# Patient Record
Sex: FEMALE | Race: BLACK OR AFRICAN AMERICAN | NOT HISPANIC OR LATINO | Employment: UNEMPLOYED | RURAL
[De-identification: names, ages, dates, MRNs, and addresses within clinical notes are randomized per-mention and may not be internally consistent; named-entity substitution may affect disease eponyms.]

---

## 2017-01-30 ENCOUNTER — OFFICE VISIT (OUTPATIENT)
Dept: FAMILY MEDICINE CLINIC | Facility: CLINIC | Age: 57
End: 2017-01-30

## 2017-01-30 VITALS
DIASTOLIC BLOOD PRESSURE: 66 MMHG | TEMPERATURE: 97.9 F | SYSTOLIC BLOOD PRESSURE: 104 MMHG | HEIGHT: 63 IN | RESPIRATION RATE: 16 BRPM | WEIGHT: 150 LBS | BODY MASS INDEX: 26.58 KG/M2 | HEART RATE: 108 BPM

## 2017-01-30 DIAGNOSIS — G47.30 SLEEP APNEA, UNSPECIFIED TYPE: Primary | ICD-10-CM

## 2017-01-30 DIAGNOSIS — M25.50 ARTHRALGIA, UNSPECIFIED JOINT: ICD-10-CM

## 2017-01-30 DIAGNOSIS — E56.9 VITAMIN DEFICIENCY: ICD-10-CM

## 2017-01-30 PROCEDURE — 99214 OFFICE O/P EST MOD 30 MIN: CPT | Performed by: NURSE PRACTITIONER

## 2017-01-30 RX ORDER — DICLOFENAC SODIUM 75 MG/1
75 TABLET, DELAYED RELEASE ORAL 2 TIMES DAILY
Qty: 60 TABLET | Refills: 5 | Status: SHIPPED | OUTPATIENT
Start: 2017-01-30 | End: 2017-05-02

## 2017-01-30 RX ORDER — FOLIC ACID 1 MG/1
1 TABLET ORAL DAILY
Qty: 30 TABLET | Refills: 5 | Status: SHIPPED | OUTPATIENT
Start: 2017-01-30 | End: 2017-09-22 | Stop reason: SDUPTHER

## 2017-01-30 NOTE — MR AVS SNAPSHOT
Jyoti SARAVIA Person   1/30/2017 3:00 PM   Office Visit    Dept Phone:  [phone]   Encounter #:  [id number]    Provider:  WESTON Burroughs   Department:  [facility]                Your Full Care Plan              Today's Medication Changes          These changes are accurate as of: 1/30/17  4:08 PM.  If you have any questions, ask your nurse or doctor.               Medication(s)that have changed:     diclofenac 75 MG EC tablet   Commonly known as:  VOLTAREN   Take 1 tablet by mouth 2 (Two) Times a Day.   What changed:    - medication strength  - how much to take   Changed by:  WESTON Burroughs         Stop taking medication(s)listed here:     hydrochlorothiazide 25 MG tablet   Commonly known as:  HYDRODIURIL   Stopped by:  WESTON Burroughs                Where to Get Your Medications      These medications were sent to [facility] [place], [place] - [address] - [phone]  - [phone]   [address] [facility] [address]     Phone:  [phone]     diclofenac 75 MG EC tablet    folic acid 1 MG tablet                  Your Updated Medication List          This list is accurate as of: 1/30/17  4:08 PM.  Always use your most recent med list.                aspirin 81 MG chewable tablet       atorvastatin 40 MG tablet   Commonly known as:  LIPITOR   Take 1 tablet by mouth Daily.       diclofenac 75 MG EC tablet   Commonly known as:  VOLTAREN   Take 1 tablet by mouth 2 (Two) Times a Day.       folic acid 1 MG tablet   Commonly known as:  FOLVITE   Take 1 tablet by mouth Daily.       gabapentin 800 MG tablet   Commonly known as:  NEURONTIN   Take 1 tablet by mouth 2 (two) times a day.       ibuprofen 800 MG tablet   Commonly known as:  ADVIL,MOTRIN   take 1 tablet by mouth three times a day if needed for pain       levETIRAcetam 250 MG tablet   Commonly known as:  KEPPRA      Take 1 tablet by mouth 2 (Two) Times a Day.       lisinopril-hydrochlorothiazide 20-25 MG per tablet   Commonly known as:  PRINZIDE,ZESTORETIC   Take 1 tablet by mouth Daily.       loratadine 10 MG tablet   Commonly known as:  CLARITIN   take 1 tablet by mouth once daily       meclizine 25 MG tablet   Commonly known as:  ANTIVERT   take 1 tablet by mouth three times a day if needed for dizziness       methocarbamol 500 MG tablet   Commonly known as:  ROBAXIN   Take 1 tablet by mouth 3 (Three) Times a Day As Needed for muscle spasms.       rOPINIRole 2 MG tablet   Commonly known as:  REQUIP   take 1 tablet by mouth every evening       VENTOLIN  (90 BASE) MCG/ACT inhaler   Generic drug:  albuterol       vitamin D 77207 UNITS capsule capsule   Commonly known as:  ERGOCALCIFEROL   1 CAP EVERY 2 WEEKS               You Were Diagnosed With        Codes Comments    Arthralgia, unspecified joint    -  Primary ICD-10-CM: M25.50  ICD-9-CM: 719.40     Vitamin deficiency     ICD-10-CM: E56.9  ICD-9-CM: 269.2       Instructions     None    Patient Instructions History      Upcoming Appointments     Visit Type Date Time Department    OFFICE VISIT 1/30/2017  3:00 PM [facility]    FOLLOW UP 4/10/2017  1:40 PM [facility]      MyChart Signup     Our records indicate that you have declined [facility] signup. If you would like to sign up for [employer], please email [facility]orm[facility] [email or web address] or call [phone] to obtain an activation code.             Other Info from Your Visit           Your Appointments     Apr 10, 2017  1:40 PM CDT   Follow Up with WESTON Gaffney   [facility] (--)    [address] [place] [address] [address]  [facility] [address] [phone]           Arrive 15 minutes prior to appointment.              Allergies     No Known Allergies      Reason for Visit     Neck Pain re.ck    Sleep Apnea           Vital Signs     Blood Pressure Pulse Temperature  "Respirations Height Weight    104/66 108 97.9 °F (36.6 °C) 16 63\" (160 cm) 150 lb (68 kg)    Body Mass Index Smoking Status                26.57 kg/m2 Current Every Day Smoker          Problems and Diagnoses Noted     Joint pain    Vitamin deficiency        "

## 2017-01-31 NOTE — PROGRESS NOTES
Hattie Riddle is a 56 y.o. female.     Sleep Apnea   This is a chronic problem. The current episode started more than 1 year ago. The problem occurs constantly. The problem has been waxing and waning. Associated symptoms include fatigue. Pertinent negatives include no abdominal pain, anorexia, arthralgias, change in bowel habit, chest pain, chills, congestion, coughing, diaphoresis, fever, headaches, joint swelling, myalgias, nausea, neck pain, numbness, rash, sore throat, swollen glands, urinary symptoms, vertigo, visual change, vomiting or weakness. Nothing aggravates the symptoms. She has tried nothing (has had sleep study done, but was unable to get results or treatment due to misunderstanding with physician who ordered.) for the symptoms. The treatment provided no relief.   Pain   This is a chronic (has neuralgia on right side of her body.  already takes gabapentin and other meds prescribed by neurologist.  has multiple joint pain) problem. The current episode started more than 1 year ago. Associated symptoms include fatigue. Pertinent negatives include no abdominal pain, anorexia, arthralgias, change in bowel habit, chest pain, chills, congestion, coughing, diaphoresis, fever, headaches, joint swelling, myalgias, nausea, neck pain, numbness, rash, sore throat, swollen glands, urinary symptoms, vertigo, visual change, vomiting or weakness. The symptoms are aggravated by walking, bending and standing. She has tried NSAIDs for the symptoms. The treatment provided moderate relief.        The following portions of the patient's history were reviewed and updated as appropriate: allergies, current medications, past family history, past medical history, past social history, past surgical history and problem list.    Review of Systems   Constitutional: Positive for fatigue. Negative for chills, diaphoresis and fever.   HENT: Negative.  Negative for congestion and sore throat.    Respiratory: Negative.   Negative for cough.    Cardiovascular: Negative.  Negative for chest pain.   Gastrointestinal: Negative for abdominal pain, anorexia, change in bowel habit, nausea and vomiting.   Musculoskeletal: Negative.  Negative for arthralgias, joint swelling, myalgias and neck pain.   Skin: Negative.  Negative for rash.   Neurological: Positive for syncope. Negative for vertigo, weakness, numbness and headaches.   Psychiatric/Behavioral: Negative.        Objective   Physical Exam   Constitutional: She is oriented to person, place, and time. She appears well-developed and well-nourished. No distress.   HENT:   Head: Normocephalic and atraumatic.   Eyes: EOM are normal. Pupils are equal, round, and reactive to light.   Neck: Normal range of motion. Neck supple. No thyromegaly present.   Cardiovascular: Normal rate, regular rhythm and normal heart sounds.  Exam reveals no friction rub.    No murmur heard.  Pulmonary/Chest: Effort normal and breath sounds normal. No respiratory distress. She has no wheezes. She has no rales.   Abdominal: Soft.   Musculoskeletal: Normal range of motion.        Right shoulder: She exhibits tenderness.        Right elbow: Tenderness found.        Right wrist: She exhibits tenderness.        Cervical back: She exhibits tenderness.   Neurological: She is alert and oriented to person, place, and time.   Skin: Skin is warm and dry.   Psychiatric: She has a normal mood and affect. Thought content normal.   Nursing note and vitals reviewed.      Assessment/Plan   Jyoti was seen today for neck pain and sleep apnea.    Diagnoses and all orders for this visit:    Sleep apnea, unspecified type    Arthralgia, unspecified joint  -     diclofenac (VOLTAREN) 75 MG EC tablet; Take 1 tablet by mouth 2 (Two) Times a Day.    Vitamin deficiency  -     folic acid (FOLVITE) 1 MG tablet; Take 1 tablet by mouth Daily.    will refer her for a sleep study.  She is given a refill on some of her maintenance meds.

## 2017-02-19 DIAGNOSIS — R42 VERTIGO: ICD-10-CM

## 2017-02-20 RX ORDER — LEVETIRACETAM 250 MG/1
250 TABLET ORAL 2 TIMES DAILY
Qty: 60 TABLET | Refills: 5 | Status: SHIPPED | OUTPATIENT
Start: 2017-02-20 | End: 2017-05-23 | Stop reason: SDUPTHER

## 2017-02-20 RX ORDER — MECLIZINE HYDROCHLORIDE 25 MG/1
TABLET ORAL
Qty: 30 TABLET | Refills: 5 | Status: SHIPPED | OUTPATIENT
Start: 2017-02-20 | End: 2017-05-30 | Stop reason: SDUPTHER

## 2017-02-24 DIAGNOSIS — M25.50 ARTHRALGIA: ICD-10-CM

## 2017-03-16 RX ORDER — LISINOPRIL AND HYDROCHLOROTHIAZIDE 12.5; 1 MG/1; MG/1
TABLET ORAL
Qty: 30 TABLET | Refills: 3 | Status: SHIPPED | OUTPATIENT
Start: 2017-03-16 | End: 2017-04-10

## 2017-03-18 ENCOUNTER — HOSPITAL ENCOUNTER (EMERGENCY)
Facility: HOSPITAL | Age: 57
Discharge: HOME | End: 2017-03-18
Attending: EMERGENCY MEDICINE | Admitting: EMERGENCY MEDICINE

## 2017-03-18 VITALS
RESPIRATION RATE: 18 BRPM | OXYGEN SATURATION: 100 % | HEART RATE: 99 BPM | BODY MASS INDEX: 26.58 KG/M2 | HEIGHT: 63 IN | SYSTOLIC BLOOD PRESSURE: 98 MMHG | WEIGHT: 150 LBS | TEMPERATURE: 97.6 F | DIASTOLIC BLOOD PRESSURE: 62 MMHG

## 2017-03-18 DIAGNOSIS — H60.91 OTITIS EXTERNA OF RIGHT EAR, UNSPECIFIED CHRONICITY, UNSPECIFIED TYPE: Primary | ICD-10-CM

## 2017-03-18 PROCEDURE — 99283 EMERGENCY DEPT VISIT LOW MDM: CPT

## 2017-03-18 RX ORDER — CLINDAMYCIN HYDROCHLORIDE 300 MG/1
300 CAPSULE ORAL EVERY 6 HOURS
Qty: 40 CAPSULE | Refills: 0 | Status: SHIPPED | OUTPATIENT
Start: 2017-03-18 | End: 2017-07-03

## 2017-03-18 NOTE — ED PROVIDER NOTES
Subjective   Patient is a 56 y.o. female presenting with ear pain.   Earache   Location:  Right  Behind ear:  Swelling  Quality:  Aching, sore and throbbing  Severity:  Severe  Onset quality:  Gradual  Duration:  2 days  Timing:  Constant  Progression:  Worsening  Chronicity:  New  Context: not direct blow, not elevation change, not foreign body in ear, not loud noise, not recent URI and not water in ear    Relieved by:  Nothing  Worsened by:  Nothing  Ineffective treatments:  None tried  Associated symptoms: ear discharge, hearing loss and neck pain    Associated symptoms: no abdominal pain, no congestion, no cough, no diarrhea, no fever, no headaches, no rash, no rhinorrhea, no sore throat, no tinnitus and no vomiting    Risk factors: no recent travel, no chronic ear infection and no prior ear surgery        Review of Systems   Constitutional: Negative for activity change, appetite change, chills and fever.   HENT: Positive for ear discharge, ear pain and hearing loss. Negative for congestion, rhinorrhea, sore throat and tinnitus.    Eyes: Negative.  Negative for discharge and redness.   Respiratory: Negative.  Negative for cough, chest tightness and shortness of breath.    Cardiovascular: Negative.  Negative for chest pain, palpitations and leg swelling.   Gastrointestinal: Negative.  Negative for abdominal pain, diarrhea, nausea and vomiting.   Genitourinary: Negative for difficulty urinating, dysuria, flank pain and urgency.   Musculoskeletal: Positive for neck pain. Negative for arthralgias, back pain, joint swelling and myalgias.   Skin: Negative.  Negative for color change and rash.   Neurological: Negative.  Negative for dizziness, seizures, speech difficulty, weakness, numbness and headaches.   Psychiatric/Behavioral: Negative for behavioral problems.   All other systems reviewed and are negative.      Past Medical History:   Diagnosis Date   • Acute bronchitis 07/19/2016   • Acute otitis externa  09/04/2014   • Acute otitis media 02/10/2015   • Acute sinusitis 07/19/2016   • Backache 12/08/2014   • Benign hypertension 05/13/2016   • Cerebrovascular accident    • Dizziness 02/10/2015   • Encounter for gynecological examination without abnormal finding 03/10/2016   • Fatigue 08/28/2015   • Foot pain 06/09/2014   • Hyperkeratosis 04/28/2014   • Hypotensive episode 06/12/2015   • Keratoma 08/04/2014    intractable plantar   • Low blood pressure 08/28/2015   • Menopausal and perimenopausal disorder 03/10/2016    other specified   • Numbness of face 05/13/2016    improved   • Otitis media of right ear 07/30/2016    unspecified   • Perforated tympanic membrane 09/04/2014   • Puncture wound 06/17/2014    injury   • Right-sided face pain 05/13/2016   • Seizure    • Thumb pain 08/28/2015   • Upper respiratory infection 08/28/2015   • Wheezing 10/21/2014       No Known Allergies    Past Surgical History:   Procedure Laterality Date   • BREAST BIOPSY     • OTHER SURGICAL HISTORY  07/07/2014    Destruction of Benign Lesion (1-14)   • TUBAL ABDOMINAL LIGATION         Family History   Problem Relation Age of Onset   • Alzheimer's disease Other    • Bipolar disorder Other    • Depression Other    • Diabetes Other    • Heart disease Other    • Hypertension Other    • Lung cancer Other    • Migraines Other    • Schizophrenia Other    • Sickle cell anemia Other    • Stroke Other    • No Known Problems Mother    • Alzheimer's disease Father    • Heart attack Father    • Throat cancer Father    • No Known Problems Daughter        Social History     Social History   • Marital status:      Spouse name: N/A   • Number of children: N/A   • Years of education: N/A     Social History Main Topics   • Smoking status: Current Every Day Smoker     Packs/day: 0.50     Types: Cigarettes   • Smokeless tobacco: Never Used   • Alcohol use Yes      Comment: rarely   • Drug use: No   • Sexual activity: Defer     Other Topics Concern   •  None     Social History Narrative           Objective   Physical Exam   HENT:   Head: Normocephalic and atraumatic.   Right Ear: Hearing and tympanic membrane normal. There is drainage, swelling and tenderness.   Left Ear: External ear normal.   Nose: Nose normal.   Mouth/Throat: Oropharynx is clear and moist. No oropharyngeal exudate.       Procedures         ED Course  ED Course                  MDM    Final diagnoses:   Otitis externa of right ear, unspecified chronicity, unspecified type            Eber Hooper MD  03/25/17 0253

## 2017-03-18 NOTE — ED NOTES
Patient presented to Ed with C/O right ear pain,swelling and drainage for about 2 days.     Janett Lam LPN  03/18/17 1237

## 2017-03-21 ENCOUNTER — OFFICE VISIT (OUTPATIENT)
Dept: FAMILY MEDICINE CLINIC | Facility: CLINIC | Age: 57
End: 2017-03-21

## 2017-03-21 VITALS
HEIGHT: 63 IN | DIASTOLIC BLOOD PRESSURE: 70 MMHG | SYSTOLIC BLOOD PRESSURE: 112 MMHG | TEMPERATURE: 97.8 F | BODY MASS INDEX: 25.39 KG/M2 | WEIGHT: 143.3 LBS | RESPIRATION RATE: 16 BRPM | HEART RATE: 76 BPM | OXYGEN SATURATION: 98 %

## 2017-03-21 DIAGNOSIS — H60.501 ACUTE OTITIS EXTERNA OF RIGHT EAR, UNSPECIFIED TYPE: Primary | ICD-10-CM

## 2017-03-21 PROCEDURE — 99213 OFFICE O/P EST LOW 20 MIN: CPT | Performed by: NURSE PRACTITIONER

## 2017-03-21 RX ORDER — AMOXICILLIN AND CLAVULANATE POTASSIUM 875; 125 MG/1; MG/1
1 TABLET, FILM COATED ORAL 2 TIMES DAILY
Qty: 20 TABLET | Refills: 0 | Status: SHIPPED | OUTPATIENT
Start: 2017-03-21 | End: 2017-04-11 | Stop reason: SDUPTHER

## 2017-03-21 NOTE — PROGRESS NOTES
Hattie Riddle is a 56 y.o. female.     HPI Comments: Was seen at the er a few nights ago with pain in her right ear.  She was placed on gtts and antibiotics and is not improved.    Earache    There is pain in the right ear. This is a new problem. The current episode started in the past 7 days. The problem occurs constantly. The problem has been unchanged. There has been no fever. The pain is at a severity of 7/10. The pain is moderate. Pertinent negatives include no abdominal pain, coughing, diarrhea, ear discharge, headaches, hearing loss, neck pain, rash, rhinorrhea, sore throat or vomiting. She has tried antibiotics and ear drops for the symptoms. The treatment provided no relief.        The following portions of the patient's history were reviewed and updated as appropriate: allergies, current medications, past family history, past medical history, past social history, past surgical history and problem list.    Review of Systems   Constitutional: Negative.    HENT: Positive for ear pain. Negative for ear discharge, hearing loss, rhinorrhea and sore throat.    Respiratory: Negative.  Negative for cough.    Cardiovascular: Negative.    Gastrointestinal: Negative.  Negative for abdominal pain, diarrhea and vomiting.   Musculoskeletal: Negative.  Negative for neck pain.   Skin: Negative.  Negative for rash.   Neurological: Negative.  Negative for headaches.   Psychiatric/Behavioral: Negative.        Objective   Physical Exam   Constitutional: She is oriented to person, place, and time. She appears well-developed and well-nourished. No distress.   HENT:   Head: Normocephalic and atraumatic.   Right Ear: Hearing normal.   Left Ear: Hearing, tympanic membrane, external ear and ear canal normal.   Right canal swollen and is full of cheesy debris.     Eyes: Pupils are equal, round, and reactive to light.   Neck: Normal range of motion. Neck supple. No thyromegaly present.   Cardiovascular: Normal rate,  regular rhythm and normal heart sounds.  Exam reveals no friction rub.    No murmur heard.  Pulmonary/Chest: Effort normal and breath sounds normal. No respiratory distress. She has no wheezes. She has no rales.   Abdominal: Soft. Bowel sounds are normal.   Musculoskeletal: Normal range of motion.   Neurological: She is alert and oriented to person, place, and time.   Skin: Skin is warm and dry.   Psychiatric: She has a normal mood and affect. Thought content normal.   Nursing note and vitals reviewed.      Assessment/Plan   Jyoti was seen today for follow-up.    Diagnoses and all orders for this visit:    Acute otitis externa of right ear, unspecified type  -     amoxicillin-clavulanate (AUGMENTIN) 875-125 MG per tablet; Take 1 tablet by mouth 2 (Two) Times a Day.      The clindomycin is changed to augmentin.  She should cont with the ear gtts.  Will refer to ent.

## 2017-03-22 RX ORDER — IBUPROFEN 800 MG/1
TABLET ORAL
Qty: 30 TABLET | Refills: 3 | OUTPATIENT
Start: 2017-03-22

## 2017-03-27 DIAGNOSIS — I10 ESSENTIAL HYPERTENSION: ICD-10-CM

## 2017-03-27 RX ORDER — LISINOPRIL AND HYDROCHLOROTHIAZIDE 25; 20 MG/1; MG/1
TABLET ORAL
Qty: 30 TABLET | Refills: 3 | Status: SHIPPED | OUTPATIENT
Start: 2017-03-27 | End: 2017-07-28 | Stop reason: SDUPTHER

## 2017-04-04 ENCOUNTER — CLINICAL SUPPORT (OUTPATIENT)
Dept: FAMILY MEDICINE CLINIC | Facility: CLINIC | Age: 57
End: 2017-04-04

## 2017-04-04 DIAGNOSIS — H66.91 RIGHT OTITIS MEDIA, UNSPECIFIED CHRONICITY, UNSPECIFIED OTITIS MEDIA TYPE: Primary | ICD-10-CM

## 2017-04-04 DIAGNOSIS — R06.2 WHEEZING: Primary | ICD-10-CM

## 2017-04-04 PROCEDURE — 96372 THER/PROPH/DIAG INJ SC/IM: CPT | Performed by: NURSE PRACTITIONER

## 2017-04-04 RX ORDER — ALBUTEROL SULFATE 90 UG/1
2 AEROSOL, METERED RESPIRATORY (INHALATION)
Qty: 18 G | Refills: 3 | Status: SHIPPED | OUTPATIENT
Start: 2017-04-04 | End: 2018-04-26 | Stop reason: SDUPTHER

## 2017-04-04 RX ORDER — CEFTRIAXONE 1 G/1
1 INJECTION, POWDER, FOR SOLUTION INTRAMUSCULAR; INTRAVENOUS EVERY 24 HOURS
Status: DISCONTINUED | OUTPATIENT
Start: 2017-04-04 | End: 2017-07-03

## 2017-04-04 RX ADMIN — CEFTRIAXONE 1 G: 1 INJECTION, POWDER, FOR SOLUTION INTRAMUSCULAR; INTRAVENOUS at 17:00

## 2017-04-10 ENCOUNTER — OFFICE VISIT (OUTPATIENT)
Dept: NEUROLOGY | Facility: CLINIC | Age: 57
End: 2017-04-10

## 2017-04-10 VITALS
DIASTOLIC BLOOD PRESSURE: 74 MMHG | SYSTOLIC BLOOD PRESSURE: 102 MMHG | HEART RATE: 84 BPM | WEIGHT: 138 LBS | BODY MASS INDEX: 24.45 KG/M2 | HEIGHT: 63 IN

## 2017-04-10 DIAGNOSIS — G40.309 GENERALIZED SEIZURE DISORDER (HCC): ICD-10-CM

## 2017-04-10 DIAGNOSIS — I63.032 CEREBROVASCULAR ACCIDENT (CVA) DUE TO THROMBOSIS OF LEFT CAROTID ARTERY (HCC): Primary | ICD-10-CM

## 2017-04-10 DIAGNOSIS — G47.30 SLEEP APNEA, UNSPECIFIED TYPE: ICD-10-CM

## 2017-04-10 DIAGNOSIS — Z72.0 TOBACCO ABUSE: ICD-10-CM

## 2017-04-10 DIAGNOSIS — R94.01 ABNORMAL EEG: ICD-10-CM

## 2017-04-10 DIAGNOSIS — I10 ESSENTIAL HYPERTENSION: ICD-10-CM

## 2017-04-10 DIAGNOSIS — F19.90 ILLICIT DRUG USE: ICD-10-CM

## 2017-04-10 PROCEDURE — 99213 OFFICE O/P EST LOW 20 MIN: CPT | Performed by: CLINICAL NURSE SPECIALIST

## 2017-04-10 RX ORDER — ATORVASTATIN CALCIUM 40 MG/1
40 TABLET, FILM COATED ORAL DAILY
Qty: 30 TABLET | Refills: 8 | Status: SHIPPED | OUTPATIENT
Start: 2017-04-10 | End: 2017-05-23 | Stop reason: SDUPTHER

## 2017-04-10 NOTE — PATIENT INSTRUCTIONS
"You Can Quit Smoking  If you are ready to quit smoking or are thinking about it, congratulations! You have chosen to help yourself be healthier and live longer! There are lots of different ways to quit smoking. Nicotine gum, nicotine patches, a nicotine inhaler, or nicotine nasal spray can help with physical craving. Hypnosis, support groups, and medicines help break the habit of smoking.  TIPS TO GET OFF AND STAY OFF CIGARETTES  · Learn to predict your moods. Do not let a bad situation be your excuse to have a cigarette. Some situations in your life might tempt you to have a cigarette.  · Ask friends and co-workers not to smoke around you.  · Make your home smoke-free.  · Never have \"just one\" cigarette. It leads to wanting another and another. Remind yourself of your decision to quit.  · On a card, make a list of your reasons for not smoking. Read it at least the same number of times a day as you have a cigarette. Tell yourself everyday, \"I do not want to smoke. I choose not to smoke.\"  · Ask someone at home or work to help you with your plan to quit smoking.  · Have something planned after you eat or have a cup of coffee. Take a walk or get other exercise to perk you up. This will help to keep you from overeating.  · Try a relaxation exercise to calm you down and decrease your stress. Remember, you may be tense and nervous the first two weeks after you quit. This will pass.  · Find new activities to keep your hands busy. Play with a pen, coin, or rubber band. Doodle or draw things on paper.  · Brush your teeth right after eating. This will help cut down the craving for the taste of tobacco after meals. You can try mouthwash too.  · Try gum, breath mints, or diet candy to keep something in your mouth.  IF YOU SMOKE AND WANT TO QUIT:  · Do not stock up on cigarettes. Never buy a carton. Wait until one pack is finished before you buy another.  · Never carry cigarettes with you at work or at home.  · Keep cigarettes " "as far away from you as possible. Leave them with someone else.  · Never carry matches or a lighter with you.  · Ask yourself, \"Do I need this cigarette or is this just a reflex?\"  · Bet with someone that you can quit. Put cigarette money in a [employer] bank every morning. If you smoke, you give up the money. If you do not smoke, by the end of the week, you keep the money.  · Keep trying. It takes 21 days to change a habit!  · Talk to your doctor about using medicines to help you quit. These include nicotine replacement gum, lozenges, or skin patches.     This information is not intended to replace advice given to you by your health care provider. Make sure you discuss any questions you have with your health care provider.     Document Released: 10/14/2010 Document Revised: 03/11/2013 Document Reviewed: 05/03/2016  [employer] Interactive Patient Education ©2016 [employer]  Stroke Prevention  Some medical conditions and behaviors are associated with an increased chance of having a stroke. You may prevent a stroke by making healthy choices and managing medical conditions.  HOW CAN I REDUCE MY RISK OF HAVING A STROKE?   · Stay physically active. Get at least 30 minutes of activity on most or all days.  · Do not smoke. It may also be helpful to avoid exposure to secondhand smoke.  · Limit alcohol use. Moderate alcohol use is considered to be:  ¨ No more than 2 drinks per day for men.  ¨ No more than 1 drink per day for nonpregnant women.  · Eat healthy foods. This involves:  ¨ Eating 5 or more servings of fruits and vegetables a day.  ¨ Making dietary changes that address high blood pressure (hypertension), high cholesterol, diabetes, or obesity.  · Manage your cholesterol levels.  ¨ Making food choices that are high in fiber and low in saturated fat, trans fat, and cholesterol may control cholesterol levels.  ¨ Take any prescribed medicines to control cholesterol as directed by your health care provider.  · Manage your " diabetes.  ¨ Controlling your carbohydrate and sugar intake is recommended to manage diabetes.  ¨ Take any prescribed medicines to control diabetes as directed by your health care provider.  · Control your hypertension.  ¨ Making food choices that are low in salt (sodium), saturated fat, trans fat, and cholesterol is recommended to manage hypertension.  ¨ Ask your health care provider if you need treatment to lower your blood pressure. Take any prescribed medicines to control hypertension as directed by your health care provider.  ¨ If you are 18-39 years of age, have your blood pressure checked every 3-5 years. If you are 40 years of age or older, have your blood pressure checked every year.  · Maintain a healthy weight.  ¨ Reducing calorie intake and making food choices that are low in sodium, saturated fat, trans fat, and cholesterol are recommended to manage weight.  · Stop drug abuse.  · Avoid taking birth control pills.  ¨ Talk to your health care provider about the risks of taking birth control pills if you are over 35 years old, smoke, get migraines, or have ever had a blood clot.  · Get evaluated for sleep disorders (sleep apnea).  ¨ Talk to your health care provider about getting a sleep evaluation if you snore a lot or have excessive sleepiness.  · Take medicines only as directed by your health care provider.  ¨ For some people, aspirin or blood thinners (anticoagulants) are helpful in reducing the risk of forming abnormal blood clots that can lead to stroke. If you have the irregular heart rhythm of atrial fibrillation, you should be on a blood thinner unless there is a good reason you cannot take them.  ¨ Understand all your medicine instructions.  · Make sure that other conditions (such as anemia or atherosclerosis) are addressed.  SEEK IMMEDIATE MEDICAL CARE IF:   · You have sudden weakness or numbness of the face, arm, or leg, especially on one side of the body.  · Your face or eyelid droops to one  side.  · You have sudden confusion.  · You have trouble speaking (aphasia) or understanding.  · You have sudden trouble seeing in one or both eyes.  · You have sudden trouble walking.  · You have dizziness.  · You have a loss of balance or coordination.  · You have a sudden, severe headache with no known cause.  · You have new chest pain or an irregular heartbeat.  Any of these symptoms may represent a serious problem that is an emergency. Do not wait to see if the symptoms will go away. Get medical help at once. Call your local emergency services (911 in [place] Do not drive yourself to the hospital.     This information is not intended to replace advice given to you by your health care provider. Make sure you discuss any questions you have with your health care provider.     Document Released: 01/25/2006 Document Revised: 01/08/2016 Document Reviewed: 06/20/2014  [employer] Interactive Patient Education ©2016 [employer]  Epilepsy  Epilepsy is a disorder in which a person has repeated seizures over time. A seizure is a release of abnormal electrical activity in the brain. Seizures can cause a change in attention, behavior, or the ability to remain awake and alert (altered mental status). Seizures often involve uncontrollable shaking (convulsions).   Most people with epilepsy lead normal lives. However, people with epilepsy are at an increased risk of falls, accidents, and injuries. Therefore, it is important to begin treatment right away.  CAUSES   Epilepsy has many possible causes. Anything that disturbs the normal pattern of brain cell activity can lead to seizures. This may include:   · Head injury.  · Birth trauma.  · High fever as a child.  · Stroke.  · Bleeding into or around the brain.  · Certain drugs.  · Prolonged low oxygen, such as what occurs after CPR efforts.  · Abnormal brain development.  · Certain illnesses, such as meningitis, encephalitis (brain infection), malaria, and other infections.  · An  imbalance of nerve signaling chemicals (neurotransmitters).    SIGNS AND SYMPTOMS   The symptoms of a seizure can vary greatly from one person to another. Right before a seizure, you may have a warning (aura) that a seizure is about to occur. An aura may include the following symptoms:  · Fear or anxiety.  · Nausea.  · Feeling like the room is spinning (vertigo).  · Vision changes, such as seeing flashing lights or spots.  Common symptoms during a seizure include:  · Abnormal sensations, such as an abnormal smell or a bitter taste in the mouth.    · Sudden, general body stiffness.    · Convulsions that involve rhythmic jerking of the face, arm, or leg on one or both sides.    · Sudden change in consciousness.      Appearing to be awake but not responding.      Appearing to be asleep but cannot be awakened.    · Grimacing, chewing, lip smacking, drooling, tongue biting, or loss of bowel or bladder control.  After a seizure, you may feel sleepy for a while.   DIAGNOSIS   Your health care provider will ask about your symptoms and take a medical history. Descriptions from any witnesses to your seizures will be very helpful in the diagnosis. A physical exam, including a detailed neurological exam, is necessary. Various tests may be done, such as:   · An electroencephalogram (EEG). This is a painless test of your brain waves. In this test, a diagram is created of your brain waves. These diagrams can be interpreted by a specialist.  · An MRI of the brain.    · A CT scan of the brain.    · A spinal tap (lumbar puncture, LP).  · Blood tests to check for signs of infection or abnormal blood chemistry.  TREATMENT   There is no cure for epilepsy, but it is generally treatable. Once epilepsy is diagnosed, it is important to begin treatment as soon as possible. For most people with epilepsy, seizures can be controlled with medicines. The following may also be used:  · A pacemaker for the brain (vagus nerve stimulator) can be used  for people with seizures that are not well controlled by medicine.  · Surgery on the brain.  For some people, epilepsy eventually goes away.  HOME CARE INSTRUCTIONS   ·  Follow your health care provider's recommendations on driving and safety in normal activities.  · Get enough rest. Lack of sleep can cause seizures.  · Only take over-the-counter or prescription medicines as directed by your health care provider. Take any prescribed medicine exactly as directed.  · Avoid any known triggers of your seizures.  · Keep a seizure diary. Record what you recall about any seizure, especially any possible trigger.    · Make sure the people you live and work with know that you are prone to seizures. They should receive instructions on how to help you. In general, a witness to a seizure should:      Cushion your head and body.      Turn you on your side.      Avoid unnecessarily restraining you.      Not place anything inside your mouth.      Call for emergency medical help if there is any question about what has occurred.    · Follow up with your health care provider as directed. You may need regular blood tests to monitor the levels of your medicine.    SEEK MEDICAL CARE IF:   · You develop signs of infection or other illness. This might increase the risk of a seizure.    · You seem to be having more frequent seizures.    · Your seizure pattern is changing.    SEEK IMMEDIATE MEDICAL CARE IF:   · You have a seizure that does not stop after a few moments.    · You have a seizure that causes any difficulty in breathing.    · You have a seizure that results in a very severe headache.    · You have a seizure that leaves you with the inability to speak or use a part of your body.       This information is not intended to replace advice given to you by your health care provider. Make sure you discuss any questions you have with your health care provider.     Document Released: 12/18/2006 Document Revised: 01/13/2017 Document  Reviewed: 07/30/2014  [email or web address] Interactive Patient Education ©2016 [employer]

## 2017-04-10 NOTE — PROGRESS NOTES
Subjective     Chief Complaint   Patient presents with   • Neurologic Problem     4 month f/u of stroke/Pt states that she feels like she is unchanged since her last visit.  Pt is R handed.       Jyoti Riddle is a 56 y.o. female right handed .  She is here today for follow up for stroke and abnormal EEG concerning to be seizuregenic. She was last seen 12/2016.  She denies new stroke symptoms of unilateral weakness, slurred speech, or numbness.  She states she did have a seizure a few days ago in the setting of missed doses of keppra and illicit drug use of cocaine and marijuana.  She has no new complaints today.     Stroke   This is a chronic (image negative stroke in 2013 left hemisphere) problem. The problem has been gradually improving. Pertinent negatives include no arthralgias, chest pain, fatigue, fever, myalgias, nausea, sore throat, vomiting or weakness. Associated symptoms comments: Right facial weakness, RUE/RLE weakness and decrease sensation. And mild dysarthria this has improved since last visit but imbalanced and walks with single point cane. Exacerbated by: hypertensive emergency, illicit drug use.   Altered Mental Status   This is a chronic (since 2013) problem. The current episode started more than 1 year ago. The problem has been gradually improving (has had 2 episodes since last visit but may be related to illliciit drug use). Pertinent negatives include no arthralgias, chest pain, fatigue, fever, myalgias, nausea, sore throat, vomiting or weakness. Exacerbated by: illicit drug use, JAQUELIN. Treatments tried: trileptal 150 mg BID.   Seizures    This is a chronic problem. Associated symptoms include speech difficulty (mild dysarthria). Pertinent negatives include no confusion, no visual disturbance, no sore throat, no chest pain, no nausea, no vomiting and no diarrhea. Characteristics include eye blinking. body stiffens Possible causes include missed seizure meds. illicit drug use        Current  Outpatient Prescriptions   Medication Sig Dispense Refill   • albuterol (VENTOLIN HFA) 108 (90 BASE) MCG/ACT inhaler Inhale 2 puffs 4 (Four) Times a Day. 18 g 3   • amoxicillin-clavulanate (AUGMENTIN) 875-125 MG per tablet Take 1 tablet by mouth 2 (Two) Times a Day. 20 tablet 0   • aspirin 81 MG chewable tablet Chew 81 mg.     • folic acid (FOLVITE) 1 MG tablet Take 1 tablet by mouth Daily. 30 tablet 5   • gabapentin (NEURONTIN) 800 MG tablet Take 1 tablet by mouth 2 (two) times a day. 60 tablet 5   • ibuprofen (ADVIL,MOTRIN) 800 MG tablet take 1 tablet by mouth three times a day if needed for pain 30 tablet 3   • levETIRAcetam (KEPPRA) 250 MG tablet Take 1 tablet by mouth 2 (Two) Times a Day. 60 tablet 5   • lisinopril-hydrochlorothiazide (PRINZIDE,ZESTORETIC) 20-25 MG per tablet take 1 tablet by mouth once daily 30 tablet 3   • meclizine (ANTIVERT) 25 MG tablet take 1 tablet by mouth three times a day if needed for dizziness 30 tablet 5   • methocarbamol (ROBAXIN) 500 MG tablet Take 1 tablet by mouth 3 (Three) Times a Day As Needed for muscle spasms. 30 tablet 3   • rOPINIRole (REQUIP) 2 MG tablet take 1 tablet by mouth every evening 30 tablet 3   • vitamin D (ERGOCALCIFEROL) 45161 UNITS capsule capsule 1 CAP EVERY 2 WEEKS 4 capsule 5   • atorvastatin (LIPITOR) 40 MG tablet Take 1 tablet by mouth Daily. 30 tablet 8   • clindamycin (CLEOCIN) 300 MG capsule Take 1 capsule by mouth Every 6 (Six) Hours. 40 capsule 0   • diclofenac (VOLTAREN) 50 MG EC tablet take 1 tablet by mouth twice a day 60 tablet 5   • diclofenac (VOLTAREN) 75 MG EC tablet Take 1 tablet by mouth 2 (Two) Times a Day. 60 tablet 5   • neomycin-polymyxin-hydrocortisone (CORTISPORIN) 3.5-74194-4 otic solution Administer 3 drops into both ears 4 (Four) Times a Day. 10 mL 0     Current Facility-Administered Medications   Medication Dose Route Frequency Provider Last Rate Last Dose   • cefTRIAXone (ROCEPHIN) injection 1 g  1 g Intramuscular Q24H Sharon ROUSSEAU  [facility]   1 g at 04/04/17 1700       Past Medical History:   Diagnosis Date   • Acute bronchitis 07/19/2016   • Acute otitis externa 09/04/2014   • Acute otitis media 02/10/2015   • Acute sinusitis 07/19/2016   • Backache 12/08/2014   • Benign hypertension 05/13/2016   • Cerebrovascular accident    • Dizziness 02/10/2015   • Encounter for gynecological examination without abnormal finding 03/10/2016   • Fatigue 08/28/2015   • Foot pain 06/09/2014   • Hyperkeratosis 04/28/2014   • Hypotensive episode 06/12/2015   • Keratoma 08/04/2014    intractable plantar   • Low blood pressure 08/28/2015   • Menopausal and perimenopausal disorder 03/10/2016    other specified   • Numbness of face 05/13/2016    improved   • Otitis media of right ear 07/30/2016    unspecified   • Perforated tympanic membrane 09/04/2014   • Puncture wound 06/17/2014    injury   • Right-sided face pain 05/13/2016   • Seizure    • Thumb pain 08/28/2015   • Upper respiratory infection 08/28/2015   • Wheezing 10/21/2014       Past Surgical History:   Procedure Laterality Date   • BREAST BIOPSY     • OTHER SURGICAL HISTORY  07/07/2014    Destruction of Benign Lesion (1-14)   • TUBAL ABDOMINAL LIGATION         family history includes Alzheimer's disease in her father and other; Bipolar disorder in her other; Depression in her other; Diabetes in her other; Heart attack in her father; Heart disease in her other; Hypertension in her other; Lung cancer in her other; Migraines in her other; No Known Problems in her daughter and mother; Sickle cell anemia in her other; Stroke in her other; Throat cancer in her father.    Social History   Substance Use Topics   • Smoking status: Current Every Day Smoker     Packs/day: 0.50     Types: Cigarettes   • Smokeless tobacco: Never Used   • Alcohol use Yes      Comment: rarely       Review of Systems   Constitutional: Negative for fatigue and fever.   HENT: Negative.  Negative for rhinorrhea, sinus pressure and sore  "throat.    Eyes: Negative.  Negative for visual disturbance.   Respiratory: Negative.  Negative for choking, chest tightness and shortness of breath.    Cardiovascular: Negative.  Negative for chest pain and leg swelling.   Gastrointestinal: Negative.  Negative for constipation, diarrhea, nausea and vomiting.   Endocrine: Negative.    Genitourinary: Negative.  Negative for dysuria.   Musculoskeletal: Positive for gait problem (unsteady gait). Negative for arthralgias and myalgias.   Skin: Negative.    Allergic/Immunologic: Negative.    Neurological: Positive for seizures and speech difficulty (mild dysarthria). Negative for dizziness, weakness and light-headedness.   Hematological: Negative.  Negative for adenopathy.   Psychiatric/Behavioral: Negative for agitation and confusion.   All other systems reviewed and are negative.      Objective     /74  Pulse 84  Ht 63\" (160 cm)  Wt 138 lb (62.6 kg)  BMI 24.45 kg/m2, Body mass index is 24.45 kg/(m^2).    Physical Exam   Constitutional: She is oriented to person, place, and time. Vital signs are normal. She appears well-developed and well-nourished.   HENT:   Head: Normocephalic and atraumatic.   Right Ear: Hearing and external ear normal.   Left Ear: Hearing and external ear normal.   Nose: Nose normal.   Mouth/Throat: Oropharynx is clear and moist.   Eyes: EOM and lids are normal. Pupils are equal, round, and reactive to light.   Neck: Neck supple. Carotid bruit is not present.   Cardiovascular: Normal rate, regular rhythm, S1 normal, S2 normal and normal heart sounds.    Pulmonary/Chest: Effort normal and breath sounds normal.   Abdominal: Soft. Bowel sounds are normal.   Musculoskeletal: Normal range of motion.   Neurological: She is alert and oriented to person, place, and time. She has normal strength and normal reflexes. She displays no tremor. A cranial nerve deficit (right lower facial weakness) and sensory deficit (decrease sensation of right side) is " present. She displays a negative Romberg sign. She displays no seizure activity. Coordination (ataxia bilateral finger to nose) abnormal. Abnormal gait: unsteady gait, negative romberg.   Reflex Scores:       Tricep reflexes are 2+ on the right side and 2+ on the left side.       Bicep reflexes are 2+ on the right side and 2+ on the left side.       Brachioradialis reflexes are 2+ on the right side and 2+ on the left side.       Patellar reflexes are 2+ on the right side and 2+ on the left side.       Achilles reflexes are 2+ on the right side and 2+ on the left side.  Skin: Skin is warm and dry.   Psychiatric: She has a normal mood and affect. Her speech is normal and behavior is normal. Cognition and memory are normal.   Nursing note and vitals reviewed.      Results for orders placed or performed during the hospital encounter of 12/23/16   Comprehensive Metabolic Panel   Result Value Ref Range    Sodium 142 137 - 145 mmol/L    Potassium 4.2 3.5 - 5.1 mmol/L    Chloride 103 95 - 110 mmol/L    CO2 27 22 - 31 mmol/L    Anion Gap 12.0 5.0 - 15.0 mmol/L    Glucose 85 60 - 100 mg/dl    BUN 22 (H) 7 - 21 mg/dl    Creatinine 1.2 (H) 0.5 - 1.0 mg/dl    GFR MDRD Non  46 (L) 51 - 120 mL/min/1.73 sq.M    GFR MDRD  56 51 - 120 mL/min/1.73 sq.M    Calcium 8.4 8.4 - 10.2 mg/dl    Total Protein 6.6 6.3 - 8.6 gm/dl    Albumin 3.3 (L) 3.4 - 4.8 gm/dl    Total Bilirubin 0.4 0.2 - 1.3 mg/dl    Alkaline Phosphatase 60 38 - 126 U/L    AST (SGOT) 57 (H) 14 - 36 U/L    ALT (SGPT) 51 9 - 52 U/L   CBC & Differential   Result Value Ref Range    WBC 3.5 3.2 - 9.8 x1000/uL    RBC 3.72 (L) 3.77 - 5.16 jessica/mm3    Hemoglobin 11.4 (L) 12.0 - 15.5 gm/dl    Hematocrit 33.8 (L) 35.0 - 45.0 %    MCV 90.9 80.0 - 98.0 fl    MCH 30.6 26.0 - 34.0 pg    MCHC 33.7 31.4 - 36.0 gm/dl    RDW 12.9 11.5 - 14.5 %    Platelets 125 (L) 150 - 450 x1000/mm3    MPV 11.6 8.0 - 12.0 fl    Neutrophil Rel % 42.0 37.0 - 80.0 %     Lymphocyte Rel % 40.2 10.0 - 50.0 %    Monocyte Rel % 6.9 0.0 - 12.0 %    Eosinophil Rel % 10.6 (H) 0.0 - 7.0 %    Basophil Rel % 0.3 0.0 - 2.0 %    Immature Granulocyte Rel % 0.00 0.00 - 0.50 %    Neutrophils Absolute 1.46 (L) 2.00 - 8.60 x1000/uL    Lymphocytes Absolute 1.40 0.60 - 4.20 x1000/uL    Monocytes Absolute 0.24 0.00 - 0.90 x1000/uL    Eosinophils Absolute 0.37 0.00 - 0.70 x1000/uL    Basophils Absolute 0.01 0.00 - 0.20 x1000/uL    Immature Granulocytes Absolute 0.000 (L) 0.005 - 0.022 x1000/uL        ASSESSMENT/PLAN    Diagnoses and all orders for this visit:    Cerebrovascular accident (CVA) due to thrombosis of left carotid artery    Tobacco abuse    Sleep apnea, unspecified type    Illicit drug use    Abnormal EEG    Essential hypertension    Other orders  -     atorvastatin (LIPITOR) 40 MG tablet; Take 1 tablet by mouth Daily.    MEDICAL DECISION MAKIN. Continue with ASA 81 mg for secondary stroke prevention  2. Continue with statin per PCP for LDL goal less than 70.  3. Continue with BP management per PCP for systolic less than 140.  4. Continue with Keppra 250 mg BID for abnormal EEG and counseled on compliance.  5. Counseled on cessation of cocaine, marijuana, and tobacco  6. Patient is counseled on stroke signs and symptoms using FAST and Time Saved is Brain Saved.  7. Seizure precautions were discussed to include no tub baths, no swimming, avoiding lack of sleep, and avoiding known triggers. Education given of things that may contribute to a seizure to include, but not limited to: stressful situations, fever, fatigue, lack of sleep, low blood sugar, hyperventilation, flashing lights, and caffeine. Instructions given to take seizure medications as prescribed. Education given to family member on what to do during a seizure and care following the seizure. Education given to contact this office prior to stopping or changing any medications.  8.Discussed tobacco cessation for greater than 3  minutes to include options for cessation and information given for support groups. All questions answered.       allergies and all known medications/prescriptions have been reviewed using resources available on this encounter.    Return in about 6 months (around 10/10/2017).        WESTON Lara

## 2017-04-11 ENCOUNTER — OFFICE VISIT (OUTPATIENT)
Dept: FAMILY MEDICINE CLINIC | Facility: CLINIC | Age: 57
End: 2017-04-11

## 2017-04-11 VITALS
WEIGHT: 137.2 LBS | OXYGEN SATURATION: 95 % | SYSTOLIC BLOOD PRESSURE: 134 MMHG | HEIGHT: 63 IN | RESPIRATION RATE: 18 BRPM | DIASTOLIC BLOOD PRESSURE: 88 MMHG | BODY MASS INDEX: 24.31 KG/M2 | TEMPERATURE: 95.9 F | HEART RATE: 76 BPM

## 2017-04-11 DIAGNOSIS — H60.501 ACUTE OTITIS EXTERNA OF RIGHT EAR, UNSPECIFIED TYPE: ICD-10-CM

## 2017-04-11 DIAGNOSIS — M25.562 ACUTE PAIN OF LEFT KNEE: Primary | ICD-10-CM

## 2017-04-11 PROCEDURE — 99213 OFFICE O/P EST LOW 20 MIN: CPT | Performed by: NURSE PRACTITIONER

## 2017-04-11 RX ORDER — AMOXICILLIN AND CLAVULANATE POTASSIUM 875; 125 MG/1; MG/1
1 TABLET, FILM COATED ORAL 2 TIMES DAILY
Qty: 20 TABLET | Refills: 0 | Status: SHIPPED | OUTPATIENT
Start: 2017-04-11 | End: 2017-07-03

## 2017-04-11 NOTE — PROGRESS NOTES
Hattie Riddle is a 56 y.o. female.     HPI Comments: She is here today with painful right ear.  Has had a hx of otitis media and otitis ext.    She feel in her bathroom 3 days ago and hurt her left knee.  Is still hurting.    Earache    There is pain in the right ear. This is a new problem. The current episode started in the past 7 days. The problem occurs constantly. The problem has been unchanged. There has been no fever. The pain is at a severity of 4/10. The pain is moderate. Pertinent negatives include no abdominal pain, coughing, diarrhea, ear discharge, headaches, hearing loss, neck pain, rash, rhinorrhea, sore throat or vomiting. She has tried antibiotics for the symptoms. The treatment provided mild relief. Her past medical history is significant for a chronic ear infection.   Knee Pain    The incident occurred 3 to 5 days ago. The incident occurred at home. The injury mechanism was a fall. The pain is present in the left knee. The pain is at a severity of 4/10. The pain is moderate. The pain has been constant since onset. Pertinent negatives include no inability to bear weight, loss of motion, loss of sensation, muscle weakness, numbness or tingling. The symptoms are aggravated by weight bearing. She has tried nothing for the symptoms. The treatment provided no relief.        The following portions of the patient's history were reviewed and updated as appropriate: allergies, current medications, past family history, past medical history, past social history, past surgical history and problem list.    Review of Systems   Constitutional: Negative.    HENT: Positive for ear pain. Negative for ear discharge, hearing loss, rhinorrhea and sore throat.    Respiratory: Negative.  Negative for cough.    Cardiovascular: Negative.    Gastrointestinal: Negative for abdominal pain, diarrhea and vomiting.   Musculoskeletal: Positive for arthralgias (left knee pain). Negative for neck pain.   Skin:  Negative.  Negative for rash.   Neurological: Negative.  Negative for tingling, numbness and headaches.   Psychiatric/Behavioral: Negative.        Objective   Physical Exam   Constitutional: She is oriented to person, place, and time. She appears well-developed and well-nourished. No distress.   HENT:   Head: Normocephalic.   Right Ear: Hearing normal. Tympanic membrane is injected.   Right canal remains injected and some areas are excoriated appearing.   Eyes: Pupils are equal, round, and reactive to light.   Neck: Normal range of motion. Neck supple. No thyromegaly present.   Cardiovascular: Normal rate, regular rhythm and normal heart sounds.  Exam reveals no friction rub.    No murmur heard.  Pulmonary/Chest: Effort normal and breath sounds normal. No respiratory distress. She has no wheezes. She has no rales.   Abdominal: Soft. Bowel sounds are normal.   Musculoskeletal: Normal range of motion.   Has good rom of her left knee.  No discoloration and can bear weight without diff.  xrays are negative.   Neurological: She is alert and oriented to person, place, and time.   Skin: Skin is warm and dry.   Psychiatric: She has a normal mood and affect. Thought content normal.   Nursing note and vitals reviewed.      Assessment/Plan   Jyoti was seen today for follow-up.    Diagnoses and all orders for this visit:    Acute pain of left knee  -     Cancel: XR Knee 1 or 2 View Left (In Office)  -     XR knee 4+ vw left    Acute otitis externa of right ear, unspecified type  -     neomycin-polymyxin-hydrocortisone (CORTISPORIN) 3.5-07661-4 otic solution; Administer 3 drops into both ears 4 (Four) Times a Day.  -     amoxicillin-clavulanate (AUGMENTIN) 875-125 MG per tablet; Take 1 tablet by mouth 2 (Two) Times a Day.      She is advised to not put anything in her ear.  Also will just watch the left knee for now.

## 2017-04-21 ENCOUNTER — OFFICE VISIT (OUTPATIENT)
Dept: FAMILY MEDICINE CLINIC | Facility: CLINIC | Age: 57
End: 2017-04-21

## 2017-04-21 VITALS
HEIGHT: 63 IN | RESPIRATION RATE: 16 BRPM | SYSTOLIC BLOOD PRESSURE: 122 MMHG | TEMPERATURE: 97.1 F | HEART RATE: 76 BPM | DIASTOLIC BLOOD PRESSURE: 84 MMHG | WEIGHT: 135.3 LBS | OXYGEN SATURATION: 98 % | BODY MASS INDEX: 23.97 KG/M2

## 2017-04-21 DIAGNOSIS — R63.4 WEIGHT LOSS: ICD-10-CM

## 2017-04-21 DIAGNOSIS — M54.32 LEFT SIDED SCIATICA: Primary | ICD-10-CM

## 2017-04-21 PROCEDURE — 96372 THER/PROPH/DIAG INJ SC/IM: CPT | Performed by: NURSE PRACTITIONER

## 2017-04-21 PROCEDURE — 99213 OFFICE O/P EST LOW 20 MIN: CPT | Performed by: NURSE PRACTITIONER

## 2017-04-21 RX ORDER — CYCLOBENZAPRINE HCL 10 MG
10 TABLET ORAL 3 TIMES DAILY PRN
Qty: 30 TABLET | Refills: 3 | Status: SHIPPED | OUTPATIENT
Start: 2017-04-21 | End: 2017-06-01 | Stop reason: SDUPTHER

## 2017-04-21 RX ORDER — BETAMETHASONE SODIUM PHOSPHATE AND BETAMETHASONE ACETATE 3; 3 MG/ML; MG/ML
6 INJECTION, SUSPENSION INTRA-ARTICULAR; INTRALESIONAL; INTRAMUSCULAR; SOFT TISSUE EVERY 24 HOURS
Status: SHIPPED | OUTPATIENT
Start: 2017-04-21 | End: 2017-04-23

## 2017-04-21 RX ADMIN — BETAMETHASONE SODIUM PHOSPHATE AND BETAMETHASONE ACETATE 6 MG: 3; 3 INJECTION, SUSPENSION INTRA-ARTICULAR; INTRALESIONAL; INTRAMUSCULAR; SOFT TISSUE at 15:13

## 2017-04-21 NOTE — PROGRESS NOTES
Hattie Riddle is a 56 y.o. female.     HPI Comments: Was here last week with left knee pain due to a fall.  Now the knee is better, but is having more left back and sciatic nerve pain.  She took a muscle relaxer that belonged to someone else and it did help.  She is also concerned about weight loss.  She says her appetite is good.    Leg Pain    The incident occurred 5 to 7 days ago. The incident occurred at home. There was no injury mechanism. The pain is present in the left leg and left thigh. The pain is at a severity of 6/10. The pain is moderate. The pain has been worsening since onset. Pertinent negatives include no inability to bear weight, loss of motion, loss of sensation, muscle weakness, numbness or tingling. The symptoms are aggravated by weight bearing and movement. Treatments tried: muscle relaxer. The treatment provided significant relief.   Weight Loss   This is a new problem. The current episode started in the past 7 days (has lost3 lbs in the past week to 10 days). The problem occurs constantly. The problem has been gradually worsening. Associated symptoms include arthralgias. Pertinent negatives include no abdominal pain, anorexia, change in bowel habit, chest pain, chills, congestion, coughing, diaphoresis, fatigue, fever, headaches, joint swelling, myalgias, nausea, neck pain, numbness, rash, sore throat, swollen glands, urinary symptoms, vertigo, visual change, vomiting or weakness. Nothing aggravates the symptoms. She has tried nothing for the symptoms. The treatment provided no relief.        The following portions of the patient's history were reviewed and updated as appropriate: allergies, current medications, past family history, past medical history, past social history, past surgical history and problem list.    Review of Systems   Constitutional: Positive for weight loss. Negative for chills, diaphoresis, fatigue and fever.   HENT: Negative.  Negative for congestion and  sore throat.    Respiratory: Negative.  Negative for cough.    Cardiovascular: Negative.  Negative for chest pain.   Gastrointestinal: Negative for abdominal pain, anorexia, change in bowel habit, nausea and vomiting.   Musculoskeletal: Positive for arthralgias. Negative for joint swelling, myalgias and neck pain.   Skin: Negative.  Negative for rash.   Neurological: Negative.  Negative for vertigo, tingling, weakness, numbness and headaches.   Psychiatric/Behavioral: Negative.        Objective   Physical Exam   Constitutional: She is oriented to person, place, and time. She appears well-developed and well-nourished. No distress.   HENT:   Head: Normocephalic.   Eyes: Pupils are equal, round, and reactive to light.   Neck: Normal range of motion. Neck supple. No thyromegaly present.   Cardiovascular: Normal rate, regular rhythm and normal heart sounds.  Exam reveals no friction rub.    No murmur heard.  Pulmonary/Chest: Effort normal and breath sounds normal. No respiratory distress. She has no wheezes. She has no rales.   Abdominal: Soft.   Musculoskeletal: Normal range of motion.        Left upper leg: She exhibits tenderness and edema.   Neurological: She is alert and oriented to person, place, and time.   Skin: Skin is warm and dry.   Psychiatric: She has a normal mood and affect. Thought content normal.   Nursing note and vitals reviewed.      Assessment/Plan   Jyoti was seen today for leg pain.    Diagnoses and all orders for this visit:    Left sided sciatica  -     betamethasone acetate-betamethasone sodium phosphate (CELESTONE SOLUSPAN) injection 6 mg; Inject 1 mL into the shoulder, thigh, or buttocks Daily.    Weight loss  -     TSH    Other orders  -     cyclobenzaprine (FLEXERIL) 10 MG tablet; Take 1 tablet by mouth 3 (Three) Times a Day As Needed for Muscle Spasms.    she has had other labs done and were normal.  tsh has not been checked in a while.

## 2017-04-26 DIAGNOSIS — G62.9 NEUROPATHY: ICD-10-CM

## 2017-04-26 RX ORDER — GABAPENTIN 800 MG/1
800 TABLET ORAL 2 TIMES DAILY
Qty: 60 TABLET | Refills: 5 | Status: SHIPPED | OUTPATIENT
Start: 2017-04-26 | End: 2017-10-12

## 2017-04-28 DIAGNOSIS — G25.81 RESTLESS LEG SYNDROME: ICD-10-CM

## 2017-04-28 RX ORDER — ROPINIROLE 2 MG/1
TABLET, FILM COATED ORAL
Qty: 30 TABLET | Refills: 3 | OUTPATIENT
Start: 2017-04-28

## 2017-05-02 ENCOUNTER — OFFICE VISIT (OUTPATIENT)
Dept: FAMILY MEDICINE CLINIC | Facility: CLINIC | Age: 57
End: 2017-05-02

## 2017-05-02 VITALS
RESPIRATION RATE: 16 BRPM | DIASTOLIC BLOOD PRESSURE: 70 MMHG | WEIGHT: 135 LBS | BODY MASS INDEX: 23.92 KG/M2 | SYSTOLIC BLOOD PRESSURE: 118 MMHG | HEART RATE: 67 BPM | TEMPERATURE: 95.5 F | OXYGEN SATURATION: 98 % | HEIGHT: 63 IN

## 2017-05-02 DIAGNOSIS — M25.50 ARTHRALGIA, UNSPECIFIED JOINT: Primary | ICD-10-CM

## 2017-05-02 PROCEDURE — 99213 OFFICE O/P EST LOW 20 MIN: CPT | Performed by: NURSE PRACTITIONER

## 2017-05-02 RX ORDER — SULINDAC 200 MG/1
200 TABLET ORAL 2 TIMES DAILY
Qty: 60 TABLET | Refills: 2 | Status: SHIPPED | OUTPATIENT
Start: 2017-05-02 | End: 2017-07-03 | Stop reason: SDUPTHER

## 2017-05-12 ENCOUNTER — TELEPHONE (OUTPATIENT)
Dept: FAMILY MEDICINE CLINIC | Facility: CLINIC | Age: 57
End: 2017-05-12

## 2017-05-23 DIAGNOSIS — R56.9 GENERALIZED CONVULSIVE SEIZURES (HCC): Primary | ICD-10-CM

## 2017-05-23 DIAGNOSIS — E78.00 HIGH CHOLESTEROL: ICD-10-CM

## 2017-05-23 RX ORDER — LEVETIRACETAM 250 MG/1
250 TABLET ORAL 2 TIMES DAILY
Qty: 60 TABLET | Refills: 5 | Status: SHIPPED | OUTPATIENT
Start: 2017-05-23 | End: 2017-10-03 | Stop reason: DRUGHIGH

## 2017-05-23 RX ORDER — ATORVASTATIN CALCIUM 40 MG/1
40 TABLET, FILM COATED ORAL DAILY
Qty: 30 TABLET | Refills: 8 | Status: SHIPPED | OUTPATIENT
Start: 2017-05-23 | End: 2018-05-03 | Stop reason: SDUPTHER

## 2017-05-30 DIAGNOSIS — G25.81 RESTLESS LEG SYNDROME: ICD-10-CM

## 2017-05-30 DIAGNOSIS — R42 VERTIGO: ICD-10-CM

## 2017-05-30 RX ORDER — MECLIZINE HYDROCHLORIDE 25 MG/1
TABLET ORAL
Qty: 30 TABLET | Refills: 5 | Status: SHIPPED | OUTPATIENT
Start: 2017-05-30 | End: 2017-08-09 | Stop reason: SDUPTHER

## 2017-05-31 RX ORDER — ROPINIROLE 2 MG/1
TABLET, FILM COATED ORAL
Qty: 30 TABLET | Refills: 3 | Status: SHIPPED | OUTPATIENT
Start: 2017-05-31 | End: 2017-06-01 | Stop reason: SDUPTHER

## 2017-06-01 ENCOUNTER — OFFICE VISIT (OUTPATIENT)
Dept: FAMILY MEDICINE CLINIC | Facility: CLINIC | Age: 57
End: 2017-06-01

## 2017-06-01 ENCOUNTER — TRANSCRIBE ORDERS (OUTPATIENT)
Dept: PHYSICAL THERAPY | Facility: HOSPITAL | Age: 57
End: 2017-06-01

## 2017-06-01 VITALS
BODY MASS INDEX: 23.92 KG/M2 | DIASTOLIC BLOOD PRESSURE: 72 MMHG | HEIGHT: 63 IN | HEART RATE: 114 BPM | RESPIRATION RATE: 18 BRPM | SYSTOLIC BLOOD PRESSURE: 103 MMHG | OXYGEN SATURATION: 98 % | WEIGHT: 135 LBS | TEMPERATURE: 98.2 F

## 2017-06-01 DIAGNOSIS — M75.81 RIGHT ROTATOR CUFF TENDINITIS: Primary | ICD-10-CM

## 2017-06-01 DIAGNOSIS — S46.011A ROTATOR CUFF STRAIN, RIGHT, INITIAL ENCOUNTER: ICD-10-CM

## 2017-06-01 DIAGNOSIS — M25.511 ACUTE PAIN OF RIGHT SHOULDER: Primary | ICD-10-CM

## 2017-06-01 DIAGNOSIS — G25.81 RESTLESS LEG SYNDROME: ICD-10-CM

## 2017-06-01 PROBLEM — S46.019A ROTATOR CUFF STRAIN: Status: ACTIVE | Noted: 2017-06-01

## 2017-06-01 PROCEDURE — 99213 OFFICE O/P EST LOW 20 MIN: CPT | Performed by: NURSE PRACTITIONER

## 2017-06-01 RX ORDER — CYCLOBENZAPRINE HCL 10 MG
10 TABLET ORAL 3 TIMES DAILY PRN
Qty: 30 TABLET | Refills: 3 | Status: SHIPPED | OUTPATIENT
Start: 2017-06-01 | End: 2017-07-03 | Stop reason: SDUPTHER

## 2017-06-01 RX ORDER — LISINOPRIL AND HYDROCHLOROTHIAZIDE 12.5; 1 MG/1; MG/1
TABLET ORAL
Refills: 0 | COMMUNITY
Start: 2017-05-18 | End: 2017-08-03

## 2017-06-01 RX ORDER — ROPINIROLE 2 MG/1
2 TABLET, FILM COATED ORAL NIGHTLY
Qty: 30 TABLET | Refills: 3 | Status: SHIPPED | OUTPATIENT
Start: 2017-06-01 | End: 2018-01-23 | Stop reason: SDUPTHER

## 2017-06-01 NOTE — PROGRESS NOTES
Hattie Riddle is a 56 y.o. female.     HPI Comments: Here today with right shoulder pain that she has had for the past 2 weeks.  There has been no injury and she says her rom has decreased.    Upper Extremity Issue   This is a new problem. The current episode started 1 to 4 weeks ago. The problem occurs constantly. The problem has been unchanged. Associated symptoms include arthralgias and myalgias. Pertinent negatives include no abdominal pain, anorexia, change in bowel habit, chest pain, chills, congestion, coughing, diaphoresis, fatigue, fever, headaches, joint swelling, nausea, neck pain, numbness, rash, sore throat, swollen glands, urinary symptoms, vertigo, visual change, vomiting or weakness. Exacerbated by: movement. She has tried position changes and NSAIDs (muscle relaxer and chiropractor.) for the symptoms. The treatment provided mild relief.        The following portions of the patient's history were reviewed and updated as appropriate: allergies, current medications, past family history, past medical history, past social history, past surgical history and problem list.    Review of Systems   Constitutional: Negative.  Negative for chills, diaphoresis, fatigue and fever.   HENT: Negative.  Negative for congestion and sore throat.    Respiratory: Negative.  Negative for cough.    Cardiovascular: Negative.  Negative for chest pain.   Gastrointestinal: Negative for abdominal pain, anorexia, change in bowel habit, nausea and vomiting.   Musculoskeletal: Positive for arthralgias and myalgias. Negative for joint swelling and neck pain.   Skin: Negative.  Negative for rash.   Neurological: Negative.  Negative for vertigo, weakness, numbness and headaches.   Psychiatric/Behavioral: Negative.        Objective   Physical Exam   Constitutional: She is oriented to person, place, and time. She appears well-developed and well-nourished. No distress.   HENT:   Head: Normocephalic.   Eyes: Pupils are  equal, round, and reactive to light.   Neck: Normal range of motion.   Cardiovascular: Normal rate, regular rhythm and normal heart sounds.  Exam reveals no friction rub.    No murmur heard.  Pulmonary/Chest: Effort normal and breath sounds normal. No respiratory distress. She has no wheezes. She has no rales.   Abdominal: Soft.   Musculoskeletal:        Right shoulder: She exhibits decreased range of motion, tenderness and pain.   Is unable to raise right arm above her head.  She also has poor ability to rotate.  xrays are reviewed and are normal.   Neurological: She is alert and oriented to person, place, and time.   Skin: Skin is warm and dry.   Psychiatric: She has a normal mood and affect. Thought content normal.   Nursing note and vitals reviewed.      Assessment/Plan   Jyoti was seen today for spasms.    Diagnoses and all orders for this visit:    Acute pain of right shoulder  -     XR Shoulder 2+ View Right (In Office)    Rotator cuff strain, right, initial encounter  -     cyclobenzaprine (FLEXERIL) 10 MG tablet; Take 1 tablet by mouth 3 (Three) Times a Day As Needed for Muscle Spasms.    Restless leg syndrome  -     rOPINIRole (REQUIP) 2 MG tablet; Take 1 tablet by mouth Every Night.    she is sent to physical therapy for right shoulder pain

## 2017-06-08 ENCOUNTER — HOSPITAL ENCOUNTER (OUTPATIENT)
Dept: PHYSICAL THERAPY | Facility: HOSPITAL | Age: 57
Setting detail: THERAPIES SERIES
Discharge: HOME | End: 2017-06-08

## 2017-06-08 DIAGNOSIS — S46.011D ROTATOR CUFF STRAIN, RIGHT, SUBSEQUENT ENCOUNTER: Primary | ICD-10-CM

## 2017-06-08 PROCEDURE — 97162 PT EVAL MOD COMPLEX 30 MIN: CPT | Performed by: PHYSICAL THERAPIST

## 2017-06-08 NOTE — THERAPY EVALUATION
Outpatient Physical Therapy Ortho Initial Evaluation  [facility]     Patient Name: Jyoti Riddle  : 1960  MRN: [id number]  Today's Date: 2017      Visit Date: 2017     Pt attended  scheduled visits.   Re-cert date 17  Pt will RTD 3 weeks  Pt awaiting approval for further visits.     Patient Active Problem List   Diagnosis   • Vitamin D deficiency   • Vertigo   • Neuropathy   • Restless leg syndrome   • Pain in joint   • Allergic rhinitis   • Essential hypertension   • Abnormal EEG   • Cerebrovascular accident (CVA) due to thrombosis of left carotid artery   • Illicit drug use   • Tobacco abuse   • Sleep apnea   • Neck pain   • Vitamin deficiency   • Acute otitis externa of right ear   • Generalized seizure disorder   • Rotator cuff strain   • Acute pain of right shoulder        Past Medical History:   Diagnosis Date   • Acute bronchitis 2016   • Acute otitis externa 2014   • Acute otitis media 02/10/2015   • Acute sinusitis 2016   • Backache 2014   • Benign hypertension 2016   • Cerebrovascular accident    • Dizziness 02/10/2015   • Encounter for gynecological examination without abnormal finding 03/10/2016   • Fatigue 2015   • Foot pain 2014   • Hyperkeratosis 2014   • Hypotensive episode 2015   • Keratoma 2014    intractable plantar   • Low blood pressure 2015   • Menopausal and perimenopausal disorder 03/10/2016    other specified   • Numbness of face 2016    improved   • Otitis media of right ear 2016    unspecified   • Perforated tympanic membrane 2014   • Puncture wound 2014    injury   • Right-sided face pain 2016   • Seizure    • Thumb pain 2015   • Upper respiratory infection 2015   • Wheezing 10/21/2014        Past Surgical History:   Procedure Laterality Date   • BREAST BIOPSY     • OTHER SURGICAL HISTORY  2014    Destruction of Benign Lesion (1-14)   •  TUBAL ABDOMINAL LIGATION       Medications (Admitted on 6/8/2017)     Outpatient Medications     albuterol (VENTOLIN HFA) 108 (90 BASE) MCG/ACT inhaler      amoxicillin-clavulanate (AUGMENTIN) 875-125 MG per tablet      aspirin 81 MG chewable tablet      atorvastatin (LIPITOR) 40 MG tablet      clindamycin (CLEOCIN) 300 MG capsule      cyclobenzaprine (FLEXERIL) 10 MG tablet      diclofenac (VOLTAREN) 50 MG EC tablet      folic acid (FOLVITE) 1 MG tablet      gabapentin (NEURONTIN) 800 MG tablet      levETIRAcetam (KEPPRA) 250 MG tablet      lisinopril-hydrochlorothiazide (PRINZIDE,ZESTORETIC) 10-12.5 MG per tablet      lisinopril-hydrochlorothiazide (PRINZIDE,ZESTORETIC) 20-25 MG per tablet      meclizine (ANTIVERT) 25 MG tablet      neomycin-polymyxin-hydrocortisone (CORTISPORIN) 3.5-61260-0 otic solution      rOPINIRole (REQUIP) 2 MG tablet      sulindac (CLINORIL) 200 MG tablet      vitamin D (ERGOCALCIFEROL) 35800 UNITS capsule capsule    Clinic-Administered Medications     cefTRIAXone (ROCEPHIN) injection           Visit Dx:     ICD-10-CM ICD-9-CM   1. Rotator cuff strain, right, subsequent encounter S46.011D V58.89     840.4                 PT Ortho       06/08/17 1400    Subjective Comments    Subjective Comments 57 y/o R handed female presents with c/o R shoulder pain, inability to lift R arm. States is unsure of specific ADRIANA, but recalls remodeling house and moving furniture. States has pain in bicep area, upper trap, ant shoulder, scapular area. States using heat at home. Pt lives at home with her boyfriend. Need assistance wiht dressing , ADLs, due to R arm pain . Pt has hx of stroke, 3 years ago. Pt not working. states has trouble sleeping.   -LF    Subjective Pain    Able to rate subjective pain? yes  -LF    Pre-Treatment Pain Level 7  -LF    Posture/Observations    Posture/Observations Comments R UE at side. Pt has a eligio tremor/ assessory motions due to stroke  -LF    Special Tests/Palpation     Special Tests/Palpation --   TTP over R shoulder girdle, bicipital groove  -LF    ROM (Range of Motion)    General ROM Detail L shoulder AROM WFL, R flexion 50, abd 32, IR to mid buttock, ER  minimal. R elbow flexion 50%, wrist / hand motion 80% (injury or residual from stroke)  -LF    MMT (Manual Muscle Testing)    General MMT Assessment Detail min strength with MMT in all planes, unable to give resistance with R shoulder  -LF      User Key  (r) = Recorded By, (t) = Taken By, (c) = Cosigned By    Initials Name Provider Type     Taya Orozco PT Physical Therapist                            Therapy Education       06/08/17 1400          Therapy Education    Given Symptoms/condition management  -LF      Program New  -LF      How Provided Verbal;Demonstration  -LF      Provided to Patient  -LF      Level of Understanding Verbalized  -LF        User Key  (r) = Recorded By, (t) = Taken By, (c) = Cosigned By    Initials Name Provider Type     Taya Orozco PT Physical Therapist                PT OP Goals       06/08/17 1400       PT Short Term Goals    STG Date to Achieve 06/22/17  -LF     STG 1 Pt sarai demonstrate AROM R shoulder flexion 60, abd 45  -LF     STG 2 Pt will perform MMT R shoulder girdle 3+/4  -LF     STG 3 Pt will perform full R slbow flexion   -LF     STG 4 I HEP each session  -LF     Long Term Goals    LTG Date to Achieve 07/06/17  -LF     LTG 1 Pt will report min R shoulder pain, 2-3/10.   -LF     LTG 2 Pt will resume brushing hair with R hand.   -LF     LTG 3 Pt will resume putting on make up with R hand  -LF     LTG 4 Pt will demonstrate functional AROM R shoulder/ UE  -LF     LTG 5 I in final HEP   -LF     Time Calculation    PT Goal Re-Cert Due Date 06/29/17  -LF       User Key  (r) = Recorded By, (t) = Taken By, (c) = Cosigned By    Initials Name Provider Type    SHAY Orozco PT Physical Therapist                PT Assessment/Plan       06/08/17 1400       PT Assessment    Functional  Limitations Decreased safety during functional activities;Impaired gait;Limitation in home management;Limitations in functional capacity and performance;Performance in leisure activities;Performance in self-care ADL  -LF     Impairments Impaired muscle power;Pain;Muscle strength;Range of motion  -LF     Assessment Comments 57 y/o female presents with c/o R shoulder pain and immobility, limited AROM R shoulder, poor muscle testing, inability to perform ADLs, brush hair, put on makeup, sleep uninterupted by pain. Pt would benefi from skilled interventoin to address the above deficits.   -LF     Please refer to paper survey for additional self-reported information Yes  -LF     Rehab Potential Good  -LF     Patient/caregiver participated in establishment of treatment plan and goals Yes  -LF     Patient would benefit from skilled therapy intervention Yes  -LF     PT Plan    PT Frequency 2x/week  -LF     Predicted Duration of Therapy Intervention (days/wks) 4 weeks  -LF     Planned CPT's? PT RE-EVAL: 20008;PT THER PROC EA 15 MIN: 34866;PT THER ACT EA 15 MIN: 38643;PT MANUAL THERAPY EA 15 MIN: 78867;PT NEUROMUSC RE-EDUCATION EA 15 MIN: 94538;PT HOT OR COLD PACK TREAT MCARE  -LF     Physical Therapy Interventions (Optional Details) home exercise program;manual therapy techniques;modalities;neuromuscular re-education;ROM (Range of Motion);strengthening  -LF     PT Plan Comments Continue therapy involving education, neuromuscular re-education, modalities, strengthening, manual techniques, progressive HEP instruction. If pt has minimal progress will refer back to physician for further study.   -LF       User Key  (r) = Recorded By, (t) = Taken By, (c) = Cosigned By    Initials Name Provider Type     Taya Orozco, PT Physical Therapist                Modalities       06/08/17 1400          Moist Heat    MH Applied Yes  -LF      Location R shoulder  -LF      Rx Minutes 10 mins  -LF        User Key  (r) = Recorded By, (t) =  Taken By, (c) = Cosigned By    Initials Name Provider Type    SHAY Orozco, PT Physical Therapist              Exercises       06/08/17 1400          Subjective Comments    Subjective Comments 55 y/o R handed female presents with c/o R shoulder pain, inability to lift R arm. States is unsure of specific ADRIANA, but recalls remodeling house and moving furniture. States has pain in bicep area, upper trap, ant shoulder, scapular area. States using heat at home. Pt lives at home with her boyfriend. Need assistance wiht dressing , ADLs, due to R arm pain . Pt has hx of stroke, 3 years ago. Pt not working. states has trouble sleeping.   -LF      Subjective Pain    Able to rate subjective pain? yes  -LF      Pre-Treatment Pain Level 7  -LF        User Key  (r) = Recorded By, (t) = Taken By, (c) = Cosigned By    Initials Name Provider Type    SHAY Orozco PT Physical Therapist                              Outcome Measures       06/08/17 1400          Quick DASH    Open a tight or new jar. 5  -LF      Do heavy household chores (e.g., wash walls, wash floors) 5  -LF      Carry a shopping bag or briefcase 4  -LF      Wash your back 4  -LF      Use a knife to cut food 5  -LF      Recreational activities in which you take some force or impact through your arm, should or hand (e.g. golf, hammering, tennis, etc.) 5  -LF      During the past week, to what extent has your arm, shoulder, or hand problem interfered with your normal social activites with family, friends, neighbors or groups? 5  -LF      During the past week, were you limited in your work or other regular daily activities as a result of your arm, shoulder or hand problem? 5  -LF      Arm, Shoulder, or hand pain 5  -LF      Tingling (pins and needles) in your arm, shoulder, or hand 5  -LF      During the past week, how much difficulty have you had sleeping because of the pain in your arm, shoulder or hand? 5  -LF      Number of Questions Answered 11  -LF      Quick  DASH Score 95.45  -LF      Functional Assessment    Outcome Measure Options Quick DASH  -LF        User Key  (r) = Recorded By, (t) = Taken By, (c) = Cosigned By    Initials Name Provider Type    LF Taya Orozco, PT Physical Therapist            Time Calculation:   Start Time: 1412  Stop Time: 1445  Time Calculation (min): 33 min  Total Timed Code Minutes- PT: 33 minute(s)     Therapy Charges for Today     Code Description Service Date Service Provider Modifiers Qty    [id number]  PT EVAL MOD COMPLEXITY 3 6/8/2017 Taya Orozco, PT GP 1    [id number]  PT THER SUPP EA 15 MIN 6/8/2017 Taya Orozco, PT GP 1          PT G-Codes  Outcome Measure Options: Quick DASH         Taya Orozco, PT  6/8/2017

## 2017-06-11 ENCOUNTER — APPOINTMENT (OUTPATIENT)
Dept: GENERAL RADIOLOGY | Facility: HOSPITAL | Age: 57
End: 2017-06-11

## 2017-06-11 ENCOUNTER — HOSPITAL ENCOUNTER (EMERGENCY)
Facility: HOSPITAL | Age: 57
Discharge: HOME | End: 2017-06-12
Attending: FAMILY MEDICINE | Admitting: FAMILY MEDICINE

## 2017-06-11 VITALS
DIASTOLIC BLOOD PRESSURE: 66 MMHG | BODY MASS INDEX: 23.92 KG/M2 | TEMPERATURE: 98.1 F | HEART RATE: 95 BPM | RESPIRATION RATE: 18 BRPM | HEIGHT: 63 IN | OXYGEN SATURATION: 99 % | SYSTOLIC BLOOD PRESSURE: 110 MMHG | WEIGHT: 135 LBS

## 2017-06-11 DIAGNOSIS — S82.839A CLOSED FRACTURE OF DISTAL END OF FIBULA, UNSPECIFIED FRACTURE MORPHOLOGY, INITIAL ENCOUNTER: Primary | ICD-10-CM

## 2017-06-11 LAB
ALBUMIN SERPL-MCNC: 3.9 G/DL (ref 3.4–4.8)
ALBUMIN/GLOB SERPL: 1.1 G/DL (ref 1.1–1.8)
ALP SERPL-CCNC: 236 U/L (ref 38–126)
ALT SERPL W P-5'-P-CCNC: 55 U/L (ref 9–52)
AMPHET+METHAMPHET UR QL: NEGATIVE
ANION GAP SERPL CALCULATED.3IONS-SCNC: 12 MMOL/L (ref 5–15)
AST SERPL-CCNC: 61 U/L (ref 14–36)
BACTERIA UR QL AUTO: ABNORMAL /HPF
BARBITURATES UR QL SCN: NEGATIVE
BASOPHILS # BLD AUTO: 0.01 10*3/MM3 (ref 0–0.2)
BASOPHILS NFR BLD AUTO: 0.2 % (ref 0–2)
BENZODIAZ UR QL SCN: NEGATIVE
BILIRUB SERPL-MCNC: 0.3 MG/DL (ref 0.2–1.3)
BILIRUB UR QL STRIP: ABNORMAL
BUN BLD-MCNC: 34 MG/DL (ref 7–21)
BUN/CREAT SERPL: 19.4 (ref 7–25)
CALCIUM SPEC-SCNC: 9.2 MG/DL (ref 8.4–10.2)
CANNABINOIDS SERPL QL: NEGATIVE
CHLORIDE SERPL-SCNC: 103 MMOL/L (ref 95–110)
CK MB SERPL-CCNC: 0.54 NG/ML (ref 0–5)
CK SERPL-CCNC: 99 U/L (ref 30–135)
CLARITY UR: ABNORMAL
CO2 SERPL-SCNC: 28 MMOL/L (ref 22–31)
COCAINE UR QL: POSITIVE
COLOR UR: YELLOW
CREAT BLD-MCNC: 1.75 MG/DL (ref 0.5–1)
DEPRECATED RDW RBC AUTO: 44.1 FL (ref 36.4–46.3)
EOSINOPHIL # BLD AUTO: 0.5 10*3/MM3 (ref 0–0.7)
EOSINOPHIL NFR BLD AUTO: 8.8 % (ref 0–7)
ERYTHROCYTE [DISTWIDTH] IN BLOOD BY AUTOMATED COUNT: 13.2 % (ref 11.5–14.5)
ETHANOL BLD-MCNC: <10 MG/DL (ref 0–10)
ETHANOL UR QL: <0.01 %
GFR SERPL CREATININE-BSD FRML MDRD: 36 ML/MIN/1.73 (ref 51–120)
GLOBULIN UR ELPH-MCNC: 3.6 GM/DL (ref 2.3–3.5)
GLUCOSE BLD-MCNC: 91 MG/DL (ref 60–100)
GLUCOSE UR STRIP-MCNC: NEGATIVE MG/DL
HCT VFR BLD AUTO: 33.8 % (ref 35–45)
HGB BLD-MCNC: 11.2 G/DL (ref 12–15.5)
HGB UR QL STRIP.AUTO: NEGATIVE
HYALINE CASTS UR QL AUTO: ABNORMAL /LPF
IMM GRANULOCYTES # BLD: 0.01 10*3/MM3 (ref 0–0.02)
IMM GRANULOCYTES NFR BLD: 0.2 % (ref 0–0.5)
KETONES UR QL STRIP: NEGATIVE
LEUKOCYTE ESTERASE UR QL STRIP.AUTO: ABNORMAL
LYMPHOCYTES # BLD AUTO: 2.06 10*3/MM3 (ref 0.6–4.2)
LYMPHOCYTES NFR BLD AUTO: 36.4 % (ref 10–50)
MCH RBC QN AUTO: 30.4 PG (ref 26.5–34)
MCHC RBC AUTO-ENTMCNC: 33.1 G/DL (ref 31.4–36)
MCV RBC AUTO: 91.6 FL (ref 80–98)
METHADONE UR QL SCN: NEGATIVE
MONOCYTES # BLD AUTO: 0.33 10*3/MM3 (ref 0–0.9)
MONOCYTES NFR BLD AUTO: 5.8 % (ref 0–12)
NEUTROPHILS # BLD AUTO: 2.75 10*3/MM3 (ref 2–8.6)
NEUTROPHILS NFR BLD AUTO: 48.6 % (ref 37–80)
NITRITE UR QL STRIP: POSITIVE
OPIATES UR QL: NEGATIVE
OXYCODONE UR QL SCN: NEGATIVE
PH UR STRIP.AUTO: 5.5 [PH] (ref 5–9)
PLATELET # BLD AUTO: 178 10*3/MM3 (ref 150–450)
PMV BLD AUTO: 12.3 FL (ref 8–12)
POTASSIUM BLD-SCNC: 4.2 MMOL/L (ref 3.5–5.1)
PROT SERPL-MCNC: 7.5 G/DL (ref 6.3–8.6)
PROT UR QL STRIP: NEGATIVE
RBC # BLD AUTO: 3.69 10*6/MM3 (ref 3.77–5.16)
RBC # UR: ABNORMAL /HPF
REF LAB TEST METHOD: ABNORMAL
SODIUM BLD-SCNC: 143 MMOL/L (ref 137–145)
SP GR UR STRIP: 1.02 (ref 1–1.03)
SQUAMOUS #/AREA URNS HPF: ABNORMAL /HPF
TROPONIN I SERPL-MCNC: <0.012 NG/ML
TSH SERPL DL<=0.05 MIU/L-ACNC: 2.67 MIU/ML (ref 0.46–4.68)
UROBILINOGEN UR QL STRIP: ABNORMAL
WBC NRBC COR # BLD: 5.66 10*3/MM3 (ref 3.2–9.8)
WBC UR QL AUTO: ABNORMAL /HPF
WHOLE BLOOD HOLD SPECIMEN: NORMAL

## 2017-06-11 PROCEDURE — 80307 DRUG TEST PRSMV CHEM ANLYZR: CPT | Performed by: FAMILY MEDICINE

## 2017-06-11 PROCEDURE — 73610 X-RAY EXAM OF ANKLE: CPT

## 2017-06-11 PROCEDURE — 84484 ASSAY OF TROPONIN QUANT: CPT | Performed by: FAMILY MEDICINE

## 2017-06-11 PROCEDURE — 87077 CULTURE AEROBIC IDENTIFY: CPT | Performed by: FAMILY MEDICINE

## 2017-06-11 PROCEDURE — 93005 ELECTROCARDIOGRAM TRACING: CPT | Performed by: FAMILY MEDICINE

## 2017-06-11 PROCEDURE — 96361 HYDRATE IV INFUSION ADD-ON: CPT

## 2017-06-11 PROCEDURE — 82553 CREATINE MB FRACTION: CPT | Performed by: FAMILY MEDICINE

## 2017-06-11 PROCEDURE — 93010 ELECTROCARDIOGRAM REPORT: CPT | Performed by: INTERNAL MEDICINE

## 2017-06-11 PROCEDURE — 82550 ASSAY OF CK (CPK): CPT | Performed by: FAMILY MEDICINE

## 2017-06-11 PROCEDURE — 81001 URINALYSIS AUTO W/SCOPE: CPT | Performed by: FAMILY MEDICINE

## 2017-06-11 PROCEDURE — 87086 URINE CULTURE/COLONY COUNT: CPT | Performed by: FAMILY MEDICINE

## 2017-06-11 PROCEDURE — 85025 COMPLETE CBC W/AUTO DIFF WBC: CPT | Performed by: FAMILY MEDICINE

## 2017-06-11 PROCEDURE — 96360 HYDRATION IV INFUSION INIT: CPT

## 2017-06-11 PROCEDURE — 84443 ASSAY THYROID STIM HORMONE: CPT | Performed by: NURSE PRACTITIONER

## 2017-06-11 PROCEDURE — 80053 COMPREHEN METABOLIC PANEL: CPT | Performed by: FAMILY MEDICINE

## 2017-06-11 PROCEDURE — 87186 SC STD MICRODIL/AGAR DIL: CPT | Performed by: FAMILY MEDICINE

## 2017-06-11 PROCEDURE — 99283 EMERGENCY DEPT VISIT LOW MDM: CPT

## 2017-06-11 RX ORDER — SODIUM CHLORIDE 9 MG/ML
125 INJECTION, SOLUTION INTRAVENOUS CONTINUOUS
Status: DISCONTINUED | OUTPATIENT
Start: 2017-06-11 | End: 2017-06-12 | Stop reason: HOSPADM

## 2017-06-11 RX ORDER — HYDROCODONE BITARTRATE AND ACETAMINOPHEN 7.5; 325 MG/1; MG/1
1 TABLET ORAL EVERY 6 HOURS PRN
Qty: 15 TABLET | Refills: 0 | Status: SHIPPED | OUTPATIENT
Start: 2017-06-11 | End: 2017-09-05

## 2017-06-11 RX ORDER — ONDANSETRON 4 MG/1
4 TABLET, FILM COATED ORAL EVERY 8 HOURS PRN
Qty: 10 TABLET | Refills: 0 | Status: SHIPPED | OUTPATIENT
Start: 2017-06-11 | End: 2018-09-19

## 2017-06-11 RX ADMIN — SODIUM CHLORIDE 1000 ML: 9 INJECTION, SOLUTION INTRAVENOUS at 19:46

## 2017-06-12 NOTE — ED PROVIDER NOTES
Subjective   HPI Comments: H/o stroke 4-5 years ago and again 2-3 months ago. Several falls this week at home.     Patient is a 56 y.o. female presenting with lower extremity pain.   History provided by:  Patient  Lower Extremity Issue   Location:  Ankle  Time since incident:  3 days  Injury: yes    Mechanism of injury: fall    Fall:     Fall occurred:  Walking (lost balance in kitchen)    Impact surface:  Hard floor    Entrapped after fall: no    Ankle location:  R ankle  Pain details:     Quality:  Aching  Chronicity:  New  Dislocation: no    Foreign body present:  No foreign bodies  Prior injury to area:  Unable to specify  Relieved by:  Nothing  Worsened by:  Bearing weight  Associated symptoms: fatigue and swelling    Associated symptoms: no fever and no neck pain        Review of Systems   Constitutional: Positive for fatigue. Negative for appetite change, chills, diaphoresis and fever.   HENT: Negative for congestion, ear discharge, ear pain, nosebleeds, rhinorrhea, sinus pressure, sore throat and trouble swallowing.    Eyes: Negative for discharge and redness.   Respiratory: Negative for apnea, cough, chest tightness, shortness of breath and wheezing.    Cardiovascular: Negative for chest pain.   Gastrointestinal: Negative for abdominal pain, diarrhea, nausea and vomiting.   Endocrine: Negative for polyuria.   Genitourinary: Negative for dysuria, frequency and urgency.   Musculoskeletal: Negative for myalgias and neck pain.   Skin: Negative for color change and rash.   Allergic/Immunologic: Negative for immunocompromised state.   Neurological: Negative for dizziness, seizures, syncope, weakness, light-headedness and headaches.   Hematological: Negative for adenopathy. Does not bruise/bleed easily.   Psychiatric/Behavioral: Negative for behavioral problems and confusion.   All other systems reviewed and are negative.      Past Medical History:   Diagnosis Date   • Acute bronchitis 07/19/2016   • Acute otitis  externa 09/04/2014   • Acute otitis media 02/10/2015   • Acute sinusitis 07/19/2016   • Backache 12/08/2014   • Benign hypertension 05/13/2016   • Cerebrovascular accident    • Dizziness 02/10/2015   • Encounter for gynecological examination without abnormal finding 03/10/2016   • Fatigue 08/28/2015   • Foot pain 06/09/2014   • Hyperkeratosis 04/28/2014   • Hypotensive episode 06/12/2015   • Keratoma 08/04/2014    intractable plantar   • Low blood pressure 08/28/2015   • Menopausal and perimenopausal disorder 03/10/2016    other specified   • Numbness of face 05/13/2016    improved   • Otitis media of right ear 07/30/2016    unspecified   • Perforated tympanic membrane 09/04/2014   • Puncture wound 06/17/2014    injury   • Right-sided face pain 05/13/2016   • Seizure    • Thumb pain 08/28/2015   • Upper respiratory infection 08/28/2015   • Wheezing 10/21/2014       No Known Allergies    Past Surgical History:   Procedure Laterality Date   • BREAST BIOPSY     • OTHER SURGICAL HISTORY  07/07/2014    Destruction of Benign Lesion (1-14)   • TUBAL ABDOMINAL LIGATION         Family History   Problem Relation Age of Onset   • Alzheimer's disease Other    • Bipolar disorder Other    • Depression Other    • Diabetes Other    • Heart disease Other    • Hypertension Other    • Lung cancer Other    • Migraines Other    • Sickle cell anemia Other    • Stroke Other    • No Known Problems Mother    • Alzheimer's disease Father    • Heart attack Father    • Throat cancer Father    • No Known Problems Daughter        Social History     Social History   • Marital status:      Spouse name: N/A   • Number of children: N/A   • Years of education: N/A     Social History Main Topics   • Smoking status: Current Every Day Smoker     Packs/day: 0.50     Types: Cigarettes   • Smokeless tobacco: Never Used   • Alcohol use Yes      Comment: rarely   • Drug use: Yes     Special: Cocaine   • Sexual activity: Defer     Other Topics Concern    • None     Social History Narrative           Objective   Physical Exam   Constitutional: She is oriented to person, place, and time. She appears well-developed and well-nourished.   HENT:   Head: Normocephalic and atraumatic.   Nose: Nose normal.   Mouth/Throat: Oropharynx is clear and moist.   Eyes: Conjunctivae and EOM are normal. Pupils are equal, round, and reactive to light. Right eye exhibits no discharge. Left eye exhibits no discharge. No scleral icterus.   Neck: Normal range of motion. Neck supple. No tracheal deviation present.   Cardiovascular: Normal rate, regular rhythm and normal heart sounds.    No murmur heard.  Pulmonary/Chest: Effort normal and breath sounds normal. No stridor. No respiratory distress. She has no wheezes. She has no rales.   Abdominal: Soft. Bowel sounds are normal. She exhibits no distension and no mass. There is no tenderness. There is no rebound and no guarding.   Musculoskeletal: She exhibits edema and tenderness.        Right ankle: She exhibits swelling. Tenderness. Lateral malleolus tenderness found.   Neurological: She is alert and oriented to person, place, and time. Coordination normal.   Skin: Skin is warm and dry. No rash noted. No erythema.   Psychiatric: She has a normal mood and affect. Her behavior is normal. Thought content normal.   Nursing note and vitals reviewed.      ECG 12 Lead    Date/Time: 6/11/2017 10:10 PM  Performed by: GALLO MORLEY  Authorized by: GALLO MORLEY   Interpreted by physician  Rhythm: sinus rhythm  Rate: normal  BPM: 99  ST Segments: ST segments normal                 ED Course  ED Course   Comment By Time   Request # : [id number] Gallo Morley MD 06/11 9475        Labs Reviewed   COMPREHENSIVE METABOLIC PANEL - Abnormal; Notable for the following:        Result Value    BUN 34 (*)     Creatinine 1.75 (*)     ALT (SGPT) 55 (*)     AST (SGOT) 61 (*)     Alkaline Phosphatase 236 (*)     eGFR   Amer 36 (*)      Globulin 3.6 (*)     All other components within normal limits   URINALYSIS W/ CULTURE IF INDICATED - Abnormal; Notable for the following:     Appearance, UA Cloudy (*)     Bilirubin, UA Small (1+) (*)     Leuk Esterase, UA Moderate (2+) (*)     Nitrite, UA Positive (*)     All other components within normal limits   URINE DRUG SCREEN - Abnormal; Notable for the following:     Cocaine Screen, Urine Positive (*)     All other components within normal limits    Narrative:     Negative Thresholds For Drugs Screened in Urine:     Amphetamines          500 ng/ml  Barbiturates          200 ng/ml  Benzodiazepines       200 ng/ml  Cocaine               150 ng/ml  Methadone             300 ng/mL  Opiates               300 ng/mL  Oxycodone             100 ng/mL  THC                   20 ng/mL    The normal value for all drugs tested is negative. This report includes final unconfirmed screening results.  A positive result by this assay can be, at your request, sent to the Reference Lab for confirmation by gas chromatography. Unconfirmed results must not be used for non-medical purposes, such as employment or legal testing. Clinical consideration should be applied to any drug of abuse test result, particularly when unconfirmed results are used.   CBC WITH AUTO DIFFERENTIAL - Abnormal; Notable for the following:     RBC 3.69 (*)     Hemoglobin 11.2 (*)     Hematocrit 33.8 (*)     MPV 12.3 (*)     Eosinophil % 8.8 (*)     All other components within normal limits   URINALYSIS, MICROSCOPIC ONLY - Abnormal; Notable for the following:     RBC, UA 0-2 (*)     WBC, UA 13-20 (*)     Bacteria, UA 4+ (*)     Squamous Epithelial Cells, UA 3-5 (*)     All other components within normal limits   CK - Normal   TROPONIN (IN-HOUSE) - Normal   CK MB - Normal   URINE CULTURE   ETHANOL   CBC AND DIFFERENTIAL    Narrative:     The following orders were created for panel order CBC & Differential.  Procedure                               Abnormality          Status                     ---------                               -----------         ------                     CBC Auto Differential[[id number]]        Abnormal            Final result                 Please view results for these tests on the individual orders.   EXTRA TUBES    Narrative:     The following orders were created for panel order Extra Tubes.  Procedure                               Abnormality         Status                     ---------                               -----------         ------                     Light Blue Top[[id number]]                                   Final result                 Please view results for these tests on the individual orders.   LIGHT BLUE TOP       XR Ankle 3+ View Right   Final Result   CONCLUSION:   Minimally displaced oblique fracture distal shaft of   fibula/lateral malleolus.      [id number]      Electronically signed by:  Josef Deal MD  6/11/2017 7:55 PM CDT   Workstation: [id number]        Advised patient to elevate and ice the leg and follow-up with orthopedics the next several days.              MDM    Final diagnoses:   Closed fracture of distal end of fibula, unspecified fracture morphology, initial encounter            Amilcar Morley MD  06/11/17 1923       Amilcar Morley MD  06/11/17 2211

## 2017-06-13 ENCOUNTER — OFFICE VISIT (OUTPATIENT)
Dept: ORTHOPEDIC SURGERY | Facility: CLINIC | Age: 57
End: 2017-06-13

## 2017-06-13 VITALS
SYSTOLIC BLOOD PRESSURE: 148 MMHG | HEIGHT: 63 IN | DIASTOLIC BLOOD PRESSURE: 78 MMHG | BODY MASS INDEX: 23.39 KG/M2 | WEIGHT: 132 LBS

## 2017-06-13 DIAGNOSIS — M25.571 ACUTE RIGHT ANKLE PAIN: ICD-10-CM

## 2017-06-13 DIAGNOSIS — S82.821A CLOSED TORUS FRACTURE OF DISTAL END OF RIGHT FIBULA, INITIAL ENCOUNTER: Primary | ICD-10-CM

## 2017-06-13 PROBLEM — S82.831A CLOSED FRACTURE OF DISTAL END OF RIGHT FIBULA: Status: ACTIVE | Noted: 2017-06-13

## 2017-06-13 PROCEDURE — 27786 TREATMENT OF ANKLE FRACTURE: CPT | Performed by: ORTHOPAEDIC SURGERY

## 2017-06-13 NOTE — PROGRESS NOTES
Hattie Riddle is a 56 y.o. female. 1960- Consult- right ankle       History of Present Illness   Patient is here for consult- right ankle. Patient states she became dizzy while at home and lost her balance. Patient fell and landed incorrectly. Patient states she went to [facility] ER with severe pain to her right ankle. Patient states the ER obtained xray's, placed Aircast boot on right foot and prescribed narcotic pain medication. Patient has Aircast boot in place during today's visit.     The following portions of the patient's history were reviewed and updated as appropriate:   She  has a past medical history of Acute bronchitis (07/19/2016); Acute otitis externa (09/04/2014); Acute otitis media (02/10/2015); Acute sinusitis (07/19/2016); Backache (12/08/2014); Benign hypertension (05/13/2016); Cerebrovascular accident; Dizziness (02/10/2015); Encounter for gynecological examination without abnormal finding (03/10/2016); Fatigue (08/28/2015); Foot pain (06/09/2014); Hyperkeratosis (04/28/2014); Hypotensive episode (06/12/2015); Keratoma (08/04/2014); Low blood pressure (08/28/2015); Menopausal and perimenopausal disorder (03/10/2016); Numbness of face (05/13/2016); Otitis media of right ear (07/30/2016); Perforated tympanic membrane (09/04/2014); Puncture wound (06/17/2014); Right-sided face pain (05/13/2016); Seizure; Thumb pain (08/28/2015); Upper respiratory infection (08/28/2015); and Wheezing (10/21/2014).  She  does not have any pertinent problems on file.  She  has a past surgical history that includes Breast biopsy; Other surgical history (07/07/2014); and Tubal ligation.  Her family history includes Alzheimer's disease in her father and other; Bipolar disorder in her other; Depression in her other; Diabetes in her other; Heart attack in her father; Heart disease in her other; Hypertension in her other; Lung cancer in her other; Migraines in her other; No Known Problems in her  daughter and mother; Sickle cell anemia in her other; Stroke in her other; Throat cancer in her father.  She  reports that she has been smoking Cigarettes.  She has been smoking about 0.50 packs per day. She has never used smokeless tobacco. She reports that she drinks alcohol. She reports that she uses illicit drugs, including Cocaine.  Current Outpatient Prescriptions   Medication Sig Dispense Refill   • albuterol (VENTOLIN HFA) 108 (90 BASE) MCG/ACT inhaler Inhale 2 puffs 4 (Four) Times a Day. 18 g 3   • amoxicillin-clavulanate (AUGMENTIN) 875-125 MG per tablet Take 1 tablet by mouth 2 (Two) Times a Day. 20 tablet 0   • aspirin 81 MG chewable tablet Chew 81 mg.     • atorvastatin (LIPITOR) 40 MG tablet Take 1 tablet by mouth Daily. 30 tablet 8   • clindamycin (CLEOCIN) 300 MG capsule Take 1 capsule by mouth Every 6 (Six) Hours. 40 capsule 0   • cyclobenzaprine (FLEXERIL) 10 MG tablet Take 1 tablet by mouth 3 (Three) Times a Day As Needed for Muscle Spasms. 30 tablet 3   • diclofenac (VOLTAREN) 50 MG EC tablet   0   • folic acid (FOLVITE) 1 MG tablet Take 1 tablet by mouth Daily. 30 tablet 5   • gabapentin (NEURONTIN) 800 MG tablet Take 1 tablet by mouth 2 (Two) Times a Day. 60 tablet 5   • HYDROcodone-acetaminophen (NORCO) 7.5-325 MG per tablet Take 1 tablet by mouth Every 6 (Six) Hours As Needed for Moderate Pain (4-6). 15 tablet 0   • levETIRAcetam (KEPPRA) 250 MG tablet Take 1 tablet by mouth 2 (Two) Times a Day. 60 tablet 5   • lisinopril-hydrochlorothiazide (PRINZIDE,ZESTORETIC) 10-12.5 MG per tablet   0   • lisinopril-hydrochlorothiazide (PRINZIDE,ZESTORETIC) 20-25 MG per tablet take 1 tablet by mouth once daily 30 tablet 3   • meclizine (ANTIVERT) 25 MG tablet take 1 tablet by mouth three times a day if needed for DIZZINESS 30 tablet 5   • neomycin-polymyxin-hydrocortisone (CORTISPORIN) 3.5-03067-4 otic solution Administer 3 drops into both ears 4 (Four) Times a Day. 10 mL 0   • ondansetron (ZOFRAN) 4 MG  tablet Take 1 tablet by mouth Every 8 (Eight) Hours As Needed for Nausea or Vomiting. 10 tablet 0   • rOPINIRole (REQUIP) 2 MG tablet Take 1 tablet by mouth Every Night. 30 tablet 3   • sulindac (CLINORIL) 200 MG tablet Take 1 tablet by mouth 2 (Two) Times a Day. 60 tablet 2   • vitamin D (ERGOCALCIFEROL) 45067 UNITS capsule capsule 1 CAP EVERY 2 WEEKS 4 capsule 5     Current Facility-Administered Medications   Medication Dose Route Frequency Provider Last Rate Last Dose   • cefTRIAXone (ROCEPHIN) injection 1 g  1 g Intramuscular Q24H WESTON Burroughs   1 g at 04/04/17 1700     Current Outpatient Prescriptions on File Prior to Visit   Medication Sig   • albuterol (VENTOLIN HFA) 108 (90 BASE) MCG/ACT inhaler Inhale 2 puffs 4 (Four) Times a Day.   • amoxicillin-clavulanate (AUGMENTIN) 875-125 MG per tablet Take 1 tablet by mouth 2 (Two) Times a Day.   • aspirin 81 MG chewable tablet Chew 81 mg.   • atorvastatin (LIPITOR) 40 MG tablet Take 1 tablet by mouth Daily.   • clindamycin (CLEOCIN) 300 MG capsule Take 1 capsule by mouth Every 6 (Six) Hours.   • cyclobenzaprine (FLEXERIL) 10 MG tablet Take 1 tablet by mouth 3 (Three) Times a Day As Needed for Muscle Spasms.   • diclofenac (VOLTAREN) 50 MG EC tablet    • folic acid (FOLVITE) 1 MG tablet Take 1 tablet by mouth Daily.   • gabapentin (NEURONTIN) 800 MG tablet Take 1 tablet by mouth 2 (Two) Times a Day.   • HYDROcodone-acetaminophen (NORCO) 7.5-325 MG per tablet Take 1 tablet by mouth Every 6 (Six) Hours As Needed for Moderate Pain (4-6).   • levETIRAcetam (KEPPRA) 250 MG tablet Take 1 tablet by mouth 2 (Two) Times a Day.   • lisinopril-hydrochlorothiazide (PRINZIDE,ZESTORETIC) 10-12.5 MG per tablet    • lisinopril-hydrochlorothiazide (PRINZIDE,ZESTORETIC) 20-25 MG per tablet take 1 tablet by mouth once daily   • meclizine (ANTIVERT) 25 MG tablet take 1 tablet by mouth three times a day if needed for DIZZINESS   • neomycin-polymyxin-hydrocortisone (CORTISPORIN)  "3.5-74343-2 otic solution Administer 3 drops into both ears 4 (Four) Times a Day.   • ondansetron (ZOFRAN) 4 MG tablet Take 1 tablet by mouth Every 8 (Eight) Hours As Needed for Nausea or Vomiting.   • rOPINIRole (REQUIP) 2 MG tablet Take 1 tablet by mouth Every Night.   • sulindac (CLINORIL) 200 MG tablet Take 1 tablet by mouth 2 (Two) Times a Day.   • vitamin D (ERGOCALCIFEROL) 10606 UNITS capsule capsule 1 CAP EVERY 2 WEEKS     Current Facility-Administered Medications on File Prior to Visit   Medication   • cefTRIAXone (ROCEPHIN) injection 1 g     She has No Known Allergies..    Review of Systems  REVIEW OF SYSTEMS:  Negative, other than presenting complaint.  HEENT: No headaches, diplopia, blurred vision, tinnitus, vertigo, epistaxis, hoarseness or sore throat.  Pulmonary: No cough, sputum, hemoptysis, dyspnea, wheezing, or chest pain.  Cardiac: No chest pain, palpitations, orthopnea, paroxysmal nocturnal dyspnea, shortness of breath, or pedal edema.  Gastrointestinal: No diarrhea, melena, or constipation.  Genitourinary: No dysuria, hematuria, nocturia, frequency, bladder or bowel incontinence.  Hematology: No history of any anemia, fatigue, fever, or chills or night sweats.  Dermatology: No rashes, pruritus, or increased pigmentation changes of the skin.     Objective   Physical Exam  /78 (BP Location: Right arm, Patient Position: Sitting)  Ht 63\" (160 cm)  Wt 132 lb (59.9 kg)  BMI 23.38 kg/m2    Social History     Social History   • Marital status:      Spouse name: N/A   • Number of children: N/A   • Years of education: N/A     Occupational History   • Not on file.     Social History Main Topics   • Smoking status: Current Every Day Smoker     Packs/day: 0.50     Types: Cigarettes   • Smokeless tobacco: Never Used   • Alcohol use Yes      Comment: rarely   • Drug use: Yes     Special: Cocaine   • Sexual activity: Defer     Other Topics Concern   • Not on file     Social History Narrative "       HEENT: Normocephalic.  PERRLA.  TM's clear bilaterally.  Oropharynx: Clear.  Neck: Supple, with no adenopathy.  Chest: Equal bilateral expansion.  Clear to auscultation and percussion.  Heart: Regular sinus rhythm, S1 and S2 normal.  No murmurs or extra heart sounds heard.  Abdomen: Soft, nontender, and no organomegaly.  Neurological: cranial nerves II-XII normal Vascular: pulses are present  Dermatological: no rashes  or blemishes, or any abnormality of the skin.      Xray of the right ankle shows minimally displaced oblique fracture distal shaft of fibula/lateral malleolus.  Neuro intact.     Assessment/Plan aircast boot  , elevation, ice, use a walker. See back in 4 weeks with x rays.    Problems Addressed this Visit        Nervous and Auditory    Right ankle pain       Other    Closed fracture of distal end of right fibula - Primary

## 2017-06-14 LAB
BACTERIA SPEC AEROBE CULT: ABNORMAL
BETA LACTAMASE: ABNORMAL

## 2017-06-19 ENCOUNTER — HOSPITAL ENCOUNTER (OUTPATIENT)
Dept: PHYSICAL THERAPY | Facility: HOSPITAL | Age: 57
Setting detail: THERAPIES SERIES
Discharge: HOME | End: 2017-06-19

## 2017-06-19 DIAGNOSIS — S46.011D ROTATOR CUFF STRAIN, RIGHT, SUBSEQUENT ENCOUNTER: Primary | ICD-10-CM

## 2017-06-19 PROCEDURE — 97110 THERAPEUTIC EXERCISES: CPT | Performed by: PHYSICAL THERAPIST

## 2017-06-19 NOTE — THERAPY TREATMENT NOTE
Outpatient Physical Therapy Ortho Treatment Note  [facility]  Catherine Connelly, PT, DPT, CSCS         Patient Name: Jyoti Riddle  : 1960  MRN: [id number]  Today's Date: 2017      Visit Date: 2017     Pt reports 4/10 pain.  Reports 0% of improvement.  Attended 2/2 visits.  Insurance available: 9 visits  Next MD appt: 2017.  Recertification: 2017.    Visit Dx:    ICD-10-CM ICD-9-CM   1. Rotator cuff strain, right, subsequent encounter S46.011D V58.89     840.4       Patient Active Problem List   Diagnosis   • Vitamin D deficiency   • Vertigo   • Neuropathy   • Restless leg syndrome   • Pain in joint   • Allergic rhinitis   • Essential hypertension   • Abnormal EEG   • Cerebrovascular accident (CVA) due to thrombosis of left carotid artery   • Illicit drug use   • Tobacco abuse   • Sleep apnea   • Neck pain   • Vitamin deficiency   • Acute otitis externa of right ear   • Generalized seizure disorder   • Rotator cuff strain   • Acute pain of right shoulder   • Closed fracture of distal end of right fibula   • Right ankle pain        Past Medical History:   Diagnosis Date   • Acute bronchitis 2016   • Acute otitis externa 2014   • Acute otitis media 02/10/2015   • Acute sinusitis 2016   • Backache 2014   • Benign hypertension 2016   • Cerebrovascular accident    • Dizziness 02/10/2015   • Encounter for gynecological examination without abnormal finding 03/10/2016   • Fatigue 2015   • Foot pain 2014   • Hyperkeratosis 2014   • Hypotensive episode 2015   • Keratoma 2014    intractable plantar   • Low blood pressure 2015   • Menopausal and perimenopausal disorder 03/10/2016    other specified   • Numbness of face 2016    improved   • Otitis media of right ear 2016    unspecified   • Perforated tympanic membrane 2014   • Puncture wound 2014    injury   • Right-sided face pain  "05/13/2016   • Seizure    • Thumb pain 08/28/2015   • Upper respiratory infection 08/28/2015   • Wheezing 10/21/2014        Past Surgical History:   Procedure Laterality Date   • BREAST BIOPSY     • OTHER SURGICAL HISTORY  07/07/2014    Destruction of Benign Lesion (1-14)   • TUBAL ABDOMINAL LIGATION               PT Ortho       06/19/17 1500    Subjective Comments    Subjective Comments Patient reports she broke her R ankle, but she'd still like ot do PT for her shoulder. She reports her shoulder is hurting some from using the walker.  -    Subjective Pain    Able to rate subjective pain? yes  -    Pre-Treatment Pain Level 4  -    Post-Treatment Pain Level --   \"it's okay.\"  -    Posture/Observations    Posture/Observations Comments No distress, low-cam walking boot on R LE, ambulating with RW, too short but that's all patient has and cannot be adjusted  -    ROM (Range of Motion)    General ROM Detail Full AAROM with 3-way pulley's all WNL and full.  -    Gait Assessment/Treatment    Gait, Navarro Level conditional independence  -    Gait, Assistive Device rolling walker  -    Gait, Comment Walker is too short for patient but is on the highest setting and cannot be raised any.  -      User Key  (r) = Recorded By, (t) = Taken By, (c) = Cosigned By    Initials Name Provider Type    GASPER Connelly, PT Physical Therapist                            PT Assessment/Plan       06/19/17 1500       PT Assessment    Assessment Comments Good performance of HEP exercises today. Issued all but pulley's for home.  -     PT Plan    PT Frequency 2x/week  -     PT Plan Comments Try wall walks flexion/abduction  -       User Key  (r) = Recorded By, (t) = Taken By, (c) = Cosigned By    Initials Name Provider Type    GASPER Connelly, PT Physical Therapist                Modalities       06/19/17 1500          Ice    Patient denies application of Ice Yes  -        User Key  (r) = Recorded " "By, (t) = Taken By, (c) = Cosigned By    Initials Name Provider Type    AJ Catherine Connelly, PT Physical Therapist                Exercises       06/19/17 1500          Subjective Comments    Subjective Comments Patient reports she broke her R ankle, but she'd still like ot do PT for her shoulder. She reports her shoulder is hurting some from using the walker.  -AJ      Subjective Pain    Able to rate subjective pain? yes  -AJ      Pre-Treatment Pain Level 4  -AJ      Post-Treatment Pain Level --   \"it's okay.\"  -AJ      Exercise 1    Exercise Name 1 Pro II UE F/R  -AJ      Resistance 1 --   level 3.0  -AJ      Time (Minutes) 1 10 minutes  -AJ      Exercise 2    Exercise Name 2 Pulley's 3-way  -AJ      Reps 2 15  -AJ      Exercise 3    Exercise Name 3 Seated wand flexion  -AJ      Sets 3 2  -AJ      Reps 3 10  -AJ      Exercise 4    Exercise Name 4 Seated wand abduction  -AJ      Sets 4 2  -AJ      Reps 4 10  -AJ      Exercise 5    Exercise Name 5 Scap squeezes  -AJ      Sets 5 1  -AJ      Reps 5 20  -AJ      Time (Seconds) 5 5\" hold  -AJ      Exercise 6    Exercise Name 6 Supine wand IR/ER  -AJ      Sets 6 2  -AJ      Reps 6 10  -AJ      Exercise 7    Exercise Name 7 Posterior shoulder rolls between exercises.  -AJ      Reps 7 --   5-10 reps at a time.  -AJ        User Key  (r) = Recorded By, (t) = Taken By, (c) = Cosigned By    Initials Name Provider Type    AJ Catherine Connelly, PT Physical Therapist                               PT OP Goals       06/19/17 1500       PT Short Term Goals    STG Date to Achieve 06/22/17  -AJ     STG 1 Pt sarai demonstrate AROM R shoulder flexion 60, abd 45  -AJ     STG 1 Progress Ongoing  -AJ     STG 2 Pt will perform MMT R shoulder girdle 3+/4  -AJ     STG 2 Progress Ongoing  -AJ     STG 3 Pt will perform full R elbow flexion   -AJ     STG 3 Progress Ongoing  -AJ     STG 4 I HEP each session  -AJ     STG 4 Progress Ongoing  -AJ     Long Term Goals    LTG Date to Achieve " 07/06/17  -AJ     LTG 1 Pt will report min R shoulder pain, 2-3/10.   -AJ     LTG 1 Progress Ongoing  -AJ     LTG 2 Pt will resume brushing hair with R hand.   -AJ     LTG 2 Progress Ongoing  -AJ     LTG 3 Pt will resume putting on make up with R hand  -AJ     LTG 3 Progress Ongoing  -AJ     LTG 4 Pt will demonstrate functional AROM R shoulder/ UE  -AJ     LTG 4 Progress Ongoing  -AJ     LTG 5 I in final HEP   -AJ     LTG 5 Progress Ongoing  -AJ     Time Calculation    PT Goal Re-Cert Due Date 06/29/17  -       User Key  (r) = Recorded By, (t) = Taken By, (c) = Cosigned By    Initials Name Provider Type    GASPER Connelly PT Physical Therapist                Therapy Education       06/19/17 1500          Therapy Education    Given HEP;Symptoms/condition management;Pain management;Posture/body mechanics  -      Program New  -      How Provided Verbal;Demonstration;Written  -AJ      Provided to Patient  -AJ      Level of Understanding Verbalized;Demonstrated  -        User Key  (r) = Recorded By, (t) = Taken By, (c) = Cosigned By    Initials Name Provider Type    GASPER Connelly PT Physical Therapist                Time Calculation:   Start Time: 1458  Stop Time: 1540  Time Calculation (min): 42 min    Therapy Charges for Today     Code Description Service Date Service Provider Modifiers Qty    77257686793  PT THER PROC EA 15 MIN 6/19/2017 Catherine Connelly, PT GP 3                    Catherine Connelly PT, DPT, CSCS  6/19/2017

## 2017-06-22 ENCOUNTER — HOSPITAL ENCOUNTER (OUTPATIENT)
Dept: PHYSICAL THERAPY | Facility: HOSPITAL | Age: 57
Setting detail: THERAPIES SERIES
Discharge: HOME | End: 2017-06-22

## 2017-06-22 DIAGNOSIS — S82.891A CLOSED RIGHT ANKLE FRACTURE, INITIAL ENCOUNTER: Primary | ICD-10-CM

## 2017-06-22 DIAGNOSIS — S46.011D ROTATOR CUFF STRAIN, RIGHT, SUBSEQUENT ENCOUNTER: Primary | ICD-10-CM

## 2017-06-22 PROCEDURE — 97110 THERAPEUTIC EXERCISES: CPT

## 2017-06-22 NOTE — THERAPY TREATMENT NOTE
Outpatient Physical Therapy Ortho Treatment Note  [facility]     Patient Name: Jyoti Riddle  : 1960  MRN: [id number]  Today's Date: 2017      Visit Date: 2017  Visits:3/3  Approved visits:9visits  Re-cert:2017  RTD:7/3/2017  Subjective Improvement:none at this time  Visit Dx:    ICD-10-CM ICD-9-CM   1. Rotator cuff strain, right, subsequent encounter S46.011D V58.89     840.4       Patient Active Problem List   Diagnosis   • Vitamin D deficiency   • Vertigo   • Neuropathy   • Restless leg syndrome   • Pain in joint   • Allergic rhinitis   • Essential hypertension   • Abnormal EEG   • Cerebrovascular accident (CVA) due to thrombosis of left carotid artery   • Illicit drug use   • Tobacco abuse   • Sleep apnea   • Neck pain   • Vitamin deficiency   • Acute otitis externa of right ear   • Generalized seizure disorder   • Rotator cuff strain   • Acute pain of right shoulder   • Closed fracture of distal end of right fibula   • Right ankle pain        Past Medical History:   Diagnosis Date   • Acute bronchitis 2016   • Acute otitis externa 2014   • Acute otitis media 02/10/2015   • Acute sinusitis 2016   • Backache 2014   • Benign hypertension 2016   • Cerebrovascular accident    • Dizziness 02/10/2015   • Encounter for gynecological examination without abnormal finding 03/10/2016   • Fatigue 2015   • Foot pain 2014   • Hyperkeratosis 2014   • Hypotensive episode 2015   • Keratoma 2014    intractable plantar   • Low blood pressure 2015   • Menopausal and perimenopausal disorder 03/10/2016    other specified   • Numbness of face 2016    improved   • Otitis media of right ear 2016    unspecified   • Perforated tympanic membrane 2014   • Puncture wound 2014    injury   • Right-sided face pain 2016   • Seizure    • Thumb pain 2015   • Upper respiratory infection 2015   •  Wheezing 10/21/2014        Past Surgical History:   Procedure Laterality Date   • BREAST BIOPSY     • OTHER SURGICAL HISTORY  07/07/2014    Destruction of Benign Lesion (1-14)   • TUBAL ABDOMINAL LIGATION                               PT Assessment/Plan       06/22/17 1608       PT Assessment    Assessment Comments pt came in late to therapy. Pt decline Ice to help decrease pain more. Pt reported pain coming in 12/10, after treatment 9/10 right shoulder. Pt met full flexion with elbow flexion. Pt has not been doing HEP secondary to unable to remember them and has left ex in car per patient.  -TL     PT Plan    PT Frequency 2x/week  -TL     PT Plan Comments start shoulder rows next visit  -TL       User Key  (r) = Recorded By, (t) = Taken By, (c) = Cosigned By    Initials Name Provider Type    NOEMI Kaye PTA Physical Therapy Assistant                Modalities       06/22/17 1700          Ice    Patient denies application of Ice Yes  -TL        User Key  (r) = Recorded By, (t) = Taken By, (c) = Cosigned By    Initials Name Provider Type    NOEMI Kaye PTA Physical Therapy Assistant                Exercises       06/22/17 1600          Exercise 1    Exercise Name 1 Pro 11 F/R  -TL      Resistance 1 --   level 3  -TL      Time (Minutes) 1 10min  -TL      Exercise 2    Exercise Name 2 Pulle'ys 3-way  -TL      Reps 2 20  -TL      Exercise 3    Exercise Name 3 scap  squeezes  -TL      Cueing 3 Verbal;Demo  -TL      Sets 3 2  -TL      Reps 3 10  -TL      Exercise 4    Exercise Name 4 No Money ex  -TL      Cueing 4 Verbal;Demo  -TL      Sets 4 2  -TL      Reps 4 10  -TL      Exercise 5    Exercise Name 5 Seated wand ex flexion  -TL      Sets 5 2  -TL      Reps 5 10  -TL      Exercise 6    Exercise Name 6 seated shld abd wand ex  -TL      Sets 6 2  -TL      Reps 6 10  -TL      Exercise 7    Exercise Name 7 Posterior shoulder rolls  -TL      Reps 7 --   in between ex  -TL      Exercise 8    Exercise Name 8  bicep curls arom  -TL      Sets 8 1  -TL      Reps 8 10  -TL      Exercise 9    Exercise Name 9 bicep curls with T-band  -TL      Equipment 9 Theraband  -TL      Resistance 9 Yellow  -TL      Sets 9 1  -TL      Reps 9 10  -TL        User Key  (r) = Recorded By, (t) = Taken By, (c) = Cosigned By    Initials Name Provider Type    TL Rocio Kaye PTA Physical Therapy Assistant                               PT OP Goals       06/22/17 1608       PT Short Term Goals    STG Date to Achieve 06/22/17  -TL     STG 1 Pt sarai demonstrate AROM R shoulder flexion 60, abd 45  -TL     STG 1 Progress Ongoing;Progressing  -TL     STG 2 Pt will perform MMT R shoulder girdle 3+/4  -TL     STG 2 Progress Ongoing  -TL     STG 3 Pt will perform full R elbow flexion   -TL     STG 3 Progress Met  -TL     STG 4 I HEP each session  -TL     STG 4 Progress Ongoing;Progressing  -TL     Long Term Goals    LTG Date to Achieve 07/06/17  -TL     LTG 1 Pt will report min R shoulder pain, 2-3/10.   -TL     LTG 1 Progress Ongoing  -TL     LTG 2 Pt will resume brushing hair with R hand.   -TL     LTG 2 Progress Ongoing  -TL     LTG 3 Pt will resume putting on make up with R hand  -TL     LTG 3 Progress Ongoing  -TL     LTG 4 Pt will demonstrate functional AROM R shoulder/ UE  -TL     LTG 4 Progress Ongoing  -TL     LTG 5 I in final HEP   -TL     LTG 5 Progress Ongoing  -TL     Time Calculation    PT Goal Re-Cert Due Date 06/29/17  -TL       User Key  (r) = Recorded By, (t) = Taken By, (c) = Cosigned By    Initials Name Provider Type    TL Rocio Kaye PTA Physical Therapy Assistant                Therapy Education       06/22/17 1608          Therapy Education    Given HEP   shld blade squeezes, no money ex  -TL      Program New  -TL      How Provided Verbal;Demonstration;Written  -TL      Provided to Patient  -TL      Level of Understanding Verbalized;Demonstrated  -TL        User Key  (r) = Recorded By, (t) = Taken By, (c) = Cosigned By     Initials Name Provider Type    TL Rocio Kaye PTA Physical Therapy Assistant                Time Calculation:   Start Time: 1608  Stop Time: 1647  Time Calculation (min): 39 min  Total Timed Code Minutes- PT: 39 minute(s)    Therapy Charges for Today     Code Description Service Date Service Provider Modifiers Qty    18559784883 HC PT THER PROC EA 15 MIN 6/22/2017 Rocio Kaye PTA GP 3                    Rocio Kaye PTA  6/22/2017

## 2017-06-27 ENCOUNTER — HOSPITAL ENCOUNTER (OUTPATIENT)
Dept: PHYSICAL THERAPY | Facility: HOSPITAL | Age: 57
Setting detail: THERAPIES SERIES
Discharge: HOME | End: 2017-06-27

## 2017-06-27 DIAGNOSIS — S46.011D ROTATOR CUFF STRAIN, RIGHT, SUBSEQUENT ENCOUNTER: Primary | ICD-10-CM

## 2017-06-27 PROCEDURE — 97110 THERAPEUTIC EXERCISES: CPT

## 2017-06-27 NOTE — THERAPY TREATMENT NOTE
Outpatient Physical Therapy Ortho Treatment Note  [facility]     Patient Name: Jyoti Riddle  : 1960  MRN: [id number]  Today's Date: 2017      Visit Date: 2017  Visits:  Approved visits:9 visits  Re-cert:2017  RTD: 7/3/2017  Subjective Improvement:Some  Visit Dx:    ICD-10-CM ICD-9-CM   1. Rotator cuff strain, right, subsequent encounter S46.011D V58.89     840.4       Patient Active Problem List   Diagnosis   • Vitamin D deficiency   • Vertigo   • Neuropathy   • Restless leg syndrome   • Pain in joint   • Allergic rhinitis   • Essential hypertension   • Abnormal EEG   • Cerebrovascular accident (CVA) due to thrombosis of left carotid artery   • Illicit drug use   • Tobacco abuse   • Sleep apnea   • Neck pain   • Vitamin deficiency   • Acute otitis externa of right ear   • Generalized seizure disorder   • Rotator cuff strain   • Acute pain of right shoulder   • Closed fracture of distal end of right fibula   • Right ankle pain        Past Medical History:   Diagnosis Date   • Acute bronchitis 2016   • Acute otitis externa 2014   • Acute otitis media 02/10/2015   • Acute sinusitis 2016   • Backache 2014   • Benign hypertension 2016   • Cerebrovascular accident    • Dizziness 02/10/2015   • Encounter for gynecological examination without abnormal finding 03/10/2016   • Fatigue 2015   • Foot pain 2014   • Hyperkeratosis 2014   • Hypotensive episode 2015   • Keratoma 2014    intractable plantar   • Low blood pressure 2015   • Menopausal and perimenopausal disorder 03/10/2016    other specified   • Numbness of face 2016    improved   • Otitis media of right ear 2016    unspecified   • Perforated tympanic membrane 2014   • Puncture wound 2014    injury   • Right-sided face pain 2016   • Seizure    • Thumb pain 2015   • Upper respiratory infection 2015   • Wheezing  10/21/2014        Past Surgical History:   Procedure Laterality Date   • BREAST BIOPSY     • OTHER SURGICAL HISTORY  07/07/2014    Destruction of Benign Lesion (1-14)   • TUBAL ABDOMINAL LIGATION               PT Ortho       06/27/17 1400    Subjective Comments    Subjective Comments pt came in with braides and rolls in hair. pt stated that she fixed her hair herself on sunday. Pt stated that she has not been doing all of her HEP. Pt stated that she did not have pulley for home.  -TL    Subjective Pain    Able to rate subjective pain? yes  -TL    Pre-Treatment Pain Level 4  -TL    Post-Treatment Pain Level 4  -TL    Posture/Observations    Posture/Observations Comments Pt not in any distress  -TL      User Key  (r) = Recorded By, (t) = Taken By, (c) = Cosigned By    Initials Name Provider Type    NOEMI Kaye PTA Physical Therapy Assistant                            PT Assessment/Plan       06/27/17 1439       PT Assessment    Assessment Comments Pt stated that she fixed her own hair on Sunday with braides.  pt has not been consistent with HEP. pt got a new walker but not using walker correctly. PTA adjusted walker to make walker level . Pt limited with rom to the right. pt continues to have pain in right shoulder. Pt states shoulder is doing some better. Pt is self limiting.  -TL     PT Plan    PT Frequency 2x/week  -TL     PT Plan Comments Measure ROM next visit  -TL       User Key  (r) = Recorded By, (t) = Taken By, (c) = Cosigned By    Initials Name Provider Type    NOEMI Kaye PTA Physical Therapy Assistant                Modalities       06/27/17 1400          Ice    Patient denies application of Ice Yes  -TL        User Key  (r) = Recorded By, (t) = Taken By, (c) = Cosigned By    Initials Name Provider Type    NOEMI Kaye PTA Physical Therapy Assistant                Exercises       06/27/17 1400          Subjective Comments    Subjective Comments pt came in with braides and rolls in  hair. pt stated that she fixed her hair herself on sunday. Pt stated that she has not been doing all of her HEP. Pt stated that she did not have pulley for home.  -TL      Subjective Pain    Able to rate subjective pain? yes  -TL      Pre-Treatment Pain Level 4  -TL      Post-Treatment Pain Level 4  -TL      Exercise 1    Exercise Name 1 Pro 2 f/r  -TL      Resistance 1 --   level 3  -TL      Time (Minutes) 1 10 mins  -TL      Exercise 2    Exercise Name 2 Pulleys 3-way  -TL      Time (Minutes) 2 3 mins   3 mins  -TL      Exercise 3    Exercise Name 3 seated shld puches  -TL      Equipment 3 Theraband  -TL      Resistance 3 Red  -TL      Sets 3 2  -TL      Reps 3 10  -TL      Exercise 4    Exercise Name 4 No Money ex T-band  -TL      Equipment 4 Theraband  -TL      Resistance 4 Red  -TL      Sets 4 1  -TL      Reps 4 10  -TL      Exercise 5    Exercise Name 5 wall circles in seated position   cc/ccw  -TL      Sets 5 2  -TL      Reps 5 10  -TL      Exercise 6    Exercise Name 6 wall slides flexion seated  -TL      Sets 6 1  -TL      Reps 6 10  -TL      Exercise 7    Exercise Name 7 Posterior shld rows  -TL      Reps 7 --   between ex  -TL      Exercise 8    Exercise Name 8 seated wand ex flexion  -TL      Sets 8 1  -TL      Reps 8 10  -TL      Exercise 9    Exercise Name 9 seated wand shld hip abd  -TL      Sets 9 1  -TL      Reps 9 10  -TL        User Key  (r) = Recorded By, (t) = Taken By, (c) = Cosigned By    Initials Name Provider Type    TL Rocio Kaye PTA Physical Therapy Assistant                               PT OP Goals       06/27/17 1439       PT Short Term Goals    STG Date to Achieve 06/22/17  -TL     STG 1 Pt sarai demonstrate AROM R shoulder flexion 60, abd 45  -TL     STG 1 Progress Ongoing;Progressing  -TL     STG 2 Pt will perform MMT R shoulder girdle 3+/4  -TL     STG 2 Progress Ongoing  -TL     STG 3 Pt will perform full R elbow flexion   -TL     STG 3 Progress Met  -TL     STG 4 I HEP each  session  -TL     STG 4 Progress Ongoing;Progressing  -TL     Long Term Goals    LTG Date to Achieve 07/06/17  -TL     LTG 1 Pt will report min R shoulder pain, 2-3/10.   -TL     LTG 1 Progress Ongoing  -TL     LTG 2 Pt will resume brushing hair with R hand.   -TL     LTG 2 Progress Ongoing;Progressing  -TL     LTG 3 Pt will resume putting on make up with R hand  -TL     LTG 3 Progress Ongoing;Progressing  -TL     LTG 4 Pt will demonstrate functional AROM R shoulder/ UE  -TL     LTG 4 Progress Ongoing  -TL     LTG 5 I in final HEP   -TL     LTG 5 Progress Ongoing  -TL       User Key  (r) = Recorded By, (t) = Taken By, (c) = Cosigned By    Initials Name Provider Type    NOEMI Kaye PTA Physical Therapy Assistant                Therapy Education       06/27/17 1439          Therapy Education    Given HEP   PTA given pt pulley's for home use with 3-way UE   -TL      Program New  -TL      How Provided Verbal;Demonstration;Written  -TL      Provided to Patient  -TL      Level of Understanding Verbalized;Demonstrated;Teach back education performed  -TL        User Key  (r) = Recorded By, (t) = Taken By, (c) = Cosigned By    Initials Name Provider Type    NOEMI Kaye PTA Physical Therapy Assistant                Time Calculation:   Start Time: 1439  Stop Time: 1530  Time Calculation (min): 51 min  Total Timed Code Minutes- PT: 51 minute(s)    Therapy Charges for Today     Code Description Service Date Service Provider Modifiers Qty    31865003988 HC PT THER PROC EA 15 MIN 6/27/2017 Rocio Kaye PTA GP 3     CT DME SUPPLY OR ACCESSORY, NOS 6/27/2017 Rocio Kaye PTA  1                    Rocio Kaye PTA  6/28/2017

## 2017-06-29 ENCOUNTER — HOSPITAL ENCOUNTER (OUTPATIENT)
Dept: PHYSICAL THERAPY | Facility: HOSPITAL | Age: 57
Setting detail: THERAPIES SERIES
Discharge: HOME | End: 2017-06-29

## 2017-06-29 DIAGNOSIS — S46.011D ROTATOR CUFF STRAIN, RIGHT, SUBSEQUENT ENCOUNTER: Primary | ICD-10-CM

## 2017-06-29 PROCEDURE — 97110 THERAPEUTIC EXERCISES: CPT | Performed by: PHYSICAL THERAPIST

## 2017-06-29 NOTE — THERAPY PROGRESS REPORT/RE-CERT
Outpatient Physical Therapy Ortho Re-Assessment  [facility]     Patient Name: Jyoti Riddle  : 1960  MRN: [id number]  Today's Date: 2017      Visit Date: 2017     Pt attended 5/5 scheduled visits. .  Re-cert date 17  Pt will RTD 7/3/17  Pt has 9 approved visits.     Patient Active Problem List   Diagnosis   • Vitamin D deficiency   • Vertigo   • Neuropathy   • Restless leg syndrome   • Pain in joint   • Allergic rhinitis   • Essential hypertension   • Abnormal EEG   • Cerebrovascular accident (CVA) due to thrombosis of left carotid artery   • Illicit drug use   • Tobacco abuse   • Sleep apnea   • Neck pain   • Vitamin deficiency   • Acute otitis externa of right ear   • Generalized seizure disorder   • Rotator cuff strain   • Acute pain of right shoulder   • Closed fracture of distal end of right fibula   • Right ankle pain        Past Medical History:   Diagnosis Date   • Acute bronchitis 2016   • Acute otitis externa 2014   • Acute otitis media 02/10/2015   • Acute sinusitis 2016   • Ankle injury    • Backache 2014   • Benign hypertension 2016   • Cerebrovascular accident    • Dizziness 02/10/2015   • Encounter for gynecological examination without abnormal finding 03/10/2016   • Fatigue 2015   • Foot pain 2014   • Hyperkeratosis 2014   • Hypotensive episode 2015   • Keratoma 2014    intractable plantar   • Low blood pressure 2015   • Menopausal and perimenopausal disorder 03/10/2016    other specified   • Numbness of face 2016    improved   • Otitis media of right ear 2016    unspecified   • Perforated tympanic membrane 2014   • Puncture wound 2014    injury   • Right-sided face pain 2016   • Seizure    • Thumb pain 2015   • Upper respiratory infection 2015   • Wheezing 10/21/2014        Past Surgical History:   Procedure Laterality Date   • BREAST BIOPSY     • OTHER  SURGICAL HISTORY  07/07/2014    Destruction of Benign Lesion (1-14)   • TUBAL ABDOMINAL LIGATION         Visit Dx:     ICD-10-CM ICD-9-CM   1. Rotator cuff strain, right, subsequent encounter S46.011D V58.89     840.4                 PT Ortho       06/29/17 1500    Subjective Comments    Subjective Comments States R shoulder feeling better, but still has sore days. Relates doing HEP each day.   -LF    Subjective Pain    Able to rate subjective pain? yes  -LF    ROM (Range of Motion)    General ROM Detail AROM : flexion 90, abd 75  -LF      06/27/17 1400    Subjective Comments    Subjective Comments pt came in with braides and rolls in hair. pt stated that she fixed her hair herself on sunday. Pt stated that she has not been doing all of her HEP. Pt stated that she did not have pulley for home.  -TL    Subjective Pain    Able to rate subjective pain? yes  -TL    Pre-Treatment Pain Level 4  -TL    Post-Treatment Pain Level 4  -TL    Posture/Observations    Posture/Observations Comments Pt not in any distress  -TL      User Key  (r) = Recorded By, (t) = Taken By, (c) = Cosigned By    Initials Name Provider Type    LF Taya Orozco, PT Physical Therapist    TL Rocio Kaye PTA Physical Therapy Assistant                            Therapy Education       06/29/17 1500          Therapy Education    Given HEP;Posture/body mechanics  -LF      Program Reinforced  -LF      How Provided Verbal  -LF      Provided to Patient  -LF      Level of Understanding Teach back education performed;Verbalized;Demonstrated  -LF        User Key  (r) = Recorded By, (t) = Taken By, (c) = Cosigned By    Initials Name Provider Type    LF Taya Orozco, PT Physical Therapist                PT OP Goals       06/29/17 1500       PT Short Term Goals    STG Date to Achieve 07/13/17  -LF     STG 1 Pt sarai demonstrate AROM R shoulder flexion 100, abd 75  -LF     STG 1 Progress New  -LF     STG 2 Pt will perform MMT R shoulder girdle 3+/4  -LF      "STG 2 Progress Ongoing  -LF     STG 3 Pt will reach  top of head R UE  -LF     STG 3 Progress New  -LF     STG 4 I HEP each session  -LF     STG 4 Progress Ongoing;Progressing  -LF     Long Term Goals    LTG Date to Achieve 07/27/17  -LF     LTG 1 Pt will report min R shoulder pain, 2-3/10.   -LF     LTG 1 Progress Ongoing  -LF     LTG 2 Pt will resume brushing hair with R hand.   -LF     LTG 2 Progress Ongoing;Progressing  -LF     LTG 3 Pt will resume putting on make up with R hand  -LF     LTG 3 Progress Ongoing;Progressing  -LF     LTG 4 Pt will demonstrate functional AROM R shoulder/ UE  -LF     LTG 4 Progress Ongoing  -LF     LTG 5 I in final HEP   -LF     LTG 5 Progress Ongoing  -LF     Time Calculation    PT Goal Re-Cert Due Date 07/20/17  -LF       User Key  (r) = Recorded By, (t) = Taken By, (c) = Cosigned By    Initials Name Provider Type     Taya Orozco, PT Physical Therapist                PT Assessment/Plan       06/29/17 1500       PT Assessment    Functional Limitations Limitation in home management;Performance in self-care ADL  -LF     Impairments Impaired flexibility;Pain;Muscle strength;Range of motion  -LF     Assessment Comments Difficult to assess, objectively, today due to pt seeming vague, slightly \"out of it\" (medication? stroke sequaela?) Difficult to get good subjective information, varying AROM of R shoulder during session. Pt slowly progressing R shoulder AROM and strength. Pt continues to demonstrate limitatin in shoulder / UE elevation, strength, pain, limitation with ADLs. Pt would benefit from continued skilled intervention to address the above mentioned deficits.   -LF     Rehab Potential Good  -LF     Patient/caregiver participated in establishment of treatment plan and goals Yes  -LF     Patient would benefit from skilled therapy intervention Yes  -LF     PT Plan    PT Frequency 2x/week  -LF     Predicted Duration of Therapy Intervention (days/wks) 3 weeks, 9 visits approved " through 7/13/17  -LF     Planned CPT's? PT RE-EVAL: 64594;PT THER PROC EA 15 MIN: 84788;PT THER ACT EA 15 MIN: 82271;PT MANUAL THERAPY EA 15 MIN: 24444;PT NEUROMUSC RE-EDUCATION EA 15 MIN: 18716;PT HOT OR COLD PACK TREAT MCARE  -LF     Physical Therapy Interventions (Optional Details) home exercise program;modalities;neuromuscular re-education;patient/family education;postural re-education;ROM (Range of Motion);strengthening  -LF     PT Plan Comments Continue therapy for shoulder girdle strengthening, ROM, stretching, modalities, progress HEP   -LF       User Key  (r) = Recorded By, (t) = Taken By, (c) = Cosigned By    Initials Name Provider Type    SHAY Orozco, PT Physical Therapist                  Exercises       06/29/17 1500          Subjective Comments    Subjective Comments States R shoulder feeling better, but still has sore days. Relates doing HEP each day.   -LF      Subjective Pain    Able to rate subjective pain? yes  -LF      Exercise 1    Exercise Name 1 Pro II - arms , legs  -LF      Time (Minutes) 1 10  -LF      Exercise 2    Exercise Name 2 standing  1) rowing  2) forward press with band   -LF      Cueing 2 Verbal;Demo  -LF      Equipment 2 Theraband  -LF      Resistance 2 Green  -LF      Sets 2 2  -LF      Reps 2 20   each , reviewed HEP   -LF      Exercise 3    Exercise Name 3 pulleys   -LF      Cueing 3 Verbal;Demo  -LF      Time (Minutes) 3 5  -LF        User Key  (r) = Recorded By, (t) = Taken By, (c) = Cosigned By    Initials Name Provider Type    SHAY Orozco, PT Physical Therapist                              Outcome Measures       06/29/17 1500          Quick DASH    Open a tight or new jar. 3  -LF      Do heavy household chores (e.g., wash walls, wash floors) 3  -LF      Carry a shopping bag or briefcase 2  -LF      Wash your back 3  -LF      Use a knife to cut food 2  -LF      Recreational activities in which you take some force or impact through your arm, should or hand (e.g.  golf, hammering, tennis, etc.) 3  -LF      During the past week, to what extent has your arm, shoulder, or hand problem interfered with your normal social activites with family, friends, neighbors or groups? 1  -LF      During the past week, were you limited in your work or other regular daily activities as a result of your arm, shoulder or hand problem? 3  -LF      Arm, Shoulder, or hand pain 2  -LF      Tingling (pins and needles) in your arm, shoulder, or hand 1  -LF      During the past week, how much difficulty have you had sleeping because of the pain in your arm, shoulder or hand? 2  -LF      Number of Questions Answered 11  -LF      Quick DASH Score 31.82  -LF      Functional Assessment    Outcome Measure Options Quick DASH  -LF        User Key  (r) = Recorded By, (t) = Taken By, (c) = Cosigned By    Initials Name Provider Type    LF Taya Orozco, PT Physical Therapist            Time Calculation:   Start Time: 1515  Stop Time: 1550  Time Calculation (min): 35 min  Total Timed Code Minutes- PT: 35 minute(s)     Therapy Charges for Today     Code Description Service Date Service Provider Modifiers Qty    60058373144 HC PT THER PROC EA 15 MIN 6/29/2017 Taya Orozco, PT GP 3          PT G-Codes  Outcome Measure Options: Quick DASH         Taya Orozco, PT  6/29/2017

## 2017-07-03 ENCOUNTER — OFFICE VISIT (OUTPATIENT)
Dept: FAMILY MEDICINE CLINIC | Facility: CLINIC | Age: 57
End: 2017-07-03

## 2017-07-03 VITALS
SYSTOLIC BLOOD PRESSURE: 132 MMHG | BODY MASS INDEX: 24.45 KG/M2 | OXYGEN SATURATION: 98 % | HEIGHT: 63 IN | WEIGHT: 138 LBS | RESPIRATION RATE: 16 BRPM | TEMPERATURE: 98.1 F | DIASTOLIC BLOOD PRESSURE: 84 MMHG | HEART RATE: 107 BPM

## 2017-07-03 DIAGNOSIS — S46.011A ROTATOR CUFF STRAIN, RIGHT, INITIAL ENCOUNTER: ICD-10-CM

## 2017-07-03 DIAGNOSIS — M25.50 ARTHRALGIA, UNSPECIFIED JOINT: ICD-10-CM

## 2017-07-03 PROCEDURE — 99213 OFFICE O/P EST LOW 20 MIN: CPT | Performed by: NURSE PRACTITIONER

## 2017-07-03 RX ORDER — SULINDAC 200 MG/1
200 TABLET ORAL 2 TIMES DAILY
Qty: 60 TABLET | Refills: 3 | Status: SHIPPED | OUTPATIENT
Start: 2017-07-03 | End: 2017-09-05

## 2017-07-03 RX ORDER — CYCLOBENZAPRINE HCL 10 MG
10 TABLET ORAL 3 TIMES DAILY PRN
Qty: 30 TABLET | Refills: 3 | Status: SHIPPED | OUTPATIENT
Start: 2017-07-03 | End: 2017-10-12 | Stop reason: SDUPTHER

## 2017-07-03 NOTE — PROGRESS NOTES
Hattie Riddle is a 56 y.o. female.     HPI Comments: Here today for toño on her right shoulder pain.  Has been going to physical therapy for rotator cuff strain.  She also is seeing ortho for fracture of right fibula.  She is seeing ortho for that.  She c/o not having anything to take for the pain and spasms.    Upper Extremity Issue   This is a chronic problem. The current episode started more than 1 month ago. The problem occurs constantly. The problem has been unchanged. Associated symptoms include arthralgias (right shoulder). Pertinent negatives include no abdominal pain, anorexia, change in bowel habit, chest pain, chills, congestion, coughing, diaphoresis, fatigue, fever, headaches, joint swelling, myalgias, nausea, neck pain, numbness, rash, sore throat, swollen glands, urinary symptoms, vertigo, visual change, vomiting or weakness. Exacerbated by: rom. She has tried NSAIDs (physical therapy) for the symptoms. The treatment provided moderate relief.        The following portions of the patient's history were reviewed and updated as appropriate: allergies, current medications, past family history, past medical history, past social history, past surgical history and problem list.    Review of Systems   Constitutional: Negative.  Negative for chills, diaphoresis, fatigue and fever.   HENT: Negative.  Negative for congestion and sore throat.    Respiratory: Negative.  Negative for cough.    Cardiovascular: Negative.  Negative for chest pain.   Gastrointestinal: Negative for abdominal pain, anorexia, change in bowel habit, nausea and vomiting.   Musculoskeletal: Positive for arthralgias (right shoulder). Negative for joint swelling, myalgias and neck pain.   Skin: Negative.  Negative for rash.   Neurological: Negative.  Negative for vertigo, weakness, numbness and headaches.   Psychiatric/Behavioral: Negative.        Objective   Physical Exam   Constitutional: She is oriented to person, place, and  time. She appears well-developed and well-nourished. No distress.   HENT:   Head: Normocephalic.   Eyes: Pupils are equal, round, and reactive to light.   Neck: Normal range of motion. Neck supple. No thyromegaly present.   Cardiovascular: Normal rate, regular rhythm and normal heart sounds.  Exam reveals no friction rub.    No murmur heard.  Pulmonary/Chest: Effort normal and breath sounds normal. No respiratory distress. She has no wheezes. She has no rales.   Abdominal: Soft.   Musculoskeletal:        Right shoulder: She exhibits decreased range of motion and tenderness.   Neurological: She is alert and oriented to person, place, and time.   Skin: Skin is warm and dry.   Psychiatric: She has a normal mood and affect. Thought content normal.   Nursing note and vitals reviewed.      Assessment/Plan   Jyoti was seen today for shoulder pain.    Diagnoses and all orders for this visit:    Rotator cuff strain, right, initial encounter  -     cyclobenzaprine (FLEXERIL) 10 MG tablet; Take 1 tablet by mouth 3 (Three) Times a Day As Needed for Muscle Spasms.    Arthralgia, unspecified joint  -     sulindac (CLINORIL) 200 MG tablet; Take 1 tablet by mouth 2 (Two) Times a Day.      She needs to cont with physical therapy.  If unimproved when therapy ends, will consider mri.

## 2017-07-05 ENCOUNTER — TELEPHONE (OUTPATIENT)
Dept: PHYSICAL THERAPY | Facility: HOSPITAL | Age: 57
End: 2017-07-05

## 2017-07-06 ENCOUNTER — HOSPITAL ENCOUNTER (OUTPATIENT)
Dept: PHYSICAL THERAPY | Facility: HOSPITAL | Age: 57
Setting detail: THERAPIES SERIES
Discharge: HOME | End: 2017-07-06

## 2017-07-06 DIAGNOSIS — S46.011D ROTATOR CUFF STRAIN, RIGHT, SUBSEQUENT ENCOUNTER: Primary | ICD-10-CM

## 2017-07-06 PROCEDURE — 97110 THERAPEUTIC EXERCISES: CPT

## 2017-07-06 NOTE — THERAPY TREATMENT NOTE
"    Outpatient Physical Therapy Ortho Treatment Note  [facility]     Patient Name: Jyoti Riddle  : 1960  MRN: [id number]  Today's Date: 2017      Visit Date: 2017  Visits:visits  Approved visits:9 visits  RTD: 2017  Subjective Improvement: \"Some with lifting the arm\"    Visit Dx:    ICD-10-CM ICD-9-CM   1. Rotator cuff strain, right, subsequent encounter S46.011D V58.89     840.4       Patient Active Problem List   Diagnosis   • Vitamin D deficiency   • Vertigo   • Neuropathy   • Restless leg syndrome   • Pain in joint   • Allergic rhinitis   • Essential hypertension   • Abnormal EEG   • Cerebrovascular accident (CVA) due to thrombosis of left carotid artery   • Illicit drug use   • Tobacco abuse   • Sleep apnea   • Neck pain   • Vitamin deficiency   • Acute otitis externa of right ear   • Generalized seizure disorder   • Rotator cuff strain   • Acute pain of right shoulder   • Closed fracture of distal end of right fibula   • Right ankle pain        Past Medical History:   Diagnosis Date   • Acute bronchitis 2016   • Acute otitis externa 2014   • Acute otitis media 02/10/2015   • Acute sinusitis 2016   • Ankle injury    • Backache 2014   • Benign hypertension 2016   • Cerebrovascular accident    • Dizziness 02/10/2015   • Encounter for gynecological examination without abnormal finding 03/10/2016   • Fatigue 2015   • Foot pain 2014   • Hyperkeratosis 2014   • Hypotensive episode 2015   • Keratoma 2014    intractable plantar   • Low blood pressure 2015   • Menopausal and perimenopausal disorder 03/10/2016    other specified   • Numbness of face 2016    improved   • Otitis media of right ear 2016    unspecified   • Perforated tympanic membrane 2014   • Puncture wound 2014    injury   • Right-sided face pain 2016   • Seizure    • Thumb pain 2015   • Upper respiratory " infection 08/28/2015   • Wheezing 10/21/2014        Past Surgical History:   Procedure Laterality Date   • BREAST BIOPSY     • OTHER SURGICAL HISTORY  07/07/2014    Destruction of Benign Lesion (1-14)   • TUBAL ABDOMINAL LIGATION               PT Ortho       07/06/17 1300    Subjective Comments    Subjective Comments pt stated that she is hurting today. pt thought her appt was today vs yesterday. Pt just taken pain medication prior to coming to therapy. Pt unable to tell PTA why she is hurting so much today.  -TL    Subjective Pain    Able to rate subjective pain? yes  -TL    Pre-Treatment Pain Level 8  -TL    Posture/Observations    Posture/Observations Comments Pt came in without walker. No acute distess  -TL      User Key  (r) = Recorded By, (t) = Taken By, (c) = Cosigned By    Initials Name Provider Type    NOEMI Kaye PTA Physical Therapy Assistant                            PT Assessment/Plan       07/06/17 1316       PT Assessment    Assessment Comments Pt needs alot of verbal and tactile cuing for proper technigue. Pt able to reach head goal. Pt progressing slowly . Pt has some processing issues with task at times. Pt limited with shoulder abd . Pt has pain in the bicep tendon  -TL     PT Plan    PT Frequency 2x/week  -TL     PT Plan Comments continue with wall slides as pt tolerates  -TL       User Key  (r) = Recorded By, (t) = Taken By, (c) = Cosigned By    Initials Name Provider Type    NOEMI Kaye PTA Physical Therapy Assistant                Modalities       07/06/17 1300          Ice    Patient denies application of Ice No  -TL        User Key  (r) = Recorded By, (t) = Taken By, (c) = Cosigned By    Initials Name Provider Type    NOEMI Kaye PTA Physical Therapy Assistant                Exercises       07/06/17 1300          Subjective Comments    Subjective Comments pt stated that she is hurting today. pt thought her appt was today vs yesterday. Pt just taken pain medication  prior to coming to therapy. Pt unable to tell PTA why she is hurting so much today.  -TL      Subjective Pain    Able to rate subjective pain? yes  -TL      Pre-Treatment Pain Level 8  -TL      Exercise 1    Exercise Name 1 Pro 11 UE  -TL      Resistance 1 --   level 3  -TL      Time (Minutes) 1 10 mins  -TL      Exercise 2    Exercise Name 2 Pulleys 3-way UE  -TL      Cueing 2 Verbal;Demo   Pt not wanting to control scaption  -TL      Time (Minutes) 2 3 mins each direction  -TL      Exercise 3    Exercise Name 3 Wall slides flexion/abd  -TL      Sets 3 2  -TL      Reps 3 10  -TL      Exercise 4    Exercise Name 4 Wall circles cc/ccw  -TL      Sets 4 2  -TL      Reps 4 10  -TL      Exercise 5    Exercise Name 5 wall push ups  -TL      Reps 5 12  -TL      Exercise 6    Exercise Name 6 Supine wand flexion  -TL      Reps 6 20  -TL      Exercise 7    Exercise Name 7 Push downs T-band  -TL      Equipment 7 Theraband  -TL      Resistance 7 Red  -TL      Sets 7 2  -TL      Reps 7 10  -TL      Exercise 8    Exercise Name 8 Punches T-band  -TL      Equipment 8 Theraband  -TL      Resistance 8 Red  -TL      Sets 8 2  -TL      Reps 8 10  -TL      Exercise 9    Exercise Name 9 No Money t-band  -TL      Equipment 9 Theraband  -TL      Resistance 9 Red  -TL      Sets 9 2  -TL      Reps 9 10  -TL      Exercise 10    Exercise Name 10 Bicep curls  -TL      Cueing 10 Verbal   Needs cues on technigue and posturing  -TL      Equipment 10 Theraband  -TL      Resistance 10 Red  -TL      Sets 10 1  -TL      Reps 10 8  -TL      Exercise 11    Exercise Name 11 right hand touching head  -TL      Cueing 11 Verbal   needs to keep upright posturing  -TL      Sets 11 2  -TL      Reps 11 10  -TL      Exercise 12    Exercise Name 12 IR with strap  -TL      Reps 12 2  -TL      Time (Seconds) 12 1 min each  -TL        User Key  (r) = Recorded By, (t) = Taken By, (c) = Cosigned By    Initials Name Provider Type    TL Rocio Kaye, PTA Physical  Therapy Assistant                               PT OP Goals       07/06/17 1316       PT Short Term Goals    STG Date to Achieve 07/13/17  -TL     STG 1 Pt sarai demonstrate AROM R shoulder flexion 100, abd 75  -TL     STG 1 Progress New  -TL     STG 2 Pt will perform MMT R shoulder girdle 3+/4  -TL     STG 2 Progress Ongoing  -TL     STG 3 Pt will reach  top of head R UE  -TL     STG 3 Progress Progressing  -TL     STG 4 I HEP each session  -TL     STG 4 Progress Ongoing;Progressing  -TL     Long Term Goals    LTG Date to Achieve 07/27/17  -TL     LTG 1 Pt will report min R shoulder pain, 2-3/10.   -TL     LTG 1 Progress Ongoing  -TL     LTG 2 Pt will resume brushing hair with R hand.   -TL     LTG 2 Progress Ongoing;Progressing  -TL     LTG 3 Pt will resume putting on make up with R hand  -TL     LTG 3 Progress Ongoing;Progressing  -TL     LTG 4 Pt will demonstrate functional AROM R shoulder/ UE  -TL     LTG 4 Progress Ongoing  -TL     LTG 5 I in final HEP   -TL     LTG 5 Progress Ongoing  -TL       User Key  (r) = Recorded By, (t) = Taken By, (c) = Cosigned By    Initials Name Provider Type    TL Rocio Kaye PTA Physical Therapy Assistant                Therapy Education       07/06/17 1316          Therapy Education    Given HEP  -TL      Program Reinforced  -TL      How Provided Verbal  -TL      Provided to Patient;Caregiver  -TL      Level of Understanding Verbalized  -TL        User Key  (r) = Recorded By, (t) = Taken By, (c) = Cosigned By    Initials Name Provider Type    TL Rocio Kaye PTA Physical Therapy Assistant                Time Calculation:   Start Time: 1316  Stop Time: 1414  Time Calculation (min): 58 min  Total Timed Code Minutes- PT: 58 minute(s)    Therapy Charges for Today     Code Description Service Date Service Provider Modifiers Qty    50354714341 HC PT THER PROC EA 15 MIN 7/6/2017 Rocio Kaye PTA GP 4                    Rocio Kaey PTA  7/6/2017

## 2017-07-10 ENCOUNTER — TELEPHONE (OUTPATIENT)
Dept: PHYSICAL THERAPY | Facility: HOSPITAL | Age: 57
End: 2017-07-10

## 2017-07-12 ENCOUNTER — TELEPHONE (OUTPATIENT)
Dept: PHYSICAL THERAPY | Facility: HOSPITAL | Age: 57
End: 2017-07-12

## 2017-07-12 DIAGNOSIS — S82.821D CLOSED TORUS FRACTURE OF DISTAL END OF RIGHT FIBULA WITH ROUTINE HEALING, SUBSEQUENT ENCOUNTER: Primary | ICD-10-CM

## 2017-07-13 ENCOUNTER — HOSPITAL ENCOUNTER (OUTPATIENT)
Dept: PHYSICAL THERAPY | Facility: HOSPITAL | Age: 57
Setting detail: THERAPIES SERIES
Discharge: HOME | End: 2017-07-13

## 2017-07-13 DIAGNOSIS — S46.011D ROTATOR CUFF STRAIN, RIGHT, SUBSEQUENT ENCOUNTER: Primary | ICD-10-CM

## 2017-07-13 PROCEDURE — 97110 THERAPEUTIC EXERCISES: CPT

## 2017-07-13 PROCEDURE — G0283 ELEC STIM OTHER THAN WOUND: HCPCS

## 2017-07-13 NOTE — THERAPY TREATMENT NOTE
Outpatient Physical Therapy Ortho Treatment Note  [facility]     Patient Name: Jyoti Riddle  : 1960  MRN: [id number]  Today's Date: 2017      Visit Date: 2017  Visits: visits  Approved visits:9 visits  Re-cert:2017  RTD: today  Subjective Improvement:50%  Visit Dx:    ICD-10-CM ICD-9-CM   1. Rotator cuff strain, right, subsequent encounter S46.011D V58.89     840.4       Patient Active Problem List   Diagnosis   • Vitamin D deficiency   • Vertigo   • Neuropathy   • Restless leg syndrome   • Pain in joint   • Allergic rhinitis   • Essential hypertension   • Abnormal EEG   • Cerebrovascular accident (CVA) due to thrombosis of left carotid artery   • Illicit drug use   • Tobacco abuse   • Sleep apnea   • Neck pain   • Vitamin deficiency   • Acute otitis externa of right ear   • Generalized seizure disorder   • Rotator cuff strain   • Acute pain of right shoulder   • Closed fracture of distal end of right fibula   • Right ankle pain        Past Medical History:   Diagnosis Date   • Acute bronchitis 2016   • Acute otitis externa 2014   • Acute otitis media 02/10/2015   • Acute sinusitis 2016   • Ankle injury    • Backache 2014   • Benign hypertension 2016   • Cerebrovascular accident    • Dizziness 02/10/2015   • Encounter for gynecological examination without abnormal finding 03/10/2016   • Fatigue 2015   • Foot pain 2014   • Hyperkeratosis 2014   • Hypotensive episode 2015   • Keratoma 2014    intractable plantar   • Low blood pressure 2015   • Menopausal and perimenopausal disorder 03/10/2016    other specified   • Numbness of face 2016    improved   • Otitis media of right ear 2016    unspecified   • Perforated tympanic membrane 2014   • Puncture wound 2014    injury   • Right-sided face pain 2016   • Seizure    • Thumb pain 2015   • Upper respiratory infection  08/28/2015   • Wheezing 10/21/2014        Past Surgical History:   Procedure Laterality Date   • BREAST BIOPSY     • OTHER SURGICAL HISTORY  07/07/2014    Destruction of Benign Lesion (1-14)   • TUBAL ABDOMINAL LIGATION               PT Ortho       07/13/17 0800    Subjective Comments    Subjective Comments pt stated that she is hurting from right shoulder blade up to neck.   -TL    Subjective Pain    Able to rate subjective pain? yes  -TL    Pre-Treatment Pain Level 7  -TL    Post-Treatment Pain Level 2  -TL    Posture/Observations    Posture/Observations Comments pt came in with std walker today  -TL    ROM (Range of Motion)    General ROM Detail right shld flexion 100 arom, abd 70  -TL      User Key  (r) = Recorded By, (t) = Taken By, (c) = Cosigned By    Initials Name Provider Type    NOEMI Kaye PTA Physical Therapy Assistant                            PT Assessment/Plan       07/13/17 0800       PT Assessment    Assessment Comments pt partially met ROM goal. pt did well with ROm after e-stim and ice. pt is self limiting secondary to moving to another address. Pt has been educated on HEP but not been doing her HEP. PT also been educated on  pain control with ice.  -TL     PT Plan    PT Frequency 2x/week  -TL     PT Plan Comments continue with e-stim and manual therapy  -TL       User Key  (r) = Recorded By, (t) = Taken By, (c) = Cosigned By    Initials Name Provider Type    NOEMI Kaye PTA Physical Therapy Assistant                Modalities       07/13/17 0800          Ice    Ice Applied Yes  -TL      Location right shoulder  -TL      Rx Minutes Other:  -TL      Ice S/P Rx Yes  -TL      ELECTRICAL STIMULATION    Attended/Unattended Unattended  -TL      Stimulation Type IFC  -TL      Location/Electrode Placement/Other right shoulder  -TL      Rx Minutes 20 mins  -TL        User Key  (r) = Recorded By, (t) = Taken By, (c) = Cosigned By    Initials Name Provider Type    NOEMI Kaye PTA  Physical Therapy Assistant                Exercises       07/13/17 0800          Subjective Comments    Subjective Comments pt stated that she is hurting from right shoulder blade up to neck.   -TL      Subjective Pain    Able to rate subjective pain? yes  -TL      Pre-Treatment Pain Level 7  -TL      Post-Treatment Pain Level 2  -TL      Exercise 1    Exercise Name 1 Pro 2 UE  -TL      Resistance 1 --   level 3  -TL      Exercise 2    Exercise Name 2 Pulleys 3-way weight for ecc   -TL      Equipment 2 Cuff Weight  -TL      Weights/Plates 2 1  -TL      Time (Minutes) 2 3 mins each direction  -TL      Exercise 3    Exercise Name 3 wall slides flexion  -TL      Sets 3 2  -TL      Reps 3 10  -TL      Exercise 4    Exercise Name 4 Wall circles cc/ccw  -TL      Sets 4 2  -TL      Reps 4 10  -TL      Exercise 5    Exercise Name 5 Shld rows mid  -TL      Equipment 5 Theraband  -TL      Resistance 5 Red  -TL      Sets 5 2  -TL      Reps 5 10  -TL      Exercise 6    Exercise Name 6 Shld punches  -TL      Equipment 6 Theraband  -TL      Resistance 6 Red  -TL      Sets 6 2  -TL      Reps 6 10  -TL        User Key  (r) = Recorded By, (t) = Taken By, (c) = Cosigned By    Initials Name Provider Type    TL Rocio Kaye PTA Physical Therapy Assistant                        Manual Rx (last 36 hours)      Manual Treatments       07/13/17 0800          Manual Rx 1    Manual Rx 1 Location right shld ocissulations, scapula movement, gentle joint mob  -TL      Manual Rx 1 Duration 10 mins  -TL        User Key  (r) = Recorded By, (t) = Taken By, (c) = Cosigned By    Initials Name Provider Type    NOEMI Kaye PTA Physical Therapy Assistant                PT OP Goals       07/13/17 0802       PT Short Term Goals    STG Date to Achieve 07/13/17  -TL     STG 1 Pt sarai demonstrate AROM R shoulder flexion 100, abd 75  -TL     STG 1 Progress Partially Met;Progressing  -TL     STG 2 Pt will perform MMT R shoulder girdle 3+/4  -TL      STG 2 Progress Ongoing  -TL     STG 3 Pt will reach  top of head R UE  -TL     STG 3 Progress Progressing  -TL     STG 4 I HEP each session  -TL     STG 4 Progress Ongoing;Progressing  -TL     Long Term Goals    LTG Date to Achieve 07/27/17  -TL     LTG 1 Pt will report min R shoulder pain, 2-3/10.   -TL     LTG 1 Progress Ongoing  -TL     LTG 2 Pt will resume brushing hair with R hand.   -TL     LTG 2 Progress Ongoing;Progressing  -TL     LTG 3 Pt will resume putting on make up with R hand  -TL     LTG 3 Progress Ongoing;Progressing  -TL     LTG 4 Pt will demonstrate functional AROM R shoulder/ UE  -TL     LTG 4 Progress Ongoing  -TL     LTG 5 I in final HEP   -TL     LTG 5 Progress Ongoing  -TL     Time Calculation    PT Goal Re-Cert Due Date 07/20/17  -TL       User Key  (r) = Recorded By, (t) = Taken By, (c) = Cosigned By    Initials Name Provider Type    TL Rocio Kaye PTA Physical Therapy Assistant                    Time Calculation:   Start Time: 0802  Stop Time: 0925  Time Calculation (min): 83 min  Total Timed Code Minutes- PT: 83 minute(s)    Therapy Charges for Today     Code Description Service Date Service Provider Modifiers Qty    19950052616 HC PT THER PROC EA 15 MIN 7/13/2017 Rocio Kaye PTA GP 4    48024646174 HC PT ELECTRICAL STIM UNATTENDED 7/13/2017 Rocio Kaye PTA  1                    Rocio Kaye PTA  7/13/2017

## 2017-07-14 RX ORDER — LISINOPRIL AND HYDROCHLOROTHIAZIDE 12.5; 1 MG/1; MG/1
TABLET ORAL
Qty: 30 TABLET | Refills: 3 | Status: SHIPPED | OUTPATIENT
Start: 2017-07-14 | End: 2017-08-03

## 2017-07-17 DIAGNOSIS — S82.821D CLOSED TORUS FRACTURE OF DISTAL END OF RIGHT FIBULA WITH ROUTINE HEALING, SUBSEQUENT ENCOUNTER: Primary | ICD-10-CM

## 2017-07-18 ENCOUNTER — HOSPITAL ENCOUNTER (OUTPATIENT)
Dept: PHYSICAL THERAPY | Facility: HOSPITAL | Age: 57
Setting detail: THERAPIES SERIES
Discharge: HOME | End: 2017-07-18

## 2017-07-18 DIAGNOSIS — S46.011D ROTATOR CUFF STRAIN, RIGHT, SUBSEQUENT ENCOUNTER: Primary | ICD-10-CM

## 2017-07-18 PROCEDURE — 97110 THERAPEUTIC EXERCISES: CPT | Performed by: PHYSICAL THERAPIST

## 2017-07-20 ENCOUNTER — HOSPITAL ENCOUNTER (OUTPATIENT)
Dept: PHYSICAL THERAPY | Facility: HOSPITAL | Age: 57
Setting detail: THERAPIES SERIES
Discharge: HOME | End: 2017-07-20

## 2017-07-20 DIAGNOSIS — S46.011D ROTATOR CUFF STRAIN, RIGHT, SUBSEQUENT ENCOUNTER: Primary | ICD-10-CM

## 2017-07-20 PROCEDURE — 97110 THERAPEUTIC EXERCISES: CPT

## 2017-07-20 NOTE — THERAPY TREATMENT NOTE
Outpatient Physical Therapy Ortho Treatment Note  [facility]     Patient Name: Jyoti Riddle  : 1960  MRN: [id number]  Today's Date: 2017      Visit Date: 2017  Visits:  Approved visits:9 visits  RTD:unknown  Re-cert:8/3/2017  Visit Dx:    ICD-10-CM ICD-9-CM   1. Rotator cuff strain, right, subsequent encounter S46.011D V58.89     840.4       Patient Active Problem List   Diagnosis   • Vitamin D deficiency   • Vertigo   • Neuropathy   • Restless leg syndrome   • Pain in joint   • Allergic rhinitis   • Essential hypertension   • Abnormal EEG   • Cerebrovascular accident (CVA) due to thrombosis of left carotid artery   • Illicit drug use   • Tobacco abuse   • Sleep apnea   • Neck pain   • Vitamin deficiency   • Acute otitis externa of right ear   • Generalized seizure disorder   • Rotator cuff strain   • Acute pain of right shoulder   • Closed fracture of distal end of right fibula   • Right ankle pain        Past Medical History:   Diagnosis Date   • Acute bronchitis 2016   • Acute otitis externa 2014   • Acute otitis media 02/10/2015   • Acute sinusitis 2016   • Ankle injury    • Backache 2014   • Benign hypertension 2016   • Cerebrovascular accident    • Dizziness 02/10/2015   • Encounter for gynecological examination without abnormal finding 03/10/2016   • Fatigue 2015   • Foot pain 2014   • Hyperkeratosis 2014   • Hypotensive episode 2015   • Keratoma 2014    intractable plantar   • Low blood pressure 2015   • Menopausal and perimenopausal disorder 03/10/2016    other specified   • Numbness of face 2016    improved   • Otitis media of right ear 2016    unspecified   • Perforated tympanic membrane 2014   • Puncture wound 2014    injury   • Right-sided face pain 2016   • Seizure    • Thumb pain 2015   • Upper respiratory infection 2015   • Wheezing 10/21/2014  "       Past Surgical History:   Procedure Laterality Date   • BREAST BIOPSY     • OTHER SURGICAL HISTORY  07/07/2014    Destruction of Benign Lesion (1-14)   • TUBAL ABDOMINAL LIGATION               PT Ortho       07/20/17 1535    Subjective Comments    Subjective Comments Treatment focused on pt being able to teach back HEP. Pt needs cues on proper tech. Pt stated that she is doing better.  -TL    Subjective Pain    Able to rate subjective pain? yes  -TL    Pre-Treatment Pain Level 0   spasms, pt with pain during ex  -TL    Posture/Observations    Posture/Observations Comments Pt walking with rw  -TL      07/18/17 1400    Subjective Comments    Subjective Comments Pt relates is having \"spasms\" in lateral R shoulder. relates feels the arm has improved since starting therapy. States HEP going well.  -LF    Subjective Pain    Able to rate subjective pain? yes  -LF    Pre-Treatment Pain Level 5  -LF    Posture/Observations    Posture/Observations Comments Pt walking with rolling walker , boot in place R ankle.   -LF    ROM (Range of Motion)    General ROM Detail AROM R shoulder flexion 80, abd 70, IR to reach beltline. Pt demonstrates using L arm to raise R arm.   -      User Key  (r) = Recorded By, (t) = Taken By, (c) = Cosigned By    Initials Name Provider Type     Taya Orozco, PT Physical Therapist    NOEMI Kaye PTA Physical Therapy Assistant                            PT Assessment/Plan       07/20/17 1535       PT Assessment    Assessment Comments Instructed pt to bring in hair brush and to use right hand to put make up on. No new goals met. Question if pt has been doing HEP.   -TL     PT Plan    PT Frequency 2x/week  -TL     PT Plan Comments Work on pt brushing hair and putting make up with right hand  -       User Key  (r) = Recorded By, (t) = Taken By, (c) = Cosigned By    Initials Name Provider Type    NOMEI Kaye PTA Physical Therapy Assistant                Modalities       07/20/17 " 1535          Ice    Ice Applied Yes  -TL      Location right shoulder  -TL      Rx Minutes Other:  -TL      Ice S/P Rx Yes  -TL      ELECTRICAL STIMULATION    Attended/Unattended Unattended  -TL      Stimulation Type IFC  -TL      Location/Electrode Placement/Other right shoulder  -TL      Rx Minutes 20 mins  -TL        User Key  (r) = Recorded By, (t) = Taken By, (c) = Cosigned By    Initials Name Provider Type    TL Rocio Kaye PTA Physical Therapy Assistant                Exercises       07/20/17 1600 07/20/17 1535       Subjective Comments    Subjective Comments  Treatment focused on pt being able to teach back HEP. Pt needs cues on proper tech. Pt stated that she is doing better.  -TL     Subjective Pain    Able to rate subjective pain?  yes  -TL     Pre-Treatment Pain Level  0   spasms, pt with pain during ex  -TL     Exercise 1    Exercise Name 1 Pro ll: UE forward/back  -TL      Resistance 1 --   level 3  -TL      Time (Minutes) 1 10 mins  -TL      Exercise 2    Exercise Name 2 Pulley flexion3-way  -TL      Cueing 2 Verbal  -TL      Time (Minutes) 2 2 mins  -TL      Exercise 3    Exercise Name 3 supine : shoulder at 90 (gravity neutral)  elbow extended, flexion to overhead and back to neutral - active   -TL      Cueing 3 Verbal;Tactile;Demo  -TL      Reps 3 20  -TL      Exercise 4    Exercise Name 4 supine: shoulder at 90, (gravity neutral)  arm circles CW/ CCW  -TL      Cueing 4 Verbal;Tactile;Demo  -TL      Sets 4 2  -TL      Reps 4 20  -TL      Exercise 5    Exercise Name 5 supine: shoulder at 90, (gravity neutral) : shoulder hoz adduction/ abduction  -TL      Cueing 5 Tactile  -TL      Sets 5 2  -TL      Reps 5 20  -TL      Exercise 6    Exercise Name 6 supine: arm at 90 abd, elbow bent 90 : active IR/ ER   -TL      Sets 6 2  -TL      Reps 6 20  -TL      Exercise 7    Exercise Name 7 supine : punch up   -TL      Sets 7 2  -TL      Reps 7 20  -TL        User Key  (r) = Recorded By, (t) = Taken By,  (c) = Cosigned By    Initials Name Provider Type    NOEMI Kaye PTA Physical Therapy Assistant                               PT OP Goals       07/20/17 1535       PT Short Term Goals    STG Date to Achieve 08/01/17  -TL     STG 1 Pt sarai demonstrate AROM R shoulder flexion 100, abd 75  -TL     STG 1 Progress Partially Met;Progressing  -TL     STG 2 Pt will perform MMT R shoulder girdle 3+/4  -TL     STG 2 Progress Ongoing  -TL     STG 3 Pt will reach  top of head R UE  -TL     STG 3 Progress Progressing  -TL     STG 4 I HEP each session  -TL     STG 4 Progress Ongoing;Progressing  -TL     Long Term Goals    LTG Date to Achieve 08/15/17  -TL     LTG 1 Pt will report min R shoulder pain, 2-3/10.   -TL     LTG 1 Progress Progressing  -TL     LTG 2 Pt will resume brushing hair with R hand.   -TL     LTG 2 Progress Ongoing;Progressing  -TL     LTG 3 Pt will resume putting on make up with R hand  -TL     LTG 3 Progress Ongoing;Progressing  -TL     LTG 4 Pt will demonstrate functional AROM R shoulder/ UE  -TL     LTG 4 Progress Ongoing  -TL     LTG 5 I in final HEP   -TL     LTG 5 Progress Ongoing  -TL     Time Calculation    PT Goal Re-Cert Due Date 08/08/17  -TL       User Key  (r) = Recorded By, (t) = Taken By, (c) = Cosigned By    Initials Name Provider Type    NOEMI Kaye PTA Physical Therapy Assistant                Therapy Education       07/20/17 1535          Therapy Education    Given HEP  -TL      Program Reinforced  -TL      How Provided Verbal  -TL      Provided to Patient  -TL      Level of Understanding Teach back education performed   Pt need alot of cue for correct HEP.  -TL        User Key  (r) = Recorded By, (t) = Taken By, (c) = Cosigned By    Initials Name Provider Type    NOEMI Kaye PTA Physical Therapy Assistant                Outcome Measures       07/18/17 1400          Quick DASH    Open a tight or new jar. 4  -LF      Do heavy household chores (e.g., wash walls, wash  floors) 3  -LF      Carry a shopping bag or briefcase 3  -LF      Wash your back 4  -LF      Use a knife to cut food 2  -LF      Recreational activities in which you take some force or impact through your arm, should or hand (e.g. golf, hammering, tennis, etc.) 5  -LF      During the past week, to what extent has your arm, shoulder, or hand problem interfered with your normal social activites with family, friends, neighbors or groups? 4  -LF      During the past week, were you limited in your work or other regular daily activities as a result of your arm, shoulder or hand problem? 3  -LF      Arm, Shoulder, or hand pain 4  -LF      Tingling (pins and needles) in your arm, shoulder, or hand 5  -LF      During the past week, how much difficulty have you had sleeping because of the pain in your arm, shoulder or hand? 1  -LF      Number of Questions Answered 11  -LF      Quick DASH Score 61.36  -LF      Functional Assessment    Outcome Measure Options Quick DASH  -LF        User Key  (r) = Recorded By, (t) = Taken By, (c) = Cosigned By    Initials Name Provider Type    LF Taya Orozco, PT Physical Therapist            Time Calculation:   Start Time: 1535  Stop Time: 1615  Time Calculation (min): 40 min  Total Timed Code Minutes- PT: 40 minute(s)    Therapy Charges for Today     Code Description Service Date Service Provider Modifiers Qty    36789107683  PT THER PROC EA 15 MIN 7/20/2017 Rocio Kaye PTA GP 3    43979594067 HC PT THER SUPP EA 15 MIN 7/20/2017 Rocio Kaye PTA GP 1                    Rocio Kaye PTA  7/20/2017

## 2017-07-24 DIAGNOSIS — S82.821D CLOSED TORUS FRACTURE OF DISTAL END OF RIGHT FIBULA WITH ROUTINE HEALING, SUBSEQUENT ENCOUNTER: Primary | ICD-10-CM

## 2017-07-25 ENCOUNTER — OFFICE VISIT (OUTPATIENT)
Dept: ORTHOPEDIC SURGERY | Facility: CLINIC | Age: 57
End: 2017-07-25

## 2017-07-25 ENCOUNTER — APPOINTMENT (OUTPATIENT)
Dept: PHYSICAL THERAPY | Facility: HOSPITAL | Age: 57
End: 2017-07-25

## 2017-07-25 VITALS — HEIGHT: 63 IN | WEIGHT: 131 LBS | BODY MASS INDEX: 23.21 KG/M2

## 2017-07-25 DIAGNOSIS — M25.571 ARTHRALGIA OF RIGHT ANKLE: ICD-10-CM

## 2017-07-25 DIAGNOSIS — S82.821A CLOSED TORUS FRACTURE OF DISTAL END OF RIGHT FIBULA, INITIAL ENCOUNTER: Primary | ICD-10-CM

## 2017-07-25 PROCEDURE — 99024 POSTOP FOLLOW-UP VISIT: CPT | Performed by: ORTHOPAEDIC SURGERY

## 2017-07-25 NOTE — PROGRESS NOTES
Jyoti SARAVIA Person is a 56 y.o. female returns for     Chief Complaint   Patient presents with   • Right Ankle - Follow-up       HISTORY OF PRESENT ILLNESS:  Patient here today for follow up right ankle.        CONCURRENT MEDICAL HISTORY:    Past Medical History:   Diagnosis Date   • Acute bronchitis 07/19/2016   • Acute otitis externa 09/04/2014   • Acute otitis media 02/10/2015   • Acute sinusitis 07/19/2016   • Ankle injury    • Backache 12/08/2014   • Benign hypertension 05/13/2016   • Cerebrovascular accident    • Dizziness 02/10/2015   • Encounter for gynecological examination without abnormal finding 03/10/2016   • Fatigue 08/28/2015   • Foot pain 06/09/2014   • Hyperkeratosis 04/28/2014   • Hypotensive episode 06/12/2015   • Keratoma 08/04/2014    intractable plantar   • Low blood pressure 08/28/2015   • Menopausal and perimenopausal disorder 03/10/2016    other specified   • Numbness of face 05/13/2016    improved   • Otitis media of right ear 07/30/2016    unspecified   • Perforated tympanic membrane 09/04/2014   • Puncture wound 06/17/2014    injury   • Right-sided face pain 05/13/2016   • Seizure    • Thumb pain 08/28/2015   • Upper respiratory infection 08/28/2015   • Wheezing 10/21/2014       No Known Allergies      Current Outpatient Prescriptions:   •  albuterol (VENTOLIN HFA) 108 (90 BASE) MCG/ACT inhaler, Inhale 2 puffs 4 (Four) Times a Day., Disp: 18 g, Rfl: 3  •  aspirin 81 MG chewable tablet, Chew 81 mg., Disp: , Rfl:   •  atorvastatin (LIPITOR) 40 MG tablet, Take 1 tablet by mouth Daily., Disp: 30 tablet, Rfl: 8  •  cyclobenzaprine (FLEXERIL) 10 MG tablet, Take 1 tablet by mouth 3 (Three) Times a Day As Needed for Muscle Spasms., Disp: 30 tablet, Rfl: 3  •  folic acid (FOLVITE) 1 MG tablet, Take 1 tablet by mouth Daily., Disp: 30 tablet, Rfl: 5  •  gabapentin (NEURONTIN) 800 MG tablet, Take 1 tablet by mouth 2 (Two) Times a Day., Disp: 60 tablet, Rfl: 5  •  HYDROcodone-acetaminophen (NORCO)  "7.5-325 MG per tablet, Take 1 tablet by mouth Every 6 (Six) Hours As Needed for Moderate Pain (4-6)., Disp: 15 tablet, Rfl: 0  •  levETIRAcetam (KEPPRA) 250 MG tablet, Take 1 tablet by mouth 2 (Two) Times a Day., Disp: 60 tablet, Rfl: 5  •  lisinopril-hydrochlorothiazide (PRINZIDE,ZESTORETIC) 10-12.5 MG per tablet, , Disp: , Rfl: 0  •  lisinopril-hydrochlorothiazide (PRINZIDE,ZESTORETIC) 10-12.5 MG per tablet, take 1 tablet by mouth once daily, Disp: 30 tablet, Rfl: 3  •  lisinopril-hydrochlorothiazide (PRINZIDE,ZESTORETIC) 20-25 MG per tablet, take 1 tablet by mouth once daily, Disp: 30 tablet, Rfl: 3  •  meclizine (ANTIVERT) 25 MG tablet, take 1 tablet by mouth three times a day if needed for DIZZINESS, Disp: 30 tablet, Rfl: 5  •  neomycin-polymyxin-hydrocortisone (CORTISPORIN) 3.5-19471-0 otic solution, Administer 3 drops into both ears 4 (Four) Times a Day., Disp: 10 mL, Rfl: 0  •  ondansetron (ZOFRAN) 4 MG tablet, Take 1 tablet by mouth Every 8 (Eight) Hours As Needed for Nausea or Vomiting., Disp: 10 tablet, Rfl: 0  •  rOPINIRole (REQUIP) 2 MG tablet, Take 1 tablet by mouth Every Night., Disp: 30 tablet, Rfl: 3  •  sulindac (CLINORIL) 200 MG tablet, Take 1 tablet by mouth 2 (Two) Times a Day., Disp: 60 tablet, Rfl: 3  •  vitamin D (ERGOCALCIFEROL) 08274 UNITS capsule capsule, 1 CAP EVERY 2 WEEKS, Disp: 4 capsule, Rfl: 5    Past Surgical History:   Procedure Laterality Date   • BREAST BIOPSY     • OTHER SURGICAL HISTORY  07/07/2014    Destruction of Benign Lesion (1-14)   • TUBAL ABDOMINAL LIGATION         ROS  No fevers or chills.  No chest pain or shortness of air.  No GI or  disturbances.    PHYSICAL EXAMINATION:       Ht 63\" (160 cm)  Wt 131 lb (59.4 kg)  BMI 23.21 kg/m2    Physical Exam    GAIT:     []  Normal  []  Antalgic    Assistive device: []  None  [x]  Walker     []  Crutches  []  Cane     []  Wheelchair  []  Stretcher    Ortho Exam   Tender to palpation of the lateral malleolus, neuro intact. "     No results found.          ASSESSMENT: lateral malleolus fracture of the  Rt ankle healing, plan: d/c air cast boot and place stirrup and progressively place  Full weight  , see back in 4 weeks.  With x rays.      Diagnoses and all orders for this visit:    Closed torus fracture of distal end of right fibula, initial encounter    Arthralgia of right ankle          PLAN    No Follow-up on file.    Stefan Vincent MD

## 2017-07-26 ENCOUNTER — HOSPITAL ENCOUNTER (OUTPATIENT)
Dept: PHYSICAL THERAPY | Facility: HOSPITAL | Age: 57
Setting detail: THERAPIES SERIES
Discharge: HOME | End: 2017-07-26

## 2017-07-26 DIAGNOSIS — S46.011D ROTATOR CUFF STRAIN, RIGHT, SUBSEQUENT ENCOUNTER: Primary | ICD-10-CM

## 2017-07-26 PROCEDURE — 97110 THERAPEUTIC EXERCISES: CPT

## 2017-07-26 NOTE — THERAPY TREATMENT NOTE
Outpatient Physical Therapy Ortho Treatment Note  [facility]     Patient Name: Jyoti Riddle  : 1960  MRN: [id number]  Today's Date: 2017      Visit Date: 2017  Pt reports 0 /10 pain.  Reports  % of improvement.  Ouffyorh75 /13 visits.  Insurance available: 17 visits  Next MD appt Unknow  Recertification: 2017.  Visit Dx:    ICD-10-CM ICD-9-CM   1. Rotator cuff strain, right, subsequent encounter S46.011D V58.89     840.4       Patient Active Problem List   Diagnosis   • Vitamin D deficiency   • Vertigo   • Neuropathy   • Restless leg syndrome   • Pain in joint   • Allergic rhinitis   • Essential hypertension   • Abnormal EEG   • Cerebrovascular accident (CVA) due to thrombosis of left carotid artery   • Illicit drug use   • Tobacco abuse   • Sleep apnea   • Neck pain   • Vitamin deficiency   • Acute otitis externa of right ear   • Generalized seizure disorder   • Rotator cuff strain   • Acute pain of right shoulder   • Closed fracture of distal end of right fibula   • Right ankle pain        Past Medical History:   Diagnosis Date   • Acute bronchitis 2016   • Acute otitis externa 2014   • Acute otitis media 02/10/2015   • Acute sinusitis 2016   • Ankle injury    • Backache 2014   • Benign hypertension 2016   • Cerebrovascular accident    • Dizziness 02/10/2015   • Encounter for gynecological examination without abnormal finding 03/10/2016   • Fatigue 2015   • Foot pain 2014   • Hyperkeratosis 2014   • Hypotensive episode 2015   • Keratoma 2014    intractable plantar   • Low blood pressure 2015   • Menopausal and perimenopausal disorder 03/10/2016    other specified   • Numbness of face 2016    improved   • Otitis media of right ear 2016    unspecified   • Perforated tympanic membrane 2014   • Puncture wound 2014    injury   • Right-sided face pain 2016   • Seizure    •  Thumb pain 08/28/2015   • Upper respiratory infection 08/28/2015   • Wheezing 10/21/2014        Past Surgical History:   Procedure Laterality Date   • BREAST BIOPSY     • OTHER SURGICAL HISTORY  07/07/2014    Destruction of Benign Lesion (1-14)   • TUBAL ABDOMINAL LIGATION               PT Ortho       07/26/17 1000    Subjective Comments    Subjective Comments Pt showed PTA that she was able to raise her right UE up to her head. Pt stated that she got a soft aircast on foot. Pt has to continue with walker for 4 weeks.  -TL    Subjective Pain    Able to rate subjective pain? yes  -TL    Pre-Treatment Pain Level 0  -TL      User Key  (r) = Recorded By, (t) = Taken By, (c) = Cosigned By    Initials Name Provider Type    NOEMI Kaye PTA Physical Therapy Assistant                            PT Assessment/Plan       07/26/17 1020       PT Assessment    Assessment Comments Pt met short term goal 1 and 3 and LTG 2 and 3 met. Pt progressing well with right shoulder. pt able to comb hair and put make up on face with right UE.  -TL     PT Plan    PT Frequency 2x/week  -TL     PT Plan Comments measure at the beginning of treatment right Shoulder flex/abd  -TL       User Key  (r) = Recorded By, (t) = Taken By, (c) = Cosigned By    Initials Name Provider Type    NOEMI Kaye PTA Physical Therapy Assistant                Modalities       07/26/17 1000          Subjective Pain    Post-Treatment Pain Level 0  -TL      Ice    Patient reports will apply ice at home to involved area Yes   PTA gave pt ice to go per patient request  -TL        User Key  (r) = Recorded By, (t) = Taken By, (c) = Cosigned By    Initials Name Provider Type    NOEMI Kaye PTA Physical Therapy Assistant                Exercises       07/26/17 1020 07/26/17 1000       Subjective Comments    Subjective Comments  Pt showed PTA that she was able to raise her right UE up to her head. Pt stated that she got a soft aircast on foot. Pt has to  continue with walker for 4 weeks.  -TL     Subjective Pain    Able to rate subjective pain?  yes  -TL     Pre-Treatment Pain Level  0  -TL     Post-Treatment Pain Level  0  -TL     Exercise 1    Exercise Name 1 Pro ll UE fwd/Rev  -TL      Time (Minutes) 1 10 mins  -TL      Resistance 1 --   level 4.5  -TL      Exercise 2    Exercise Name 2 Pulley flexion3-way  -TL      Time (Minutes) 2 2 mins  -TL      Resistance 2 --   1 pound cuff weight for ecc  -TL      Exercise 3    Exercise Name 3 R shld ball circles ccw/cc  -TL      Reps 3 20  -TL      Exercise 4    Exercise Name 4 Pt brushed hair R UE  -TL      Sets 4 2  -TL      Reps 4 20  -TL      Exercise 5    Exercise Name 5 Simulated crissy makeup with R UE  -TL      Sets 5 2  -TL      Reps 5 10  -TL      Exercise 6    Exercise Name 6 supine punches  -TL      Sets 6 2  -TL      Reps 6 20  -TL      Exercise 7    Exercise Name 7 supine circles ccw/cc  -TL      Sets 7 2  -TL      Reps 7 20  -TL      Resistance 7 --   1 pound weight  -TL        User Key  (r) = Recorded By, (t) = Taken By, (c) = Cosigned By    Initials Name Provider Type    NOEMI Kaye, PTA Physical Therapy Assistant                               PT OP Goals       07/26/17 1020       PT Short Term Goals    STG Date to Achieve 08/01/17  -TL     STG 1 Pt sarai demonstrate AROM R shoulder flexion 100, abd 75  -TL     STG 1 Progress Met  -TL     STG 2 Pt will perform MMT R shoulder girdle 3+/4  -TL     STG 2 Progress Ongoing  -TL     STG 3 Pt will reach  top of head R UE  -TL     STG 3 Progress Met  -TL     STG 4 I HEP each session  -TL     STG 4 Progress Ongoing;Progressing  -TL     Long Term Goals    LTG Date to Achieve 08/15/17  -TL     LTG 1 Pt will report min R shoulder pain, 2-3/10.   -TL     LTG 1 Progress Progressing  -TL     LTG 2 Pt will resume brushing hair with R hand.   -TL     LTG 2 Progress Met  -TL     LTG 3 Pt will resume putting on make up with R hand  -TL     LTG 3 Progress Met  -TL      LTG 4 Pt will demonstrate functional AROM R shoulder/ UE  -TL     LTG 4 Progress Ongoing;Progressing  -TL     LTG 5 I in final HEP   -TL     LTG 5 Progress Ongoing  -TL     Time Calculation    PT Goal Re-Cert Due Date 08/08/17  -TL       User Key  (r) = Recorded By, (t) = Taken By, (c) = Cosigned By    Initials Name Provider Type    NOEMI Kaye PTA Physical Therapy Assistant                Therapy Education       07/26/17 1020          Therapy Education    Given HEP  -TL      Program Reinforced  -TL      How Provided Verbal;Demonstration  -TL      Provided to Patient  -TL      Level of Understanding Verbalized;Demonstrated  -TL        User Key  (r) = Recorded By, (t) = Taken By, (c) = Cosigned By    Initials Name Provider Type    NOEMI Kaye PTA Physical Therapy Assistant                Time Calculation:   Start Time: 1020  Stop Time: 1110  Time Calculation (min): 50 min  Total Timed Code Minutes- PT: 50 minute(s)    Therapy Charges for Today     Code Description Service Date Service Provider Modifiers Qty    [id number] HC PT THER PROC EA 15 MIN 7/26/2017 Rocio Kaye PTA GP 3                    Rocio Kaye PTA  7/26/2017

## 2017-07-27 ENCOUNTER — APPOINTMENT (OUTPATIENT)
Dept: PHYSICAL THERAPY | Facility: HOSPITAL | Age: 57
End: 2017-07-27

## 2017-07-28 ENCOUNTER — TELEPHONE (OUTPATIENT)
Dept: PHYSICAL THERAPY | Facility: HOSPITAL | Age: 57
End: 2017-07-28

## 2017-07-28 DIAGNOSIS — I10 ESSENTIAL HYPERTENSION: ICD-10-CM

## 2017-07-28 RX ORDER — LISINOPRIL AND HYDROCHLOROTHIAZIDE 25; 20 MG/1; MG/1
TABLET ORAL
Qty: 30 TABLET | Refills: 3 | Status: SHIPPED | OUTPATIENT
Start: 2017-07-28 | End: 2017-08-03

## 2017-08-01 ENCOUNTER — DOCUMENTATION (OUTPATIENT)
Dept: PHYSICAL THERAPY | Facility: HOSPITAL | Age: 57
End: 2017-08-01

## 2017-08-01 DIAGNOSIS — S46.011D ROTATOR CUFF STRAIN, RIGHT, SUBSEQUENT ENCOUNTER: Primary | ICD-10-CM

## 2017-08-03 ENCOUNTER — OFFICE VISIT (OUTPATIENT)
Dept: FAMILY MEDICINE CLINIC | Facility: CLINIC | Age: 57
End: 2017-08-03

## 2017-08-03 VITALS
DIASTOLIC BLOOD PRESSURE: 68 MMHG | OXYGEN SATURATION: 96 % | SYSTOLIC BLOOD PRESSURE: 98 MMHG | RESPIRATION RATE: 18 BRPM | TEMPERATURE: 96.6 F | HEIGHT: 63 IN | HEART RATE: 113 BPM | WEIGHT: 134.5 LBS | BODY MASS INDEX: 23.83 KG/M2

## 2017-08-03 DIAGNOSIS — I10 ESSENTIAL HYPERTENSION: Primary | ICD-10-CM

## 2017-08-03 DIAGNOSIS — S82.821D CLOSED TORUS FRACTURE OF DISTAL END OF RIGHT FIBULA WITH ROUTINE HEALING, SUBSEQUENT ENCOUNTER: ICD-10-CM

## 2017-08-03 DIAGNOSIS — T78.40XS ALLERGIC REACTION, SEQUELA: ICD-10-CM

## 2017-08-03 PROBLEM — T78.40XA ALLERGIC REACTION: Status: ACTIVE | Noted: 2017-08-03

## 2017-08-03 PROCEDURE — 99214 OFFICE O/P EST MOD 30 MIN: CPT | Performed by: NURSE PRACTITIONER

## 2017-08-03 RX ORDER — NIFEDIPINE 30 MG/1
30 TABLET, EXTENDED RELEASE ORAL DAILY
Qty: 30 TABLET | Refills: 5 | Status: SHIPPED | OUTPATIENT
Start: 2017-08-03 | End: 2017-08-03 | Stop reason: SDUPTHER

## 2017-08-03 RX ORDER — NIFEDIPINE 30 MG/1
30 TABLET, EXTENDED RELEASE ORAL DAILY
Qty: 30 TABLET | Refills: 5 | Status: SHIPPED | OUTPATIENT
Start: 2017-08-03 | End: 2017-09-05

## 2017-08-03 RX ORDER — NIFEDIPINE 30 MG/1
30 TABLET, EXTENDED RELEASE ORAL DAILY
COMMUNITY
End: 2017-08-03 | Stop reason: SDUPTHER

## 2017-08-03 NOTE — PROGRESS NOTES
Subjective   Jyoti Riddle is a 56 y.o. female.     HPI Comments: Here today for toño.  She has had an allergic reaction to lisinopril.  She has been in the hospital and was changed to nifedipine.  She is tolerating it well.   She has sustained a fracture to her right ankle and says she needs a new order for pt.  The fracture occurred about a week ago with a fall at home.    Allergic Reaction   This is a new problem. The current episode started more than 1 week ago. The problem has been resolved since onset. The problem is moderate. Associated with: allergic reaction to lisinopril. Pertinent negatives include no abdominal pain, chest pain, coughing, rash or vomiting. (Angioedema) Swelling is present on the tongue and lips. Treatments tried: cortisone. The treatment provided significant relief. Her past medical history is significant for medication allergies. There is no history of asthma, atopic dermatitis, food allergies or seasonal allergies.   Lower Extremity Issue   This is a new (fracture to the right ankle) problem. The current episode started 1 to 4 weeks ago. The problem occurs constantly. The problem has been unchanged. Associated symptoms include arthralgias (right ankle). Pertinent negatives include no abdominal pain, anorexia, change in bowel habit, chest pain, chills, congestion, coughing, diaphoresis, fatigue, fever, headaches, joint swelling, myalgias, nausea, neck pain, numbness, rash, sore throat, swollen glands, urinary symptoms, vertigo, visual change, vomiting or weakness. The symptoms are aggravated by walking. The treatment provided mild relief.        The following portions of the patient's history were reviewed and updated as appropriate: allergies, current medications, past family history, past medical history, past social history, past surgical history and problem list.    Review of Systems   Constitutional: Negative.  Negative for chills, diaphoresis, fatigue and fever.   HENT: Negative.   Negative for congestion and sore throat.    Respiratory: Negative.  Negative for cough.    Cardiovascular: Negative.  Negative for chest pain.   Gastrointestinal: Negative for abdominal pain, anorexia, change in bowel habit, nausea and vomiting.   Musculoskeletal: Positive for arthralgias (right ankle). Negative for joint swelling, myalgias and neck pain.   Skin: Negative.  Negative for rash.   Allergic/Immunologic: Negative for food allergies.   Neurological: Negative.  Negative for vertigo, weakness, numbness and headaches.   Psychiatric/Behavioral: Negative.        Objective   Physical Exam   Constitutional: She is oriented to person, place, and time. She appears well-developed and well-nourished. No distress.   HENT:   Head: Normocephalic and atraumatic.   Right Ear: External ear normal.   Left Ear: External ear normal.   Nose: Nose normal.   Mouth/Throat: Oropharynx is clear and moist. No oropharyngeal exudate.   Eyes: Pupils are equal, round, and reactive to light.   Neck: Normal range of motion. Neck supple. No thyromegaly present.   Cardiovascular: Normal rate, regular rhythm and normal heart sounds.  Exam reveals no friction rub.    No murmur heard.  Pulmonary/Chest: Effort normal and breath sounds normal. No respiratory distress. She has no wheezes. She has no rales.   Abdominal: Soft.   Musculoskeletal:        Right ankle: She exhibits decreased range of motion and swelling. Tenderness. Achilles tendon exhibits pain.   Neurological: She is alert and oriented to person, place, and time.   Skin: Skin is warm and dry.   Psychiatric: She has a normal mood and affect. Thought content normal.   Nursing note and vitals reviewed.      Assessment/Plan   Jyoti was seen today for follow-up.    Diagnoses and all orders for this visit:    Essential hypertension  -     Discontinue: NIFEdipine XL (PROCARDIA XL) 30 MG 24 hr tablet; Take 1 tablet by mouth Daily.  -     NIFEdipine XL (PROCARDIA XL) 30 MG 24 hr tablet;  Take 1 tablet by mouth Daily.    Closed torus fracture of distal end of right fibula with routine healing, subsequent encounter  -     Ambulatory Referral to Physical Therapy Evaluate and treat    Allergic reaction, sequela      She is given refills on the nifedipine.  Also she wants a new referral to pt.    Lisinopril is now listed as an allergy.

## 2017-08-04 DIAGNOSIS — I99.8 FLUCTUATING BLOOD PRESSURE: Primary | ICD-10-CM

## 2017-08-04 RX ORDER — MEDICAL SUPPLY, MISCELLANEOUS
1 EACH MISCELLANEOUS 2 TIMES DAILY PRN
Qty: 1 EACH | Refills: 0 | Status: SHIPPED | OUTPATIENT
Start: 2017-08-04 | End: 2021-12-03

## 2017-08-09 DIAGNOSIS — R42 VERTIGO: ICD-10-CM

## 2017-08-10 RX ORDER — MECLIZINE HYDROCHLORIDE 25 MG/1
TABLET ORAL
Qty: 30 TABLET | Refills: 5 | Status: SHIPPED | OUTPATIENT
Start: 2017-08-10 | End: 2017-10-23 | Stop reason: SDUPTHER

## 2017-08-11 DIAGNOSIS — M25.50 ARTHRALGIA: ICD-10-CM

## 2017-08-22 ENCOUNTER — APPOINTMENT (OUTPATIENT)
Dept: PHYSICAL THERAPY | Facility: HOSPITAL | Age: 57
End: 2017-08-22

## 2017-08-23 ENCOUNTER — OFFICE VISIT (OUTPATIENT)
Dept: NEUROLOGY | Facility: CLINIC | Age: 57
End: 2017-08-23

## 2017-08-23 VITALS
WEIGHT: 136 LBS | RESPIRATION RATE: 18 BRPM | OXYGEN SATURATION: 98 % | HEIGHT: 63 IN | BODY MASS INDEX: 24.1 KG/M2 | SYSTOLIC BLOOD PRESSURE: 130 MMHG | DIASTOLIC BLOOD PRESSURE: 76 MMHG | HEART RATE: 106 BPM

## 2017-08-23 DIAGNOSIS — F19.90 ILLICIT DRUG USE: ICD-10-CM

## 2017-08-23 DIAGNOSIS — G40.309 GENERALIZED SEIZURE DISORDER (HCC): ICD-10-CM

## 2017-08-23 DIAGNOSIS — I10 ESSENTIAL HYPERTENSION: Primary | ICD-10-CM

## 2017-08-23 DIAGNOSIS — R94.01 ABNORMAL EEG: ICD-10-CM

## 2017-08-23 DIAGNOSIS — Z72.0 TOBACCO ABUSE: ICD-10-CM

## 2017-08-23 DIAGNOSIS — I63.032 CEREBROVASCULAR ACCIDENT (CVA) DUE TO THROMBOSIS OF LEFT CAROTID ARTERY (HCC): ICD-10-CM

## 2017-08-23 PROCEDURE — 99213 OFFICE O/P EST LOW 20 MIN: CPT | Performed by: CLINICAL NURSE SPECIALIST

## 2017-08-23 PROCEDURE — 99406 BEHAV CHNG SMOKING 3-10 MIN: CPT | Performed by: CLINICAL NURSE SPECIALIST

## 2017-08-23 RX ORDER — LEVOFLOXACIN 750 MG/1
TABLET ORAL
Refills: 0 | COMMUNITY
Start: 2017-07-31 | End: 2017-09-05

## 2017-08-23 RX ORDER — ASPIRIN 325 MG/1
TABLET, FILM COATED ORAL
Refills: 0 | COMMUNITY
Start: 2017-07-31 | End: 2019-04-11

## 2017-08-23 NOTE — PROGRESS NOTES
Subjective     Chief Complaint   Patient presents with   • Stroke     follow up /no new stroke symptoms       Jyoti Riddle is a 57 y.o. female right handed .  She is here today for follow up for stroke and abnormal EEG concerning to be seizuregenic. She was last seen 4/2017. .  She denies new stroke symptoms of unilateral weakness, slurred speech, or numbness.  She denies seizures. She does continue with Keppra 250 mg BId and she is tolerating and denies missed doses. She does admit to missing doses of ASA. She continues to use tobacco stating 1 pack last 3 days. She also tells me she has cut back on marijuana and cocaine using some times 1-2 times weekly.     Since last visit she fell and fractured her right ankle and is wearing a hard boot. She also was recently hospitalized for allergic reaction to lisinopril causing angioedema.        Stroke   This is a chronic (image negative stroke in 2013 left hemisphere) problem. The problem has been gradually improving. Pertinent negatives include no arthralgias, chest pain, fatigue, fever, myalgias, nausea, sore throat, vomiting or weakness. Associated symptoms comments: Right facial weakness, RUE/RLE weakness and decrease sensation. And mild dysarthria this has improved since last visit but imbalanced and walks with single point cane. Exacerbated by: hypertensive emergency, illicit drug use.   Altered Mental Status   This is a chronic (since 2013) problem. The current episode started more than 1 year ago. The problem has been gradually improving (has had 2 episodes since last visit but may be related to illliciit drug use). Pertinent negatives include no arthralgias, chest pain, fatigue, fever, myalgias, nausea, sore throat, vomiting or weakness. Exacerbated by: illicit drug use, JAQUELIN. Treatments tried: trileptal 150 mg BID.   Seizures    This is a chronic problem. Associated symptoms include speech difficulty (mild dysarthria). Pertinent negatives include no confusion,  no visual disturbance, no sore throat, no chest pain, no nausea, no vomiting and no diarrhea. Characteristics include eye blinking. body stiffens Possible causes include missed seizure meds. illicit drug use        Current Outpatient Prescriptions   Medication Sig Dispense Refill   • albuterol (VENTOLIN HFA) 108 (90 BASE) MCG/ACT inhaler Inhale 2 puffs 4 (Four) Times a Day. 18 g 3   • aspirin 81 MG chewable tablet Chew 81 mg.     • atorvastatin (LIPITOR) 40 MG tablet Take 1 tablet by mouth Daily. 30 tablet 8   • Blood Pressure Monitoring (B-D ASSURE BPM/AUTO WRIST CUFF) misc 1 kit 2 (Two) Times a Day As Needed (fluctuating blood pressure). 1 each 0   • cyclobenzaprine (FLEXERIL) 10 MG tablet Take 1 tablet by mouth 3 (Three) Times a Day As Needed for Muscle Spasms. 30 tablet 3   • diclofenac (VOLTAREN) 50 MG EC tablet take 1 tablet by mouth twice a day 60 tablet 5   • folic acid (FOLVITE) 1 MG tablet Take 1 tablet by mouth Daily. 30 tablet 5   • gabapentin (NEURONTIN) 800 MG tablet Take 1 tablet by mouth 2 (Two) Times a Day. 60 tablet 5   • HYDROcodone-acetaminophen (NORCO) 7.5-325 MG per tablet Take 1 tablet by mouth Every 6 (Six) Hours As Needed for Moderate Pain (4-6). 15 tablet 0   • hydrocortisone (PROCTOCORT) 1 % cream rectal cream APPLY WITH TIP OF FINGER TO AFFECTED EAR THREE TIMES A DAY  0   • levETIRAcetam (KEPPRA) 250 MG tablet Take 1 tablet by mouth 2 (Two) Times a Day. 60 tablet 5   • levoFLOXacin (LEVAQUIN) 750 MG tablet take 1 tablet by mouth once daily for 10 days  0   • meclizine (ANTIVERT) 25 MG tablet take 1 tablet by mouth three times a day if needed for dizziness 30 tablet 5   • neomycin-polymyxin-hydrocortisone (CORTISPORIN) 3.5-21093-2 otic solution Administer 3 drops into both ears 4 (Four) Times a Day. 10 mL 0   • NIFEdipine XL (PROCARDIA XL) 30 MG 24 hr tablet Take 1 tablet by mouth Daily. 30 tablet 5   • ondansetron (ZOFRAN) 4 MG tablet Take 1 tablet by mouth Every 8 (Eight) Hours As Needed  for Nausea or Vomiting. 10 tablet 0   • RA VITAMIN B-1 100 MG tablet take 1 tablet by mouth once daily  0   • rOPINIRole (REQUIP) 2 MG tablet Take 1 tablet by mouth Every Night. 30 tablet 3   • sulindac (CLINORIL) 200 MG tablet Take 1 tablet by mouth 2 (Two) Times a Day. 60 tablet 3   • vitamin D (ERGOCALCIFEROL) 02165 UNITS capsule capsule 1 CAP EVERY 2 WEEKS 4 capsule 5     No current facility-administered medications for this visit.        Past Medical History:   Diagnosis Date   • Acute bronchitis 07/19/2016   • Acute otitis externa 09/04/2014   • Acute otitis media 02/10/2015   • Acute sinusitis 07/19/2016   • Ankle injury    • Backache 12/08/2014   • Benign hypertension 05/13/2016   • Cerebrovascular accident    • Dizziness 02/10/2015   • Encounter for gynecological examination without abnormal finding 03/10/2016   • Fatigue 08/28/2015   • Foot pain 06/09/2014   • Hyperkeratosis 04/28/2014   • Hypotensive episode 06/12/2015   • Keratoma 08/04/2014    intractable plantar   • Low blood pressure 08/28/2015   • Menopausal and perimenopausal disorder 03/10/2016    other specified   • Numbness of face 05/13/2016    improved   • Otitis media of right ear 07/30/2016    unspecified   • Perforated tympanic membrane 09/04/2014   • Puncture wound 06/17/2014    injury   • Right-sided face pain 05/13/2016   • Seizure    • Thumb pain 08/28/2015   • Upper respiratory infection 08/28/2015   • Wheezing 10/21/2014       Past Surgical History:   Procedure Laterality Date   • BREAST BIOPSY     • OTHER SURGICAL HISTORY  07/07/2014    Destruction of Benign Lesion (1-14)   • TUBAL ABDOMINAL LIGATION         family history includes Alzheimer's disease in her father and other; Bipolar disorder in her other; Depression in her other; Diabetes in her other; Heart attack in her father; Heart disease in her other; Hypertension in her other; Lung cancer in her other; Migraines in her other; No Known Problems in her daughter and mother;  "Sickle cell anemia in her other; Stroke in her other; Throat cancer in her father.    Social History   Substance Use Topics   • Smoking status: Current Every Day Smoker     Packs/day: 0.50     Types: Cigarettes   • Smokeless tobacco: Never Used   • Alcohol use Yes      Comment: rarely       Review of Systems   Constitutional: Negative for fatigue and fever.   HENT: Negative.  Negative for rhinorrhea, sinus pressure and sore throat.    Eyes: Negative.  Negative for visual disturbance.   Respiratory: Negative.  Negative for choking, chest tightness and shortness of breath.    Cardiovascular: Negative.  Negative for chest pain and leg swelling.   Gastrointestinal: Negative.  Negative for constipation, diarrhea, nausea and vomiting.   Endocrine: Negative.    Genitourinary: Negative.  Negative for dysuria.   Musculoskeletal: Positive for gait problem (unsteady gait). Negative for arthralgias and myalgias.   Skin: Negative.    Allergic/Immunologic: Negative.    Neurological: Positive for seizures and speech difficulty (mild dysarthria). Negative for dizziness, weakness and light-headedness.   Hematological: Negative.  Negative for adenopathy.   Psychiatric/Behavioral: Negative for agitation and confusion.   All other systems reviewed and are negative.      Objective     /76 (BP Location: Left arm, Patient Position: Sitting, Cuff Size: Adult)  Pulse 106  Resp 18  Ht 63\" (160 cm)  Wt 136 lb (61.7 kg)  SpO2 98%  BMI 24.09 kg/m2, Body mass index is 24.09 kg/(m^2).    Physical Exam   Constitutional: She is oriented to person, place, and time. Vital signs are normal. She appears well-developed and well-nourished.   HENT:   Head: Normocephalic and atraumatic.   Right Ear: Hearing and external ear normal.   Left Ear: Hearing and external ear normal.   Nose: Nose normal.   Mouth/Throat: Oropharynx is clear and moist.   Eyes: Conjunctivae, EOM and lids are normal. Pupils are equal, round, and reactive to light.   Neck: " Normal range of motion. Neck supple. Carotid bruit is not present.   Cardiovascular: Normal rate, regular rhythm, S1 normal, S2 normal and normal heart sounds.    Pulmonary/Chest: Effort normal and breath sounds normal.   Abdominal: Soft. Bowel sounds are normal.   Musculoskeletal: Normal range of motion.   Hard boot to right foot   Neurological: She is alert and oriented to person, place, and time. She has normal strength and normal reflexes. She displays no tremor. A cranial nerve deficit (right lower facial weakness) and sensory deficit (decrease sensation of right side) is present. She displays a negative Romberg sign. She displays no seizure activity. Coordination (ataxia bilateral finger to nose) abnormal. Abnormal gait: unsteady gait, negative romberg.   Reflex Scores:       Tricep reflexes are 2+ on the right side and 2+ on the left side.       Bicep reflexes are 2+ on the right side and 2+ on the left side.       Brachioradialis reflexes are 2+ on the right side and 2+ on the left side.       Patellar reflexes are 2+ on the right side and 2+ on the left side.       Achilles reflexes are 2+ on the right side and 2+ on the left side.  Skin: Skin is warm and dry.   Psychiatric: She has a normal mood and affect. Her speech is normal and behavior is normal. Cognition and memory are normal.   Nursing note and vitals reviewed.           ASSESSMENT/PLAN    Diagnoses and all orders for this visit:    Essential hypertension    Cerebrovascular accident (CVA) due to thrombosis of left carotid artery    Abnormal EEG    Generalized seizure disorder    Tobacco abuse    Illicit drug use    Other orders  -     hydrocortisone (PROCTOCORT) 1 % cream rectal cream; APPLY WITH TIP OF FINGER TO AFFECTED EAR THREE TIMES A DAY  -     levoFLOXacin (LEVAQUIN) 750 MG tablet; take 1 tablet by mouth once daily for 10 days  -     RA VITAMIN B-1 100 MG tablet; take 1 tablet by mouth once daily    MEDICAL DECISION MAKIN. Continue with  ASA 81 mg for secondary stroke prevention and counseled on compliance.  2. Continue with statin per PCP for LDL goal less than 70.  3. Continue with BP management per PCP for systolic less than 140.  4. Continue with Keppra 250 mg BID for abnormal EEG and counseled on compliance.  5. Counseled on cessation of cocaine, marijuana, and tobacco  6. Patient is counseled on stroke signs and symptoms using FAST and Time Saved is Brain Saved.  7. Seizure precautions were discussed to include no tub baths, no swimming, avoiding lack of sleep, and avoiding known triggers. Education given of things that may contribute to a seizure to include, but not limited to: stressful situations, fever, fatigue, lack of sleep, low blood sugar, hyperventilation, flashing lights, and caffeine. Instructions given to take seizure medications as prescribed. Education given to family member on what to do during a seizure and care following the seizure. Education given to contact this office prior to stopping or changing any medications.  8.Discussed tobacco cessation for greater than 3 minutes to include options for cessation and information given for support groups. All questions answered.  9. Healthy BMI.   allergies and all known medications/prescriptions have been reviewed using resources available on this encounter.    Return in about 6 months (around 2/23/2018).        WESTON Lara

## 2017-08-23 NOTE — PATIENT INSTRUCTIONS
"You Can Quit Smoking  If you are ready to quit smoking or are thinking about it, congratulations! You have chosen to help yourself be healthier and live longer! There are lots of different ways to quit smoking. Nicotine gum, nicotine patches, a nicotine inhaler, or nicotine nasal spray can help with physical craving. Hypnosis, support groups, and medicines help break the habit of smoking.  TIPS TO GET OFF AND STAY OFF CIGARETTES  · Learn to predict your moods. Do not let a bad situation be your excuse to have a cigarette. Some situations in your life might tempt you to have a cigarette.  · Ask friends and co-workers not to smoke around you.  · Make your home smoke-free.  · Never have \"just one\" cigarette. It leads to wanting another and another. Remind yourself of your decision to quit.  · On a card, make a list of your reasons for not smoking. Read it at least the same number of times a day as you have a cigarette. Tell yourself everyday, \"I do not want to smoke. I choose not to smoke.\"  · Ask someone at home or work to help you with your plan to quit smoking.  · Have something planned after you eat or have a cup of coffee. Take a walk or get other exercise to perk you up. This will help to keep you from overeating.  · Try a relaxation exercise to calm you down and decrease your stress. Remember, you may be tense and nervous the first two weeks after you quit. This will pass.  · Find new activities to keep your hands busy. Play with a pen, coin, or rubber band. Doodle or draw things on paper.  · Brush your teeth right after eating. This will help cut down the craving for the taste of tobacco after meals. You can try mouthwash too.  · Try gum, breath mints, or diet candy to keep something in your mouth.  IF YOU SMOKE AND WANT TO QUIT:  · Do not stock up on cigarettes. Never buy a carton. Wait until one pack is finished before you buy another.  · Never carry cigarettes with you at work or at home.  · Keep cigarettes " "as far away from you as possible. Leave them with someone else.  · Never carry matches or a lighter with you.  · Ask yourself, \"Do I need this cigarette or is this just a reflex?\"  · Bet with someone that you can quit. Put cigarette money in a [employer] bank every morning. If you smoke, you give up the money. If you do not smoke, by the end of the week, you keep the money.  · Keep trying. It takes 21 days to change a habit!  · Talk to your doctor about using medicines to help you quit. These include nicotine replacement gum, lozenges, or skin patches.     This information is not intended to replace advice given to you by your health care provider. Make sure you discuss any questions you have with your health care provider.     Document Released: 10/14/2010 Document Revised: 03/11/2013 Document Reviewed: 05/03/2016  [employer] Interactive Patient Education ©2017 [employer]  Stroke Prevention  Some medical conditions and behaviors are associated with an increased chance of having a stroke. You may prevent a stroke by making healthy choices and managing medical conditions.  HOW CAN I REDUCE MY RISK OF HAVING A STROKE?   · Stay physically active. Get at least 30 minutes of activity on most or all days.  · Do not smoke. It may also be helpful to avoid exposure to secondhand smoke.  · Limit alcohol use. Moderate alcohol use is considered to be:  ¨ No more than 2 drinks per day for men.  ¨ No more than 1 drink per day for nonpregnant women.  · Eat healthy foods. This involves:  ¨ Eating 5 or more servings of fruits and vegetables a day.  ¨ Making dietary changes that address high blood pressure (hypertension), high cholesterol, diabetes, or obesity.  · Manage your cholesterol levels.  ¨ Making food choices that are high in fiber and low in saturated fat, trans fat, and cholesterol may control cholesterol levels.  ¨ Take any prescribed medicines to control cholesterol as directed by your health care provider.  · Manage your " diabetes.  ¨ Controlling your carbohydrate and sugar intake is recommended to manage diabetes.  ¨ Take any prescribed medicines to control diabetes as directed by your health care provider.  · Control your hypertension.  ¨ Making food choices that are low in salt (sodium), saturated fat, trans fat, and cholesterol is recommended to manage hypertension.  ¨ Ask your health care provider if you need treatment to lower your blood pressure. Take any prescribed medicines to control hypertension as directed by your health care provider.  ¨ If you are 18-39 years of age, have your blood pressure checked every 3-5 years. If you are 40 years of age or older, have your blood pressure checked every year.  · Maintain a healthy weight.  ¨ Reducing calorie intake and making food choices that are low in sodium, saturated fat, trans fat, and cholesterol are recommended to manage weight.  · Stop drug abuse.  · Avoid taking birth control pills.  ¨ Talk to your health care provider about the risks of taking birth control pills if you are over 35 years old, smoke, get migraines, or have ever had a blood clot.  · Get evaluated for sleep disorders (sleep apnea).  ¨ Talk to your health care provider about getting a sleep evaluation if you snore a lot or have excessive sleepiness.  · Take medicines only as directed by your health care provider.  ¨ For some people, aspirin or blood thinners (anticoagulants) are helpful in reducing the risk of forming abnormal blood clots that can lead to stroke. If you have the irregular heart rhythm of atrial fibrillation, you should be on a blood thinner unless there is a good reason you cannot take them.  ¨ Understand all your medicine instructions.  · Make sure that other conditions (such as anemia or atherosclerosis) are addressed.  SEEK IMMEDIATE MEDICAL CARE IF:   · You have sudden weakness or numbness of the face, arm, or leg, especially on one side of the body.  · Your face or eyelid droops to one  side.  · You have sudden confusion.  · You have trouble speaking (aphasia) or understanding.  · You have sudden trouble seeing in one or both eyes.  · You have sudden trouble walking.  · You have dizziness.  · You have a loss of balance or coordination.  · You have a sudden, severe headache with no known cause.  · You have new chest pain or an irregular heartbeat.  Any of these symptoms may represent a serious problem that is an emergency. Do not wait to see if the symptoms will go away. Get medical help at once. Call your local emergency services (911 in [place] Do not drive yourself to the hospital.     This information is not intended to replace advice given to you by your health care provider. Make sure you discuss any questions you have with your health care provider.     Document Released: 01/25/2006 Document Revised: 01/08/2016 Document Reviewed: 06/20/2014  [employer] Interactive Patient Education ©2017 [employer]  Epilepsy  Epilepsy is a disorder in which a person has repeated seizures over time. A seizure is a release of abnormal electrical activity in the brain. Seizures can cause a change in attention, behavior, or the ability to remain awake and alert (altered mental status). Seizures often involve uncontrollable shaking (convulsions).   Most people with epilepsy lead normal lives. However, people with epilepsy are at an increased risk of falls, accidents, and injuries. Therefore, it is important to begin treatment right away.  CAUSES   Epilepsy has many possible causes. Anything that disturbs the normal pattern of brain cell activity can lead to seizures. This may include:   · Head injury.  · Birth trauma.  · High fever as a child.  · Stroke.  · Bleeding into or around the brain.  · Certain drugs.  · Prolonged low oxygen, such as what occurs after CPR efforts.  · Abnormal brain development.  · Certain illnesses, such as meningitis, encephalitis (brain infection), malaria, and other infections.  · An  imbalance of nerve signaling chemicals (neurotransmitters).    SIGNS AND SYMPTOMS   The symptoms of a seizure can vary greatly from one person to another. Right before a seizure, you may have a warning (aura) that a seizure is about to occur. An aura may include the following symptoms:  · Fear or anxiety.  · Nausea.  · Feeling like the room is spinning (vertigo).  · Vision changes, such as seeing flashing lights or spots.  Common symptoms during a seizure include:  · Abnormal sensations, such as an abnormal smell or a bitter taste in the mouth.    · Sudden, general body stiffness.    · Convulsions that involve rhythmic jerking of the face, arm, or leg on one or both sides.    · Sudden change in consciousness.      Appearing to be awake but not responding.      Appearing to be asleep but cannot be awakened.    · Grimacing, chewing, lip smacking, drooling, tongue biting, or loss of bowel or bladder control.  After a seizure, you may feel sleepy for a while.   DIAGNOSIS   Your health care provider will ask about your symptoms and take a medical history. Descriptions from any witnesses to your seizures will be very helpful in the diagnosis. A physical exam, including a detailed neurological exam, is necessary. Various tests may be done, such as:   · An electroencephalogram (EEG). This is a painless test of your brain waves. In this test, a diagram is created of your brain waves. These diagrams can be interpreted by a specialist.  · An MRI of the brain.    · A CT scan of the brain.    · A spinal tap (lumbar puncture, LP).  · Blood tests to check for signs of infection or abnormal blood chemistry.  TREATMENT   There is no cure for epilepsy, but it is generally treatable. Once epilepsy is diagnosed, it is important to begin treatment as soon as possible. For most people with epilepsy, seizures can be controlled with medicines. The following may also be used:  · A pacemaker for the brain (vagus nerve stimulator) can be used  for people with seizures that are not well controlled by medicine.  · Surgery on the brain.  For some people, epilepsy eventually goes away.  HOME CARE INSTRUCTIONS   ·  Follow your health care provider's recommendations on driving and safety in normal activities.  · Get enough rest. Lack of sleep can cause seizures.  · Only take over-the-counter or prescription medicines as directed by your health care provider. Take any prescribed medicine exactly as directed.  · Avoid any known triggers of your seizures.  · Keep a seizure diary. Record what you recall about any seizure, especially any possible trigger.    · Make sure the people you live and work with know that you are prone to seizures. They should receive instructions on how to help you. In general, a witness to a seizure should:      Cushion your head and body.      Turn you on your side.      Avoid unnecessarily restraining you.      Not place anything inside your mouth.      Call for emergency medical help if there is any question about what has occurred.    · Follow up with your health care provider as directed. You may need regular blood tests to monitor the levels of your medicine.    SEEK MEDICAL CARE IF:   · You develop signs of infection or other illness. This might increase the risk of a seizure.    · You seem to be having more frequent seizures.    · Your seizure pattern is changing.    SEEK IMMEDIATE MEDICAL CARE IF:   · You have a seizure that does not stop after a few moments.    · You have a seizure that causes any difficulty in breathing.    · You have a seizure that results in a very severe headache.    · You have a seizure that leaves you with the inability to speak or use a part of your body.       This information is not intended to replace advice given to you by your health care provider. Make sure you discuss any questions you have with your health care provider.     Document Released: 12/18/2006 Document Revised: 04/10/2017 Document  Reviewed: 07/30/2014  [employer] Interactive Patient Education ©2017 [employer]

## 2017-08-28 DIAGNOSIS — S82.891D CLOSED RIGHT ANKLE FRACTURE, WITH ROUTINE HEALING, SUBSEQUENT ENCOUNTER: Primary | ICD-10-CM

## 2017-08-29 ENCOUNTER — OFFICE VISIT (OUTPATIENT)
Dept: ORTHOPEDIC SURGERY | Facility: CLINIC | Age: 57
End: 2017-08-29

## 2017-08-29 VITALS — BODY MASS INDEX: 23.21 KG/M2 | HEIGHT: 63 IN | WEIGHT: 131 LBS

## 2017-08-29 DIAGNOSIS — M25.572 ACUTE LEFT ANKLE PAIN: ICD-10-CM

## 2017-08-29 DIAGNOSIS — M25.572 ACUTE LEFT ANKLE PAIN: Primary | ICD-10-CM

## 2017-08-29 DIAGNOSIS — S92.109A: ICD-10-CM

## 2017-08-29 DIAGNOSIS — S82.821D CLOSED TORUS FRACTURE OF DISTAL END OF RIGHT FIBULA WITH ROUTINE HEALING, SUBSEQUENT ENCOUNTER: Primary | ICD-10-CM

## 2017-08-29 PROCEDURE — 99213 OFFICE O/P EST LOW 20 MIN: CPT | Performed by: ORTHOPAEDIC SURGERY

## 2017-08-29 NOTE — PROGRESS NOTES
Hattie Riddle is a 57 y.o. female. Right ankle follow up & new complaint of left ankle pain.     History of Present Illness Patient is here today for right ankle follow up and new complaint of left ankle pain. Patient states that her left ankle hurts worse than her right. Patient was sent to [facility] upon arrival.     The following portions of the patient's history were reviewed and updated as appropriate:   She  has a past medical history of Acute bronchitis (07/19/2016); Acute otitis externa (09/04/2014); Acute otitis media (02/10/2015); Acute sinusitis (07/19/2016); Ankle injury; Backache (12/08/2014); Benign hypertension (05/13/2016); Cerebrovascular accident; Dizziness (02/10/2015); Encounter for gynecological examination without abnormal finding (03/10/2016); Fatigue (08/28/2015); Foot pain (06/09/2014); Hyperkeratosis (04/28/2014); Hypotensive episode (06/12/2015); Keratoma (08/04/2014); Low blood pressure (08/28/2015); Menopausal and perimenopausal disorder (03/10/2016); Numbness of face (05/13/2016); Otitis media of right ear (07/30/2016); Perforated tympanic membrane (09/04/2014); Puncture wound (06/17/2014); Right-sided face pain (05/13/2016); Seizure; Thumb pain (08/28/2015); Upper respiratory infection (08/28/2015); and Wheezing (10/21/2014).  She  does not have any pertinent problems on file.  She  has a past surgical history that includes Breast biopsy; Other surgical history (07/07/2014); and Tubal ligation.  Her family history includes Alzheimer's disease in her father and other; Bipolar disorder in her other; Depression in her other; Diabetes in her other; Heart attack in her father; Heart disease in her other; Hypertension in her other; Lung cancer in her other; Migraines in her other; No Known Problems in her daughter and mother; Sickle cell anemia in her other; Stroke in her other; Throat cancer in her father.  She  reports that she has been smoking Cigarettes.  She has been smoking  about 0.50 packs per day. She has never used smokeless tobacco. She reports that she drinks alcohol. She reports that she uses illicit drugs, including Cocaine.  Current Outpatient Prescriptions   Medication Sig Dispense Refill   • albuterol (VENTOLIN HFA) 108 (90 BASE) MCG/ACT inhaler Inhale 2 puffs 4 (Four) Times a Day. 18 g 3   • aspirin 81 MG chewable tablet Chew 81 mg.     • atorvastatin (LIPITOR) 40 MG tablet Take 1 tablet by mouth Daily. 30 tablet 8   • Blood Pressure Monitoring (B-D ASSURE BPM/AUTO WRIST CUFF) misc 1 kit 2 (Two) Times a Day As Needed (fluctuating blood pressure). 1 each 0   • cyclobenzaprine (FLEXERIL) 10 MG tablet Take 1 tablet by mouth 3 (Three) Times a Day As Needed for Muscle Spasms. 30 tablet 3   • diclofenac (VOLTAREN) 50 MG EC tablet take 1 tablet by mouth twice a day 60 tablet 5   • folic acid (FOLVITE) 1 MG tablet Take 1 tablet by mouth Daily. 30 tablet 5   • gabapentin (NEURONTIN) 800 MG tablet Take 1 tablet by mouth 2 (Two) Times a Day. 60 tablet 5   • HYDROcodone-acetaminophen (NORCO) 7.5-325 MG per tablet Take 1 tablet by mouth Every 6 (Six) Hours As Needed for Moderate Pain (4-6). 15 tablet 0   • hydrocortisone (PROCTOCORT) 1 % cream rectal cream APPLY WITH TIP OF FINGER TO AFFECTED EAR THREE TIMES A DAY  0   • levETIRAcetam (KEPPRA) 250 MG tablet Take 1 tablet by mouth 2 (Two) Times a Day. 60 tablet 5   • levoFLOXacin (LEVAQUIN) 750 MG tablet take 1 tablet by mouth once daily for 10 days  0   • meclizine (ANTIVERT) 25 MG tablet take 1 tablet by mouth three times a day if needed for dizziness 30 tablet 5   • neomycin-polymyxin-hydrocortisone (CORTISPORIN) 3.5-69147-5 otic solution Administer 3 drops into both ears 4 (Four) Times a Day. 10 mL 0   • NIFEdipine XL (PROCARDIA XL) 30 MG 24 hr tablet Take 1 tablet by mouth Daily. 30 tablet 5   • ondansetron (ZOFRAN) 4 MG tablet Take 1 tablet by mouth Every 8 (Eight) Hours As Needed for Nausea or Vomiting. 10 tablet 0   • RA VITAMIN  B-1 100 MG tablet take 1 tablet by mouth once daily  0   • rOPINIRole (REQUIP) 2 MG tablet Take 1 tablet by mouth Every Night. 30 tablet 3   • sulindac (CLINORIL) 200 MG tablet Take 1 tablet by mouth 2 (Two) Times a Day. 60 tablet 3   • vitamin D (ERGOCALCIFEROL) 74798 UNITS capsule capsule 1 CAP EVERY 2 WEEKS 4 capsule 5     No current facility-administered medications for this visit.      Current Outpatient Prescriptions on File Prior to Visit   Medication Sig   • albuterol (VENTOLIN HFA) 108 (90 BASE) MCG/ACT inhaler Inhale 2 puffs 4 (Four) Times a Day.   • aspirin 81 MG chewable tablet Chew 81 mg.   • atorvastatin (LIPITOR) 40 MG tablet Take 1 tablet by mouth Daily.   • Blood Pressure Monitoring (B-D ASSURE BPM/AUTO WRIST CUFF) misc 1 kit 2 (Two) Times a Day As Needed (fluctuating blood pressure).   • cyclobenzaprine (FLEXERIL) 10 MG tablet Take 1 tablet by mouth 3 (Three) Times a Day As Needed for Muscle Spasms.   • diclofenac (VOLTAREN) 50 MG EC tablet take 1 tablet by mouth twice a day   • folic acid (FOLVITE) 1 MG tablet Take 1 tablet by mouth Daily.   • gabapentin (NEURONTIN) 800 MG tablet Take 1 tablet by mouth 2 (Two) Times a Day.   • HYDROcodone-acetaminophen (NORCO) 7.5-325 MG per tablet Take 1 tablet by mouth Every 6 (Six) Hours As Needed for Moderate Pain (4-6).   • hydrocortisone (PROCTOCORT) 1 % cream rectal cream APPLY WITH TIP OF FINGER TO AFFECTED EAR THREE TIMES A DAY   • levETIRAcetam (KEPPRA) 250 MG tablet Take 1 tablet by mouth 2 (Two) Times a Day.   • levoFLOXacin (LEVAQUIN) 750 MG tablet take 1 tablet by mouth once daily for 10 days   • meclizine (ANTIVERT) 25 MG tablet take 1 tablet by mouth three times a day if needed for dizziness   • neomycin-polymyxin-hydrocortisone (CORTISPORIN) 3.5-73813-1 otic solution Administer 3 drops into both ears 4 (Four) Times a Day.   • NIFEdipine XL (PROCARDIA XL) 30 MG 24 hr tablet Take 1 tablet by mouth Daily.   • ondansetron (ZOFRAN) 4 MG tablet Take 1  "tablet by mouth Every 8 (Eight) Hours As Needed for Nausea or Vomiting.   • RA VITAMIN B-1 100 MG tablet take 1 tablet by mouth once daily   • rOPINIRole (REQUIP) 2 MG tablet Take 1 tablet by mouth Every Night.   • sulindac (CLINORIL) 200 MG tablet Take 1 tablet by mouth 2 (Two) Times a Day.   • vitamin D (ERGOCALCIFEROL) 79301 UNITS capsule capsule 1 CAP EVERY 2 WEEKS     No current facility-administered medications on file prior to visit.      She is allergic to lisinopril..    Review of Systems  REVIEW OF SYSTEMS:  Negative, other than presenting complaint.  HEENT: No headaches, diplopia, blurred vision, tinnitus, vertigo, epistaxis, hoarseness or sore throat.  Pulmonary: No cough, sputum, hemoptysis, dyspnea, wheezing, or chest pain.  Cardiac: No chest pain, palpitations, orthopnea, paroxysmal nocturnal dyspnea, shortness of breath, or pedal edema.  Gastrointestinal: No diarrhea, melena, or constipation.  Genitourinary: No dysuria, hematuria, nocturia, frequency, bladder or bowel incontinence.  Hematology: No history of any anemia, fatigue, fever, or chills or night sweats.  Dermatology: No rashes, pruritus, or increased pigmentation changes of the skin.   Ht 63\" (160 cm)  Wt 131 lb (59.4 kg)  BMI 23.21 kg/m2    Social History     Social History   • Marital status:      Spouse name: N/A   • Number of children: N/A   • Years of education: N/A     Occupational History   • Not on file.     Social History Main Topics   • Smoking status: Current Every Day Smoker     Packs/day: 0.50     Types: Cigarettes   • Smokeless tobacco: Never Used   • Alcohol use Yes      Comment: rarely   • Drug use: Yes     Special: Cocaine   • Sexual activity: Defer     Other Topics Concern   • Not on file     Social History Narrative       HEENT: Normocephalic.  PERRLA.  TM's clear bilaterally.  Oropharynx: Clear.  Neck: Supple, with no adenopathy.  Chest: Equal bilateral expansion.  Clear to auscultation and percussion.  Heart: " Regular sinus rhythm, S1 and S2 normal.  No murmurs or extra heart sounds heard.  Abdomen: Soft, nontender, and no organomegaly.  Neurological: cranial nerves II-XII normal Vascular: pulses are present  Dermatological: no rashes  or blemishes, or any abnormality of the skin.      Objective   Physical Exam  There is tenderness on palpation of the lateral aspect of the tlar boy. And that is the area on the x ray that is suspicious for fracture.  Patient placed in a cast boot. The rt lateral malleolar fracture is healing well, fracture line is still present continue with stirrup.        Assessment/Plan  see back in 4 weeks for x ray of the rt ankle and left talus.    Jyoti was seen today for follow-up.    Diagnoses and all orders for this visit:    Closed torus fracture of distal end of right fibula with routine healing, subsequent encounter    Acute left ankle pain    Closed fracture of talus, unspecified portion of talus, initial encounter

## 2017-08-30 ENCOUNTER — HOSPITAL ENCOUNTER (OUTPATIENT)
Dept: PHYSICAL THERAPY | Facility: HOSPITAL | Age: 57
Setting detail: THERAPIES SERIES
Discharge: HOME | End: 2017-08-30

## 2017-08-30 DIAGNOSIS — S82.821D CLOSED TORUS FRACTURE OF DISTAL END OF RIGHT FIBULA WITH ROUTINE HEALING, SUBSEQUENT ENCOUNTER: Primary | ICD-10-CM

## 2017-08-30 DIAGNOSIS — M25.571 ACUTE RIGHT ANKLE PAIN: ICD-10-CM

## 2017-08-30 PROCEDURE — 97162 PT EVAL MOD COMPLEX 30 MIN: CPT | Performed by: PHYSICAL THERAPIST

## 2017-09-05 ENCOUNTER — OFFICE VISIT (OUTPATIENT)
Dept: FAMILY MEDICINE CLINIC | Facility: CLINIC | Age: 57
End: 2017-09-05

## 2017-09-05 VITALS
TEMPERATURE: 98.1 F | SYSTOLIC BLOOD PRESSURE: 95 MMHG | HEART RATE: 116 BPM | WEIGHT: 128 LBS | OXYGEN SATURATION: 97 % | BODY MASS INDEX: 22.68 KG/M2 | RESPIRATION RATE: 14 BRPM | HEIGHT: 63 IN | DIASTOLIC BLOOD PRESSURE: 68 MMHG

## 2017-09-05 DIAGNOSIS — N28.9 RENAL INSUFFICIENCY: ICD-10-CM

## 2017-09-05 DIAGNOSIS — Z13.820 SCREENING FOR OSTEOPOROSIS: ICD-10-CM

## 2017-09-05 DIAGNOSIS — M25.572 BILATERAL ANKLE PAIN, UNSPECIFIED CHRONICITY: ICD-10-CM

## 2017-09-05 DIAGNOSIS — I95.1 ORTHOSTATIC HYPOTENSION: Primary | ICD-10-CM

## 2017-09-05 DIAGNOSIS — M25.571 BILATERAL ANKLE PAIN, UNSPECIFIED CHRONICITY: ICD-10-CM

## 2017-09-05 PROCEDURE — 99214 OFFICE O/P EST MOD 30 MIN: CPT | Performed by: NURSE PRACTITIONER

## 2017-09-05 RX ORDER — TRAMADOL HYDROCHLORIDE 50 MG/1
50 TABLET ORAL EVERY 8 HOURS PRN
Qty: 90 TABLET | Refills: 0 | Status: SHIPPED | OUTPATIENT
Start: 2017-09-05 | End: 2017-11-06 | Stop reason: SDUPTHER

## 2017-09-06 ENCOUNTER — APPOINTMENT (OUTPATIENT)
Dept: PHYSICAL THERAPY | Facility: HOSPITAL | Age: 57
End: 2017-09-06

## 2017-09-06 NOTE — PROGRESS NOTES
Hattie Riddle is a 57 y.o. female.     HPI Comments: Here today for toño.  She cont to be light headed when she gets up and has sustained another ankle fracture due to a fall.  She has also been to the er and cmp showed a creat of 1.75.  She cont to take nsaids.  She cont to have pain.    Hypertension   This is a chronic problem. The current episode started more than 1 year ago. The problem is uncontrolled (is very low today). Pertinent negatives include no anxiety, blurred vision, chest pain, headaches, malaise/fatigue, neck pain, orthopnea, palpitations, peripheral edema, PND, shortness of breath or sweats. Agents associated with hypertension include NSAIDs. Risk factors for coronary artery disease include post-menopausal state, smoking/tobacco exposure and sedentary lifestyle. Past treatments include calcium channel blockers. The current treatment provides significant (is acturally too low) improvement. There are no compliance problems.    Lower Extremity Issue   This is a chronic problem. The current episode started 1 to 4 weeks ago. The problem occurs constantly. The problem has been unchanged. Associated symptoms include arthralgias and joint swelling (ankle). Pertinent negatives include no abdominal pain, anorexia, change in bowel habit, chest pain, chills, congestion, coughing, diaphoresis, fatigue, fever, headaches, myalgias, nausea, neck pain, numbness, rash, sore throat, swollen glands, urinary symptoms, vertigo, visual change, vomiting or weakness. The symptoms are aggravated by walking and standing. She has tried immobilization for the symptoms. The treatment provided mild relief.        The following portions of the patient's history were reviewed and updated as appropriate: allergies, current medications, past family history, past medical history, past social history, past surgical history and problem list.    Review of Systems   Constitutional: Negative.  Negative for chills,  diaphoresis, fatigue, fever and malaise/fatigue.   HENT: Negative.  Negative for congestion and sore throat.    Eyes: Negative for blurred vision.   Respiratory: Negative.  Negative for cough and shortness of breath.    Cardiovascular: Negative.  Negative for chest pain, palpitations, orthopnea and PND.   Gastrointestinal: Negative for abdominal pain, anorexia, change in bowel habit, nausea and vomiting.   Musculoskeletal: Positive for arthralgias and joint swelling (ankle). Negative for myalgias and neck pain.        Fracture of both ankles.   Skin: Negative.  Negative for rash.   Neurological: Positive for dizziness. Negative for vertigo, weakness, numbness and headaches.   Psychiatric/Behavioral: Negative.        Objective   Physical Exam   Constitutional: She is oriented to person, place, and time. She appears well-developed and well-nourished. No distress.   HENT:   Head: Normocephalic.   Eyes: EOM are normal. Pupils are equal, round, and reactive to light.   Neck: Normal range of motion. Neck supple.   Cardiovascular: Normal rate, regular rhythm and normal heart sounds.  Exam reveals no friction rub.    No murmur heard.  Pulmonary/Chest: Effort normal and breath sounds normal. No respiratory distress. She has no wheezes. She has no rales.   Abdominal: Soft.   Musculoskeletal:   Has air cast on the right ankle and walking boot on the left.  Is ambulating with walker.   Neurological: She is alert and oriented to person, place, and time.   Skin: Skin is warm and dry.   Psychiatric: She has a normal mood and affect. Thought content normal.   Nursing note and vitals reviewed.      Assessment/Plan   Jyoti was seen today for hypertension.    Diagnoses and all orders for this visit:    Orthostatic hypotension    Bilateral ankle pain, unspecified chronicity  -     traMADol (ULTRAM) 50 MG tablet; Take 1 tablet by mouth Every 8 (Eight) Hours As Needed for Moderate Pain .    Screening for osteoporosis  -     Cancel: dexa  assess fracture vertebral; Future    Renal insufficiency  -     Comprehensive Metabolic Panel      Have asked her to stop the nifedipine and also to stop the nsaids.  She needs to have bone density test done.  Will also send her to the lab to toño her creat.  She is given tramadol for pain.  Toño in 1 week.

## 2017-09-12 ENCOUNTER — OFFICE VISIT (OUTPATIENT)
Dept: FAMILY MEDICINE CLINIC | Facility: CLINIC | Age: 57
End: 2017-09-12

## 2017-09-12 VITALS
WEIGHT: 136 LBS | BODY MASS INDEX: 24.1 KG/M2 | HEART RATE: 106 BPM | TEMPERATURE: 98.3 F | RESPIRATION RATE: 16 BRPM | HEIGHT: 63 IN | SYSTOLIC BLOOD PRESSURE: 136 MMHG | OXYGEN SATURATION: 98 % | DIASTOLIC BLOOD PRESSURE: 90 MMHG

## 2017-09-12 DIAGNOSIS — I95.9 HYPOTENSION, UNSPECIFIED HYPOTENSION TYPE: Primary | ICD-10-CM

## 2017-09-12 PROCEDURE — 99213 OFFICE O/P EST LOW 20 MIN: CPT | Performed by: NURSE PRACTITIONER

## 2017-09-12 PROCEDURE — 80053 COMPREHEN METABOLIC PANEL: CPT | Performed by: NURSE PRACTITIONER

## 2017-09-12 NOTE — PROGRESS NOTES
Hattie Riddle is a 57 y.o. female.     HPI Comments: Here today for toño on her b/p.  She is improved since she stopped the b/p med.  She did not get her cmp drawn the last time she was here.  This needs to be done to toño her creat since it was mildly elevated.    Hypotension   This is a new problem. The current episode started 1 to 4 weeks ago. The problem has been resolved. Pertinent negatives include no abdominal pain, anorexia, arthralgias, change in bowel habit, chest pain, chills, congestion, coughing, diaphoresis, fatigue, fever, headaches, joint swelling, myalgias, nausea, neck pain, numbness, rash, sore throat, swollen glands, urinary symptoms, vertigo, visual change, vomiting or weakness. Exacerbated by: was taking nifedipine. Treatments tried: stopped the b/p med. The treatment provided significant relief.        The following portions of the patient's history were reviewed and updated as appropriate: allergies, current medications, past family history, past medical history, past social history, past surgical history and problem list.    Review of Systems   Constitutional: Negative.  Negative for chills, diaphoresis, fatigue and fever.   HENT: Negative.  Negative for congestion and sore throat.    Respiratory: Negative.  Negative for cough.    Cardiovascular: Negative.  Negative for chest pain.   Gastrointestinal: Negative for abdominal pain, anorexia, change in bowel habit, nausea and vomiting.   Musculoskeletal: Negative.  Negative for arthralgias, joint swelling, myalgias and neck pain.   Skin: Negative.  Negative for rash.   Neurological: Negative.  Negative for vertigo, weakness, numbness and headaches.   Psychiatric/Behavioral: Negative.        Objective   Physical Exam   Constitutional: She is oriented to person, place, and time. She appears well-developed and well-nourished. No distress.   HENT:   Head: Normocephalic.   Eyes: Pupils are equal, round, and reactive to light.   Neck:  Normal range of motion. Neck supple. No thyromegaly present.   Cardiovascular: Normal rate, regular rhythm and normal heart sounds.  Exam reveals no friction rub.    No murmur heard.  Pulmonary/Chest: Effort normal and breath sounds normal. No respiratory distress. She has no wheezes. She has no rales.   Abdominal: Soft.   Musculoskeletal:   Ambulating with a walker due to fractures in her feet/ankles.   Neurological: She is alert and oriented to person, place, and time.   Skin: Skin is warm and dry.   Psychiatric: She has a normal mood and affect. Thought content normal.   Nursing note and vitals reviewed.      Assessment/Plan   Jyoti was seen today for hypotension.    Diagnoses and all orders for this visit:    Hypotension, unspecified hypotension type    She needs to get her cmp drawn today.  Will let her know the results.

## 2017-09-13 ENCOUNTER — TELEPHONE (OUTPATIENT)
Dept: PHYSICAL THERAPY | Facility: HOSPITAL | Age: 57
End: 2017-09-13

## 2017-09-13 ENCOUNTER — HOSPITAL ENCOUNTER (OUTPATIENT)
Dept: PHYSICAL THERAPY | Facility: HOSPITAL | Age: 57
Setting detail: THERAPIES SERIES
Discharge: HOME | End: 2017-09-13

## 2017-09-13 DIAGNOSIS — S82.821D CLOSED TORUS FRACTURE OF DISTAL END OF RIGHT FIBULA WITH ROUTINE HEALING, SUBSEQUENT ENCOUNTER: Primary | ICD-10-CM

## 2017-09-13 DIAGNOSIS — M25.571 ACUTE RIGHT ANKLE PAIN: ICD-10-CM

## 2017-09-13 LAB
ALBUMIN SERPL-MCNC: 4.1 G/DL (ref 3.4–4.8)
ALBUMIN/GLOB SERPL: 1.4 G/DL (ref 1.1–1.8)
ALP SERPL-CCNC: 80 U/L (ref 38–126)
ALT SERPL W P-5'-P-CCNC: 34 U/L (ref 9–52)
ANION GAP SERPL CALCULATED.3IONS-SCNC: 9 MMOL/L (ref 5–15)
AST SERPL-CCNC: 32 U/L (ref 14–36)
BILIRUB SERPL-MCNC: 0.6 MG/DL (ref 0.2–1.3)
BUN BLD-MCNC: 22 MG/DL (ref 7–21)
BUN/CREAT SERPL: 18 (ref 7–25)
CALCIUM SPEC-SCNC: 9.9 MG/DL (ref 8.4–10.2)
CHLORIDE SERPL-SCNC: 102 MMOL/L (ref 95–110)
CO2 SERPL-SCNC: 29 MMOL/L (ref 22–31)
CREAT BLD-MCNC: 1.22 MG/DL (ref 0.5–1)
GFR SERPL CREATININE-BSD FRML MDRD: 55 ML/MIN/1.73 (ref 51–120)
GLOBULIN UR ELPH-MCNC: 2.9 GM/DL (ref 2.3–3.5)
GLUCOSE BLD-MCNC: 86 MG/DL (ref 60–100)
POTASSIUM BLD-SCNC: 5.4 MMOL/L (ref 3.5–5.1)
PROT SERPL-MCNC: 7 G/DL (ref 6.3–8.6)
SODIUM BLD-SCNC: 140 MMOL/L (ref 137–145)

## 2017-09-13 PROCEDURE — 97110 THERAPEUTIC EXERCISES: CPT | Performed by: PHYSICAL THERAPIST

## 2017-09-13 NOTE — THERAPY EVALUATION
Outpatient Physical Therapy Ortho Treatment Note  [facility]  Catherine Connelly, PT, DPT, CSCS       Patient Name: Jyoti Riddle  : 1960  MRN: [id number]  Today's Date: 2017      Visit Date: 2017     Pt reports 0/10 pain pre treatment, 0/10 pain post treatment  Reports 0% of improvement.  Attended2/3 visits.  Insurance available: 5 visits  Next MD appt: BRANDY .  Recertification: 2017.    Visit Dx:    ICD-10-CM ICD-9-CM   1. Closed torus fracture of distal end of right fibula with routine healing, subsequent encounter S82.821D V54.16   2. Acute right ankle pain M25.571 719.47     338.19       Patient Active Problem List   Diagnosis   • Vitamin D deficiency   • Vertigo   • Neuropathy   • Restless leg syndrome   • Pain in joint   • Allergic rhinitis   • Essential hypertension   • Abnormal EEG   • Cerebrovascular accident (CVA) due to thrombosis of left carotid artery   • Illicit drug use   • Tobacco abuse   • Sleep apnea   • Neck pain   • Vitamin deficiency   • Acute otitis externa of right ear   • Generalized seizure disorder   • Rotator cuff strain   • Acute pain of right shoulder   • Closed fracture of distal end of right fibula   • Right ankle pain   • Allergic reaction   • Left ankle pain   • Closed fracture of talus   • Hypotension        Past Medical History:   Diagnosis Date   • Acute bronchitis 2016   • Acute otitis externa 2014   • Acute otitis media 02/10/2015   • Acute sinusitis 2016   • Ankle injury    • Backache 2014   • Benign hypertension 2016   • Cerebrovascular accident    • Dizziness 02/10/2015   • Encounter for gynecological examination without abnormal finding 03/10/2016   • Fatigue 2015   • Foot pain 2014   • Hyperkeratosis 2014   • Hypotensive episode 2015   • Keratoma 2014    intractable plantar   • Low blood pressure 2015   • Menopausal and perimenopausal disorder  03/10/2016    other specified   • Numbness of face 05/13/2016    improved   • Otitis media of right ear 07/30/2016    unspecified   • Perforated tympanic membrane 09/04/2014   • Puncture wound 06/17/2014    injury   • Right-sided face pain 05/13/2016   • Seizure    • Thumb pain 08/28/2015   • Upper respiratory infection 08/28/2015   • Wheezing 10/21/2014        Past Surgical History:   Procedure Laterality Date   • BREAST BIOPSY     • OTHER SURGICAL HISTORY  07/07/2014    Destruction of Benign Lesion (1-14)   • TUBAL ABDOMINAL LIGATION               PT Ortho       09/13/17 1600    Subjective Comments    Subjective Comments Patient reports they are doing a bone density scan on monday to check out her bones.  -    Subjective Pain    Able to rate subjective pain? yes  -    Pre-Treatment Pain Level 0   r ankle, 7/10 L ankle  -    Post-Treatment Pain Level 0  -    Posture/Observations    Posture/Observations Comments No acute distress, walking boot on L ankle, aircast on R ankle  -    Gait Assessment/Treatment    Gait, Trappe Level independent  -    Gait, Gait Deviations left:;antalgic  -      User Key  (r) = Recorded By, (t) = Taken By, (c) = Cosigned By    Initials Name Provider Type    GASPER Connelly PT Physical Therapist                            PT Assessment/Plan       09/13/17 1600       PT Assessment    Assessment Comments Patient did well with all ther ex today. No pain with treatment.  -     PT Plan    PT Frequency 1x/week  -     PT Plan Comments Add marble pick-up  -       User Key  (r) = Recorded By, (t) = Taken By, (c) = Cosigned By    Initials Name Provider Type    GASPER Connelly PT Physical Therapist                Modalities       09/13/17 1600          Ice    Patient denies application of Ice Yes  -        User Key  (r) = Recorded By, (t) = Taken By, (c) = Cosigned By    Initials Name Provider Type    GASPER Connelly PT Physical Therapist                 Exercises       09/13/17 1600          Subjective Comments    Subjective Comments Patient reports they are doing a bone density scan on monday to check out her bones.  -AJ      Subjective Pain    Able to rate subjective pain? yes  -AJ      Pre-Treatment Pain Level 0   r ankle, 7/10 L ankle  -AJ      Post-Treatment Pain Level 0  -AJ      Exercise 1    Exercise Name 1 Pro II UE/LE  -AJ      Time (Minutes) 1 10 minutes  -AJ      Additional Comments L 4.0  -AJ      Exercise 2    Exercise Name 2 Sit to/from Stand  -AJ      Sets 2 2  -AJ      Reps 2 10  -AJ      Exercise 3    Exercise Name 3 Seated Gastroc/Soleus S with strap  -AJ      Reps 3 3  -AJ      Time (Seconds) 3 30 seconds  -AJ      Additional Comments each  -AJ      Exercise 4    Exercise Name 4 AROM ankle DF/PF  -AJ      Time (Minutes) 4 2 minutes  -AJ      Exercise 5    Exercise Name 5 AROM ankle inv/erv  -AJ      Time (Minutes) 5 2 minutes  -AJ      Exercise 6    Exercise Name 6 Ankle circles: cw/ccw  -AJ      Time (Minutes) 6 2 minutes each  -AJ      Exercise 7    Exercise Name 7 RTB DF/PF  -AJ      Reps 7 20   each  -AJ        User Key  (r) = Recorded By, (t) = Taken By, (c) = Cosigned By    Initials Name Provider Type    AJ Catherine Connelly, PT Physical Therapist                               PT OP Goals       09/13/17 1600       PT Short Term Goals    STG Date to Achieve 09/20/17  -     STG 1 I with HEP and [facility]  -     STG 1 Progress Ongoing  -     STG 2 AROm R ankle DF >= 0°.  -     STG 2 Progress Ongoing  -     STG 3 AROm R ankle PF >= 40°.  -     STG 3 Progress Ongoing  -     STG 4 AROM R ankle inv/erv >= 20° each.  -     STG 4 Progress Ongoing  -     STG 5 R ankle 3-/5 or greater.  -     STG 5 Progress Ongoing  -     Long Term Goals    LTG Date to Achieve 10/13/17  -     LTG 1 AROm R ankle DF >= 5°.  -     LTG 1 Progress Ongoing  -     LTG 2 R ankle strength 4+/5 or  "greater in all directions.  -     LTG 2 Progress Ongoing  -AJ     LTG 3 R SLS >= 15\" EO on level ground.  -     LTG 3 Progress Ongoing  -AJ     LTG 4 I with final HEP.  -     LTG 4 Progress Ongoing  -GASPER     LTG 5 D/C with final HEP and free 30 day fitness formula membership.  -     LTG 5 Progress Ongoing  -AJ     Time Calculation    PT Goal Re-Cert Due Date 09/20/17  -       User Key  (r) = Recorded By, (t) = Taken By, (c) = Cosigned By    Initials Name Provider Type    GASPER Connelly PT Physical Therapist                Therapy Education       09/13/17 1600          Therapy Education    Education Details Add sit to stand to HEP and break up calf stretch into gastroc and soleus  -GASPER      Given HEP;Symptoms/condition management;Pain management;Posture/body mechanics  -GASPER      Program Modified  -GASPER      How Provided Verbal;Demonstration;Written  -GASPER      Provided to Patient  -GASPER      Level of Understanding Verbalized;Demonstrated  -GASPER        User Key  (r) = Recorded By, (t) = Taken By, (c) = Cosigned By    Initials Name Provider Type    GASPER Connelly PT Physical Therapist                Time Calculation:   Start Time: 1600  Stop Time: 1640  Time Calculation (min): 40 min  Total Timed Code Minutes- PT: 40 minute(s)    Therapy Charges for Today     Code Description Service Date Service Provider Modifiers Qty    10541012414 HC PT THER PROC EA 15 MIN 9/13/2017 Catherine Connelly, PT GP 3                    Catherine Connelly PT, DPT, CSCS  9/13/2017       "

## 2017-09-13 NOTE — TELEPHONE ENCOUNTER
Initially no appointment today available so patient offered appointment for 09/14/2017 for 1345 and accepts it. Called patient back immediately due to 1600 appointment for today becoming available. Patient accepts appointment and states that she will be here at 1600 today.

## 2017-09-14 ENCOUNTER — OFFICE VISIT (OUTPATIENT)
Dept: OTOLARYNGOLOGY | Facility: CLINIC | Age: 57
End: 2017-09-14

## 2017-09-14 VITALS — BODY MASS INDEX: 24.1 KG/M2 | WEIGHT: 136 LBS | TEMPERATURE: 97.1 F | HEIGHT: 63 IN

## 2017-09-14 DIAGNOSIS — H60.63 CHRONIC NON-INFECTIVE OTITIS EXTERNA OF BOTH EARS, UNSPECIFIED TYPE: Primary | ICD-10-CM

## 2017-09-14 PROCEDURE — 99213 OFFICE O/P EST LOW 20 MIN: CPT | Performed by: OTOLARYNGOLOGY

## 2017-09-14 NOTE — PROGRESS NOTES
Subjective   Jyoti Riddle is a 57 y.o. female.       History of Present Illness     Patient has a history of chronic otitis externa and eustachian tube dysfunction.  It's been over 18 months since she was last seen.  She says her ears are itching and feels stopped up.  Is not having any otorrhea.    The following portions of the patient's history were reviewed and updated as appropriate: allergies, current medications, past family history, past medical history, past social history, past surgical history and problem list.     reports that she has been smoking Cigarettes.  She has been smoking about 0.25 packs per day. She has never used smokeless tobacco. She reports that she drinks alcohol. She reports that she uses illicit drugs, including Cocaine.   Patient is a tobacco user and has been counseled for use of tobacco products      Review of Systems   Constitutional: Negative for fever.           Objective   Physical Exam  Ears: External ears no deformity.  Both ear canals show squamous debris that cleaned under the microscope using instrumentation.  Tympanic membranes are intact with no infection or effusion.      Assessment/Plan   Jyoti was seen today for ear problem.    Diagnoses and all orders for this visit:    Chronic non-infective otitis externa of both ears, unspecified type        And: Ears cleaned as described above.  Use baby oil as needed at bedtime for itching.  Return in 6 months.

## 2017-09-15 ENCOUNTER — TELEPHONE (OUTPATIENT)
Dept: FAMILY MEDICINE CLINIC | Facility: CLINIC | Age: 57
End: 2017-09-15

## 2017-09-15 NOTE — TELEPHONE ENCOUNTER
----- Message from WESTON Burroughs sent at 9/14/2017  8:23 AM CDT -----  Let her know her creatinine is down to 1.22.

## 2017-09-19 ENCOUNTER — HOSPITAL ENCOUNTER (OUTPATIENT)
Dept: PHYSICAL THERAPY | Facility: HOSPITAL | Age: 57
Setting detail: THERAPIES SERIES
Discharge: HOME | End: 2017-09-19

## 2017-09-19 DIAGNOSIS — M25.571 ACUTE RIGHT ANKLE PAIN: ICD-10-CM

## 2017-09-19 DIAGNOSIS — S82.821D CLOSED TORUS FRACTURE OF DISTAL END OF RIGHT FIBULA WITH ROUTINE HEALING, SUBSEQUENT ENCOUNTER: Primary | ICD-10-CM

## 2017-09-19 PROCEDURE — 97110 THERAPEUTIC EXERCISES: CPT

## 2017-09-19 NOTE — THERAPY TREATMENT NOTE
Outpatient Physical Therapy Ortho Treatment Note  [facility]  Mary Ann Marley PTA       Patient Name: Jyoti Riddle  : 1960  MRN: [id number]  Today's Date: 2017      Visit Date: 2017     Visits: 3/4  Insurance Visits Approved: 5 visits  Recert Due: 2017  MD Appt: TBD  Pain: pretreatment 9/10; post treatment 9/10  Improvement: pt is subjectively reporting 0% improvement since initial evaluation    Visit Dx:    ICD-10-CM ICD-9-CM   1. Closed torus fracture of distal end of right fibula with routine healing, subsequent encounter S82.821D V54.16   2. Acute right ankle pain M25.571 719.47     338.19       Patient Active Problem List   Diagnosis   • Vitamin D deficiency   • Vertigo   • Neuropathy   • Restless leg syndrome   • Pain in joint   • Allergic rhinitis   • Essential hypertension   • Abnormal EEG   • Cerebrovascular accident (CVA) due to thrombosis of left carotid artery   • Illicit drug use   • Tobacco abuse   • Sleep apnea   • Neck pain   • Vitamin deficiency   • Acute otitis externa of right ear   • Generalized seizure disorder   • Rotator cuff strain   • Acute pain of right shoulder   • Closed fracture of distal end of right fibula   • Right ankle pain   • Allergic reaction   • Left ankle pain   • Closed fracture of talus   • Hypotension        Past Medical History:   Diagnosis Date   • Acute bronchitis 2016   • Acute otitis externa 2014   • Acute otitis media 02/10/2015   • Acute sinusitis 2016   • Ankle injury    • Backache 2014   • Benign hypertension 2016   • Cerebrovascular accident    • Dizziness 02/10/2015   • Encounter for gynecological examination without abnormal finding 03/10/2016   • Fatigue 2015   • Foot pain 2014   • Hyperkeratosis 2014   • Hypotensive episode 2015   • Keratoma 2014    intractable plantar   • Low blood pressure 2015   • Menopausal and perimenopausal disorder  03/10/2016    other specified   • Numbness of face 05/13/2016    improved   • Otitis media of right ear 07/30/2016    unspecified   • Perforated tympanic membrane 09/04/2014   • Puncture wound 06/17/2014    injury   • Right-sided face pain 05/13/2016   • Seizure    • Thumb pain 08/28/2015   • Upper respiratory infection 08/28/2015   • Wheezing 10/21/2014        Past Surgical History:   Procedure Laterality Date   • BREAST BIOPSY     • OTHER SURGICAL HISTORY  07/07/2014    Destruction of Benign Lesion (1-14)   • TUBAL ABDOMINAL LIGATION               PT Ortho       09/19/17 1300    Subjective Comments    Subjective Comments states that she has not been doing her leg exercises at home. reports that shes been doing exercises for her shoulder. patient reports that she is wearing bilateral walking boots due to radiating pain down left LE from back.   -    Subjective Pain    Able to rate subjective pain? yes  -    Pre-Treatment Pain Level 9  -    Post-Treatment Pain Level 9  -    Posture/Observations    Posture/Observations Comments patient presents wearing bilateral walking boots. appears very lethargic, has difficulty keeping eyes open and staying awake during treatment. head often drops during treatment. significant sway with ambulation with patient keeping the walker away from her. unsafe stand to sit transfers with patient going to sit before being positioned in front of the chair. as she is leaving she has several instances of unsteadiness and Loss of Balance that requires clinician assitance to correct. patient displays unsafe transfer to getting into the vehicle that she is riding in.    -    Gait Assessment/Treatment    Gait, Holmes Level supervision required  -    Gait, Assistive Device rolling walker  -    Gait, Comment unsteady gait with multiple Loss of Balance  -      User Key  (r) = Recorded By, (t) = Taken By, (c) = Cosigned By    Initials Name Provider Type     Mary Ann Marley, PTA  Physical Therapy Assistant                            PT Assessment/Plan       09/19/17 1300       PT Assessment    Assessment Comments patient with unsteady gait. displays confusion about appointments today. unable to initiate marble  due to patients requiring consistent cueing for technique and staying on task today.   -     PT Plan    PT Frequency 1x/week  -     PT Plan Comments recheck due next visit  -       User Key  (r) = Recorded By, (t) = Taken By, (c) = Cosigned By    Initials Name Provider Type     Mary Ann Marley PTA Physical Therapy Assistant                Modalities       09/19/17 1300          Ice    Patient denies application of Ice Yes  -        User Key  (r) = Recorded By, (t) = Taken By, (c) = Cosigned By    Initials Name Provider Type     Mary Ann Marley PTA Physical Therapy Assistant                Exercises       09/19/17 1300          Subjective Comments    Subjective Comments states that she has not been doing her leg exercises at home. reports that shes been doing exercises for her shoulder. patient reports that she is wearing bilateral walking boots due to radiating pain down left LE from back.   -      Subjective Pain    Able to rate subjective pain? yes  -      Pre-Treatment Pain Level 9  -      Post-Treatment Pain Level 9  -      Exercise 1    Exercise Name 1 Pro II UE/LE  -      Time (Minutes) 1 10 minutes  -      Additional Comments L 4.0  -      Exercise 2    Exercise Name 2 sit to/from stand  -      Time (Minutes) 2 2 minutes  -      Exercise 3    Exercise Name 3 Seated Gastroc/Soleus S with Strap  -      Reps 3 3  -      Time (Seconds) 3 30 seconds  -      Additional Comments each  -      Exercise 4    Exercise Name 4 Ankle Circles CW/CCW  -      Time (Minutes) 4 2 minutes each direction  -      Exercise 5    Exercise Name 5 Ankle ROM- Write Name with foot  -      Reps 5 2  -      Time (Minutes) 5 2 minutes  -      Exercise 6     "Exercise Name 6 RTB DF/PF  -      Time (Minutes) 6 2 minutes each  -        User Key  (r) = Recorded By, (t) = Taken By, (c) = Cosigned By    Initials Name Provider Type    ULYSSES Marley PTA Physical Therapy Assistant                               PT OP Goals       09/19/17 1300       PT Short Term Goals    STG Date to Achieve 09/20/17  -     STG 1 I with HEP and [facility]  -     STG 1 Progress Ongoing  -     STG 2 AROm R ankle DF >= 0°.  -     STG 2 Progress Ongoing  -     STG 3 AROm R ankle PF >= 40°.  -     STG 3 Progress Ongoing  -     STG 4 AROM R ankle inv/erv >= 20° each.  -     STG 4 Progress Ongoing  -     STG 5 R ankle 3-/5 or greater.  -     STG 5 Progress Ongoing  -     Long Term Goals    LTG Date to Achieve 10/13/17  -     LTG 1 AROm R ankle DF >= 5°.  -     LTG 1 Progress Ongoing  -     LTG 2 R ankle strength 4+/5 or greater in all directions.  -     LTG 2 Progress Ongoing  -     LTG 3 R SLS >= 15\" EO on level ground.  -     LTG 3 Progress Ongoing  -     LTG 4 I with final HEP.  -     LTG 4 Progress Ongoing  -     LTG 5 D/C with final HEP and free 30 day fitness formula membership.  -     LTG 5 Progress Ongoing  -     Time Calculation    PT Goal Re-Cert Due Date 09/20/17  -       User Key  (r) = Recorded By, (t) = Taken By, (c) = Cosigned By    Initials Name Provider Type    ULYSSES Marley PTA Physical Therapy Assistant                Therapy Education       09/19/17 1300          Therapy Education    Given HEP;Symptoms/condition management;Pain management;Posture/body mechanics  -      Program Reinforced  -      How Provided Verbal;Demonstration  -      Provided to Patient  -      Level of Understanding Verbalized;Demonstrated  -        User Key  (r) = Recorded By, (t) = Taken By, (c) = Cosigned By    Initials Name Provider Type    ULYSSES Marley PTA Physical Therapy Assistant    "             Time Calculation:   Start Time: 1335  Stop Time: 1430  Time Calculation (min): 55 min  Total Timed Code Minutes- PT: 55 minute(s)    Therapy Charges for Today     Code Description Service Date Service Provider Modifiers Qty    79794393272 HC PT THER PROC EA 15 MIN 9/19/2017 Mary Ann Marley, PTA GP 4    93300164562 HC PT THER SUPP EA 15 MIN 9/19/2017 Mary Ann Marley, LIDIA GP 1                    Mary Ann Marley PTA  9/19/2017

## 2017-09-22 DIAGNOSIS — E56.9 VITAMIN DEFICIENCY: ICD-10-CM

## 2017-09-22 RX ORDER — FOLIC ACID 1 MG/1
TABLET ORAL
Qty: 30 TABLET | Refills: 5 | Status: SHIPPED | OUTPATIENT
Start: 2017-09-22 | End: 2018-03-30 | Stop reason: SDUPTHER

## 2017-09-25 DIAGNOSIS — S82.821D CLOSED TORUS FRACTURE OF LOWER END OF RIGHT FIBULA WITH ROUTINE HEALING: Primary | ICD-10-CM

## 2017-09-26 ENCOUNTER — OFFICE VISIT (OUTPATIENT)
Dept: ORTHOPEDIC SURGERY | Facility: CLINIC | Age: 57
End: 2017-09-26

## 2017-09-26 VITALS
HEIGHT: 63 IN | WEIGHT: 131 LBS | SYSTOLIC BLOOD PRESSURE: 148 MMHG | BODY MASS INDEX: 23.21 KG/M2 | DIASTOLIC BLOOD PRESSURE: 80 MMHG

## 2017-09-26 DIAGNOSIS — S82.821P: ICD-10-CM

## 2017-09-26 DIAGNOSIS — M25.571 ACUTE RIGHT ANKLE PAIN: ICD-10-CM

## 2017-09-26 DIAGNOSIS — S92.101D: ICD-10-CM

## 2017-09-26 DIAGNOSIS — M25.572 ACUTE LEFT ANKLE PAIN: Primary | ICD-10-CM

## 2017-09-26 PROCEDURE — 99213 OFFICE O/P EST LOW 20 MIN: CPT | Performed by: ORTHOPAEDIC SURGERY

## 2017-09-26 RX ORDER — ZINC GLUCONATE 50 MG
TABLET ORAL
Refills: 0 | COMMUNITY
Start: 2017-09-22 | End: 2018-09-18

## 2017-09-26 NOTE — PROGRESS NOTES
Hattie Riddle is a 57 y.o. female. 1960- 4 week recheck- bilateral ankle     History of Present Illness   Patient is here for recheck of bilateral ankle. Patient states that the pain in the left leg/ankle is still present. Patient states that the pain in the right ankle has improved since her last office visit. Patient was sent to [facility] upon arrival.    The following portions of the patient's history were reviewed and updated as appropriate:   She  has a past medical history of Acute bronchitis (07/19/2016); Acute otitis externa (09/04/2014); Acute otitis media (02/10/2015); Acute sinusitis (07/19/2016); Ankle injury; Backache (12/08/2014); Benign hypertension (05/13/2016); Cerebrovascular accident (2015); Dizziness (02/10/2015); Encounter for gynecological examination without abnormal finding (03/10/2016); Fatigue (08/28/2015); Foot pain (06/09/2014); Hyperkeratosis (04/28/2014); Hypotensive episode (06/12/2015); Keratoma (08/04/2014); Low blood pressure (08/28/2015); Menopausal and perimenopausal disorder (03/10/2016); Numbness of face (05/13/2016); Otitis media of right ear (07/30/2016); Perforated tympanic membrane (09/04/2014); Puncture wound (06/17/2014); Right-sided face pain (05/13/2016); Seizure; Thumb pain (08/28/2015); Upper respiratory infection (08/28/2015); and Wheezing (10/21/2014).  She  does not have any pertinent problems on file.  She  has a past surgical history that includes Breast biopsy; Other surgical history (07/07/2014); and Tubal ligation.  Her family history includes Alzheimer's disease in her father and other; Bipolar disorder in her other; Depression in her other; Diabetes in her other; Heart attack in her father; Heart disease in her other; Hypertension in her other; Lung cancer in her other; Migraines in her other; No Known Problems in her daughter and mother; Sickle cell anemia in her other; Stroke in her other; Throat cancer in her father.  She  reports that she  has been smoking Cigarettes.  She has been smoking about 0.25 packs per day. She has never used smokeless tobacco. She reports that she drinks alcohol. She reports that she uses illicit drugs, including Cocaine.  Current Outpatient Prescriptions   Medication Sig Dispense Refill   • albuterol (VENTOLIN HFA) 108 (90 BASE) MCG/ACT inhaler Inhale 2 puffs 4 (Four) Times a Day. 18 g 3   • ANORO ELLIPTA 62.5-25 MCG/INH aerosol powder  Inhale 62.5 inhalers Daily.  0   • aspirin 81 MG chewable tablet Chew 81 mg.     • atorvastatin (LIPITOR) 40 MG tablet Take 1 tablet by mouth Daily. 30 tablet 8   • Blood Pressure Monitoring (B-D ASSURE BPM/AUTO WRIST CUFF) misc 1 kit 2 (Two) Times a Day As Needed (fluctuating blood pressure). 1 each 0   • cyclobenzaprine (FLEXERIL) 10 MG tablet Take 1 tablet by mouth 3 (Three) Times a Day As Needed for Muscle Spasms. 30 tablet 3   • diclofenac (VOLTAREN) 50 MG EC tablet take 1 tablet by mouth twice a day 60 tablet 5   • folic acid (FOLVITE) 1 MG tablet take 1 tablet by mouth once daily 30 tablet 5   • gabapentin (NEURONTIN) 800 MG tablet Take 1 tablet by mouth 2 (Two) Times a Day. 60 tablet 5   • hydrocortisone (PROCTOCORT) 1 % cream rectal cream APPLY WITH TIP OF FINGER TO AFFECTED EAR THREE TIMES A DAY  0   • levETIRAcetam (KEPPRA) 250 MG tablet Take 1 tablet by mouth 2 (Two) Times a Day. 60 tablet 5   • meclizine (ANTIVERT) 25 MG tablet take 1 tablet by mouth three times a day if needed for dizziness 30 tablet 5   • neomycin-polymyxin-hydrocortisone (CORTISPORIN) 3.5-01002-8 otic solution Administer 3 drops into both ears 4 (Four) Times a Day. 10 mL 0   • ondansetron (ZOFRAN) 4 MG tablet Take 1 tablet by mouth Every 8 (Eight) Hours As Needed for Nausea or Vomiting. 10 tablet 0   • RA FOLIC ACID 400 MCG tablet take 1 tablet by mouth once daily  0   • RA VITAMIN B-1 100 MG tablet take 1 tablet by mouth once daily  0   • rOPINIRole (REQUIP) 2 MG tablet Take 1 tablet by mouth Every Night. 30  tablet 3   • traMADol (ULTRAM) 50 MG tablet Take 1 tablet by mouth Every 8 (Eight) Hours As Needed for Moderate Pain . 90 tablet 0   • vitamin D (ERGOCALCIFEROL) 03403 UNITS capsule capsule 1 CAP EVERY 2 WEEKS 4 capsule 5     No current facility-administered medications for this visit.      Current Outpatient Prescriptions on File Prior to Visit   Medication Sig   • albuterol (VENTOLIN HFA) 108 (90 BASE) MCG/ACT inhaler Inhale 2 puffs 4 (Four) Times a Day.   • ANORO ELLIPTA 62.5-25 MCG/INH aerosol powder  Inhale 62.5 inhalers Daily.   • aspirin 81 MG chewable tablet Chew 81 mg.   • atorvastatin (LIPITOR) 40 MG tablet Take 1 tablet by mouth Daily.   • Blood Pressure Monitoring (B-D ASSURE BPM/AUTO WRIST CUFF) misc 1 kit 2 (Two) Times a Day As Needed (fluctuating blood pressure).   • cyclobenzaprine (FLEXERIL) 10 MG tablet Take 1 tablet by mouth 3 (Three) Times a Day As Needed for Muscle Spasms.   • diclofenac (VOLTAREN) 50 MG EC tablet take 1 tablet by mouth twice a day   • folic acid (FOLVITE) 1 MG tablet take 1 tablet by mouth once daily   • gabapentin (NEURONTIN) 800 MG tablet Take 1 tablet by mouth 2 (Two) Times a Day.   • hydrocortisone (PROCTOCORT) 1 % cream rectal cream APPLY WITH TIP OF FINGER TO AFFECTED EAR THREE TIMES A DAY   • levETIRAcetam (KEPPRA) 250 MG tablet Take 1 tablet by mouth 2 (Two) Times a Day.   • meclizine (ANTIVERT) 25 MG tablet take 1 tablet by mouth three times a day if needed for dizziness   • neomycin-polymyxin-hydrocortisone (CORTISPORIN) 3.5-43259-0 otic solution Administer 3 drops into both ears 4 (Four) Times a Day.   • ondansetron (ZOFRAN) 4 MG tablet Take 1 tablet by mouth Every 8 (Eight) Hours As Needed for Nausea or Vomiting.   • RA VITAMIN B-1 100 MG tablet take 1 tablet by mouth once daily   • rOPINIRole (REQUIP) 2 MG tablet Take 1 tablet by mouth Every Night.   • traMADol (ULTRAM) 50 MG tablet Take 1 tablet by mouth Every 8 (Eight) Hours As Needed for Moderate Pain .   •  "vitamin D (ERGOCALCIFEROL) 11515 UNITS capsule capsule 1 CAP EVERY 2 WEEKS     No current facility-administered medications on file prior to visit.      She is allergic to lisinopril..    Review of Systems  REVIEW OF SYSTEMS:  Negative, other than presenting complaint.  HEENT: No headaches, diplopia, blurred vision, tinnitus, vertigo, epistaxis, hoarseness or sore throat.  Pulmonary: No cough, sputum, hemoptysis, dyspnea, wheezing, or chest pain.  Cardiac: No chest pain, palpitations, orthopnea, paroxysmal nocturnal dyspnea, shortness of breath, or pedal edema.  Gastrointestinal: No diarrhea, melena, or constipation.  Genitourinary: No dysuria, hematuria, nocturia, frequency, bladder or bowel incontinence.  Hematology: No history of any anemia, fatigue, fever, or chills or night sweats.  Dermatology: No rashes, pruritus, or increased pigmentation changes of the skin.     Objective   Physical Exam  /80 (BP Location: Right arm, Patient Position: Sitting)  Ht 63\" (160 cm)  Wt 131 lb (59.4 kg)  BMI 23.21 kg/m2    Social History     Social History   • Marital status:      Spouse name: N/A   • Number of children: N/A   • Years of education: N/A     Occupational History   • Not on file.     Social History Main Topics   • Smoking status: Current Every Day Smoker     Packs/day: 0.25     Types: Cigarettes   • Smokeless tobacco: Never Used   • Alcohol use Yes      Comment: rarely   • Drug use: Yes     Special: Cocaine   • Sexual activity: Defer     Other Topics Concern   • Not on file     Social History Narrative       HEENT: Normocephalic.  PERRLA.  TM's clear bilaterally.  Oropharynx: Clear.  Neck: Supple, with no adenopathy.  Chest: Equal bilateral expansion.  Clear to auscultation and percussion.  Heart: Regular sinus rhythm, S1 and S2 normal.  No murmurs or extra heart sounds heard.  Abdomen: Soft, nontender, and no organomegaly.  Neurological: cranial nerves II-XII normal Vascular: pulses are present  " Dermatological: no rashes  or blemishes, or any abnormality of the skin.    Xray of bilateral ankle show     Examination of bilateral ankle show     Assessment/Plan   Problems Addressed this Visit        Nervous and Auditory    Right ankle pain    Left ankle pain - Primary       Musculoskeletal and Integument    Closed fracture of talus       Other    Closed fracture of distal end of right fibula

## 2017-09-29 ENCOUNTER — TELEPHONE (OUTPATIENT)
Dept: FAMILY MEDICINE CLINIC | Facility: CLINIC | Age: 57
End: 2017-09-29

## 2017-09-29 DIAGNOSIS — M85.80 OSTEOPENIA: Primary | ICD-10-CM

## 2017-09-29 RX ORDER — CALCIUM GLUCONATE 45(500) MG
500 TABLET ORAL 2 TIMES DAILY
Qty: 60 TABLET | Refills: 11 | OUTPATIENT
Start: 2017-09-29 | End: 2018-02-16

## 2017-10-02 ENCOUNTER — APPOINTMENT (OUTPATIENT)
Dept: PHYSICAL THERAPY | Facility: HOSPITAL | Age: 57
End: 2017-10-02

## 2017-10-03 ENCOUNTER — OFFICE VISIT (OUTPATIENT)
Dept: NEUROLOGY | Facility: CLINIC | Age: 57
End: 2017-10-03

## 2017-10-03 VITALS
DIASTOLIC BLOOD PRESSURE: 80 MMHG | HEART RATE: 88 BPM | WEIGHT: 131 LBS | HEIGHT: 63 IN | SYSTOLIC BLOOD PRESSURE: 128 MMHG | BODY MASS INDEX: 23.21 KG/M2

## 2017-10-03 DIAGNOSIS — I10 ESSENTIAL HYPERTENSION: Primary | ICD-10-CM

## 2017-10-03 DIAGNOSIS — Z72.0 TOBACCO ABUSE: ICD-10-CM

## 2017-10-03 DIAGNOSIS — R94.01 ABNORMAL EEG: ICD-10-CM

## 2017-10-03 DIAGNOSIS — G40.309 GENERALIZED SEIZURE DISORDER (HCC): ICD-10-CM

## 2017-10-03 DIAGNOSIS — F19.90 ILLICIT DRUG USE: ICD-10-CM

## 2017-10-03 DIAGNOSIS — I63.032 CEREBROVASCULAR ACCIDENT (CVA) DUE TO THROMBOSIS OF LEFT CAROTID ARTERY (HCC): ICD-10-CM

## 2017-10-03 PROCEDURE — 99213 OFFICE O/P EST LOW 20 MIN: CPT | Performed by: CLINICAL NURSE SPECIALIST

## 2017-10-03 PROCEDURE — 99406 BEHAV CHNG SMOKING 3-10 MIN: CPT | Performed by: CLINICAL NURSE SPECIALIST

## 2017-10-03 RX ORDER — NIFEDIPINE 30 MG/1
30 TABLET, EXTENDED RELEASE ORAL DAILY
COMMUNITY
End: 2018-03-29

## 2017-10-03 RX ORDER — LEVETIRACETAM 500 MG/1
500 TABLET ORAL 2 TIMES DAILY
Qty: 60 TABLET | Refills: 4 | Status: SHIPPED | OUTPATIENT
Start: 2017-10-03 | End: 2018-02-16

## 2017-10-03 NOTE — PROGRESS NOTES
"Subjective     Chief Complaint   Patient presents with   • Stroke     No known stroke symptoms   • Seizures     No known sz         Jyoti SARAVIA Person is a 57 y.o. female right handed .  She is here today for follow up for stroke and abnormal EEG concerning to be seizuregenic. She was last seen 8/2017. .  She denies new stroke symptoms of unilateral weakness, slurred speech, or numbness.  She denies seizures but does complain of right facial \"shooting, electrical shock\" beginning on right fore head radiating to eye. She will some times have dizziness. She denies loss of consciousness, tongue biting, incontinence, involuntary tremor. At last visit she had fractured right ankle and was wearing hard boot. Since last visit she had fallen again and fractured left ankle and now has hard boot to both feet and is ambulating with walker. She does continue with Keppra 250 mg BId and she is tolerating and denies missed doses. She continues with  ASA. She continues to use tobacco stating 1 pack last 3 days. She also tells me she has cut back on marijuana and but uses cocaine at least 3 times weekly with her last reported use 3-4 days ago.        Stroke   This is a chronic (image negative stroke in 2013 left hemisphere) problem. The current episode started more than 1 year ago. The problem has been gradually improving. Associated symptoms include headaches (sharp, \" electrical shock\" sensation on right side of forehead radiating to right eye. ). Pertinent negatives include no arthralgias, chest pain, fatigue, fever, myalgias, nausea, sore throat, vomiting or weakness. Associated symptoms comments: Right facial weakness, RUE/RLE weakness and decrease sensation. And mild dysarthria this has improved since last visit but imbalanced and walks with single point cane. Exacerbated by: hypertensive emergency, illicit drug use.   Seizures    This is a chronic problem. Associated symptoms include headaches (sharp, \" electrical shock\" " "sensation on right side of forehead radiating to right eye. ) and speech difficulty (mild dysarthria). Pertinent negatives include no confusion, no visual disturbance, no sore throat, no chest pain, no nausea, no vomiting and no diarrhea. Characteristics include eye blinking. body stiffens Possible causes include missed seizure meds. illicit drug use   Altered Mental Status   This is a chronic (since 2013) problem. The current episode started more than 1 year ago. The problem has been gradually improving (has had 2 episodes since last visit but may be related to illliciit drug use). Associated symptoms include headaches (sharp, \" electrical shock\" sensation on right side of forehead radiating to right eye. ). Pertinent negatives include no arthralgias, chest pain, fatigue, fever, myalgias, nausea, sore throat, vomiting or weakness. Exacerbated by: illicit drug use, JAQUELIN. Treatments tried: trileptal 150 mg BID.        Current Outpatient Prescriptions   Medication Sig Dispense Refill   • albuterol (VENTOLIN HFA) 108 (90 BASE) MCG/ACT inhaler Inhale 2 puffs 4 (Four) Times a Day. 18 g 3   • ANORO ELLIPTA 62.5-25 MCG/INH aerosol powder  Inhale 62.5 inhalers Daily.  0   • aspirin 81 MG chewable tablet Chew 81 mg.     • atorvastatin (LIPITOR) 40 MG tablet Take 1 tablet by mouth Daily. 30 tablet 8   • Blood Pressure Monitoring (B-D ASSURE BPM/AUTO WRIST CUFF) misc 1 kit 2 (Two) Times a Day As Needed (fluctuating blood pressure). 1 each 0   • calcium gluconate 500 MG tablet Take 1 tablet by mouth 2 (Two) Times a Day. 60 tablet 11   • cyclobenzaprine (FLEXERIL) 10 MG tablet Take 1 tablet by mouth 3 (Three) Times a Day As Needed for Muscle Spasms. 30 tablet 3   • diclofenac (VOLTAREN) 50 MG EC tablet take 1 tablet by mouth twice a day 60 tablet 5   • folic acid (FOLVITE) 1 MG tablet take 1 tablet by mouth once daily 30 tablet 5   • gabapentin (NEURONTIN) 800 MG tablet Take 1 tablet by mouth 2 (Two) Times a Day. 60 tablet 5   • " hydrocortisone (PROCTOCORT) 1 % cream rectal cream APPLY WITH TIP OF FINGER TO AFFECTED EAR THREE TIMES A DAY  0   • meclizine (ANTIVERT) 25 MG tablet take 1 tablet by mouth three times a day if needed for dizziness 30 tablet 5   • neomycin-polymyxin-hydrocortisone (CORTISPORIN) 3.5-68835-9 otic solution Administer 3 drops into both ears 4 (Four) Times a Day. 10 mL 0   • NIFEdipine XL (PROCARDIA XL) 30 MG 24 hr tablet Take 30 mg by mouth Daily.     • ondansetron (ZOFRAN) 4 MG tablet Take 1 tablet by mouth Every 8 (Eight) Hours As Needed for Nausea or Vomiting. 10 tablet 0   • RA FOLIC ACID 400 MCG tablet take 1 tablet by mouth once daily  0   • RA VITAMIN B-1 100 MG tablet take 1 tablet by mouth once daily  0   • rOPINIRole (REQUIP) 2 MG tablet Take 1 tablet by mouth Every Night. 30 tablet 3   • traMADol (ULTRAM) 50 MG tablet Take 1 tablet by mouth Every 8 (Eight) Hours As Needed for Moderate Pain . 90 tablet 0   • vitamin D (ERGOCALCIFEROL) 86634 UNITS capsule capsule 1 CAP EVERY 2 WEEKS 4 capsule 5   • levETIRAcetam (KEPPRA) 500 MG tablet Take 1 tablet by mouth 2 (Two) Times a Day. 60 tablet 4     No current facility-administered medications for this visit.        Past Medical History:   Diagnosis Date   • Acute bronchitis 07/19/2016   • Acute otitis externa 09/04/2014   • Acute otitis media 02/10/2015   • Acute sinusitis 07/19/2016   • Ankle injury    • Backache 12/08/2014   • Benign hypertension 05/13/2016   • Cerebrovascular accident 2015   • Dizziness 02/10/2015   • Encounter for gynecological examination without abnormal finding 03/10/2016   • Fatigue 08/28/2015   • Foot pain 06/09/2014   • Hyperkeratosis 04/28/2014   • Hypotensive episode 06/12/2015   • Keratoma 08/04/2014    intractable plantar   • Low blood pressure 08/28/2015   • Menopausal and perimenopausal disorder 03/10/2016    other specified   • Numbness of face 05/13/2016    improved   • Otitis media of right ear 07/30/2016    unspecified   •  "Perforated tympanic membrane 09/04/2014   • Puncture wound 06/17/2014    injury   • Right-sided face pain 05/13/2016   • Seizure    • Thumb pain 08/28/2015   • Upper respiratory infection 08/28/2015   • Wheezing 10/21/2014       Past Surgical History:   Procedure Laterality Date   • BREAST BIOPSY     • OTHER SURGICAL HISTORY  07/07/2014    Destruction of Benign Lesion (1-14)   • TUBAL ABDOMINAL LIGATION         family history includes Alzheimer's disease in her father and other; Bipolar disorder in her other; Depression in her other; Diabetes in her other; Heart attack in her father; Heart disease in her other; Hypertension in her other; Lung cancer in her other; Migraines in her other; No Known Problems in her daughter and mother; Sickle cell anemia in her other; Stroke in her other; Throat cancer in her father.    Social History   Substance Use Topics   • Smoking status: Current Every Day Smoker     Packs/day: 0.25     Types: Cigarettes   • Smokeless tobacco: Never Used   • Alcohol use Yes      Comment: rarely       Review of Systems   Constitutional: Negative for fatigue and fever.   HENT: Negative for rhinorrhea, sinus pressure and sore throat.    Eyes: Negative.  Negative for visual disturbance.   Respiratory: Negative.  Negative for choking, chest tightness and shortness of breath.    Cardiovascular: Negative.  Negative for chest pain and leg swelling.   Gastrointestinal: Negative.  Negative for constipation, diarrhea, nausea and vomiting.   Endocrine: Negative.    Genitourinary: Negative.  Negative for dysuria and frequency.   Musculoskeletal: Positive for gait problem (unsteady gait). Negative for arthralgias and myalgias.   Skin: Negative.    Allergic/Immunologic: Negative.    Neurological: Positive for seizures, speech difficulty (mild dysarthria) and headaches (sharp, \" electrical shock\" sensation on right side of forehead radiating to right eye. ). Negative for dizziness, weakness and light-headedness. " "  Hematological: Negative.  Negative for adenopathy.   Psychiatric/Behavioral: Negative for agitation, confusion and hallucinations.   All other systems reviewed and are negative.      Objective     /80  Pulse 88  Ht 63\" (160 cm)  Wt 131 lb (59.4 kg)  BMI 23.21 kg/m2, Body mass index is 23.21 kg/(m^2).    Physical Exam   Constitutional: She is oriented to person, place, and time. Vital signs are normal. She appears well-developed and well-nourished.   HENT:   Head: Normocephalic and atraumatic.   Right Ear: Hearing and external ear normal.   Left Ear: Hearing and external ear normal.   Nose: Nose normal.   Mouth/Throat: Oropharynx is clear and moist.   Eyes: Conjunctivae, EOM and lids are normal. Pupils are equal, round, and reactive to light.   Neck: Normal range of motion. Neck supple. Carotid bruit is not present.   Cardiovascular: Normal rate, regular rhythm, S1 normal, S2 normal and normal heart sounds.    Pulmonary/Chest: Effort normal and breath sounds normal.   Abdominal: Soft. Bowel sounds are normal.   Musculoskeletal: Normal range of motion.   Hard boot to right foot   Neurological: She is alert and oriented to person, place, and time. She has normal strength and normal reflexes. She displays no tremor. A cranial nerve deficit (right lower facial weakness) and sensory deficit (decrease sensation of right side) is present. She displays a negative Romberg sign. She displays no seizure activity. Coordination (ataxia bilateral finger to nose) abnormal. Abnormal gait: unsteady gait, negative romberg.   Reflex Scores:       Tricep reflexes are 2+ on the right side and 2+ on the left side.       Bicep reflexes are 2+ on the right side and 2+ on the left side.       Brachioradialis reflexes are 2+ on the right side and 2+ on the left side.       Patellar reflexes are 2+ on the right side and 2+ on the left side.       Achilles reflexes are 2+ on the right side and 2+ on the left side.  Skin: Skin is " warm and dry.   Psychiatric: She has a normal mood and affect. Her speech is normal and behavior is normal. Cognition and memory are normal.   Nursing note and vitals reviewed.      Results for orders placed or performed in visit on 17   Comprehensive Metabolic Panel   Result Value Ref Range    Glucose 86 60 - 100 mg/dL    BUN 22 (H) 7 - 21 mg/dL    Creatinine 1.22 (H) 0.50 - 1.00 mg/dL    Sodium 140 137 - 145 mmol/L    Potassium 5.4 (H) 3.5 - 5.1 mmol/L    Chloride 102 95 - 110 mmol/L    CO2 29.0 22.0 - 31.0 mmol/L    Calcium 9.9 8.4 - 10.2 mg/dL    Total Protein 7.0 6.3 - 8.6 g/dL    Albumin 4.10 3.40 - 4.80 g/dL    ALT (SGPT) 34 9 - 52 U/L    AST (SGOT) 32 14 - 36 U/L    Alkaline Phosphatase 80 38 - 126 U/L    Total Bilirubin 0.6 0.2 - 1.3 mg/dL    eGFR  African Amer 55 51 - 120 mL/min/1.73    Globulin 2.9 2.3 - 3.5 gm/dL    A/G Ratio 1.4 1.1 - 1.8 g/dL    BUN/Creatinine Ratio 18.0 7.0 - 25.0    Anion Gap 9.0 5.0 - 15.0 mmol/L        ASSESSMENT/PLAN    Diagnoses and all orders for this visit:    Essential hypertension    Cerebrovascular accident (CVA) due to thrombosis of left carotid artery    Abnormal EEG    Generalized seizure disorder    Tobacco abuse    Illicit drug use    Other orders  -     NIFEdipine XL (PROCARDIA XL) 30 MG 24 hr tablet; Take 30 mg by mouth Daily.  -     levETIRAcetam (KEPPRA) 500 MG tablet; Take 1 tablet by mouth 2 (Two) Times a Day.      MEDICAL DECISION MAKIN. Continue with ASA 81 mg for secondary stroke prevention and counseled on compliance.  2. Continue with statin per PCP for LDL goal less than 70.  3. Continue with BP management per PCP for systolic less than 140.  4. Increase Keppra 500 mg BID and counseled on compliance.  5. Counseled on cessation of cocaine, marijuana, and tobacco. I did discuss rehabilitation and patient wishes to think about it.  6. Patient is counseled on stroke signs and symptoms using FAST and Time Saved is Brain Saved.  7. Seizure precautions  were discussed to include no tub baths, no swimming, avoiding lack of sleep, and avoiding known triggers. Education given of things that may contribute to a seizure to include, but not limited to: stressful situations, fever, fatigue, lack of sleep, low blood sugar, hyperventilation, flashing lights, and caffeine. Instructions given to take seizure medications as prescribed. Education given to family member on what to do during a seizure and care following the seizure. Education given to contact this office prior to stopping or changing any medications.  8.I advised the patient of the risks in continuing to use tobacco, and I provided this patient with smoking cessation educational materials.  I also discussed how to quit smoking and the patient has expressed the willingness to quit.      During this visit, I spent 3-10 mintues counseling the patient regarding smoking cessation.       9. Healthy BMI.     allergies and all known medications/prescriptions have been reviewed using resources available on this encounter.    Return in about 4 months (around 2/3/2018).        WESTON Lara

## 2017-10-03 NOTE — PATIENT INSTRUCTIONS
Epilepsy  Epilepsy is a disorder in which a person has repeated seizures over time. A seizure is a release of abnormal electrical activity in the brain. Seizures can cause a change in attention, behavior, or the ability to remain awake and alert (altered mental status). Seizures often involve uncontrollable shaking (convulsions).   Most people with epilepsy lead normal lives. However, people with epilepsy are at an increased risk of falls, accidents, and injuries. Therefore, it is important to begin treatment right away.  CAUSES   Epilepsy has many possible causes. Anything that disturbs the normal pattern of brain cell activity can lead to seizures. This may include:   · Head injury.  · Birth trauma.  · High fever as a child.  · Stroke.  · Bleeding into or around the brain.  · Certain drugs.  · Prolonged low oxygen, such as what occurs after CPR efforts.  · Abnormal brain development.  · Certain illnesses, such as meningitis, encephalitis (brain infection), malaria, and other infections.  · An imbalance of nerve signaling chemicals (neurotransmitters).    SIGNS AND SYMPTOMS   The symptoms of a seizure can vary greatly from one person to another. Right before a seizure, you may have a warning (aura) that a seizure is about to occur. An aura may include the following symptoms:  · Fear or anxiety.  · Nausea.  · Feeling like the room is spinning (vertigo).  · Vision changes, such as seeing flashing lights or spots.  Common symptoms during a seizure include:  · Abnormal sensations, such as an abnormal smell or a bitter taste in the mouth.    · Sudden, general body stiffness.    · Convulsions that involve rhythmic jerking of the face, arm, or leg on one or both sides.    · Sudden change in consciousness.      Appearing to be awake but not responding.      Appearing to be asleep but cannot be awakened.    · Grimacing, chewing, lip smacking, drooling, tongue biting, or loss of bowel or bladder control.  After a seizure,  you may feel sleepy for a while.   DIAGNOSIS   Your health care provider will ask about your symptoms and take a medical history. Descriptions from any witnesses to your seizures will be very helpful in the diagnosis. A physical exam, including a detailed neurological exam, is necessary. Various tests may be done, such as:   · An electroencephalogram (EEG). This is a painless test of your brain waves. In this test, a diagram is created of your brain waves. These diagrams can be interpreted by a specialist.  · An MRI of the brain.    · A CT scan of the brain.    · A spinal tap (lumbar puncture, LP).  · Blood tests to check for signs of infection or abnormal blood chemistry.  TREATMENT   There is no cure for epilepsy, but it is generally treatable. Once epilepsy is diagnosed, it is important to begin treatment as soon as possible. For most people with epilepsy, seizures can be controlled with medicines. The following may also be used:  · A pacemaker for the brain (vagus nerve stimulator) can be used for people with seizures that are not well controlled by medicine.  · Surgery on the brain.  For some people, epilepsy eventually goes away.  HOME CARE INSTRUCTIONS   ·  Follow your health care provider's recommendations on driving and safety in normal activities.  · Get enough rest. Lack of sleep can cause seizures.  · Only take over-the-counter or prescription medicines as directed by your health care provider. Take any prescribed medicine exactly as directed.  · Avoid any known triggers of your seizures.  · Keep a seizure diary. Record what you recall about any seizure, especially any possible trigger.    · Make sure the people you live and work with know that you are prone to seizures. They should receive instructions on how to help you. In general, a witness to a seizure should:      Cushion your head and body.      Turn you on your side.      Avoid unnecessarily restraining you.      Not place anything inside your  "mouth.      Call for emergency medical help if there is any question about what has occurred.    · Follow up with your health care provider as directed. You may need regular blood tests to monitor the levels of your medicine.    SEEK MEDICAL CARE IF:   · You develop signs of infection or other illness. This might increase the risk of a seizure.    · You seem to be having more frequent seizures.    · Your seizure pattern is changing.    SEEK IMMEDIATE MEDICAL CARE IF:   · You have a seizure that does not stop after a few moments.    · You have a seizure that causes any difficulty in breathing.    · You have a seizure that results in a very severe headache.    · You have a seizure that leaves you with the inability to speak or use a part of your body.       This information is not intended to replace advice given to you by your health care provider. Make sure you discuss any questions you have with your health care provider.     Document Released: 12/18/2006 Document Revised: 04/10/2017 Document Reviewed: 07/30/2014  [employer] Interactive Patient Education ©2017 [employer]  You Can Quit Smoking  If you are ready to quit smoking or are thinking about it, congratulations! You have chosen to help yourself be healthier and live longer! There are lots of different ways to quit smoking. Nicotine gum, nicotine patches, a nicotine inhaler, or nicotine nasal spray can help with physical craving. Hypnosis, support groups, and medicines help break the habit of smoking.  TIPS TO GET OFF AND STAY OFF CIGARETTES  · Learn to predict your moods. Do not let a bad situation be your excuse to have a cigarette. Some situations in your life might tempt you to have a cigarette.  · Ask friends and co-workers not to smoke around you.  · Make your home smoke-free.  · Never have \"just one\" cigarette. It leads to wanting another and another. Remind yourself of your decision to quit.  · On a card, make a list of your reasons for not smoking. " "Read it at least the same number of times a day as you have a cigarette. Tell yourself everyday, \"I do not want to smoke. I choose not to smoke.\"  · Ask someone at home or work to help you with your plan to quit smoking.  · Have something planned after you eat or have a cup of coffee. Take a walk or get other exercise to perk you up. This will help to keep you from overeating.  · Try a relaxation exercise to calm you down and decrease your stress. Remember, you may be tense and nervous the first two weeks after you quit. This will pass.  · Find new activities to keep your hands busy. Play with a pen, coin, or rubber band. Doodle or draw things on paper.  · Brush your teeth right after eating. This will help cut down the craving for the taste of tobacco after meals. You can try mouthwash too.  · Try gum, breath mints, or diet candy to keep something in your mouth.  IF YOU SMOKE AND WANT TO QUIT:  · Do not stock up on cigarettes. Never buy a carton. Wait until one pack is finished before you buy another.  · Never carry cigarettes with you at work or at home.  · Keep cigarettes as far away from you as possible. Leave them with someone else.  · Never carry matches or a lighter with you.  · Ask yourself, \"Do I need this cigarette or is this just a reflex?\"  · Bet with someone that you can quit. Put cigarette money in a [employer] bank every morning. If you smoke, you give up the money. If you do not smoke, by the end of the week, you keep the money.  · Keep trying. It takes 21 days to change a habit!  · Talk to your doctor about using medicines to help you quit. These include nicotine replacement gum, lozenges, or skin patches.     This information is not intended to replace advice given to you by your health care provider. Make sure you discuss any questions you have with your health care provider.     Document Released: 10/14/2010 Document Revised: 03/11/2013 Document Reviewed: 05/03/2016  [employer] Interactive Patient " Education ©2017 [employer].  Stroke Prevention  Some medical conditions and behaviors are associated with an increased chance of having a stroke. You may prevent a stroke by making healthy choices and managing medical conditions.  HOW CAN I REDUCE MY RISK OF HAVING A STROKE?   · Stay physically active. Get at least 30 minutes of activity on most or all days.  · Do not smoke. It may also be helpful to avoid exposure to secondhand smoke.  · Limit alcohol use. Moderate alcohol use is considered to be:  ¨ No more than 2 drinks per day for men.  ¨ No more than 1 drink per day for nonpregnant women.  · Eat healthy foods. This involves:  ¨ Eating 5 or more servings of fruits and vegetables a day.  ¨ Making dietary changes that address high blood pressure (hypertension), high cholesterol, diabetes, or obesity.  · Manage your cholesterol levels.  ¨ Making food choices that are high in fiber and low in saturated fat, trans fat, and cholesterol may control cholesterol levels.  ¨ Take any prescribed medicines to control cholesterol as directed by your health care provider.  · Manage your diabetes.  ¨ Controlling your carbohydrate and sugar intake is recommended to manage diabetes.  ¨ Take any prescribed medicines to control diabetes as directed by your health care provider.  · Control your hypertension.  ¨ Making food choices that are low in salt (sodium), saturated fat, trans fat, and cholesterol is recommended to manage hypertension.  ¨ Ask your health care provider if you need treatment to lower your blood pressure. Take any prescribed medicines to control hypertension as directed by your health care provider.  ¨ If you are 18-39 years of age, have your blood pressure checked every 3-5 years. If you are 40 years of age or older, have your blood pressure checked every year.  · Maintain a healthy weight.  ¨ Reducing calorie intake and making food choices that are low in sodium, saturated fat, trans fat, and cholesterol are  recommended to manage weight.  · Stop drug abuse.  · Avoid taking birth control pills.  ¨ Talk to your health care provider about the risks of taking birth control pills if you are over 35 years old, smoke, get migraines, or have ever had a blood clot.  · Get evaluated for sleep disorders (sleep apnea).  ¨ Talk to your health care provider about getting a sleep evaluation if you snore a lot or have excessive sleepiness.  · Take medicines only as directed by your health care provider.  ¨ For some people, aspirin or blood thinners (anticoagulants) are helpful in reducing the risk of forming abnormal blood clots that can lead to stroke. If you have the irregular heart rhythm of atrial fibrillation, you should be on a blood thinner unless there is a good reason you cannot take them.  ¨ Understand all your medicine instructions.  · Make sure that other conditions (such as anemia or atherosclerosis) are addressed.  SEEK IMMEDIATE MEDICAL CARE IF:   · You have sudden weakness or numbness of the face, arm, or leg, especially on one side of the body.  · Your face or eyelid droops to one side.  · You have sudden confusion.  · You have trouble speaking (aphasia) or understanding.  · You have sudden trouble seeing in one or both eyes.  · You have sudden trouble walking.  · You have dizziness.  · You have a loss of balance or coordination.  · You have a sudden, severe headache with no known cause.  · You have new chest pain or an irregular heartbeat.  Any of these symptoms may represent a serious problem that is an emergency. Do not wait to see if the symptoms will go away. Get medical help at once. Call your local emergency services (911 in [place] Do not drive yourself to the hospital.     This information is not intended to replace advice given to you by your health care provider. Make sure you discuss any questions you have with your health care provider.     Document Released: 01/25/2006 Document Revised: 01/08/2016 Document  Reviewed: 06/20/2014  [employer] Interactive Patient Education ©2017 [employer]  BMI for Adults  Body mass index (BMI) is a number that is calculated from a person's weight and height. In most adults, the number is used to find how much of an adult's weight is made up of fat. BMI is not as accurate as a direct measure of body fat.  HOW IS BMI CALCULATED?  BMI is calculated by dividing weight in kilograms by height in meters squared. It can also be calculated by dividing weight in pounds by height in inches squared, then multiplying the resulting number by 703. Charts are available to help you find your BMI quickly and easily without doing this calculation.   HOW IS BMI INTERPRETED?  Health care professionals use BMI charts to identify whether an adult is underweight, at a normal weight, or overweight based on the following guidelines:  · Underweight: BMI less than 18.5.  · Normal weight: BMI between 18.5 and 24.9.  · Overweight: BMI between 25 and 29.9.  · Obese: BMI of 30 and above.  BMI is usually interpreted the same for males and females.  Weight includes both fat and muscle, so someone with a muscular build, such as an athlete, may have a BMI that is higher than 24.9. In cases like these, BMI may not accurately depict body fat. To determine if excess body fat is the cause of a BMI of 25 or higher, further assessments may need to be done by a health care provider.  WHY IS BMI A USEFUL TOOL?  BMI is used to identify a possible weight problem that may be related to a medical problem or may increase the risk for medical problems. BMI can also be used to promote changes to reach a healthy weight.     This information is not intended to replace advice given to you by your health care provider. Make sure you discuss any questions you have with your health care provider.     Document Released: 08/29/2005 Document Revised: 01/08/2016 Document Reviewed: 05/15/2015  [employer] Patient Education ©2017 [employer]

## 2017-10-04 ENCOUNTER — HOSPITAL ENCOUNTER (OUTPATIENT)
Dept: PHYSICAL THERAPY | Facility: HOSPITAL | Age: 57
Setting detail: THERAPIES SERIES
Discharge: HOME | End: 2017-10-04

## 2017-10-04 ENCOUNTER — OFFICE VISIT (OUTPATIENT)
Dept: FAMILY MEDICINE CLINIC | Facility: CLINIC | Age: 57
End: 2017-10-04

## 2017-10-04 VITALS
HEART RATE: 99 BPM | SYSTOLIC BLOOD PRESSURE: 135 MMHG | HEIGHT: 63 IN | BODY MASS INDEX: 23.04 KG/M2 | OXYGEN SATURATION: 97 % | WEIGHT: 130 LBS | TEMPERATURE: 97.4 F | DIASTOLIC BLOOD PRESSURE: 99 MMHG

## 2017-10-04 DIAGNOSIS — R52 BODY ACHES: ICD-10-CM

## 2017-10-04 DIAGNOSIS — J06.9 UPPER RESPIRATORY TRACT INFECTION, UNSPECIFIED TYPE: Primary | ICD-10-CM

## 2017-10-04 DIAGNOSIS — G89.29 CHRONIC PAIN OF RIGHT ANKLE: Primary | ICD-10-CM

## 2017-10-04 DIAGNOSIS — M25.571 CHRONIC PAIN OF RIGHT ANKLE: Primary | ICD-10-CM

## 2017-10-04 PROCEDURE — 97110 THERAPEUTIC EXERCISES: CPT

## 2017-10-04 PROCEDURE — 97110 THERAPEUTIC EXERCISES: CPT | Performed by: PHYSICAL THERAPIST

## 2017-10-04 PROCEDURE — 99213 OFFICE O/P EST LOW 20 MIN: CPT | Performed by: NURSE PRACTITIONER

## 2017-10-04 RX ORDER — ACETAMINOPHEN 500 MG
500 TABLET ORAL EVERY 6 HOURS PRN
Qty: 30 TABLET | Refills: 0 | Status: SHIPPED | OUTPATIENT
Start: 2017-10-04 | End: 2019-01-11

## 2017-10-04 RX ORDER — CHLORPHENIRAMINE MALEATE 4 MG/1
4 TABLET ORAL EVERY 6 HOURS PRN
Qty: 20 TABLET | Refills: 0 | Status: SHIPPED | OUTPATIENT
Start: 2017-10-04 | End: 2018-02-16

## 2017-10-04 NOTE — THERAPY PROGRESS REPORT/RE-CERT
Outpatient Physical Therapy Ortho Re-Assessment  [facility]     Patient Name: Jyoti Riddle  : 1960  MRN: [id number]  Today's Date: 10/4/2017      Visit Date: 10/04/2017     Pt attended 4/5 scheduled visits. .  Re-cert date 10/25/17  Pt will RTD 10/24/17  Pt has 5 approved visits.     PT IS BEING PUT ON HOLD UNTIL AFTER DR VISIT 10/24/17      Patient Active Problem List   Diagnosis   • Vitamin D deficiency   • Vertigo   • Neuropathy   • Restless leg syndrome   • Pain in joint   • Allergic rhinitis   • Essential hypertension   • Abnormal EEG   • Cerebrovascular accident (CVA) due to thrombosis of left carotid artery   • Illicit drug use   • Tobacco abuse   • Sleep apnea   • Neck pain   • Vitamin deficiency   • Acute otitis externa of right ear   • Generalized seizure disorder   • Rotator cuff strain   • Acute pain of right shoulder   • Closed fracture of distal end of right fibula   • Right ankle pain   • Allergic reaction   • Left ankle pain   • Closed fracture of talus   • Hypotension        Past Medical History:   Diagnosis Date   • Acute bronchitis 2016   • Acute otitis externa 2014   • Acute otitis media 02/10/2015   • Acute sinusitis 2016   • Ankle injury    • Backache 2014   • Benign hypertension 2016   • Cerebrovascular accident    • Dizziness 02/10/2015   • Encounter for gynecological examination without abnormal finding 03/10/2016   • Fatigue 2015   • Foot pain 2014   • Hyperkeratosis 2014   • Hypotensive episode 2015   • Keratoma 2014    intractable plantar   • Low blood pressure 2015   • Menopausal and perimenopausal disorder 03/10/2016    other specified   • Numbness of face 2016    improved   • Otitis media of right ear 2016    unspecified   • Perforated tympanic membrane 2014   • Puncture wound 2014    injury   • Right-sided face pain 2016   • Seizure    • Thumb pain  08/28/2015   • Upper respiratory infection 08/28/2015   • Wheezing 10/21/2014        Past Surgical History:   Procedure Laterality Date   • BREAST BIOPSY     • OTHER SURGICAL HISTORY  07/07/2014    Destruction of Benign Lesion (1-14)   • TUBAL ABDOMINAL LIGATION         Visit Dx:     ICD-10-CM ICD-9-CM   1. Chronic pain of right ankle M25.571 719.47    G89.29 338.29                 PT Ortho       10/04/17 1300    Subjective Comments    Subjective Comments Pt states is having soreness in lateral L ankle, knee. States is wearing her walking boots in the communty. States will see Dr Vincent on 10/24. States is doing HEP.   -LF    Subjective Pain    Able to rate subjective pain? yes  -LF    Pre-Treatment Pain Level 8  -LF    Posture/Observations    Posture/Observations Comments Pt presents in B walking boots, walker.  Trace edema R ankle.  -LF    Special Tests/Palpation    Special Tests/Palpation --   Pt c/o tenderness to lateral R ankle.   -LF    ROM (Range of Motion)    General ROM Detail R ankle df 1, pf 50  -LF    MMT (Manual Muscle Testing)    General MMT Assessment Detail Pt has minimal effort with structured MMT , but can move ankle in all planes, and perform seated wobble board motions.  -LF    Transfers    Transfer, Comment functional, uses walker   -LF    Gait Assessment/Treatment    Gait, Comment Pt presents in B walking boots, not donned correctly, a tad misaligned, walking with walker, leaning forward.   -LF      User Key  (r) = Recorded By, (t) = Taken By, (c) = Cosigned By    Initials Name Provider Type    LF Taya Orozco, PT Physical Therapist                            Therapy Education       10/04/17 1300          Therapy Education    Given HEP;Mobility training;Pain management  -LF      Program Reinforced  -LF      How Provided Verbal;Demonstration  -LF      Provided to Patient  -LF      Level of Understanding Teach back education performed;Verbalized;Demonstrated  -LF        User Key  (r) = Recorded  "By, (t) = Taken By, (c) = Cosigned By    Initials Name Provider Type    LF Taya Orozco, PT Physical Therapist                PT OP Goals       10/04/17 1300       PT Short Term Goals    STG Date to Achieve 10/18/17  -LF     ST[facility] 1 [facility] and [facility]  -[facility]     STG 1 Progress Ongoing  -LF     STG 2 AROM R ankle DF >= 0°.  -LF     STG 2 Progress Met  -LF     STG 2 Progress Comments df 0, 10/4/17  -LF     STG 3 AROM R ankle PF >= 40°.  -LF     STG 3 Progress Met  -LF     STG 4 AROM R ankle inv/erv >= 20° each.  -LF     STG 4 Progress Goal Revised  -LF     STG 5 R ankle 3-/5 or greater.  -LF     STG 5 Progress Ongoing  -LF     Long Term Goals    LTG Date to Achieve 11/01/17  -LF     LTG 1 AROM R ankle DF >= 5°.  -LF     LTG 1 Progress Ongoing  -LF     LTG 2 R ankle strength 4+/5 or greater in all directions.  -LF     LTG 2 Progress Ongoing  -LF     LTG 3 R SLS >= 15\" EO on level ground.  -LF     LTG 3 Progress Ongoing  -LF     LTG 4 I with final HEP.  -LF     LTG 4 Progress Ongoing  -LF     LTG 5 D/C with final HEP and free 30 day fitness formula membership.  -LF     LTG 5 Progress Ongoing  -LF     Time Calculation    PT Goal Re-Cert Due Date 10/25/17  -LF       User Key  (r) = Recorded By, (t) = Taken By, (c) = Cosigned By    Initials Name Provider Type    SHAY Taya Orozco, PT Physical Therapist                PT Assessment/Plan       10/04/17 1300       PT Assessment    Functional Limitations Impaired gait;Limitation in home management;Limitations in community activities  -LF     Impairments Balance;Cognition;Gait;Pain;Muscle strength;Range of motion  -LF     Assessment Comments Pt presents with healing fracture R lateral ankle, wearing walking boots. From previous xrays, seems the fracture is still healing.Pt has 8 visits remaining for the year. Pt will continue her HEP, follow up with the physician 10/24/17, then return to therapy for further strengthening and stabilization " training. Will put pt on HOLD until then.   -LF     Rehab Potential --   fair at this time, better after healing is more progressed   -LF     Patient/caregiver participated in establishment of treatment plan and goals Yes  -LF     Patient would benefit from skilled therapy intervention --   not at this time  -LF     PT Plan    PT Plan Comments Will put pt on HOLD until after next physician visit, 10/24/17, then will see pt back in therapy and progress according to physicians orders. Pt will be more appropriate for strengthening, stabilization, and proprioception training at that time.   -LF       User Key  (r) = Recorded By, (t) = Taken By, (c) = Cosigned By    Initials Name Provider Type    LF Taya Orozco, PT Physical Therapist                  Exercises       10/04/17 1325 10/04/17 1300       Subjective Comments    Subjective Comments pt stated that her face is hurting.  -TL Pt states is having soreness in lateral L ankle, knee. States is wearing her walking boots in the communty. States will see Dr Vincent on 10/24. States is doing HEP.   -LF     Subjective Pain    Able to rate subjective pain?  yes  -LF     Pre-Treatment Pain Level  8  -LF     Exercise 1    Exercise Name 1  Pro II, UE/LE  -LF     Time (Minutes) 1  10  -LF     Exercise 2    Exercise Name 2 seated BAPS board df/pf  -TL      Time (Minutes) 2 1 mins  -TL      Additional Comments both legs same time  -TL      Exercise 3    Exercise Name 3 seated BAPSboard in/ev  -TL      Time (Minutes) 3 1 mins  -TL      Additional Comments both at same time per instructions of evaluating therapist  -TL      Exercise 4    Exercise Name 4 seated BAPS board circles cc/ccw  -TL      Time (Minutes) 4 1 min each directions  -TL      Exercise 5    Exercise Name 5 seated heelslides with feet flat   forward/backward   -TL      Reps 5 20  -TL      Additional Comments with paper under foot to help slide  -TL      Exercise 6    Exercise Name 6 seated foot slides with foot flat  side to side both legs  -TL      Reps 6 20  -TL      Exercise 7    Exercise Name 7 seated foot slides with alphabet  -TL      Reps 7 20  -TL      Additional Comments each leg  -TL        User Key  (r) = Recorded By, (t) = Taken By, (c) = Cosigned By    Initials Name Provider Type    LF Taya Orozco, PT Physical Therapist    TL Rocio Kaye, PTA Physical Therapy Assistant                              Outcome Measures       10/04/17 1300          Lower Extremity Functional Index    Any of your usual work, housework or school activities 0  -LF      Your usual hobbies, recreational or sporting activities 0  -LF      Getting into or out of the bath 0  -LF      Walking between rooms 0  -LF      Putting on your shoes or socks 0  -LF      Squatting 0  -LF      Lifting an object, like a bag of groceries from the floor 0  -LF      Performing light activities around your home 0  -LF      Performing heavy activities around your home 0  -LF      Getting into or out of a car 0  -LF      Walking 2 blocks 0  -LF      Walking a mile 0  -LF      Going up or down 10 stairs (about 1 flight of stairs) 0  -LF      Standing for 1 hour 0  -LF      Sitting for 1 hour 0  -LF      Running on even ground 0  -LF      Running on uneven ground 0  -LF      Making sharp turns while running fast 0  -LF      Hopping 0  -LF      Rolling over in bed 0  -LF      Total 0  -LF      Functional Assessment    Outcome Measure Options Lower Extremity Functional Scale (LEFS)  -LF        User Key  (r) = Recorded By, (t) = Taken By, (c) = Cosigned By    Initials Name Provider Type    LF Taya Orozco, PT Physical Therapist            Time Calculation:   Start Time: 1325  Stop Time: 1354  Time Calculation (min): 29 min  Total Timed Code Minutes- PT: 29 minute(s)     Therapy Charges for Today     Code Description Service Date Service Provider Modifiers Qty    30280443146 HC PT THER PROC EA 15 MIN 10/4/2017 Taya Orozco, PT GP 1          PT G-Codes  Outcome  Measure Options: Lower Extremity Functional Scale (LEFS)         Taya Orozco, PT  10/4/2017

## 2017-10-04 NOTE — PATIENT INSTRUCTIONS
"Upper Respiratory Infection, Adult  Most upper respiratory infections (URIs) are a viral infection of the air passages leading to the lungs. A URI affects the nose, throat, and upper air passages. The most common type of URI is nasopharyngitis and is typically referred to as \"the common cold.\"  URIs run their course and usually go away on their own. Most of the time, a URI does not require medical attention, but sometimes a bacterial infection in the upper airways can follow a viral infection. This is called a secondary infection. Sinus and middle ear infections are common types of secondary upper respiratory infections.  Bacterial pneumonia can also complicate a URI. A URI can worsen asthma and chronic obstructive pulmonary disease (COPD). Sometimes, these complications can require emergency medical care and may be life threatening.   CAUSES  Almost all URIs are caused by viruses. A virus is a type of germ and can spread from one person to another.   RISKS FACTORS  You may be at risk for a URI if:   · You smoke.    · You have chronic heart or lung disease.  · You have a weakened defense (immune) system.    · You are very young or very old.    · You have nasal allergies or asthma.  · You work in crowded or poorly ventilated areas.  · You work in health care facilities or schools.  SIGNS AND SYMPTOMS   Symptoms typically develop 2-3 days after you come in contact with a cold virus. Most viral URIs last 7-10 days. However, viral URIs from the influenza virus (flu virus) can last 14-18 days and are typically more severe. Symptoms may include:   · Runny or stuffy (congested) nose.    · Sneezing.    · Cough.    · Sore throat.    · Headache.    · Fatigue.    · Fever.    · Loss of appetite.    · Pain in your forehead, behind your eyes, and over your cheekbones (sinus pain).  · Muscle aches.    DIAGNOSIS   Your health care provider may diagnose a URI by:  · Physical exam.  · Tests to check that your symptoms are not due to " another condition such as:  ¨ Strep throat.  ¨ Sinusitis.  ¨ Pneumonia.  ¨ Asthma.  TREATMENT   A URI goes away on its own with time. It cannot be cured with medicines, but medicines may be prescribed or recommended to relieve symptoms. Medicines may help:  · Reduce your fever.  · Reduce your cough.  · Relieve nasal congestion.  HOME CARE INSTRUCTIONS   · Take medicines only as directed by your health care provider.    · Gargle warm saltwater or take cough drops to comfort your throat as directed by your health care provider.  · Use a warm mist humidifier or inhale steam from a shower to increase air moisture. This may make it easier to breathe.  · Drink enough fluid to keep your urine clear or pale yellow.    · Eat soups and other clear broths and maintain good nutrition.    · Rest as needed.    · Return to work when your temperature has returned to normal or as your health care provider advises. You may need to stay home longer to avoid infecting others. You can also use a face mask and careful hand washing to prevent spread of the virus.  · Increase the usage of your inhaler if you have asthma.    · Do not use any tobacco products, including cigarettes, chewing tobacco, or electronic cigarettes. If you need help quitting, ask your health care provider.  PREVENTION   The best way to protect yourself from getting a cold is to practice good hygiene.   · Avoid oral or hand contact with people with cold symptoms.    · Wash your hands often if contact occurs.    There is no clear evidence that vitamin C, vitamin E, echinacea, or exercise reduces the chance of developing a cold. However, it is always recommended to get plenty of rest, exercise, and practice good nutrition.   SEEK MEDICAL CARE IF:   · You are getting worse rather than better.    · Your symptoms are not controlled by medicine.    · You have chills.  · You have worsening shortness of breath.  · You have brown or red mucus.  · You have yellow or brown nasal  discharge.  · You have pain in your face, especially when you bend forward.  · You have a fever.  · You have swollen neck glands.  · You have pain while swallowing.  · You have white areas in the back of your throat.  SEEK IMMEDIATE MEDICAL CARE IF:   · You have severe or persistent:    Headache.    Ear pain.    Sinus pain.    Chest pain.  · You have chronic lung disease and any of the following:    Wheezing.    Prolonged cough.    Coughing up blood.    A change in your usual mucus.  · You have a stiff neck.  · You have changes in your:    Vision.    Hearing.    Thinking.    Mood.  MAKE SURE YOU:   · Understand these instructions.  · Will watch your condition.  · Will get help right away if you are not doing well or get worse.     This information is not intended to replace advice given to you by your health care provider. Make sure you discuss any questions you have with your health care provider.     Document Released: 06/13/2002 Document Revised: 05/03/2016 Document Reviewed: 03/25/2015  [facility] Interactive Patient Education ©2017 [employer]

## 2017-10-04 NOTE — PROGRESS NOTES
"Hattie SARAVIA Person is a 57 y.o. female.     HPI Comments: C/O general myalgias and reports that Tramadol is not \"strong enough\" to help with her ankle pain.  Has appt with PCP (RAMESH Post) on 10-12-17.    URI    This is a new problem. Episode onset: x 2-3 days. The problem has been unchanged. There has been no fever. Associated symptoms include joint pain ( ankles), joint swelling ( ankles), rhinorrhea and sneezing. Pertinent negatives include no abdominal pain, chest pain, congestion, coughing, diarrhea, dysuria, ear pain, headaches, nausea, neck pain, plugged ear sensation, rash, sinus pain, sore throat, swollen glands, vomiting or wheezing. She has tried nothing for the symptoms.        The following portions of the patient's history were reviewed and updated as appropriate: allergies, current medications, past medical history, past social history, past surgical history and problem list.    Review of Systems   Constitutional: Positive for chills and diaphoresis. Negative for appetite change and fever.   HENT: Positive for rhinorrhea and sneezing. Negative for congestion, ear pain, postnasal drip, sinus pain, sinus pressure, sore throat and trouble swallowing.    Eyes: Negative for discharge and itching.   Respiratory: Negative for cough, chest tightness, shortness of breath and wheezing.    Cardiovascular: Negative for chest pain.   Gastrointestinal: Negative for abdominal pain, diarrhea, nausea and vomiting.   Genitourinary: Negative for dysuria.   Musculoskeletal: Positive for joint pain ( ankles) and myalgias. Negative for neck pain.        Ankle pain following bilateral ankle fractures--currently in PT      Skin: Negative for rash.   Neurological: Negative for headaches.   Hematological: Negative for adenopathy.       Objective    /99 (BP Location: Left arm, Patient Position: Sitting, Cuff Size: Adult)  Pulse 99  Temp 97.4 °F (36.3 °C) (Tympanic)   Ht 63\" (160 cm)  Wt 130 lb (59 kg)  SpO2 " 97%  Breastfeeding? No  BMI 23.03 kg/m2    Physical Exam   Constitutional: She is oriented to person, place, and time. No distress.   HENT:   Head: Normocephalic and atraumatic.   Right Ear: Tympanic membrane and ear canal normal.   Left Ear: Tympanic membrane and ear canal normal.   Nose: Mucosal edema ( injected, stuffed) present. Right sinus exhibits no maxillary sinus tenderness and no frontal sinus tenderness. Left sinus exhibits no maxillary sinus tenderness and no frontal sinus tenderness.   Mouth/Throat: Uvula is midline and mucous membranes are normal. Posterior oropharyngeal erythema ( mild erythema with PND) present. No oropharyngeal exudate.   Eyes: Conjunctivae are normal.   Cardiovascular: Normal rate and regular rhythm.    Pulmonary/Chest: Effort normal. She has wheezes ( with cough only).   Musculoskeletal:   Using walker to help ambulate.  Bilateral walking boots.    Lymphadenopathy:     She has no cervical adenopathy.   Neurological: She is alert and oriented to person, place, and time.   Nursing note and vitals reviewed.      Assessment/Plan   Jyoti was seen today for generalized body aches.    Diagnoses and all orders for this visit:    Upper respiratory tract infection, unspecified type  -     chlorpheniramine (EQ CHLORTABS) 4 MG tablet; Take 1 tablet by mouth Every 6 (Six) Hours As Needed (cold symptoms).    Body aches  -     acetaminophen (TYLENOL) 500 MG tablet; Take 1 tablet by mouth Every 6 (Six) Hours As Needed (body aches).    Push fluids  Rest  Tylenol as needed--Rx given  Rx for Chlortabs for URI    May speak to PCP next week regarding pain management with Tramadol

## 2017-10-12 ENCOUNTER — OFFICE VISIT (OUTPATIENT)
Dept: FAMILY MEDICINE CLINIC | Facility: CLINIC | Age: 57
End: 2017-10-12

## 2017-10-12 VITALS
OXYGEN SATURATION: 97 % | WEIGHT: 132 LBS | DIASTOLIC BLOOD PRESSURE: 78 MMHG | RESPIRATION RATE: 16 BRPM | HEIGHT: 63 IN | BODY MASS INDEX: 23.39 KG/M2 | HEART RATE: 112 BPM | SYSTOLIC BLOOD PRESSURE: 130 MMHG | TEMPERATURE: 98.3 F

## 2017-10-12 DIAGNOSIS — S46.011A ROTATOR CUFF STRAIN, RIGHT, INITIAL ENCOUNTER: ICD-10-CM

## 2017-10-12 DIAGNOSIS — I10 ESSENTIAL HYPERTENSION: Primary | ICD-10-CM

## 2017-10-12 PROCEDURE — 99213 OFFICE O/P EST LOW 20 MIN: CPT | Performed by: NURSE PRACTITIONER

## 2017-10-12 RX ORDER — CYCLOBENZAPRINE HCL 10 MG
10 TABLET ORAL 3 TIMES DAILY PRN
Qty: 90 TABLET | Refills: 3 | Status: SHIPPED | OUTPATIENT
Start: 2017-10-12 | End: 2018-02-18 | Stop reason: SDUPTHER

## 2017-10-12 NOTE — PROGRESS NOTES
Hattie Riddle is a 57 y.o. female.     HPI Comments: Here today for toño on her b/p.  We had asked her to withhold her b/p med for a short period of time.  She is taking it again and not it is improved.  She is tolerating her b/p med better.    Hypertension   This is a chronic problem. The current episode started more than 1 year ago. The problem is controlled. Pertinent negatives include no anxiety, blurred vision, chest pain, headaches, malaise/fatigue, neck pain, orthopnea, palpitations, peripheral edema, PND, shortness of breath or sweats. Agents associated with hypertension include NSAIDs. Risk factors for coronary artery disease include post-menopausal state, sedentary lifestyle and smoking/tobacco exposure. Past treatments include calcium channel blockers. The current treatment provides significant improvement. Compliance problems include psychosocial issues.  There is no history of angina, kidney disease, CAD/MI, CVA, heart failure, left ventricular hypertrophy, PVD or retinopathy.        The following portions of the patient's history were reviewed and updated as appropriate: allergies, current medications, past family history, past medical history, past social history, past surgical history and problem list.    Review of Systems   Constitutional: Negative.  Negative for malaise/fatigue.   HENT: Negative.    Eyes: Negative for blurred vision.   Respiratory: Negative.  Negative for shortness of breath.    Cardiovascular: Negative.  Negative for chest pain, palpitations, orthopnea and PND.   Musculoskeletal: Negative.  Negative for neck pain.   Neurological: Negative.  Negative for headaches.   Psychiatric/Behavioral: Negative.        Objective   Physical Exam   Constitutional: She is oriented to person, place, and time. She appears well-developed and well-nourished. No distress.   HENT:   Head: Normocephalic.   Eyes: Pupils are equal, round, and reactive to light.   Neck: Normal range of  motion. Neck supple. No thyromegaly present.   Cardiovascular: Normal rate, regular rhythm and normal heart sounds.  Exam reveals no friction rub.    No murmur heard.  Pulmonary/Chest: Effort normal and breath sounds normal. No respiratory distress. She has no wheezes. She has no rales.   Abdominal: Soft.   Musculoskeletal:   Ambulating with a walker   Neurological: She is alert and oriented to person, place, and time.   Skin: Skin is warm and dry.   Psychiatric: She has a normal mood and affect. Thought content normal.   Nursing note and vitals reviewed.      Assessment/Plan   Jyoti was seen today for hypertension.    Diagnoses and all orders for this visit:    Essential hypertension    Rotator cuff strain, right, initial encounter  -     cyclobenzaprine (FLEXERIL) 10 MG tablet; Take 1 tablet by mouth 3 (Three) Times a Day As Needed for Muscle Spasms.    she will remain on current meds.  Refill given on her flexeril.

## 2017-10-23 DIAGNOSIS — R42 VERTIGO: ICD-10-CM

## 2017-10-23 RX ORDER — MECLIZINE HYDROCHLORIDE 25 MG/1
TABLET ORAL
Qty: 30 TABLET | Refills: 5 | Status: SHIPPED | OUTPATIENT
Start: 2017-10-23 | End: 2018-01-19 | Stop reason: SDUPTHER

## 2017-10-24 ENCOUNTER — OFFICE VISIT (OUTPATIENT)
Dept: ORTHOPEDIC SURGERY | Facility: CLINIC | Age: 57
End: 2017-10-24

## 2017-10-24 ENCOUNTER — TELEPHONE (OUTPATIENT)
Dept: ORTHOPEDIC SURGERY | Facility: CLINIC | Age: 57
End: 2017-10-24

## 2017-10-24 VITALS
HEIGHT: 63 IN | SYSTOLIC BLOOD PRESSURE: 128 MMHG | BODY MASS INDEX: 25.34 KG/M2 | WEIGHT: 143 LBS | DIASTOLIC BLOOD PRESSURE: 74 MMHG

## 2017-10-24 DIAGNOSIS — S92.101D CLOSED DISPLACED FRACTURE OF RIGHT TALUS WITH ROUTINE HEALING, UNSPECIFIED FRACTURE MORPHOLOGY, SUBSEQUENT ENCOUNTER: ICD-10-CM

## 2017-10-24 DIAGNOSIS — G89.29 CHRONIC PAIN OF RIGHT ANKLE: Primary | ICD-10-CM

## 2017-10-24 DIAGNOSIS — G89.29 CHRONIC PAIN OF LEFT ANKLE: ICD-10-CM

## 2017-10-24 DIAGNOSIS — M25.572 CHRONIC PAIN OF LEFT ANKLE: ICD-10-CM

## 2017-10-24 DIAGNOSIS — E55.9 VITAMIN D DEFICIENCY: ICD-10-CM

## 2017-10-24 DIAGNOSIS — S82.821D CLOSED TORUS FRACTURE OF DISTAL END OF RIGHT FIBULA WITH ROUTINE HEALING, SUBSEQUENT ENCOUNTER: ICD-10-CM

## 2017-10-24 DIAGNOSIS — M25.571 CHRONIC PAIN OF RIGHT ANKLE: Primary | ICD-10-CM

## 2017-10-24 PROCEDURE — 99024 POSTOP FOLLOW-UP VISIT: CPT | Performed by: ORTHOPAEDIC SURGERY

## 2017-10-24 RX ORDER — LEVETIRACETAM 250 MG/1
TABLET ORAL
Refills: 0 | COMMUNITY
Start: 2017-10-10 | End: 2017-10-24 | Stop reason: SDUPTHER

## 2017-10-24 RX ORDER — ERGOCALCIFEROL 1.25 MG/1
CAPSULE ORAL
Qty: 4 CAPSULE | Refills: 5 | Status: SHIPPED | OUTPATIENT
Start: 2017-10-24 | End: 2018-10-29 | Stop reason: SDUPTHER

## 2017-10-24 NOTE — TELEPHONE ENCOUNTER
After reviewing today's bilateral ankle xray's Dr. Vincent determined Ms. Rawls would benefit from wearing the bilateral aircast boot's until her next office visit on 12/5/2017 instead of the original instructions of being able to remove boots in 2 weeks.     Attempt to contact patient at given number provided in EPIC with no success. Left patient a message to call our office back.     Will attempt to contact again tomorrow morning.    CFB  10/24/2017 3:50

## 2017-10-24 NOTE — PROGRESS NOTES
Hattie Riddle is a 57 y.o. female. 1960- 4 week recheck bilateral ankle       History of Present Illness   Patient is here for recheck of bilateral ankle. Patient states that she has worn bilateral Aircast boot as instructed since her last office visit. Patient states that she has attended courses of physical therapy as instructed. Patient states that she has continued to have mild to moderate pain, daily, to bilateral ankle/lower extremities.     The following portions of the patient's history were reviewed and updated as appropriate:   She  has a past medical history of Acute bronchitis (07/19/2016); Acute otitis externa (09/04/2014); Acute otitis media (02/10/2015); Acute sinusitis (07/19/2016); Ankle injury; Backache (12/08/2014); Benign hypertension (05/13/2016); Cerebrovascular accident (2015); Dizziness (02/10/2015); Encounter for gynecological examination without abnormal finding (03/10/2016); Fatigue (08/28/2015); Foot pain (06/09/2014); Hyperkeratosis (04/28/2014); Hypotensive episode (06/12/2015); Keratoma (08/04/2014); Low blood pressure (08/28/2015); Menopausal and perimenopausal disorder (03/10/2016); Numbness of face (05/13/2016); Otitis media of right ear (07/30/2016); Perforated tympanic membrane (09/04/2014); Puncture wound (06/17/2014); Right-sided face pain (05/13/2016); Seizure; Thumb pain (08/28/2015); Upper respiratory infection (08/28/2015); and Wheezing (10/21/2014).  She  does not have any pertinent problems on file.  She  has a past surgical history that includes Breast biopsy; Other surgical history (07/07/2014); and Tubal ligation.  Her family history includes Alzheimer's disease in her father and other; Bipolar disorder in her other; Depression in her other; Diabetes in her other; Heart attack in her father; Heart disease in her other; Hypertension in her other; Lung cancer in her other; Migraines in her other; No Known Problems in her daughter and mother; Sickle cell  anemia in her other; Stroke in her other; Throat cancer in her father.  She  reports that she has been smoking Cigarettes.  She has been smoking about 0.25 packs per day. She has never used smokeless tobacco. She reports that she drinks alcohol. She reports that she uses illicit drugs, including Cocaine.  Current Outpatient Prescriptions   Medication Sig Dispense Refill   • acetaminophen (TYLENOL) 500 MG tablet Take 1 tablet by mouth Every 6 (Six) Hours As Needed (body aches). 30 tablet 0   • albuterol (VENTOLIN HFA) 108 (90 BASE) MCG/ACT inhaler Inhale 2 puffs 4 (Four) Times a Day. 18 g 3   • ANORO ELLIPTA 62.5-25 MCG/INH aerosol powder  Inhale 62.5 inhalers Daily.  0   • aspirin 81 MG chewable tablet Chew 81 mg.     • atorvastatin (LIPITOR) 40 MG tablet Take 1 tablet by mouth Daily. 30 tablet 8   • Blood Pressure Monitoring (B-D ASSURE BPM/AUTO WRIST CUFF) misc 1 kit 2 (Two) Times a Day As Needed (fluctuating blood pressure). 1 each 0   • calcium gluconate 500 MG tablet Take 1 tablet by mouth 2 (Two) Times a Day. 60 tablet 11   • chlorpheniramine (EQ CHLORTABS) 4 MG tablet Take 1 tablet by mouth Every 6 (Six) Hours As Needed (cold symptoms). 20 tablet 0   • cyclobenzaprine (FLEXERIL) 10 MG tablet Take 1 tablet by mouth 3 (Three) Times a Day As Needed for Muscle Spasms. 90 tablet 3   • diclofenac (VOLTAREN) 50 MG EC tablet take 1 tablet by mouth twice a day 60 tablet 5   • folic acid (FOLVITE) 1 MG tablet take 1 tablet by mouth once daily 30 tablet 5   • levETIRAcetam (KEPPRA) 500 MG tablet Take 1 tablet by mouth 2 (Two) Times a Day. 60 tablet 4   • meclizine (ANTIVERT) 25 MG tablet take 1 tablet by mouth three times a day if needed for dizziness 30 tablet 5   • neomycin-polymyxin-hydrocortisone (CORTISPORIN) 3.5-17556-0 otic solution Administer 3 drops into both ears 4 (Four) Times a Day. 10 mL 0   • NIFEdipine XL (PROCARDIA XL) 30 MG 24 hr tablet Take 30 mg by mouth Daily.     • ondansetron (ZOFRAN) 4 MG tablet  Take 1 tablet by mouth Every 8 (Eight) Hours As Needed for Nausea or Vomiting. 10 tablet 0   • RA FOLIC ACID 400 MCG tablet take 1 tablet by mouth once daily  0   • RA VITAMIN B-1 100 MG tablet take 1 tablet by mouth once daily  0   • rOPINIRole (REQUIP) 2 MG tablet Take 1 tablet by mouth Every Night. 30 tablet 3   • traMADol (ULTRAM) 50 MG tablet Take 1 tablet by mouth Every 8 (Eight) Hours As Needed for Moderate Pain . 90 tablet 0   • vitamin D (ERGOCALCIFEROL) 73606 units capsule capsule take 1 capsule by mouth EVERY 2 WEEKS 4 capsule 5     No current facility-administered medications for this visit.      Current Outpatient Prescriptions on File Prior to Visit   Medication Sig   • acetaminophen (TYLENOL) 500 MG tablet Take 1 tablet by mouth Every 6 (Six) Hours As Needed (body aches).   • albuterol (VENTOLIN HFA) 108 (90 BASE) MCG/ACT inhaler Inhale 2 puffs 4 (Four) Times a Day.   • ANORO ELLIPTA 62.5-25 MCG/INH aerosol powder  Inhale 62.5 inhalers Daily.   • aspirin 81 MG chewable tablet Chew 81 mg.   • atorvastatin (LIPITOR) 40 MG tablet Take 1 tablet by mouth Daily.   • Blood Pressure Monitoring (B-D ASSURE BPM/AUTO WRIST CUFF) misc 1 kit 2 (Two) Times a Day As Needed (fluctuating blood pressure).   • calcium gluconate 500 MG tablet Take 1 tablet by mouth 2 (Two) Times a Day.   • chlorpheniramine (EQ CHLORTABS) 4 MG tablet Take 1 tablet by mouth Every 6 (Six) Hours As Needed (cold symptoms).   • cyclobenzaprine (FLEXERIL) 10 MG tablet Take 1 tablet by mouth 3 (Three) Times a Day As Needed for Muscle Spasms.   • diclofenac (VOLTAREN) 50 MG EC tablet take 1 tablet by mouth twice a day   • folic acid (FOLVITE) 1 MG tablet take 1 tablet by mouth once daily   • levETIRAcetam (KEPPRA) 500 MG tablet Take 1 tablet by mouth 2 (Two) Times a Day.   • meclizine (ANTIVERT) 25 MG tablet take 1 tablet by mouth three times a day if needed for dizziness   • neomycin-polymyxin-hydrocortisone (CORTISPORIN) 3.5-97981-4 otic  "solution Administer 3 drops into both ears 4 (Four) Times a Day.   • NIFEdipine XL (PROCARDIA XL) 30 MG 24 hr tablet Take 30 mg by mouth Daily.   • ondansetron (ZOFRAN) 4 MG tablet Take 1 tablet by mouth Every 8 (Eight) Hours As Needed for Nausea or Vomiting.   • RA FOLIC ACID 400 MCG tablet take 1 tablet by mouth once daily   • RA VITAMIN B-1 100 MG tablet take 1 tablet by mouth once daily   • rOPINIRole (REQUIP) 2 MG tablet Take 1 tablet by mouth Every Night.   • traMADol (ULTRAM) 50 MG tablet Take 1 tablet by mouth Every 8 (Eight) Hours As Needed for Moderate Pain .   • [DISCONTINUED] vitamin D (ERGOCALCIFEROL) 74388 UNITS capsule capsule 1 CAP EVERY 2 WEEKS     No current facility-administered medications on file prior to visit.      She is allergic to lisinopril..    Review of Systems  REVIEW OF SYSTEMS:  Negative, other than presenting complaint.  HEENT: No headaches, diplopia, blurred vision, tinnitus, vertigo, epistaxis, hoarseness or sore throat.  Pulmonary: No cough, sputum, hemoptysis, dyspnea, wheezing, or chest pain.  Cardiac: No chest pain, palpitations, orthopnea, paroxysmal nocturnal dyspnea, shortness of breath, or pedal edema.  Gastrointestinal: No diarrhea, melena, or constipation.  Genitourinary: No dysuria, hematuria, nocturia, frequency, bladder or bowel incontinence.  Hematology: No history of any anemia, fatigue, fever, or chills or night sweats.  Dermatology: No rashes, pruritus, or increased pigmentation changes of the skin.     Objective   Physical Exam  /74 (BP Location: Right arm, Patient Position: Sitting)  Ht 63\" (160 cm)  Wt 143 lb (64.9 kg)  BMI 25.33 kg/m2    Social History     Social History   • Marital status:      Spouse name: N/A   • Number of children: N/A   • Years of education: N/A     Occupational History   • Not on file.     Social History Main Topics   • Smoking status: Current Every Day Smoker     Packs/day: 0.25     Types: Cigarettes   • Smokeless " tobacco: Never Used   • Alcohol use Yes      Comment: rarely   • Drug use: Yes     Special: Cocaine   • Sexual activity: Defer     Other Topics Concern   • Not on file     Social History Narrative       HEENT: Normocephalic.  PERRLA.  TM's clear bilaterally.  Oropharynx: Clear.  Neck: Supple, with no adenopathy.  Chest: Equal bilateral expansion.  Clear to auscultation and percussion.  Heart: Regular sinus rhythm, S1 and S2 normal.  No murmurs or extra heart sounds heard.  Abdomen: Soft, nontender, and no organomegaly.  Neurological: cranial nerves II-XII normal Vascular: pulses are present  Dermatological: no rashes  or blemishes, or any abnormality of the skin.      Examination of bilateral ankle shows no tenderness to palpation of the ankle in both rt and left sides neuro intact.   both fractures are healing uneventfully. Neuro intact. See back in 6 weeks for final x rays.          Assessment/Plan   Problems Addressed this Visit        Nervous and Auditory    Right ankle pain - Primary    Left ankle pain       Musculoskeletal and Integument    Closed fracture of distal end of right fibula    Closed fracture of talus

## 2017-10-29 DIAGNOSIS — G62.9 NEUROPATHY: ICD-10-CM

## 2017-10-30 RX ORDER — GABAPENTIN 800 MG/1
TABLET ORAL
Qty: 60 TABLET | Refills: 0 | OUTPATIENT
Start: 2017-10-30 | End: 2017-11-28 | Stop reason: SDUPTHER

## 2017-11-06 DIAGNOSIS — M25.572 BILATERAL ANKLE PAIN, UNSPECIFIED CHRONICITY: ICD-10-CM

## 2017-11-06 DIAGNOSIS — M25.571 BILATERAL ANKLE PAIN, UNSPECIFIED CHRONICITY: ICD-10-CM

## 2017-11-06 RX ORDER — TRAMADOL HYDROCHLORIDE 50 MG/1
TABLET ORAL
Qty: 90 TABLET | Refills: 0 | OUTPATIENT
Start: 2017-11-06 | End: 2017-12-21 | Stop reason: SDUPTHER

## 2017-11-07 ENCOUNTER — OFFICE VISIT (OUTPATIENT)
Dept: FAMILY MEDICINE CLINIC | Facility: CLINIC | Age: 57
End: 2017-11-07

## 2017-11-07 VITALS
HEART RATE: 99 BPM | BODY MASS INDEX: 24.8 KG/M2 | SYSTOLIC BLOOD PRESSURE: 130 MMHG | RESPIRATION RATE: 18 BRPM | DIASTOLIC BLOOD PRESSURE: 90 MMHG | HEIGHT: 63 IN | WEIGHT: 140 LBS | OXYGEN SATURATION: 99 % | TEMPERATURE: 98.4 F

## 2017-11-07 DIAGNOSIS — M25.562 ACUTE PAIN OF LEFT KNEE: Primary | ICD-10-CM

## 2017-11-07 DIAGNOSIS — M25.512 ACUTE PAIN OF LEFT SHOULDER: ICD-10-CM

## 2017-11-07 DIAGNOSIS — Z13.1 SCREENING FOR DIABETES MELLITUS: ICD-10-CM

## 2017-11-07 DIAGNOSIS — Z13.0 SCREENING FOR DEFICIENCY ANEMIA: ICD-10-CM

## 2017-11-07 DIAGNOSIS — M54.2 NECK PAIN: Primary | ICD-10-CM

## 2017-11-07 PROCEDURE — 85025 COMPLETE CBC W/AUTO DIFF WBC: CPT | Performed by: NURSE PRACTITIONER

## 2017-11-07 PROCEDURE — 80053 COMPREHEN METABOLIC PANEL: CPT | Performed by: NURSE PRACTITIONER

## 2017-11-07 PROCEDURE — 99214 OFFICE O/P EST MOD 30 MIN: CPT | Performed by: NURSE PRACTITIONER

## 2017-11-07 RX ORDER — LIDOCAINE 50 MG/G
1 PATCH TOPICAL EVERY 24 HOURS
Qty: 6 PATCH | Refills: 1 | Status: SHIPPED | OUTPATIENT
Start: 2017-11-07 | End: 2018-03-10 | Stop reason: SDUPTHER

## 2017-11-07 NOTE — PROGRESS NOTES
Hattie Riddle is a 57 y.o. female.     HPI Comments: Here today for toño.  She says she cont to have some neck pain.  She has used a lidocaine patch belonging to a significant other and it helped with her pain.      Neck Pain    This is a recurrent problem. The current episode started 1 to 4 weeks ago. The problem occurs constantly. The problem has been waxing and waning. The pain is associated with nothing. The pain is present in the anterior neck. The pain is at a severity of 8/10. The pain is severe. The symptoms are aggravated by stress, twisting and position. The pain is same all the time. Stiffness is present in the morning. Pertinent negatives include no chest pain, fever, headaches, leg pain, numbness, pain with swallowing, paresis, photophobia, syncope, tingling, trouble swallowing, visual change, weakness or weight loss. She has tried NSAIDs for the symptoms. The treatment provided mild relief.        The following portions of the patient's history were reviewed and updated as appropriate: allergies, current medications, past family history, past medical history, past social history, past surgical history and problem list.    Review of Systems   Constitutional: Negative.  Negative for fever and weight loss.   HENT: Negative.  Negative for trouble swallowing.    Eyes: Negative for photophobia.   Respiratory: Negative.    Cardiovascular: Negative.  Negative for chest pain and syncope.   Musculoskeletal: Positive for neck pain.   Skin: Negative.    Neurological: Negative.  Negative for tingling, weakness, numbness and headaches.   Psychiatric/Behavioral: Negative.        Objective   Physical Exam   Constitutional: She is oriented to person, place, and time. She appears well-developed and well-nourished. No distress.   HENT:   Head: Normocephalic.   Eyes: EOM are normal. Pupils are equal, round, and reactive to light.   Neck: Normal range of motion.   Cardiovascular: Normal rate, regular rhythm  and normal heart sounds.  Exam reveals no friction rub.    No murmur heard.  Pulmonary/Chest: Effort normal and breath sounds normal. No respiratory distress. She has no wheezes. She has no rales.   Abdominal: Soft.   Musculoskeletal:        Cervical back: She exhibits decreased range of motion, tenderness, pain and spasm.   Neurological: She is alert and oriented to person, place, and time.   Skin: Skin is warm and dry.   Psychiatric: She has a normal mood and affect. Thought content normal.   Nursing note and vitals reviewed.      Assessment/Plan   Jyoti was seen today for generalized body aches.    Diagnoses and all orders for this visit:    Neck pain  -     lidocaine (LIDODERM) 5 %; Place 1 patch on the skin Daily. Remove & Discard patch within 12 hours or as directed by MD    Screening for deficiency anemia  -     CBC & Differential  -     CBC Auto Differential    Screening for diabetes mellitus  -     Comprehensive metabolic panel    she is cautioned about using more than one patch at a time and about leaving it on for more thatn 12 hrs.

## 2017-11-08 LAB
ALBUMIN SERPL-MCNC: 4.1 G/DL (ref 3.4–4.8)
ALBUMIN/GLOB SERPL: 1.2 G/DL (ref 1.1–1.8)
ALP SERPL-CCNC: 70 U/L (ref 38–126)
ALT SERPL W P-5'-P-CCNC: 37 U/L (ref 9–52)
ANION GAP SERPL CALCULATED.3IONS-SCNC: 16 MMOL/L (ref 5–15)
AST SERPL-CCNC: 32 U/L (ref 14–36)
BASOPHILS # BLD AUTO: 0.01 10*3/MM3 (ref 0–0.2)
BASOPHILS NFR BLD AUTO: 0.2 % (ref 0–2)
BILIRUB SERPL-MCNC: 0.2 MG/DL (ref 0.2–1.3)
BUN BLD-MCNC: 27 MG/DL (ref 7–21)
BUN/CREAT SERPL: 21.4 (ref 7–25)
CALCIUM SPEC-SCNC: 9.8 MG/DL (ref 8.4–10.2)
CHLORIDE SERPL-SCNC: 104 MMOL/L (ref 95–110)
CO2 SERPL-SCNC: 25 MMOL/L (ref 22–31)
CREAT BLD-MCNC: 1.26 MG/DL (ref 0.5–1)
DEPRECATED RDW RBC AUTO: 42.3 FL (ref 36.4–46.3)
EOSINOPHIL # BLD AUTO: 0.39 10*3/MM3 (ref 0–0.7)
EOSINOPHIL NFR BLD AUTO: 7.6 % (ref 0–7)
ERYTHROCYTE [DISTWIDTH] IN BLOOD BY AUTOMATED COUNT: 12.6 % (ref 11.5–14.5)
GFR SERPL CREATININE-BSD FRML MDRD: 53 ML/MIN/1.73 (ref 51–120)
GLOBULIN UR ELPH-MCNC: 3.3 GM/DL (ref 2.3–3.5)
GLUCOSE BLD-MCNC: 86 MG/DL (ref 60–100)
HCT VFR BLD AUTO: 39.1 % (ref 35–45)
HGB BLD-MCNC: 12.5 G/DL (ref 12–15.5)
IMM GRANULOCYTES # BLD: 0.01 10*3/MM3 (ref 0–0.02)
IMM GRANULOCYTES NFR BLD: 0.2 % (ref 0–0.5)
LYMPHOCYTES # BLD AUTO: 1.92 10*3/MM3 (ref 0.6–4.2)
LYMPHOCYTES NFR BLD AUTO: 37.3 % (ref 10–50)
MCH RBC QN AUTO: 29.5 PG (ref 26.5–34)
MCHC RBC AUTO-ENTMCNC: 32 G/DL (ref 31.4–36)
MCV RBC AUTO: 92.2 FL (ref 80–98)
MONOCYTES # BLD AUTO: 0.35 10*3/MM3 (ref 0–0.9)
MONOCYTES NFR BLD AUTO: 6.8 % (ref 0–12)
NEUTROPHILS # BLD AUTO: 2.47 10*3/MM3 (ref 2–8.6)
NEUTROPHILS NFR BLD AUTO: 47.9 % (ref 37–80)
PLATELET # BLD AUTO: 187 10*3/MM3 (ref 150–450)
PMV BLD AUTO: 12.3 FL (ref 8–12)
POTASSIUM BLD-SCNC: 5.1 MMOL/L (ref 3.5–5.1)
PROT SERPL-MCNC: 7.4 G/DL (ref 6.3–8.6)
RBC # BLD AUTO: 4.24 10*6/MM3 (ref 3.77–5.16)
SODIUM BLD-SCNC: 145 MMOL/L (ref 137–145)
WBC NRBC COR # BLD: 5.15 10*3/MM3 (ref 3.2–9.8)

## 2017-11-13 DIAGNOSIS — M25.562 ACUTE PAIN OF LEFT KNEE: Primary | ICD-10-CM

## 2017-11-13 DIAGNOSIS — M25.512 ACUTE PAIN OF LEFT SHOULDER: ICD-10-CM

## 2017-11-14 ENCOUNTER — OFFICE VISIT (OUTPATIENT)
Dept: ORTHOPEDIC SURGERY | Facility: CLINIC | Age: 57
End: 2017-11-14

## 2017-11-14 VITALS — WEIGHT: 144 LBS | BODY MASS INDEX: 25.52 KG/M2 | HEIGHT: 63 IN

## 2017-11-14 DIAGNOSIS — S82.65XA CLOSED NONDISPLACED FRACTURE OF LATERAL MALLEOLUS OF LEFT FIBULA, INITIAL ENCOUNTER: ICD-10-CM

## 2017-11-14 DIAGNOSIS — M25.512 ACUTE PAIN OF LEFT SHOULDER: ICD-10-CM

## 2017-11-14 DIAGNOSIS — M79.605 LEFT LEG PAIN: Primary | ICD-10-CM

## 2017-11-14 PROCEDURE — 99213 OFFICE O/P EST LOW 20 MIN: CPT | Performed by: ORTHOPAEDIC SURGERY

## 2017-11-14 NOTE — PROGRESS NOTES
Hattie Riddle is a 57 y.o. female. Left side pain following fall.     History of Present Illness Patient is here today for left shoulder pain, left hip pain, left leg pain. Patient states that she is having severe pain due to the fall. Patient was sent to ay upon arrival.     The following portions of the patient's history were reviewed and updated as appropriate:   She  has a past medical history of Acute bronchitis (07/19/2016); Acute otitis externa (09/04/2014); Acute otitis media (02/10/2015); Acute sinusitis (07/19/2016); Ankle injury; Backache (12/08/2014); Benign hypertension (05/13/2016); Cerebrovascular accident (2015); Dizziness (02/10/2015); Encounter for gynecological examination without abnormal finding (03/10/2016); Fatigue (08/28/2015); Foot pain (06/09/2014); Hyperkeratosis (04/28/2014); Hypotensive episode (06/12/2015); Keratoma (08/04/2014); Low blood pressure (08/28/2015); Menopausal and perimenopausal disorder (03/10/2016); Numbness of face (05/13/2016); Otitis media of right ear (07/30/2016); Perforated tympanic membrane (09/04/2014); Puncture wound (06/17/2014); Right-sided face pain (05/13/2016); Seizure; Thumb pain (08/28/2015); Upper respiratory infection (08/28/2015); and Wheezing (10/21/2014).  She  does not have any pertinent problems on file.  She  has a past surgical history that includes Breast biopsy; Other surgical history (07/07/2014); and Tubal ligation.  Her family history includes Alzheimer's disease in her father and other; Bipolar disorder in her other; Depression in her other; Diabetes in her other; Heart attack in her father; Heart disease in her other; Hypertension in her other; Lung cancer in her other; Migraines in her other; No Known Problems in her daughter and mother; Sickle cell anemia in her other; Stroke in her other; Throat cancer in her father.  She  reports that she has been smoking Cigarettes.  She has been smoking about 0.25 packs per day. She  has never used smokeless tobacco. She reports that she drinks alcohol. She reports that she uses illicit drugs, including Cocaine.  Current Outpatient Prescriptions   Medication Sig Dispense Refill   • acetaminophen (TYLENOL) 500 MG tablet Take 1 tablet by mouth Every 6 (Six) Hours As Needed (body aches). 30 tablet 0   • albuterol (VENTOLIN HFA) 108 (90 BASE) MCG/ACT inhaler Inhale 2 puffs 4 (Four) Times a Day. 18 g 3   • ANORO ELLIPTA 62.5-25 MCG/INH aerosol powder  Inhale 62.5 inhalers Daily.  0   • aspirin 81 MG chewable tablet Chew 81 mg.     • atorvastatin (LIPITOR) 40 MG tablet Take 1 tablet by mouth Daily. 30 tablet 8   • Blood Pressure Monitoring (B-D ASSURE BPM/AUTO WRIST CUFF) misc 1 kit 2 (Two) Times a Day As Needed (fluctuating blood pressure). 1 each 0   • calcium gluconate 500 MG tablet Take 1 tablet by mouth 2 (Two) Times a Day. 60 tablet 11   • chlorpheniramine (EQ CHLORTABS) 4 MG tablet Take 1 tablet by mouth Every 6 (Six) Hours As Needed (cold symptoms). 20 tablet 0   • cyclobenzaprine (FLEXERIL) 10 MG tablet Take 1 tablet by mouth 3 (Three) Times a Day As Needed for Muscle Spasms. 90 tablet 3   • diclofenac (VOLTAREN) 50 MG EC tablet take 1 tablet by mouth twice a day 60 tablet 5   • folic acid (FOLVITE) 1 MG tablet take 1 tablet by mouth once daily 30 tablet 5   • gabapentin (NEURONTIN) 800 MG tablet take 1 tablet by mouth twice a day 60 tablet 0   • levETIRAcetam (KEPPRA) 500 MG tablet Take 1 tablet by mouth 2 (Two) Times a Day. 60 tablet 4   • lidocaine (LIDODERM) 5 % Place 1 patch on the skin Daily. Remove & Discard patch within 12 hours or as directed by MD [facility] patch 1   • meclizine (ANTIVERT) 25 MG tablet take 1 tablet by mouth three times a day if needed for dizziness 30 tablet 5   • neomycin-polymyxin-hydrocortisone (CORTISPORIN) 3.5-69198-6 otic solution Administer 3 drops into both ears 4 (Four) Times a Day. 10 mL 0   • NIFEdipine XL (PROCARDIA XL) 30 MG 24 hr tablet Take 30 mg by mouth  Daily.     • ondansetron (ZOFRAN) 4 MG tablet Take 1 tablet by mouth Every 8 (Eight) Hours As Needed for Nausea or Vomiting. 10 tablet 0   • RA FOLIC ACID 400 MCG tablet take 1 tablet by mouth once daily  0   • RA VITAMIN B-1 100 MG tablet take 1 tablet by mouth once daily  0   • rOPINIRole (REQUIP) 2 MG tablet Take 1 tablet by mouth Every Night. 30 tablet 3   • traMADol (ULTRAM) 50 MG tablet take 1 tablet by mouth every 8 hours if needed for MODERATE PAIN 90 tablet 0   • vitamin D (ERGOCALCIFEROL) 02946 units capsule capsule take 1 capsule by mouth EVERY 2 WEEKS 4 capsule 5     No current facility-administered medications for this visit.      Current Outpatient Prescriptions on File Prior to Visit   Medication Sig   • acetaminophen (TYLENOL) 500 MG tablet Take 1 tablet by mouth Every 6 (Six) Hours As Needed (body aches).   • albuterol (VENTOLIN HFA) 108 (90 BASE) MCG/ACT inhaler Inhale 2 puffs 4 (Four) Times a Day.   • ANORO ELLIPTA 62.5-25 MCG/INH aerosol powder  Inhale 62.5 inhalers Daily.   • aspirin 81 MG chewable tablet Chew 81 mg.   • atorvastatin (LIPITOR) 40 MG tablet Take 1 tablet by mouth Daily.   • Blood Pressure Monitoring (B-D ASSURE BPM/AUTO WRIST CUFF) misc 1 kit 2 (Two) Times a Day As Needed (fluctuating blood pressure).   • calcium gluconate 500 MG tablet Take 1 tablet by mouth 2 (Two) Times a Day.   • chlorpheniramine (EQ CHLORTABS) 4 MG tablet Take 1 tablet by mouth Every 6 (Six) Hours As Needed (cold symptoms).   • cyclobenzaprine (FLEXERIL) 10 MG tablet Take 1 tablet by mouth 3 (Three) Times a Day As Needed for Muscle Spasms.   • diclofenac (VOLTAREN) 50 MG EC tablet take 1 tablet by mouth twice a day   • folic acid (FOLVITE) 1 MG tablet take 1 tablet by mouth once daily   • gabapentin (NEURONTIN) 800 MG tablet take 1 tablet by mouth twice a day   • levETIRAcetam (KEPPRA) 500 MG tablet Take 1 tablet by mouth 2 (Two) Times a Day.   • lidocaine (LIDODERM) 5 % Place 1 patch on the skin Daily.  "Remove & Discard patch within 12 hours or as directed by MD   • meclizine (ANTIVERT) 25 MG tablet take 1 tablet by mouth three times a day if needed for dizziness   • neomycin-polymyxin-hydrocortisone (CORTISPORIN) 3.5-30226-4 otic solution Administer 3 drops into both ears 4 (Four) Times a Day.   • NIFEdipine XL (PROCARDIA XL) 30 MG 24 hr tablet Take 30 mg by mouth Daily.   • ondansetron (ZOFRAN) 4 MG tablet Take 1 tablet by mouth Every 8 (Eight) Hours As Needed for Nausea or Vomiting.   • RA FOLIC ACID 400 MCG tablet take 1 tablet by mouth once daily   • RA VITAMIN B-1 100 MG tablet take 1 tablet by mouth once daily   • rOPINIRole (REQUIP) 2 MG tablet Take 1 tablet by mouth Every Night.   • traMADol (ULTRAM) 50 MG tablet take 1 tablet by mouth every 8 hours if needed for MODERATE PAIN   • vitamin D (ERGOCALCIFEROL) 20572 units capsule capsule take 1 capsule by mouth EVERY 2 WEEKS     No current facility-administered medications on file prior to visit.      She is allergic to lisinopril..    Review of Systems  REVIEW OF SYSTEMS:  Negative, other than presenting complaint.  HEENT: No headaches, diplopia, blurred vision, tinnitus, vertigo, epistaxis, hoarseness or sore throat.  Pulmonary: No cough, sputum, hemoptysis, dyspnea, wheezing, or chest pain.  Cardiac: No chest pain, palpitations, orthopnea, paroxysmal nocturnal dyspnea, shortness of breath, or pedal edema.  Gastrointestinal: No diarrhea, melena, or constipation.  Genitourinary: No dysuria, hematuria, nocturia, frequency, bladder or bowel incontinence.  Hematology: No history of any anemia, fatigue, fever, or chills or night sweats.  Dermatology: No rashes, pruritus, or increased pigmentation changes of the skin.   Ht 63\" (160 cm)  Wt 144 lb (65.3 kg)  BMI 25.51 kg/m2    Social History     Social History   • Marital status:      Spouse name: N/A   • Number of children: N/A   • Years of education: N/A     Occupational History   • Not on file. "     Social History Main Topics   • Smoking status: Current Every Day Smoker     Packs/day: 0.25     Types: Cigarettes   • Smokeless tobacco: Never Used   • Alcohol use Yes      Comment: rarely   • Drug use: Yes     Special: Cocaine   • Sexual activity: Defer     Other Topics Concern   • Not on file     Social History Narrative       HEENT: Normocephalic.  PERRLA.  TM's clear bilaterally.  Oropharynx: Clear.  Neck: Supple, with no adenopathy.  Chest: Equal bilateral expansion.  Clear to auscultation and percussion.  Heart: Regular sinus rhythm, S1 and S2 normal.  No murmurs or extra heart sounds heard.  Abdomen: Soft, nontender, and no organomegaly.  Neurological: cranial nerves II-XII normal Vascular: pulses are present  Dermatological: no rashes  or blemishes, or any abnormality of the skin.      Objective   Physical Examx rays of the lateral malleolus show fracture healed. Clinically patient  Has discomfort over the lateral aspect of the proxima; leg which is connected to her discomfort in the left lumbar region. X rays of the lower lumbar area show severe spondylosis of the L4/L5  And L5/S1 vertebral segments. Neuro intact.       Assessment/Plan  plan: referal to specialist for lumbar spondylosis with radiation of pin into the leg.    Jyoti was seen today for pain, pain, pain and pain.    Diagnoses and all orders for this visit:    Left leg pain  -     XR Tibia Fibula 2 View Left    Acute pain of left shoulder    Closed nondisplaced fracture of lateral malleolus of left fibula, initial encounter

## 2017-11-28 ENCOUNTER — OFFICE VISIT (OUTPATIENT)
Dept: FAMILY MEDICINE CLINIC | Facility: CLINIC | Age: 57
End: 2017-11-28

## 2017-11-28 VITALS
SYSTOLIC BLOOD PRESSURE: 120 MMHG | TEMPERATURE: 98.1 F | HEIGHT: 63 IN | HEART RATE: 79 BPM | BODY MASS INDEX: 25.34 KG/M2 | DIASTOLIC BLOOD PRESSURE: 76 MMHG | WEIGHT: 143 LBS | RESPIRATION RATE: 20 BRPM | OXYGEN SATURATION: 96 %

## 2017-11-28 DIAGNOSIS — M25.50 ARTHRALGIA, UNSPECIFIED JOINT: Primary | ICD-10-CM

## 2017-11-28 DIAGNOSIS — G62.9 NEUROPATHY: ICD-10-CM

## 2017-11-28 DIAGNOSIS — R21 SKIN RASH: ICD-10-CM

## 2017-11-28 PROCEDURE — 85651 RBC SED RATE NONAUTOMATED: CPT | Performed by: NURSE PRACTITIONER

## 2017-11-28 PROCEDURE — 99214 OFFICE O/P EST MOD 30 MIN: CPT | Performed by: NURSE PRACTITIONER

## 2017-11-28 PROCEDURE — 84550 ASSAY OF BLOOD/URIC ACID: CPT | Performed by: NURSE PRACTITIONER

## 2017-11-28 PROCEDURE — 86431 RHEUMATOID FACTOR QUANT: CPT | Performed by: NURSE PRACTITIONER

## 2017-11-28 RX ORDER — GABAPENTIN 800 MG/1
800 TABLET ORAL 2 TIMES DAILY
Qty: 60 TABLET | Refills: 0 | Status: SHIPPED | OUTPATIENT
Start: 2017-11-28 | End: 2018-01-23 | Stop reason: SDUPTHER

## 2017-11-28 NOTE — PROGRESS NOTES
Hattie Riddle is a 57 y.o. female.     HPI Comments: Here today with increasing joint pain.  Has nodules noted on her right hand 2nd finger dip joint.  Does have multiple joint pain that is not a new problem.    She also has a rash on her wrists that is itchy and has been present for a few days.  She has been scratching.  There have been no new soaps, detergents or perfums.    Rash   This is a new problem. The current episode started in the past 7 days. The problem is unchanged. Location: both wrists. The rash is characterized by redness and itchiness. She was exposed to nothing. Pertinent negatives include no anorexia, congestion, cough, diarrhea, eye pain, facial edema, fatigue, fever, joint pain, nail changes, rhinorrhea, shortness of breath, sore throat or vomiting. Past treatments include nothing. The treatment provided no relief.   Arthritis   Presents for follow-up visit. She complains of pain, stiffness and joint swelling. The symptoms have been worsening. Affected location: multiple sites including her fingers and hands. Associated symptoms include rash. Pertinent negatives include no diarrhea, fatigue or fever. Compliance with total regimen is 51-75%. Compliance problems include psychosocial issues.  Compliance with medications is 51-75%.        The following portions of the patient's history were reviewed and updated as appropriate: allergies, current medications, past family history, past medical history, past social history, past surgical history and problem list.    Review of Systems   Constitutional: Negative.  Negative for fatigue and fever.   HENT: Negative for congestion, rhinorrhea and sore throat.    Eyes: Negative for pain.   Respiratory: Negative.  Negative for cough and shortness of breath.    Cardiovascular: Negative.    Gastrointestinal: Negative for anorexia, diarrhea and vomiting.   Musculoskeletal: Positive for arthritis, joint swelling and stiffness. Negative for joint  pain.   Skin: Positive for rash. Negative for nail changes.   Neurological: Negative.    Psychiatric/Behavioral: Negative.        Objective   Physical Exam   Constitutional: She is oriented to person, place, and time. She appears well-developed and well-nourished. No distress.   HENT:   Head: Normocephalic.   Eyes: Pupils are equal, round, and reactive to light.   Neck: Normal range of motion. Neck supple. No thyromegaly present.   Cardiovascular: Normal rate, regular rhythm and normal heart sounds.  Exam reveals no friction rub.    No murmur heard.  Pulmonary/Chest: Effort normal and breath sounds normal. No respiratory distress. She has no rales.   Abdominal: Soft.   Musculoskeletal: Normal range of motion.        Hands:  Nodules and enlargement of the dip joint noted   Neurological: She is alert and oriented to person, place, and time.   Skin: Skin is warm and dry.   Has minimal redness noted both wrists.  There are areas of skin break down due to scratching.   Psychiatric: She has a normal mood and affect. Thought content normal.   Nursing note and vitals reviewed.      Assessment/Plan   Jyoti was seen today for discuss test results.    Diagnoses and all orders for this visit:    Arthralgia, unspecified joint  -     Sedimentation rate, automated  -     Uric acid  -     Rheumatoid Factor, Quant    Skin rash  -     triamcinolone (KENALOG) 0.1 % ointment; Apply  topically 2 (Two) Times a Day.    Neuropathy  -     gabapentin (NEURONTIN) 800 MG tablet; Take 1 tablet by mouth 2 (Two) Times a Day.

## 2017-11-29 LAB
ERYTHROCYTE [SEDIMENTATION RATE] IN BLOOD: 37 MM/HR (ref 0–20)
URATE SERPL-MCNC: 4.4 MG/DL (ref 2.5–8.5)

## 2017-11-30 LAB — RHEUMATOID FACT SERPL-ACNC: NEGATIVE [IU]/ML

## 2017-12-01 ENCOUNTER — TELEPHONE (OUTPATIENT)
Dept: FAMILY MEDICINE CLINIC | Facility: CLINIC | Age: 57
End: 2017-12-01

## 2017-12-01 DIAGNOSIS — R70.0 ELEVATED SED RATE: Primary | ICD-10-CM

## 2017-12-01 DIAGNOSIS — M75.42 SHOULDER IMPINGEMENT SYNDROME, LEFT: Primary | ICD-10-CM

## 2017-12-01 NOTE — TELEPHONE ENCOUNTER
----- Message from WESTON Burroughs sent at 12/1/2017  7:57 AM CST -----  She doesn't have gout or ra, but her sed rate is pretty elevated, she has something going on.  Maybe we should get her in to see a rheumatologist.

## 2017-12-06 DIAGNOSIS — S82.65XA CLOSED NONDISPLACED FRACTURE OF LATERAL MALLEOLUS OF LEFT FIBULA, INITIAL ENCOUNTER: Primary | ICD-10-CM

## 2017-12-07 ENCOUNTER — OFFICE VISIT (OUTPATIENT)
Dept: ORTHOPEDIC SURGERY | Facility: CLINIC | Age: 57
End: 2017-12-07

## 2017-12-07 VITALS — WEIGHT: 143 LBS | BODY MASS INDEX: 25.34 KG/M2 | HEIGHT: 63 IN

## 2017-12-07 DIAGNOSIS — S82.65XD CLOSED NONDISPLACED FRACTURE OF LATERAL MALLEOLUS OF LEFT FIBULA WITH ROUTINE HEALING: Primary | ICD-10-CM

## 2017-12-07 PROCEDURE — 99213 OFFICE O/P EST LOW 20 MIN: CPT | Performed by: NURSE PRACTITIONER
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
## 2017-12-08 ENCOUNTER — TELEPHONE (OUTPATIENT)
Dept: ORTHOPEDIC SURGERY | Facility: CLINIC | Age: 57
End: 2017-12-08

## 2017-12-08 RX ORDER — MELOXICAM 15 MG/1
15 TABLET ORAL DAILY
Qty: 30 TABLET | Refills: 1 | Status: SHIPPED | OUTPATIENT
Start: 2017-12-08 | End: 2018-01-07

## 2017-12-08 NOTE — TELEPHONE ENCOUNTER
WAS SEEN YESTERDAY ,CALLED SAYING NOTHING WAS CALLED IN YET. WAS SUPPOSED TO HAVE A ANTIINFLAMMATORY, [facility] IN [place]

## 2017-12-10 PROBLEM — S82.65XD CLOSED NONDISPLACED FRACTURE OF LATERAL MALLEOLUS OF LEFT FIBULA WITH ROUTINE HEALING: Status: ACTIVE | Noted: 2017-12-10

## 2017-12-21 DIAGNOSIS — M25.571 BILATERAL ANKLE PAIN, UNSPECIFIED CHRONICITY: ICD-10-CM

## 2017-12-21 DIAGNOSIS — M25.572 BILATERAL ANKLE PAIN, UNSPECIFIED CHRONICITY: ICD-10-CM

## 2017-12-21 RX ORDER — TRAMADOL HYDROCHLORIDE 50 MG/1
50 TABLET ORAL EVERY 8 HOURS PRN
Qty: 90 TABLET | Refills: 0 | OUTPATIENT
Start: 2017-12-21 | End: 2018-01-25 | Stop reason: SDUPTHER

## 2018-01-16 ENCOUNTER — DOCUMENTATION (OUTPATIENT)
Dept: PHYSICAL THERAPY | Facility: HOSPITAL | Age: 58
End: 2018-01-16

## 2018-01-16 DIAGNOSIS — M25.571 ACUTE RIGHT ANKLE PAIN: Primary | ICD-10-CM

## 2018-01-16 DIAGNOSIS — S82.821D CLOSED TORUS FRACTURE OF DISTAL END OF RIGHT FIBULA WITH ROUTINE HEALING, SUBSEQUENT ENCOUNTER: ICD-10-CM

## 2018-01-19 DIAGNOSIS — R42 VERTIGO: ICD-10-CM

## 2018-01-19 RX ORDER — MECLIZINE HYDROCHLORIDE 25 MG/1
TABLET ORAL
Qty: 30 TABLET | Refills: 5 | Status: SHIPPED | OUTPATIENT
Start: 2018-01-19 | End: 2018-04-28 | Stop reason: SDUPTHER

## 2018-01-23 DIAGNOSIS — G62.9 NEUROPATHY: ICD-10-CM

## 2018-01-23 DIAGNOSIS — G25.81 RESTLESS LEG SYNDROME: ICD-10-CM

## 2018-01-23 RX ORDER — ROPINIROLE 2 MG/1
TABLET, FILM COATED ORAL
Qty: 30 TABLET | Refills: 3 | Status: SHIPPED | OUTPATIENT
Start: 2018-01-23 | End: 2018-06-04 | Stop reason: SDUPTHER

## 2018-01-24 RX ORDER — GABAPENTIN 800 MG/1
TABLET ORAL
Qty: 60 TABLET | Refills: 0 | OUTPATIENT
Start: 2018-01-24 | End: 2018-03-05 | Stop reason: SDUPTHER

## 2018-01-25 ENCOUNTER — OFFICE VISIT (OUTPATIENT)
Dept: FAMILY MEDICINE CLINIC | Facility: CLINIC | Age: 58
End: 2018-01-25

## 2018-01-25 VITALS
RESPIRATION RATE: 20 BRPM | TEMPERATURE: 98.7 F | HEIGHT: 63 IN | DIASTOLIC BLOOD PRESSURE: 77 MMHG | SYSTOLIC BLOOD PRESSURE: 124 MMHG | WEIGHT: 145 LBS | OXYGEN SATURATION: 99 % | HEART RATE: 115 BPM | BODY MASS INDEX: 25.69 KG/M2

## 2018-01-25 DIAGNOSIS — M25.571 BILATERAL ANKLE PAIN, UNSPECIFIED CHRONICITY: ICD-10-CM

## 2018-01-25 DIAGNOSIS — M25.572 BILATERAL ANKLE PAIN, UNSPECIFIED CHRONICITY: ICD-10-CM

## 2018-01-25 PROCEDURE — 99214 OFFICE O/P EST MOD 30 MIN: CPT | Performed by: NURSE PRACTITIONER

## 2018-01-25 RX ORDER — TRAMADOL HYDROCHLORIDE 50 MG/1
50 TABLET ORAL EVERY 8 HOURS PRN
Qty: 90 TABLET | Refills: 0 | Status: SHIPPED | OUTPATIENT
Start: 2018-01-25 | End: 2018-04-07 | Stop reason: SDUPTHER

## 2018-01-25 NOTE — PROGRESS NOTES
Hattie Riddle is a 57 y.o. female.     HPI Comments: Here today for toño.  She has htn that is well controlled and multiple joint pain along with neuralgia in her face.  She takes tramadol for the joint pain and gabapentin for the pain in her face.  She reports that gabapentin does help.    Hypertension   This is a chronic problem. The current episode started more than 1 year ago. The problem is uncontrolled. Pertinent negatives include no anxiety, blurred vision, chest pain, headaches, malaise/fatigue, neck pain, orthopnea, palpitations, peripheral edema, PND, shortness of breath or sweats. There are no associated agents to hypertension. Risk factors for coronary artery disease include post-menopausal state, smoking/tobacco exposure and dyslipidemia. Past treatments include calcium channel blockers. The current treatment provides significant improvement. There are no compliance problems.  There is no history of angina, kidney disease, CAD/MI, CVA, heart failure, left ventricular hypertrophy, PVD or retinopathy.   Pain   This is a chronic (pain in multiple joints, including hands and ankles.) problem. The current episode started more than 1 year ago. The problem occurs constantly. The problem has been unchanged. Associated symptoms include joint swelling and myalgias. Pertinent negatives include no abdominal pain, anorexia, arthralgias, change in bowel habit, chest pain, chills, congestion, coughing, diaphoresis, fatigue, fever, headaches, nausea, neck pain, numbness, rash, sore throat, swollen glands, urinary symptoms, vertigo, visual change, vomiting or weakness. The symptoms are aggravated by bending, walking, standing and smoking. She has tried NSAIDs (tramadol and gabapentin) for the symptoms. The treatment provided significant relief.        The following portions of the patient's history were reviewed and updated as appropriate: allergies, current medications, past family history, past medical  history, past social history, past surgical history and problem list.    Review of Systems   Constitutional: Negative.  Negative for chills, diaphoresis, fatigue, fever and malaise/fatigue.   HENT: Negative.  Negative for congestion and sore throat.    Eyes: Negative for blurred vision.   Respiratory: Negative.  Negative for cough and shortness of breath.    Cardiovascular: Negative.  Negative for chest pain, palpitations, orthopnea and PND.   Gastrointestinal: Negative for abdominal pain, anorexia, change in bowel habit, nausea and vomiting.   Musculoskeletal: Positive for joint swelling and myalgias. Negative for arthralgias and neck pain.   Skin: Negative.  Negative for rash.   Neurological: Negative.  Negative for vertigo, weakness, numbness and headaches.   Psychiatric/Behavioral: Negative.        Objective   Physical Exam   Constitutional: She is oriented to person, place, and time. She appears well-developed and well-nourished. No distress.   HENT:   Head: Normocephalic.   Eyes: Pupils are equal, round, and reactive to light.   Neck: Normal range of motion. Neck supple. No thyromegaly present.   Cardiovascular: Normal rate, regular rhythm and normal heart sounds.  Exam reveals no friction rub.    No murmur heard.  Pulmonary/Chest: Effort normal and breath sounds normal. No respiratory distress. She has no wheezes. She has no rales.   Abdominal: Soft.   Musculoskeletal: Normal range of motion.        Right ankle: She exhibits normal range of motion. Tenderness.        Left ankle: She exhibits normal range of motion. Tenderness.        Right hand: She exhibits tenderness. She exhibits normal range of motion.        Left hand: She exhibits tenderness. She exhibits normal range of motion.   Neurological: She is alert and oriented to person, place, and time.   Skin: Skin is warm and dry.   Psychiatric: She has a normal mood and affect. Thought content normal.   Nursing note and vitals  reviewed.      Assessment/Plan   Jyoti was seen today for hypertension, hyperlipidemia and restless legs syndrome.    Diagnoses and all orders for this visit:    Bilateral ankle pain, unspecified chronicity  -     traMADol (ULTRAM) 50 MG tablet; Take 1 tablet by mouth Every 8 (Eight) Hours As Needed for Moderate Pain .      The gabapentin was filled yesterday.  Have given her refill on tramadol.  edith #[id number] is reviewed.

## 2018-02-16 ENCOUNTER — OFFICE VISIT (OUTPATIENT)
Dept: FAMILY MEDICINE CLINIC | Facility: CLINIC | Age: 58
End: 2018-02-16

## 2018-02-16 VITALS
HEART RATE: 112 BPM | TEMPERATURE: 96.8 F | OXYGEN SATURATION: 98 % | BODY MASS INDEX: 25.69 KG/M2 | DIASTOLIC BLOOD PRESSURE: 74 MMHG | HEIGHT: 63 IN | WEIGHT: 145 LBS | SYSTOLIC BLOOD PRESSURE: 117 MMHG

## 2018-02-16 DIAGNOSIS — H65.91 RIGHT OTITIS MEDIA WITH EFFUSION: ICD-10-CM

## 2018-02-16 DIAGNOSIS — J06.9 URI WITH COUGH AND CONGESTION: Primary | ICD-10-CM

## 2018-02-16 PROCEDURE — 99213 OFFICE O/P EST LOW 20 MIN: CPT | Performed by: NURSE PRACTITIONER

## 2018-02-16 RX ORDER — AMOXICILLIN 875 MG/1
875 TABLET, COATED ORAL EVERY 12 HOURS SCHEDULED
Qty: 20 TABLET | Refills: 0 | Status: SHIPPED | OUTPATIENT
Start: 2018-02-16 | End: 2018-03-14

## 2018-02-16 RX ORDER — GUAIFENESIN 600 MG/1
600 TABLET, EXTENDED RELEASE ORAL EVERY 12 HOURS SCHEDULED
Qty: 20 TABLET | Refills: 0 | Status: SHIPPED | OUTPATIENT
Start: 2018-02-16 | End: 2018-09-19

## 2018-02-16 RX ORDER — FLUTICASONE PROPIONATE 50 MCG
2 SPRAY, SUSPENSION (ML) NASAL DAILY
Qty: 16 G | Refills: 0 | Status: SHIPPED | OUTPATIENT
Start: 2018-02-16 | End: 2019-01-11 | Stop reason: SDUPTHER

## 2018-02-16 NOTE — PROGRESS NOTES
Hattie Riddle is a 57 y.o. female.     Earache    There is pain in the right ear. This is a new problem. The current episode started in the past 7 days. The problem occurs constantly. The problem has been unchanged. There has been no fever. The pain is at a severity of 7/10. Associated symptoms include headaches ( sinus) and rhinorrhea. Pertinent negatives include no abdominal pain, coughing, diarrhea, ear discharge, hearing loss, neck pain, rash, sore throat or vomiting. She has tried nothing for the symptoms.   URI    This is a new problem. The current episode started in the past 7 days. The problem has been gradually worsening. There has been no fever. Associated symptoms include congestion, ear pain, headaches ( sinus), a plugged ear sensation ( right), rhinorrhea, sneezing and swollen glands. Pertinent negatives include no abdominal pain, chest pain, coughing, diarrhea, dysuria, joint pain, joint swelling, nausea, neck pain, rash, sinus pain, sore throat, vomiting or wheezing. She has tried nothing for the symptoms.        The following portions of the patient's history were reviewed and updated as appropriate: allergies, current medications, past medical history, past social history, past surgical history and problem list.    Review of Systems   Constitutional: Negative for appetite change, chills, fatigue and fever.   HENT: Positive for congestion, ear pain, rhinorrhea, sinus pressure and sneezing. Negative for ear discharge, hearing loss, sinus pain and sore throat.    Eyes: Negative for discharge and itching.   Respiratory: Negative for cough, chest tightness, shortness of breath and wheezing.    Cardiovascular: Negative for chest pain.   Gastrointestinal: Negative for abdominal pain, diarrhea, nausea and vomiting.   Genitourinary: Negative for dysuria.   Musculoskeletal: Negative for joint pain, myalgias, neck pain and neck stiffness.   Skin: Negative for rash.   Neurological: Positive for  "headaches ( sinus). Negative for dizziness.   Hematological: Positive for adenopathy.       Objective    /74 (BP Location: Left arm, Patient Position: Sitting, Cuff Size: Adult)  Pulse 112  Temp 96.8 °F (36 °C) (Tympanic)   Ht 160 cm (63\")  Wt 65.8 kg (145 lb)  SpO2 98%  BMI 25.69 kg/m2    Physical Exam   Constitutional: She is oriented to person, place, and time. She appears well-developed and well-nourished. No distress.   HENT:   Head: Normocephalic and atraumatic.   Right Ear: Ear canal normal. Tympanic membrane is erythematous. A middle ear effusion is present.   Left Ear: Tympanic membrane and ear canal normal.   Nose: Mucosal edema ( congested) present.   Mouth/Throat: Uvula is midline and mucous membranes are normal. Posterior oropharyngeal erythema ( mild erythema with PND) present.   No localized pain over sinuses     Eyes: Conjunctivae are normal. Right eye exhibits no discharge. Left eye exhibits no discharge.   Cardiovascular: Normal rate and regular rhythm.    Pulmonary/Chest: Effort normal. No respiratory distress. She has no wheezes. She has no rales.   Loose, congested cough   Lymphadenopathy:     She has cervical adenopathy ( shotty on right side).   Neurological: She is alert and oriented to person, place, and time.   Nursing note and vitals reviewed.      Assessment/Plan   Jyoti was seen today for earache and uri.    Diagnoses and all orders for this visit:    URI with cough and congestion  -     amoxicillin (AMOXIL) 875 MG tablet; Take 1 tablet by mouth Every 12 (Twelve) Hours.  -     guaiFENesin (MUCINEX) 600 MG 12 hr tablet; Take 1 tablet by mouth Every 12 (Twelve) Hours.  -     fluticasone (FLONASE) 50 MCG/ACT nasal spray; 2 sprays into each nostril Daily.    Right otitis media with effusion  -     amoxicillin (AMOXIL) 875 MG tablet; Take 1 tablet by mouth Every 12 (Twelve) Hours.      Push fluids  Rest  Rx for Amoxil, Mucinex, Flonase  Tylenol as needed  Moist heat to outer ear " as needed    See PCP or RTC if symptoms persist/worsen

## 2018-02-16 NOTE — PATIENT INSTRUCTIONS
Otitis Media, Adult  Otitis media occurs when there is inflammation and fluid in the middle ear. Your middle ear is a part of the ear that contains bones for hearing as well as air that helps send sounds to your brain.  What are the causes?  This condition is caused by a blockage in the eustachian tube. This tube drains fluid from the ear to the back of the nose (nasopharynx). A blockage in this tube can be caused by an object or by swelling (edema) in the tube. Problems that can cause a blockage include:  · A cold or other upper respiratory infection.  · Allergies.  · An irritant, such as tobacco smoke.  · Enlarged adenoids. The adenoids are areas of soft tissue located high in the back of the throat, behind the nose and the roof of the mouth.  · A mass in the nasopharynx.  · Damage to the ear caused by pressure changes (barotrauma).  What are the signs or symptoms?  Symptoms of this condition include:  · Ear pain.  · A fever.  · Decreased hearing.  · A headache.  · Tiredness (lethargy).  · Fluid leaking from the ear.  · Ringing in the ear.  How is this diagnosed?  This condition is diagnosed with a physical exam. During the exam your health care provider will use an instrument called an otoscope to look into your ear and check for redness, swelling, and fluid. He or she will also ask about your symptoms.  Your health care provider may also order tests, such as:  · A test to check the movement of the eardrum (pneumatic otoscopy). This test is done by squeezing a small amount of air into the ear.  · A test that changes air pressure in the middle ear to check how well the eardrum moves and whether the eustachian tube is working (tympanogram).  How is this treated?  This condition usually goes away on its own within 3-5 days. But if the condition is caused by a bacteria infection and does not go away own its own, or keeps coming back, your health care provider may:  · Prescribe antibiotic medicines to treat the  "infection.  · Prescribe or recommend medicines to control pain.  Follow these instructions at home:  · Take over-the-counter and prescription medicines only as told by your health care provider.  · If you were prescribed an antibiotic medicine, take it as told by your health care provider. Do not stop taking the antibiotic even if you start to feel better.  · Keep all follow-up visits as told by your health care provider. This is important.  Contact a health care provider if:  · You have bleeding from your nose.  · There is a lump on your neck.  · You are not getting better in 5 days.  · You feel worse instead of better.  Get help right away if:  · You have severe pain that is not controlled with medicine.  · You have swelling, redness, or pain around your ear.  · You have stiffness in your neck.  · A part of your face is paralyzed.  · The bone behind your ear (mastoid) is tender when you touch it.  · You develop a severe headache.  Summary  · Otitis media is redness, soreness, and swelling of the middle ear.  · This condition usually goes away on its own within 3-5 days.  · If the problem does not go away in 3-5 days, your health care provider may prescribe or recommend medicines to treat your symptoms.  · If you were prescribed an antibiotic medicine, take it as told by your health care provider.  This information is not intended to replace advice given to you by your health care provider. Make sure you discuss any questions you have with your health care provider.  Document Released: 09/22/2005 Document Revised: 12/08/2017 Document Reviewed: 12/08/2017  [employer] Interactive Patient Education © 2017 [employer]  Upper Respiratory Infection, Adult  Most upper respiratory infections (URIs) are a viral infection of the air passages leading to the lungs. A URI affects the nose, throat, and upper air passages. The most common type of URI is nasopharyngitis and is typically referred to as \"the common cold.\"  URIs run " their course and usually go away on their own. Most of the time, a URI does not require medical attention, but sometimes a bacterial infection in the upper airways can follow a viral infection. This is called a secondary infection. Sinus and middle ear infections are common types of secondary upper respiratory infections.  Bacterial pneumonia can also complicate a URI. A URI can worsen asthma and chronic obstructive pulmonary disease (COPD). Sometimes, these complications can require emergency medical care and may be life threatening.  What are the causes?  Almost all URIs are caused by viruses. A virus is a type of germ and can spread from one person to another.  What increases the risk?  You may be at risk for a URI if:  · You smoke.  · You have chronic heart or lung disease.  · You have a weakened defense (immune) system.  · You are very young or very old.  · You have nasal allergies or asthma.  · You work in crowded or poorly ventilated areas.  · You work in health care facilities or schools.  What are the signs or symptoms?  Symptoms typically develop 2-3 days after you come in contact with a cold virus. Most viral URIs last 7-10 days. However, viral URIs from the influenza virus (flu virus) can last 14-18 days and are typically more severe. Symptoms may include:  · Runny or stuffy (congested) nose.  · Sneezing.  · Cough.  · Sore throat.  · Headache.  · Fatigue.  · Fever.  · Loss of appetite.  · Pain in your forehead, behind your eyes, and over your cheekbones (sinus pain).  · Muscle aches.  How is this diagnosed?  Your health care provider may diagnose a URI by:  · Physical exam.  · Tests to check that your symptoms are not due to another condition such as:  ¨ Strep throat.  ¨ Sinusitis.  ¨ Pneumonia.  ¨ Asthma.  How is this treated?  A URI goes away on its own with time. It cannot be cured with medicines, but medicines may be prescribed or recommended to relieve symptoms. Medicines may help:  · Reduce your  fever.  · Reduce your cough.  · Relieve nasal congestion.  Follow these instructions at home:  · Take medicines only as directed by your health care provider.  · Gargle warm saltwater or take cough drops to comfort your throat as directed by your health care provider.  · Use a warm mist humidifier or inhale steam from a shower to increase air moisture. This may make it easier to breathe.  · Drink enough fluid to keep your urine clear or pale yellow.  · Eat soups and other clear broths and maintain good nutrition.  · Rest as needed.  · Return to work when your temperature has returned to normal or as your health care provider advises. You may need to stay home longer to avoid infecting others. You can also use a face mask and careful hand washing to prevent spread of the virus.  · Increase the usage of your inhaler if you have asthma.  · Do not use any tobacco products, including cigarettes, chewing tobacco, or electronic cigarettes. If you need help quitting, ask your health care provider.  How is this prevented?  The best way to protect yourself from getting a cold is to practice good hygiene.  · Avoid oral or hand contact with people with cold symptoms.  · Wash your hands often if contact occurs.  There is no clear evidence that vitamin C, vitamin E, echinacea, or exercise reduces the chance of developing a cold. However, it is always recommended to get plenty of rest, exercise, and practice good nutrition.  Contact a health care provider if:  · You are getting worse rather than better.  · Your symptoms are not controlled by medicine.  · You have chills.  · You have worsening shortness of breath.  · You have brown or red mucus.  · You have yellow or brown nasal discharge.  · You have pain in your face, especially when you bend forward.  · You have a fever.  · You have swollen neck glands.  · You have pain while swallowing.  · You have white areas in the back of your throat.  Get help right away if:  · You have  severe or persistent:  ¨ Headache.  ¨ Ear pain.  ¨ Sinus pain.  ¨ Chest pain.  · You have chronic lung disease and any of the following:  ¨ Wheezing.  ¨ Prolonged cough.  ¨ Coughing up blood.  ¨ A change in your usual mucus.  · You have a stiff neck.  · You have changes in your:  ¨ Vision.  ¨ Hearing.  ¨ Thinking.  ¨ Mood.  This information is not intended to replace advice given to you by your health care provider. Make sure you discuss any questions you have with your health care provider.  Document Released: 06/13/2002 Document Revised: 08/20/2017 Document Reviewed: 03/25/2015  [employer] Interactive Patient Education © 2017 [employer]

## 2018-02-18 DIAGNOSIS — M25.50 ARTHRALGIA: ICD-10-CM

## 2018-02-18 DIAGNOSIS — S46.011A ROTATOR CUFF STRAIN, RIGHT, INITIAL ENCOUNTER: ICD-10-CM

## 2018-02-19 RX ORDER — LEVETIRACETAM 500 MG/1
500 TABLET ORAL 2 TIMES DAILY
Qty: 60 TABLET | Refills: 2 | Status: SHIPPED | OUTPATIENT
Start: 2018-02-19 | End: 2018-03-29 | Stop reason: SDUPTHER

## 2018-02-19 RX ORDER — CYCLOBENZAPRINE HCL 10 MG
TABLET ORAL
Qty: 90 TABLET | Refills: 3 | Status: SHIPPED | OUTPATIENT
Start: 2018-02-19 | End: 2018-04-26 | Stop reason: SDUPTHER

## 2018-03-01 ENCOUNTER — OFFICE VISIT (OUTPATIENT)
Dept: FAMILY MEDICINE CLINIC | Facility: CLINIC | Age: 58
End: 2018-03-01

## 2018-03-01 VITALS
SYSTOLIC BLOOD PRESSURE: 165 MMHG | DIASTOLIC BLOOD PRESSURE: 118 MMHG | OXYGEN SATURATION: 90 % | RESPIRATION RATE: 20 BRPM | BODY MASS INDEX: 25.87 KG/M2 | WEIGHT: 146 LBS | HEART RATE: 108 BPM | TEMPERATURE: 97.8 F | HEIGHT: 63 IN

## 2018-03-01 DIAGNOSIS — I10 ESSENTIAL HYPERTENSION: Primary | ICD-10-CM

## 2018-03-01 PROCEDURE — 99212 OFFICE O/P EST SF 10 MIN: CPT | Performed by: NURSE PRACTITIONER

## 2018-03-01 NOTE — PROGRESS NOTES
Hattie Riddle is a 57 y.o. female.     HPI Comments: Here today for toño on her b/p.  Is elevated today due to cocaine use.  Her significant other accompanies and reports she has not slept for a couple of nights.  She has not taken her meds today.    Hypertension   This is a chronic problem. The current episode started more than 1 year ago. The problem is uncontrolled (uncontrolled today, is usually well controlled.). Associated symptoms include anxiety and malaise/fatigue. Pertinent negatives include no blurred vision, chest pain, headaches, neck pain, orthopnea, palpitations, peripheral edema, PND, shortness of breath or sweats. There are no associated agents to hypertension. Risk factors for coronary artery disease include post-menopausal state, sedentary lifestyle and smoking/tobacco exposure. Past treatments include calcium channel blockers. The current treatment provides significant (usually is controlled) improvement. Compliance problems include psychosocial issues.  Hypertensive end-organ damage includes CVA. There is no history of angina, kidney disease, CAD/MI, heart failure, left ventricular hypertrophy, PVD or retinopathy.        The following portions of the patient's history were reviewed and updated as appropriate: allergies, current medications, past family history, past medical history, past social history, past surgical history and problem list.    Review of Systems   Constitutional: Positive for malaise/fatigue.   HENT: Negative.    Eyes: Negative for blurred vision.   Respiratory: Negative.  Negative for shortness of breath.    Cardiovascular: Negative.  Negative for chest pain, palpitations, orthopnea and PND.   Musculoskeletal: Negative.  Negative for neck pain.   Skin: Negative.    Neurological: Negative.  Negative for headaches.   Psychiatric/Behavioral: Negative.        Objective   Physical Exam   Constitutional: She is oriented to person, place, and time. She appears  well-developed and well-nourished. No distress.   HENT:   Head: Normocephalic.   Eyes: Pupils are equal, round, and reactive to light.   Neck: Normal range of motion. Neck supple. No thyromegaly present.   Cardiovascular: Normal rate, regular rhythm and normal heart sounds.  Exam reveals no friction rub.    No murmur heard.  Pulmonary/Chest: Effort normal and breath sounds normal. No respiratory distress. She has no wheezes. She has no rales.   Abdominal: Soft. Bowel sounds are normal. She exhibits no distension.   Musculoskeletal: Normal range of motion.   Neurological: She is alert and oriented to person, place, and time.   Skin: Skin is warm and dry.   Psychiatric: She has a normal mood and affect. Thought content normal.   Nursing note and vitals reviewed.      Assessment/Plan   Jyoti was seen today for hypertension.    Diagnoses and all orders for this visit:    Essential hypertension      No changes in meds at this time.  Significant other will check b/p at home.  Will toño in 1 month.

## 2018-03-03 DIAGNOSIS — I10 ESSENTIAL HYPERTENSION: ICD-10-CM

## 2018-03-05 DIAGNOSIS — G62.9 NEUROPATHY: ICD-10-CM

## 2018-03-05 RX ORDER — GABAPENTIN 800 MG/1
TABLET ORAL
Qty: 60 TABLET | Refills: 0 | Status: SHIPPED | OUTPATIENT
Start: 2018-03-05 | End: 2018-04-26 | Stop reason: SDUPTHER

## 2018-03-05 RX ORDER — NIFEDIPINE 30 MG/1
TABLET, EXTENDED RELEASE ORAL
Qty: 30 TABLET | Refills: 5 | Status: SHIPPED | OUTPATIENT
Start: 2018-03-05 | End: 2018-09-24 | Stop reason: SDUPTHER

## 2018-03-10 DIAGNOSIS — M54.2 NECK PAIN: ICD-10-CM

## 2018-03-12 ENCOUNTER — HOSPITAL ENCOUNTER (EMERGENCY)
Facility: HOSPITAL | Age: 58
Discharge: HOME | End: 2018-03-12
Attending: FAMILY MEDICINE | Admitting: FAMILY MEDICINE

## 2018-03-12 VITALS
DIASTOLIC BLOOD PRESSURE: 62 MMHG | RESPIRATION RATE: 20 BRPM | BODY MASS INDEX: 25.87 KG/M2 | SYSTOLIC BLOOD PRESSURE: 98 MMHG | HEIGHT: 63 IN | TEMPERATURE: 98.7 F | WEIGHT: 146 LBS | OXYGEN SATURATION: 96 % | HEART RATE: 122 BPM

## 2018-03-12 DIAGNOSIS — H66.90 ACUTE OTITIS MEDIA, UNSPECIFIED OTITIS MEDIA TYPE: Primary | ICD-10-CM

## 2018-03-12 DIAGNOSIS — H72.91 TYMPANIC MEMBRANE PERFORATION, RIGHT: ICD-10-CM

## 2018-03-12 PROCEDURE — 99283 EMERGENCY DEPT VISIT LOW MDM: CPT

## 2018-03-12 RX ORDER — HYDROCODONE BITARTRATE AND ACETAMINOPHEN 5; 325 MG/1; MG/1
1 TABLET ORAL ONCE
Status: COMPLETED | OUTPATIENT
Start: 2018-03-12 | End: 2018-03-12

## 2018-03-12 RX ORDER — AMOXICILLIN 500 MG/1
1000 CAPSULE ORAL 3 TIMES DAILY
Qty: 60 CAPSULE | Refills: 0 | Status: SHIPPED | OUTPATIENT
Start: 2018-03-12 | End: 2018-03-14

## 2018-03-12 RX ORDER — LIDOCAINE 50 MG/G
PATCH TOPICAL
Qty: 6 PATCH | Refills: 1 | Status: SHIPPED | OUTPATIENT
Start: 2018-03-12 | End: 2020-09-29

## 2018-03-12 RX ORDER — HYDROCODONE BITARTRATE AND ACETAMINOPHEN 5; 325 MG/1; MG/1
1 TABLET ORAL EVERY 6 HOURS PRN
Qty: 12 TABLET | Refills: 0 | Status: SHIPPED | OUTPATIENT
Start: 2018-03-12 | End: 2018-03-15

## 2018-03-12 RX ADMIN — HYDROCODONE BITARTRATE AND ACETAMINOPHEN 1 TABLET: 5; 325 TABLET ORAL at 20:31

## 2018-03-13 NOTE — ED PROVIDER NOTES
Subjective     History provided by:  Patient and parent   used: No    Earache   Location:  Right  Quality:  Aching and throbbing  Severity:  Mild  Onset quality:  Gradual  Duration:  2 weeks  Timing:  Constant  Progression:  Worsening  Chronicity:  New  Context: not direct blow, not elevation change, not foreign body in ear, not loud noise, not recent URI and not water in ear    Context comment:  Patient stuck tweezers and ivan pin in her ear to try to dig piece of cotton out.   Relieved by:  Nothing  Worsened by:  Nothing  Ineffective treatments:  None tried  Associated symptoms: no abdominal pain, no congestion, no cough, no diarrhea, no ear discharge, no fever, no headaches, no hearing loss, no neck pain, no rash, no rhinorrhea, no sore throat, no tinnitus and no vomiting    Risk factors: no recent travel, no chronic ear infection and no prior ear surgery        Review of Systems   Constitutional: Negative.  Negative for fever.   HENT: Positive for ear pain. Negative for congestion, dental problem, drooling, ear discharge, facial swelling, hearing loss, mouth sores, nosebleeds, postnasal drip, rhinorrhea, sinus pain, sinus pressure, sneezing, sore throat, tinnitus, trouble swallowing and voice change.    Eyes: Negative.    Respiratory: Negative.  Negative for cough.    Cardiovascular: Negative.    Gastrointestinal: Negative.  Negative for abdominal pain, diarrhea and vomiting.   Endocrine: Negative.    Genitourinary: Negative.    Musculoskeletal: Negative for neck pain.   Skin: Negative.  Negative for rash.   Allergic/Immunologic: Negative.    Neurological: Negative.  Negative for headaches.   Hematological: Negative.    Psychiatric/Behavioral: Negative.        Past Medical History:   Diagnosis Date   • Acute bronchitis 07/19/2016   • Acute otitis externa 09/04/2014   • Acute otitis media 02/10/2015   • Acute sinusitis 07/19/2016   • Ankle injury    • Backache 12/08/2014   • Benign  hypertension 05/13/2016   • Cerebrovascular accident 2015   • Dizziness 02/10/2015   • Encounter for gynecological examination without abnormal finding 03/10/2016   • Fatigue 08/28/2015   • Foot pain 06/09/2014   • Hyperkeratosis 04/28/2014   • Hypotensive episode 06/12/2015   • Keratoma 08/04/2014    intractable plantar   • Low blood pressure 08/28/2015   • Menopausal and perimenopausal disorder 03/10/2016    other specified   • Numbness of face 05/13/2016    improved   • Otitis media of right ear 07/30/2016    unspecified   • Perforated tympanic membrane 09/04/2014   • Puncture wound 06/17/2014    injury   • Right-sided face pain 05/13/2016   • Seizure    • Thumb pain 08/28/2015   • Upper respiratory infection 08/28/2015   • Wheezing 10/21/2014       Allergies   Allergen Reactions   • Lisinopril Anaphylaxis       Past Surgical History:   Procedure Laterality Date   • BREAST BIOPSY     • OTHER SURGICAL HISTORY  07/07/2014    Destruction of Benign Lesion (1-14)   • TUBAL ABDOMINAL LIGATION         Family History   Problem Relation Age of Onset   • Alzheimer's disease Other    • Bipolar disorder Other    • Depression Other    • Diabetes Other    • Heart disease Other    • Hypertension Other    • Lung cancer Other    • Migraines Other    • Sickle cell anemia Other    • Stroke Other    • No Known Problems Mother    • Alzheimer's disease Father    • Heart attack Father    • Throat cancer Father    • No Known Problems Daughter        Social History     Social History   • Marital status:      Social History Main Topics   • Smoking status: Current Every Day Smoker     Packs/day: 0.25     Types: Cigarettes   • Smokeless tobacco: Never Used   • Alcohol use Yes      Comment: rarely   • Drug use:      Types: Cocaine   • Sexual activity: Defer     Other Topics Concern   • Not on file           Objective   Physical Exam   Constitutional: She is oriented to person, place, and time. She appears well-developed and  well-nourished. No distress.   HENT:   Head: Normocephalic and atraumatic.   Right Ear: Hearing normal. No lacerations. There is tenderness. No drainage or swelling. No foreign bodies. No mastoid tenderness. Tympanic membrane is perforated and erythematous. Tympanic membrane is not injected, not scarred, not retracted and not bulging. Tympanic membrane mobility is normal. No middle ear effusion. No hemotympanum. No decreased hearing is noted.   Left Ear: Hearing and tympanic membrane normal.   Ears:    Mouth/Throat: No oropharyngeal exudate.   Eyes: Conjunctivae and EOM are normal. Pupils are equal, round, and reactive to light. Right eye exhibits no discharge. Left eye exhibits no discharge. No scleral icterus.   Neck: Normal range of motion. Neck supple. No JVD present. No tracheal deviation present. No thyromegaly present.   Cardiovascular: Normal rate, regular rhythm, normal heart sounds and intact distal pulses.  Exam reveals no gallop and no friction rub.    No murmur heard.  Pulmonary/Chest: Effort normal and breath sounds normal. No stridor. No respiratory distress. She has no wheezes. She has no rales. She exhibits no tenderness.   Abdominal: Soft. Bowel sounds are normal. She exhibits no distension and no mass. There is no tenderness. There is no rebound and no guarding. No hernia.   Musculoskeletal: Normal range of motion. She exhibits no edema, tenderness or deformity.   Lymphadenopathy:     She has no cervical adenopathy.   Neurological: She is alert and oriented to person, place, and time. She has normal reflexes. She displays normal reflexes. No cranial nerve deficit. She exhibits normal muscle tone. Coordination normal.   Skin: Skin is warm and dry. Capillary refill takes less than 2 seconds. No rash noted. She is not diaphoretic. No erythema. No pallor.   Psychiatric: She has a normal mood and affect. Her behavior is normal. Judgment and thought content normal.   Nursing note and vitals  reviewed.      Procedures         ED Course  ED Course        Patient will follow up with ENT tomorrow morning. Will treat with antibiotics and pain medication. Advised to return to ED if symptoms worsen.           MDM    Final diagnoses:   Acute otitis media, unspecified otitis media type   Tympanic membrane perforation, right            VERONICA Tucker  03/12/18 [id number]

## 2018-03-14 ENCOUNTER — OFFICE VISIT (OUTPATIENT)
Dept: OTOLARYNGOLOGY | Facility: CLINIC | Age: 58
End: 2018-03-14

## 2018-03-14 VITALS — BODY MASS INDEX: 26.65 KG/M2 | WEIGHT: 150.4 LBS | HEIGHT: 63 IN

## 2018-03-14 DIAGNOSIS — Z71.1 FEARED CONDITION NOT DEMONSTRATED: ICD-10-CM

## 2018-03-14 DIAGNOSIS — H60.391 ACUTE INFECTIVE OTITIS EXTERNA, RIGHT: Primary | ICD-10-CM

## 2018-03-14 DIAGNOSIS — H60.63 CHRONIC NON-INFECTIVE OTITIS EXTERNA OF BOTH EARS, UNSPECIFIED TYPE: ICD-10-CM

## 2018-03-14 PROCEDURE — 99214 OFFICE O/P EST MOD 30 MIN: CPT | Performed by: OTOLARYNGOLOGY

## 2018-03-14 PROCEDURE — 92504 EAR MICROSCOPY EXAMINATION: CPT | Performed by: OTOLARYNGOLOGY

## 2018-03-14 RX ORDER — OFLOXACIN 3 MG/ML
5 SOLUTION AURICULAR (OTIC) 2 TIMES DAILY
Qty: 5 ML | Refills: 0 | Status: SHIPPED | OUTPATIENT
Start: 2018-03-14 | End: 2018-03-21

## 2018-03-14 NOTE — PROGRESS NOTES
Hattie Riddle is a 57 y.o. female.       History of Present Illness   Patient is followed with chronic noninfective otitis externa.  Reports that couple weeks ago she was cleaning her right ear with a Q-tip and when she pulled out the cotton was not on the end of the swab.  She tried to get it out with Rupesh pins and tweezers.  Subsequently went to the emergency room and was told she had a ruptured eardrum and was given amoxicillin.  She feels like there is blood and pus in her right ear.  No symptoms in her left ear.      The following portions of the patient's history were reviewed and updated as appropriate: allergies, current medications, past family history, past medical history, past social history, past surgical history and problem list.     reports that she has been smoking Cigarettes.  She has been smoking about 0.25 packs per day. She has never used smokeless tobacco. She reports that she drinks alcohol. She reports that she uses drugs, including Cocaine.   Patient is a tobacco user and has been counseled for use of tobacco products      Review of Systems   Constitutional: Negative for fever.   HENT: Positive for ear discharge.            Objective   Physical Exam   General: Well-developed well-nourished female in no acute distress.  Alert and oriented ×3.  Head: Normocephalic. Face: Symmetrical strength and appearance. Voice:Strong Speech:Fluent    Ears: External ears no deformity.  Left ear canal shows a modest amount of uninfected squamous debris that cleaned under the microscope using his mentation.  Tympanic membrane is intact and clear.  Right ear canal shows macerated squamous debris consistent with an acute infective otitis externa.  This is cleaned under the microscope using suction.  Tympanic membrane is intact and clear.  No foreign material is identified.  Nose: Nares show no discharge mass polyp or purulence.  Boggy mucosa is present.  No gross external deformity.  Septum:  Midline  Oral cavity: Lips and gums without lesions.  Tongue and floor of mouth without lesions.  Parotid and submandibular ducts unobstructed.  No mucosal lesions on the buccal mucosa or vestibule of the mouth.  Pharynx: No erythema exudate mass or ulcer  Neck: No lymphadenopathy.  No thyromegaly.  Trachea and larynx midline.  No masses in the parotid or submandibular glands.      Assessment/Plan   Jyoti was seen today for ear drainage.    Diagnoses and all orders for this visit:    Acute infective otitis externa, right    Chronic non-infective otitis externa of both ears, unspecified type    Feared condition not demonstrated      Plan: Ear cleaned as described above.  Prescribed ofloxacin 5 drops to the right ear twice a day ×7 days.  Advise no further manipulation.  Reassured the patient was no evidence of foreign material.  If symptoms return to baseline return in 6 months, call sooner for problems.

## 2018-03-29 ENCOUNTER — OFFICE VISIT (OUTPATIENT)
Dept: NEUROLOGY | Facility: CLINIC | Age: 58
End: 2018-03-29

## 2018-03-29 VITALS
HEIGHT: 63 IN | SYSTOLIC BLOOD PRESSURE: 108 MMHG | DIASTOLIC BLOOD PRESSURE: 70 MMHG | HEART RATE: 106 BPM | OXYGEN SATURATION: 94 % | WEIGHT: 149 LBS | BODY MASS INDEX: 26.4 KG/M2

## 2018-03-29 DIAGNOSIS — F19.90 ILLICIT DRUG USE: ICD-10-CM

## 2018-03-29 DIAGNOSIS — Z72.0 TOBACCO ABUSE: ICD-10-CM

## 2018-03-29 DIAGNOSIS — I63.032 CEREBROVASCULAR ACCIDENT (CVA) DUE TO THROMBOSIS OF LEFT CAROTID ARTERY (HCC): Primary | ICD-10-CM

## 2018-03-29 DIAGNOSIS — G62.9 NEUROPATHY: ICD-10-CM

## 2018-03-29 DIAGNOSIS — I10 ESSENTIAL HYPERTENSION: ICD-10-CM

## 2018-03-29 DIAGNOSIS — G40.309 GENERALIZED SEIZURE DISORDER (HCC): ICD-10-CM

## 2018-03-29 DIAGNOSIS — R94.01 ABNORMAL EEG: ICD-10-CM

## 2018-03-29 PROCEDURE — 99213 OFFICE O/P EST LOW 20 MIN: CPT | Performed by: CLINICAL NURSE SPECIALIST

## 2018-03-29 RX ORDER — MELOXICAM 15 MG/1
TABLET ORAL
Refills: 0 | COMMUNITY
Start: 2018-01-11 | End: 2018-07-11

## 2018-03-29 RX ORDER — IBUPROFEN 800 MG/1
800 TABLET ORAL DAILY
COMMUNITY
End: 2018-10-18

## 2018-03-29 RX ORDER — LEVETIRACETAM 500 MG/1
500 TABLET ORAL 2 TIMES DAILY
Qty: 60 TABLET | Refills: 5 | Status: SHIPPED | OUTPATIENT
Start: 2018-03-29 | End: 2018-05-21 | Stop reason: SDUPTHER

## 2018-03-29 NOTE — PROGRESS NOTES
"Subjective     Chief Complaint   Patient presents with   • Stroke     Patient states she does have pain that shoot through both hands with tingling along with the facial pain she mentioned last visit.   • Balance Issues     She claims to has fallen several times.  Falls usually happen after standing up.   • Seizures     Her Partner states she has had 3-4 seizures that last about 5-6 mins.         Jyoti Riddle is a 57 y.o. female right handed .  She is here today for follow up for stroke and abnormal EEG concerning to be seizuregenic. She was last seen 10/2017. At last visit had increased keppra to 500 mg BID and patient denies side effects. Patient reports recent bilateral ear infection.     She denies new stroke symptoms of unilateral weakness, slurred speech, or numbness.  She denies seizures . She continues to have occasional  right facial \"shooting, electrical shock\" beginning on right fore head radiating to eye. . She denies loss of consciousness, tongue biting, incontinence, involuntary tremor. Patient caregiver does report 3-4 staring spells since lasts visit but cannot tell me if occurs after cocaine use. She denies missing doses of Keppra. She continues with  ASA. She continues to use tobacco stating 1 pack last 3 days. She also tells me she has cut back on marijuana and but uses cocaine at least 3 times weekly with her last reported use yesterday.      Stroke   This is a chronic (image negative stroke in 2013 left hemisphere) problem. The current episode started more than 1 year ago. The problem has been gradually improving. Associated symptoms include headaches (sharp, \" electrical shock\" sensation on right side of forehead radiating to right eye. ). Pertinent negatives include no arthralgias, chest pain, fatigue, fever, myalgias, nausea, sore throat, vomiting or weakness. Associated symptoms comments: Right facial weakness, RUE/RLE weakness and decrease sensation. And mild dysarthria this has improved " "since last visit but imbalanced and walks with single point cane. Exacerbated by: hypertensive emergency, illicit drug use.   Seizures    This is a chronic problem. Associated symptoms include headaches (sharp, \" electrical shock\" sensation on right side of forehead radiating to right eye. ) and speech difficulty (mild dysarthria). Pertinent negatives include no confusion, no visual disturbance, no sore throat, no chest pain, no nausea, no vomiting and no diarrhea. Characteristics include eye blinking. body stiffens Possible causes include missed seizure meds. illicit drug use   Altered Mental Status   This is a chronic (since 2013) problem. The current episode started more than 1 year ago. The problem has been gradually improving (has had 2 episodes since last visit but may be related to illliciit drug use). Associated symptoms include headaches (sharp, \" electrical shock\" sensation on right side of forehead radiating to right eye. ). Pertinent negatives include no arthralgias, chest pain, fatigue, fever, myalgias, nausea, sore throat, vomiting or weakness. Exacerbated by: illicit drug use, JAQUELIN. Treatments tried: trileptal 150 mg BID.        Current Outpatient Prescriptions   Medication Sig Dispense Refill   • acetaminophen (TYLENOL) 500 MG tablet Take 1 tablet by mouth Every 6 (Six) Hours As Needed (body aches). 30 tablet 0   • albuterol (VENTOLIN HFA) 108 (90 BASE) MCG/ACT inhaler Inhale 2 puffs 4 (Four) Times a Day. 18 g 3   • ANORO ELLIPTA 62.5-25 MCG/INH aerosol powder  Inhale 62.5 inhalers Daily.  0   • aspirin 81 MG chewable tablet Chew 81 mg.     • atorvastatin (LIPITOR) 40 MG tablet Take 1 tablet by mouth Daily. 30 tablet 8   • Blood Pressure Monitoring (B-D ASSURE BPM/AUTO WRIST CUFF) misc 1 kit 2 (Two) Times a Day As Needed (fluctuating blood pressure). 1 each 0   • Calcium-Magnesium-Vitamin D (CALCIUM 500 PO) Take  by mouth.     • cyclobenzaprine (FLEXERIL) 10 MG tablet take 1 tablet by mouth three " times a day if needed 90 tablet 3   • diclofenac (VOLTAREN) 50 MG EC tablet take 1 tablet by mouth twice a day 60 tablet 5   • fluticasone (FLONASE) 50 MCG/ACT nasal spray 2 sprays into each nostril Daily. 16 g 0   • folic acid (FOLVITE) 1 MG tablet take 1 tablet by mouth once daily 30 tablet 5   • gabapentin (NEURONTIN) 800 MG tablet take 1 tablet by mouth twice a day 60 tablet 0   • guaiFENesin (MUCINEX) 600 MG 12 hr tablet Take 1 tablet by mouth Every 12 (Twelve) Hours. 20 tablet 0   • ibuprofen (ADVIL,MOTRIN) 800 MG tablet Take 800 mg by mouth Daily.     • levETIRAcetam (KEPPRA) 500 MG tablet Take 1 tablet by mouth 2 (Two) Times a Day. 60 tablet 5   • lidocaine (LIDODERM) 5 % apply 1 patch to the affected area daily. Leave on for 12 hours and then off for 12 hours. 6 patch 1   • meclizine (ANTIVERT) 25 MG tablet take 1 tablet by mouth three times a day if needed for dizziness 30 tablet 5   • NIFEdipine XL (PROCARDIA XL) 30 MG 24 hr tablet take 1 tablet by mouth once daily 30 tablet 5   • ondansetron (ZOFRAN) 4 MG tablet Take 1 tablet by mouth Every 8 (Eight) Hours As Needed for Nausea or Vomiting. 10 tablet 0   • RA FOLIC ACID 400 MCG tablet take 1 tablet by mouth once daily  0   • RA VITAMIN B-1 100 MG tablet take 1 tablet by mouth once daily  0   • rOPINIRole (REQUIP) 2 MG tablet take 1 tablet by mouth at bedtime 30 tablet 3   • traMADol (ULTRAM) 50 MG tablet Take 1 tablet by mouth Every 8 (Eight) Hours As Needed for Moderate Pain . 90 tablet 0   • triamcinolone (KENALOG) 0.1 % ointment Apply  topically 2 (Two) Times a Day. 30 g 3   • vitamin D (ERGOCALCIFEROL) 62440 units capsule capsule take 1 capsule by mouth EVERY 2 WEEKS 4 capsule 5   • meloxicam (MOBIC) 15 MG tablet   0     No current facility-administered medications for this visit.        Past Medical History:   Diagnosis Date   • Acute bronchitis 07/19/2016   • Acute otitis externa 09/04/2014   • Acute otitis media 02/10/2015   • Acute sinusitis  07/19/2016   • Ankle injury    • Backache 12/08/2014   • Benign hypertension 05/13/2016   • Cerebrovascular accident 2015   • Dizziness 02/10/2015   • Encounter for gynecological examination without abnormal finding 03/10/2016   • Fatigue 08/28/2015   • Foot pain 06/09/2014   • Hyperkeratosis 04/28/2014   • Hypotensive episode 06/12/2015   • Keratoma 08/04/2014    intractable plantar   • Low blood pressure 08/28/2015   • Menopausal and perimenopausal disorder 03/10/2016    other specified   • Numbness of face 05/13/2016    improved   • Otitis media of right ear 07/30/2016    unspecified   • Perforated tympanic membrane 09/04/2014   • Puncture wound 06/17/2014    injury   • Right-sided face pain 05/13/2016   • Seizure    • Thumb pain 08/28/2015   • Upper respiratory infection 08/28/2015   • Wheezing 10/21/2014       Past Surgical History:   Procedure Laterality Date   • BREAST BIOPSY     • OTHER SURGICAL HISTORY  07/07/2014    Destruction of Benign Lesion (1-14)   • TUBAL ABDOMINAL LIGATION         family history includes Alzheimer's disease in her father and other; Bipolar disorder in her other; Depression in her other; Diabetes in her other; Heart attack in her father; Heart disease in her other; Hypertension in her other; Lung cancer in her other; Migraines in her other; No Known Problems in her daughter and mother; Sickle cell anemia in her other; Stroke in her other; Throat cancer in her father.    Social History   Substance Use Topics   • Smoking status: Current Every Day Smoker     Packs/day: 0.25     Types: Cigarettes   • Smokeless tobacco: Never Used   • Alcohol use Yes      Comment: rarely       Review of Systems   Constitutional: Negative for fatigue and fever.   HENT: Negative for sore throat.    Eyes: Negative for visual disturbance.   Cardiovascular: Negative for chest pain.   Gastrointestinal: Negative for diarrhea, nausea and vomiting.   Genitourinary: Negative for dysuria and frequency.  "  Musculoskeletal: Positive for gait problem (imbalance at times). Negative for arthralgias and myalgias.   Neurological: Positive for seizures, speech difficulty (mild dysarthria) and headaches (sharp, \" electrical shock\" sensation on right side of forehead radiating to right eye. ). Negative for weakness.   Psychiatric/Behavioral: Negative for agitation, confusion and hallucinations.       Objective     /70   Pulse 106   Ht 160 cm (63\")   Wt 67.6 kg (149 lb)   SpO2 94%   BMI 26.39 kg/m² , Body mass index is 26.39 kg/m².    Physical Exam   Constitutional: She is oriented to person, place, and time. Vital signs are normal. She appears well-developed and well-nourished. She is cooperative.   HENT:   Head: Normocephalic and atraumatic.   Right Ear: Hearing and external ear normal. There is tenderness. Tympanic membrane is erythematous.   Left Ear: Hearing and external ear normal.   Nose: Nose normal.   Mouth/Throat: Oropharynx is clear and moist.   Eyes: Conjunctivae, EOM and lids are normal. Pupils are equal, round, and reactive to light. Right eye exhibits normal extraocular motion and no nystagmus. Left eye exhibits normal extraocular motion and no nystagmus. Right pupil is round. Left pupil is round and reactive. Pupils are equal.   Neck: Normal range of motion. Neck supple. Carotid bruit is not present.   Cardiovascular: Normal rate, regular rhythm, S1 normal, S2 normal and normal heart sounds.    No murmur heard.  Pulmonary/Chest: Effort normal and breath sounds normal.   Abdominal: Soft. Bowel sounds are normal.   Musculoskeletal: Normal range of motion.   Hard boot to right foot   Neurological: She is alert and oriented to person, place, and time. She has normal strength and normal reflexes. She displays no tremor. A cranial nerve deficit (right lower facial weakness) and sensory deficit (decrease sensation of right side) is present. She displays a negative Romberg sign. She displays no seizure " activity. Coordination (ataxia bilateral finger to nose) abnormal. Abnormal gait: unsteady gait, negative romberg.   Reflex Scores:       Tricep reflexes are 2+ on the right side and 2+ on the left side.       Bicep reflexes are 2+ on the right side and 2+ on the left side.       Brachioradialis reflexes are 2+ on the right side and 2+ on the left side.       Patellar reflexes are 2+ on the right side and 2+ on the left side.       Achilles reflexes are 2+ on the right side and 2+ on the left side.  CN II:  Visual fields full.  Pupils equally reactive to light  CN III, IV, VI:  Extraocular Muscles full with no signs of nystagmus  CN V:  Facial sensory is symmetric with no asymetries.  CN VII:  Facial motor symmetric  CN VIII:  Gross hearing intact bilaterally  CN IX:  Palate elevates symmetrically  CN X:  Palate elevates symmetrically  CN XI:  Shoulder shrug symmetric  CN XII:  Tongue is midline on protrusion   Skin: Skin is warm and dry.   Psychiatric: She has a normal mood and affect. Her speech is normal and behavior is normal. Cognition and memory are normal.   Nursing note and vitals reviewed.      Results for orders placed or performed in visit on 11/28/17   Sedimentation rate, automated   Result Value Ref Range    Sed Rate 37 (H) 0 - 20 mm/hr   Uric acid   Result Value Ref Range    Uric Acid 4.4 2.5 - 8.5 mg/dL   Rheumatoid Factor, Quant   Result Value Ref Range    Rheumatoid Factor Qualitative Negative Negative        ASSESSMENT/PLAN    Diagnoses and all orders for this visit:    Cerebrovascular accident (CVA) due to thrombosis of left carotid artery    Essential hypertension    Neuropathy    Abnormal EEG    Generalized seizure disorder    Illicit drug use    Tobacco abuse    Other orders  -     meloxicam (MOBIC) 15 MG tablet;   -     ibuprofen (ADVIL,MOTRIN) 800 MG tablet; Take 800 mg by mouth Daily.  -     levETIRAcetam (KEPPRA) 500 MG tablet; Take 1 tablet by mouth 2 (Two) Times a Day.      MEDICAL  DECISION MAKIN. Continue with ASA 81 mg for secondary stroke prevention and counseled on compliance.  2. Continue with statin per PCP for LDL goal less than 70.  3. Continue with BP management per PCP for systolic less than 140.  4.continue  Keppra 500 mg BID and counseled on compliance.  5. Counseled on cessation of cocaine, marijuana, and tobacco. I did discuss rehabilitation and patient wishes to think about it.  6. Patient is counseled on stroke signs and symptoms using FAST and Time Saved is Brain Saved.  7. Seizure precautions were discussed to include no tub baths, no swimming, avoiding lack of sleep, and avoiding known triggers. Education given of things that may contribute to a seizure to include, but not limited to: stressful situations, fever, fatigue, lack of sleep, low blood sugar, hyperventilation, flashing lights, and caffeine. Instructions given to take seizure medications as prescribed. Education given to family member on what to do during a seizure and care following the seizure. Education given to contact this office prior to stopping or changing any medications.  8.I advised the patient of the risks in continuing to use tobacco, and I provided this patient with smoking cessation educational materials.    During this visit, I spent > 3-10 minutes counseling the patient regarding smoking cessation.  9. Healthy BMI. Discussed the patient's BMI with her. BMI is within normal parameters. No follow-up required.     allergies and all known medications/prescriptions have been reviewed using resources available on this encounter.    Return in about 6 months (around 2018).        Gladis Ritchie, [place]

## 2018-03-30 DIAGNOSIS — E56.9 VITAMIN DEFICIENCY: ICD-10-CM

## 2018-03-30 DIAGNOSIS — R21 SKIN RASH: ICD-10-CM

## 2018-03-30 RX ORDER — FOLIC ACID 1 MG/1
TABLET ORAL
Qty: 30 TABLET | Refills: 5 | Status: SHIPPED | OUTPATIENT
Start: 2018-03-30 | End: 2018-04-26 | Stop reason: SDUPTHER

## 2018-04-02 ENCOUNTER — OFFICE VISIT (OUTPATIENT)
Dept: FAMILY MEDICINE CLINIC | Facility: CLINIC | Age: 58
End: 2018-04-02

## 2018-04-02 VITALS
WEIGHT: 149 LBS | OXYGEN SATURATION: 98 % | SYSTOLIC BLOOD PRESSURE: 112 MMHG | HEART RATE: 93 BPM | BODY MASS INDEX: 26.4 KG/M2 | RESPIRATION RATE: 20 BRPM | HEIGHT: 63 IN | TEMPERATURE: 98.1 F | DIASTOLIC BLOOD PRESSURE: 73 MMHG

## 2018-04-02 DIAGNOSIS — M25.541 ARTHRALGIA OF BOTH HANDS: Primary | ICD-10-CM

## 2018-04-02 DIAGNOSIS — M25.542 ARTHRALGIA OF BOTH HANDS: Primary | ICD-10-CM

## 2018-04-02 DIAGNOSIS — I10 ESSENTIAL HYPERTENSION: ICD-10-CM

## 2018-04-02 PROCEDURE — 99213 OFFICE O/P EST LOW 20 MIN: CPT | Performed by: NURSE PRACTITIONER

## 2018-04-02 PROCEDURE — 96372 THER/PROPH/DIAG INJ SC/IM: CPT | Performed by: NURSE PRACTITIONER

## 2018-04-02 RX ORDER — BETAMETHASONE SODIUM PHOSPHATE AND BETAMETHASONE ACETATE 3; 3 MG/ML; MG/ML
12 INJECTION, SUSPENSION INTRA-ARTICULAR; INTRALESIONAL; INTRAMUSCULAR; SOFT TISSUE EVERY 24 HOURS
Status: SHIPPED | OUTPATIENT
Start: 2018-04-02 | End: 2018-04-04

## 2018-04-02 RX ADMIN — BETAMETHASONE SODIUM PHOSPHATE AND BETAMETHASONE ACETATE 12 MG: 3; 3 INJECTION, SUSPENSION INTRA-ARTICULAR; INTRALESIONAL; INTRAMUSCULAR; SOFT TISSUE at 14:28

## 2018-04-02 NOTE — PROGRESS NOTES
Hattie SARAVIA Person is a 57 y.o. female.     Here today for toño on her b/p.  Had been having some issues with b/p elevating at home.  Significant other was supposed to be checking it at home.  However he did not accompany her.  She reports her b/p is doing well at home.  Also she has multiple joint pain, especially in her hands.      Hypertension   This is a chronic problem. The current episode started more than 1 year ago. The problem is controlled. Associated symptoms include anxiety. Pertinent negatives include no blurred vision, chest pain, headaches, malaise/fatigue, neck pain, orthopnea, palpitations, peripheral edema, PND, shortness of breath or sweats. Agents associated with hypertension include NSAIDs (street drugs). Risk factors for coronary artery disease include dyslipidemia, post-menopausal state, sedentary lifestyle, smoking/tobacco exposure and stress. Past treatments include calcium channel blockers. Current antihypertension treatment includes calcium channel blockers. The current treatment provides significant improvement. There are no compliance problems.  There is no history of angina, kidney disease, CAD/MI, CVA, heart failure, left ventricular hypertrophy, PVD or retinopathy.   Hand Pain    The incident occurred more than 1 week ago. There was no injury mechanism. The pain is present in the left wrist, left fingers, right fingers and right wrist. The quality of the pain is described as aching. The pain does not radiate. The pain is at a severity of 7/10. Pertinent negatives include no chest pain. The symptoms are aggravated by movement. She has tried NSAIDs for the symptoms. The treatment provided no relief.        The following portions of the patient's history were reviewed and updated as appropriate: allergies, current medications, past family history, past medical history, past social history, past surgical history and problem list.    Review of Systems   Constitutional: Negative.   Negative for malaise/fatigue.   HENT: Negative.    Eyes: Negative for blurred vision.   Respiratory: Negative.  Negative for shortness of breath.    Cardiovascular: Negative.  Negative for chest pain, palpitations, orthopnea and PND.   Musculoskeletal: Positive for arthralgias (hands). Negative for neck pain.   Skin: Negative.    Neurological: Negative.  Negative for headaches.   Psychiatric/Behavioral: Negative.        Objective   Physical Exam   Constitutional: She is oriented to person, place, and time. She appears well-developed and well-nourished. No distress.   HENT:   Head: Normocephalic.   Eyes: Pupils are equal, round, and reactive to light.   Neck: Normal range of motion. Neck supple. No thyromegaly present.   Cardiovascular: Normal rate, regular rhythm and normal heart sounds.  Exam reveals no friction rub.    No murmur heard.  Pulmonary/Chest: Effort normal and breath sounds normal. No respiratory distress. She has no wheezes. She has no rales.   Abdominal: Soft.   Musculoskeletal:        Right hand: She exhibits decreased range of motion, tenderness and bony tenderness.        Left hand: She exhibits decreased range of motion, tenderness and bony tenderness.        Hands:  Neurological: She is alert and oriented to person, place, and time.   Skin: Skin is warm and dry.   Psychiatric: She has a normal mood and affect. Thought content normal.   Nursing note and vitals reviewed.        Assessment/Plan   Jyoti was seen today for hypertension.    Diagnoses and all orders for this visit:    Arthralgia of both hands  -     betamethasone acetate-betamethasone sodium phosphate (CELESTONE SOLUSPAN) injection 12 mg; Inject 2 mL into the shoulder, thigh, or buttocks Daily.    Essential hypertension    b/p is normal today.  No changes in meds.  Will give cortisone today for the joint pain.  She is already taking gabapentin and a nsaid.

## 2018-04-07 DIAGNOSIS — M25.571 BILATERAL ANKLE PAIN, UNSPECIFIED CHRONICITY: ICD-10-CM

## 2018-04-07 DIAGNOSIS — M25.572 BILATERAL ANKLE PAIN, UNSPECIFIED CHRONICITY: ICD-10-CM

## 2018-04-09 DIAGNOSIS — M54.2 NECK PAIN: ICD-10-CM

## 2018-04-09 RX ORDER — TRAMADOL HYDROCHLORIDE 50 MG/1
TABLET ORAL
Qty: 90 TABLET | Refills: 0 | Status: SHIPPED | OUTPATIENT
Start: 2018-04-09 | End: 2018-04-26 | Stop reason: SDUPTHER

## 2018-04-11 ENCOUNTER — OFFICE VISIT (OUTPATIENT)
Dept: ORTHOPEDIC SURGERY | Facility: CLINIC | Age: 58
End: 2018-04-11

## 2018-04-11 VITALS — HEIGHT: 63 IN | BODY MASS INDEX: 26.4 KG/M2 | WEIGHT: 149 LBS

## 2018-04-11 DIAGNOSIS — M79.672 LEFT FOOT PAIN: Primary | ICD-10-CM

## 2018-04-11 DIAGNOSIS — S92.315A CLOSED NONDISPLACED FRACTURE OF FIRST METATARSAL BONE OF LEFT FOOT, INITIAL ENCOUNTER: ICD-10-CM
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
## 2018-04-11 PROBLEM — R70.0 ESR RAISED: Status: ACTIVE | Noted: 2018-01-26

## 2018-04-11 PROBLEM — N28.9 RENAL INSUFFICIENCY: Status: ACTIVE | Noted: 2018-01-26

## 2018-04-11 PROBLEM — R52 PAIN: Status: ACTIVE | Noted: 2018-01-26

## 2018-04-11 PROCEDURE — 99214 OFFICE O/P EST MOD 30 MIN: CPT | Performed by: NURSE PRACTITIONER

## 2018-04-11 PROCEDURE — 28470 CLTX METATARSAL FX WO MNP EA: CPT | Performed by: NURSE PRACTITIONER

## 2018-04-11 RX ORDER — HYDROCODONE BITARTRATE AND ACETAMINOPHEN 5; 325 MG/1; MG/1
1 TABLET ORAL EVERY 6 HOURS PRN
Qty: 40 TABLET | Refills: 0 | Status: SHIPPED | OUTPATIENT
Start: 2018-04-11 | End: 2018-04-21

## 2018-04-11 NOTE — PROGRESS NOTES
Jyoti Riddle is a 57 y.o. female   Primary provider:  WESTON Mitchell       Chief Complaint   Patient presents with   • Left Foot - Establish Care     Xray done today         HISTORY OF PRESENT ILLNESS:    57-year-old patient presents to office for evaluation of left foot and left ankle pain due to injury. Patient sustained a fall at home today. Pain is described as constant and severe. Pain is described as stabbing in nature with associated bruising, redness and swelling in the foot. Pain is worse with movement of the left foot and ankle, weight-bearing, standing and walking. Pain improves mildly with ice and rest.       Foot Injury    Incident onset: 12 hours ago. The incident occurred at home. The injury mechanism was a fall. The pain is present in the left foot and left ankle. The quality of the pain is described as stabbing. The pain is at a severity of 8/10. The pain is severe. The pain has been constant since onset. Associated symptoms comments: Redness, bruising and swelling  . She reports no foreign bodies present. The symptoms are aggravated by weight bearing, movement and palpation. She has tried ice and rest for the symptoms. The treatment provided mild relief.        CONCURRENT MEDICAL HISTORY:    Past Medical History:   Diagnosis Date   • Acute bronchitis 07/19/2016   • Acute otitis externa 09/04/2014   • Acute otitis media 02/10/2015   • Acute sinusitis 07/19/2016   • Ankle injury    • Backache 12/08/2014   • Benign hypertension 05/13/2016   • Cerebrovascular accident 2015   • Dizziness 02/10/2015   • Encounter for gynecological examination without abnormal finding 03/10/2016   • Fatigue 08/28/2015   • Foot pain 06/09/2014   • Hyperkeratosis 04/28/2014   • Hypotensive episode 06/12/2015   • Keratoma 08/04/2014    intractable plantar   • Low blood pressure 08/28/2015   • Menopausal and perimenopausal disorder 03/10/2016    other specified   • Numbness of face 05/13/2016    improved   • Otitis  media of right ear 07/30/2016    unspecified   • Perforated tympanic membrane 09/04/2014   • Puncture wound 06/17/2014    injury   • Right-sided face pain 05/13/2016   • Seizure    • Thumb pain 08/28/2015   • Upper respiratory infection 08/28/2015   • Wheezing 10/21/2014       Allergies   Allergen Reactions   • Lisinopril Anaphylaxis         Current Outpatient Prescriptions:   •  acetaminophen (TYLENOL) 500 MG tablet, Take 1 tablet by mouth Every 6 (Six) Hours As Needed (body aches)., Disp: 30 tablet, Rfl: 0  •  albuterol (VENTOLIN HFA) 108 (90 BASE) MCG/ACT inhaler, Inhale 2 puffs 4 (Four) Times a Day., Disp: 18 g, Rfl: 3  •  ANORO ELLIPTA 62.5-25 MCG/INH aerosol powder , Inhale 62.5 inhalers Daily., Disp: , Rfl: 0  •  aspirin 81 MG chewable tablet, Chew 81 mg., Disp: , Rfl:   •  atorvastatin (LIPITOR) 40 MG tablet, Take 1 tablet by mouth Daily., Disp: 30 tablet, Rfl: 8  •  Blood Pressure Monitoring (B-D ASSURE BPM/AUTO WRIST CUFF) misc, 1 kit 2 (Two) Times a Day As Needed (fluctuating blood pressure)., Disp: 1 each, Rfl: 0  •  Calcium-Magnesium-Vitamin D (CALCIUM 500 PO), Take  by mouth., Disp: , Rfl:   •  cyclobenzaprine (FLEXERIL) 10 MG tablet, take 1 tablet by mouth three times a day if needed, Disp: 90 tablet, Rfl: 3  •  diclofenac (VOLTAREN) 50 MG EC tablet, take 1 tablet by mouth twice a day, Disp: 60 tablet, Rfl: 5  •  fluticasone (FLONASE) 50 MCG/ACT nasal spray, 2 sprays into each nostril Daily., Disp: 16 g, Rfl: 0  •  folic acid (FOLVITE) 1 MG tablet, take 1 tablet by mouth once daily, Disp: 30 tablet, Rfl: 5  •  gabapentin (NEURONTIN) 800 MG tablet, take 1 tablet by mouth twice a day, Disp: 60 tablet, Rfl: 0  •  guaiFENesin (MUCINEX) 600 MG 12 hr tablet, Take 1 tablet by mouth Every 12 (Twelve) Hours., Disp: 20 tablet, Rfl: 0  •  ibuprofen (ADVIL,MOTRIN) 800 MG tablet, Take 800 mg by mouth Daily., Disp: , Rfl:   •  levETIRAcetam (KEPPRA) 500 MG tablet, Take 1 tablet by mouth 2 (Two) Times a Day., Disp:  60 tablet, Rfl: 5  •  lidocaine (LIDODERM) 5 %, apply 1 patch to the affected area daily. Leave on for 12 hours and then off for 12 hours., Disp: 6 patch, Rfl: 1  •  meclizine (ANTIVERT) 25 MG tablet, take 1 tablet by mouth three times a day if needed for dizziness, Disp: 30 tablet, Rfl: 5  •  meloxicam (MOBIC) 15 MG tablet, , Disp: , Rfl: 0  •  NIFEdipine XL (PROCARDIA XL) 30 MG 24 hr tablet, take 1 tablet by mouth once daily, Disp: 30 tablet, Rfl: 5  •  ondansetron (ZOFRAN) 4 MG tablet, Take 1 tablet by mouth Every 8 (Eight) Hours As Needed for Nausea or Vomiting., Disp: 10 tablet, Rfl: 0  •  RA FOLIC ACID 400 MCG tablet, take 1 tablet by mouth once daily, Disp: , Rfl: 0  •  RA VITAMIN B-1 100 MG tablet, take 1 tablet by mouth once daily, Disp: , Rfl: 0  •  rOPINIRole (REQUIP) 2 MG tablet, take 1 tablet by mouth at bedtime, Disp: 30 tablet, Rfl: 3  •  traMADol (ULTRAM) 50 MG tablet, take 1 tablet by mouth every 8 hours if needed, Disp: 90 tablet, Rfl: 0  •  triamcinolone (KENALOG) 0.1 % ointment, apply to affected area twice a day, Disp: 30 g, Rfl: 3  •  vitamin D (ERGOCALCIFEROL) 04519 units capsule capsule, take 1 capsule by mouth EVERY 2 WEEKS, Disp: 4 capsule, Rfl: 5  •  HYDROcodone-acetaminophen (NORCO) 5-325 MG per tablet, Take 1 tablet by mouth Every 6 (Six) Hours As Needed (PRN) for up to 10 days., Disp: 40 tablet, Rfl: 0    Past Surgical History:   Procedure Laterality Date   • BREAST BIOPSY     • OTHER SURGICAL HISTORY  07/07/2014    Destruction of Benign Lesion (1-14)   • TUBAL ABDOMINAL LIGATION         Family History   Problem Relation Age of Onset   • Alzheimer's disease Other    • Bipolar disorder Other    • Depression Other    • Diabetes Other    • Heart disease Other    • Hypertension Other    • Lung cancer Other    • Migraines Other    • Sickle cell anemia Other    • Stroke Other    • No Known Problems Mother    • Alzheimer's disease Father    • Heart attack Father    • Throat cancer Father    •  "No Known Problems Daughter        Social History     Social History   • Marital status:      Spouse name: N/A   • Number of children: N/A   • Years of education: N/A     Occupational History   • Not on file.     Social History Main Topics   • Smoking status: Current Every Day Smoker     Packs/day: 0.25     Types: Cigarettes   • Smokeless tobacco: Never Used   • Alcohol use Yes      Comment: rarely   • Drug use:      Types: Cocaine   • Sexual activity: Defer     Other Topics Concern   • Not on file     Social History Narrative   • No narrative on file        Review of Systems   Musculoskeletal: Positive for arthralgias, gait problem and joint swelling.        Left foot pain. Left ankle pain.    Neurological: Positive for weakness (Left ankle/foot).   All other systems reviewed and are negative.      PHYSICAL EXAMINATION:       Ht 160 cm (63\")   Wt 67.6 kg (149 lb)   BMI 26.39 kg/m²     Physical Exam   Constitutional: She is oriented to person, place, and time. Vital signs are normal. She appears well-developed and well-nourished. She is active and cooperative. She does not appear ill. No distress.   HENT:   Head: Normocephalic.   Pulmonary/Chest: Effort normal. No respiratory distress.   Abdominal: Soft. She exhibits no distension.   Musculoskeletal: She exhibits edema (Mild, dorsal left foot) and tenderness (Dorsal left foot). She exhibits no deformity.   Neurological: She is alert and oriented to person, place, and time. GCS eye subscore is 4. GCS verbal subscore is 5. GCS motor subscore is 6.   Skin: Skin is warm, dry and intact. Capillary refill takes less than 2 seconds.   Psychiatric: She has a normal mood and affect. Her speech is normal and behavior is normal. Judgment and thought content normal. Cognition and memory are normal.   Vitals reviewed.      GAIT:     []  Normal  []  Antalgic    Assistive device: []  None  []  Walker     []  Crutches  []  Cane     [x]  Wheelchair  []  Stretcher    Right " Ankle Exam   Swelling: none    Tenderness   The patient is experiencing no tenderness.    Range of Motion   The patient has normal right ankle ROM.    Muscle Strength   The patient has normal right ankle strength.  Other   Erythema: absent  Sensation: normal  Pulse: present       Left Ankle Exam   Left ankle swelling: dorsal foot, first metatarsal.    Tenderness   Left ankle tenderness location: dorsal aspect foot, first metatarsal.     Range of Motion   Dorsiflexion: abnormal   Plantar flexion: abnormal   Inversion: abnormal   Eversion: abnormal     Other   Erythema: present (Mild, localized to area of swelling at first metatarsal)  Sensation: normal  Pulse: present    Comments:  Pain with range of motion of ankle and foot. Mild, localized swelling with mild erythema and ecchymosis to dorsal foot at first metatarsal. No deformity.             Xr Ankle 3+ View Left    Result Date: 4/11/2018  Narrative: 3 views of the left ankle reveal no evidence of fracture or dislocation.  Ankle mortise is intact.  No significant degenerative changes are noted.  No acute radiologic abnormalities are noted at this time.  No significant changes are noted when compared with prior images from 12/6/2017.04/11/18 at 3:12 PM by WESTON Yuen     Xr Foot 3+ View Left    Result Date: 4/11/2018  Narrative: 3 views of the left foot reveal an acute, nondisplaced transversely oriented fracture at the base of the first metatarsal.  This is only visualized on the oblique view.  No significant degenerative changes are noted.  No other acute radiologic abnormalities are noted at this time.  No comparison images are available for review.04/11/18 at 3:09 PM by WESTON Yuen         ASSESSMENT:    Diagnoses and all orders for this visit:    Left foot pain  -     XR Foot 3+ View Left    Closed nondisplaced fracture of first metatarsal bone of left foot, initial encounter    Fall with injury, initial encounter    Other orders  -      HYDROcodone-acetaminophen (NORCO) 5-325 MG per tablet; Take 1 tablet by mouth Every 6 (Six) Hours As Needed (PRN) for up to 10 days.    PLAN    X-rays of left foot and left ankle reviewed today. Discussed findings of left first metatarsal fracture with patient. Left ankle x-ray was negative for fracture but patient complains of ankle pain and pain with range of motion. Recommend short leg Orthoboot for immobilization of left foot and ankle. Recommend elevation of left foot frequently to minimize swelling. Recommend ice therapy intermittently for 3-4 times daily to minimize swelling/pain. Recommend modified weight-bearing with use of a walker. Patient states she has a walker at home. Continue Meloxicam daily, as previously prescribed. Norco is prescribed for moderate to severe pain. Follow up in 2 weeks for recheck and repeat x-rays at that time.     This patient has sustained a traumatic injury.  Therefore, I will recommend a short course of narcotic pain medication for this patient until their pain has been sufficiently reduced to a level that I deem acceptable to be treated with alternative treatment options.  [facility] reviewed # [id number].     Return in about 2 weeks (around 4/25/2018) for Recheck.      This document has been electronically signed by WESTON Yuen on April 12, 2018 8:51 AM      WESTON Yuen
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
## 2018-04-12 PROBLEM — M79.672 LEFT FOOT PAIN: Status: ACTIVE | Noted: 2018-04-12

## 2018-04-12 PROBLEM — S92.355A CLOSED NONDISPLACED FRACTURE OF FIFTH LEFT METATARSAL BONE: Status: ACTIVE | Noted: 2018-04-12

## 2018-04-12 RX ORDER — LIDOCAINE 50 MG/G
1 PATCH TOPICAL DAILY
Qty: 6 PATCH | Refills: 1 | OUTPATIENT
Start: 2018-04-12

## 2018-04-20 DIAGNOSIS — M25.572 BILATERAL ANKLE PAIN, UNSPECIFIED CHRONICITY: ICD-10-CM

## 2018-04-20 DIAGNOSIS — M25.571 BILATERAL ANKLE PAIN, UNSPECIFIED CHRONICITY: ICD-10-CM

## 2018-04-23 RX ORDER — TRAMADOL HYDROCHLORIDE 50 MG/1
TABLET ORAL
Qty: 90 TABLET | Refills: 0 | OUTPATIENT
Start: 2018-04-23

## 2018-04-24 DIAGNOSIS — S92.355D CLOSED NONDISPLACED FRACTURE OF FIFTH METATARSAL BONE OF LEFT FOOT WITH ROUTINE HEALING, SUBSEQUENT ENCOUNTER: Primary | ICD-10-CM

## 2018-04-26 ENCOUNTER — OFFICE VISIT (OUTPATIENT)
Dept: FAMILY MEDICINE CLINIC | Facility: CLINIC | Age: 58
End: 2018-04-26

## 2018-04-26 VITALS
BODY MASS INDEX: 24.8 KG/M2 | WEIGHT: 140 LBS | TEMPERATURE: 97.6 F | RESPIRATION RATE: 20 BRPM | OXYGEN SATURATION: 98 % | SYSTOLIC BLOOD PRESSURE: 141 MMHG | HEART RATE: 106 BPM | DIASTOLIC BLOOD PRESSURE: 97 MMHG | HEIGHT: 63 IN

## 2018-04-26 DIAGNOSIS — G62.9 NEUROPATHY: ICD-10-CM

## 2018-04-26 DIAGNOSIS — R06.2 WHEEZING: ICD-10-CM

## 2018-04-26 DIAGNOSIS — S46.011A ROTATOR CUFF STRAIN, RIGHT, INITIAL ENCOUNTER: ICD-10-CM

## 2018-04-26 DIAGNOSIS — E56.9 VITAMIN DEFICIENCY: ICD-10-CM

## 2018-04-26 DIAGNOSIS — M25.572 BILATERAL ANKLE PAIN, UNSPECIFIED CHRONICITY: ICD-10-CM

## 2018-04-26 DIAGNOSIS — M25.50 ARTHRALGIA, UNSPECIFIED JOINT: ICD-10-CM

## 2018-04-26 DIAGNOSIS — M25.571 BILATERAL ANKLE PAIN, UNSPECIFIED CHRONICITY: ICD-10-CM

## 2018-04-26 PROCEDURE — 99214 OFFICE O/P EST MOD 30 MIN: CPT | Performed by: NURSE PRACTITIONER

## 2018-04-26 RX ORDER — ALBUTEROL SULFATE 90 UG/1
2 AEROSOL, METERED RESPIRATORY (INHALATION)
Qty: 18 G | Refills: 3 | Status: SHIPPED | OUTPATIENT
Start: 2018-04-26 | End: 2018-05-21 | Stop reason: SDUPTHER

## 2018-04-26 RX ORDER — GABAPENTIN 800 MG/1
800 TABLET ORAL 2 TIMES DAILY
Qty: 60 TABLET | Refills: 0 | Status: SHIPPED | OUTPATIENT
Start: 2018-04-26 | End: 2018-06-06 | Stop reason: SDUPTHER

## 2018-04-26 RX ORDER — CYCLOBENZAPRINE HCL 10 MG
10 TABLET ORAL 3 TIMES DAILY PRN
Qty: 90 TABLET | Refills: 3 | Status: SHIPPED | OUTPATIENT
Start: 2018-04-26 | End: 2018-07-13 | Stop reason: SDUPTHER

## 2018-04-26 RX ORDER — TRAMADOL HYDROCHLORIDE 50 MG/1
50 TABLET ORAL EVERY 8 HOURS PRN
Qty: 90 TABLET | Refills: 0 | Status: SHIPPED | OUTPATIENT
Start: 2018-04-26 | End: 2018-06-08 | Stop reason: SDUPTHER

## 2018-04-26 RX ORDER — FOLIC ACID 1 MG/1
1000 TABLET ORAL DAILY
Qty: 30 TABLET | Refills: 5 | Status: SHIPPED | OUTPATIENT
Start: 2018-04-26 | End: 2018-05-21 | Stop reason: SDUPTHER

## 2018-04-26 NOTE — PROGRESS NOTES
Hattie Riddle is a 57 y.o. female.     Here today for toñoMercedes  Cont to have pain in her feet and hands.  Does have some arthritis and multiple fractures in feet and ankles.  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      Peripheral Neuropathy   This is a chronic problem. The current episode started more than 1 year ago. The problem occurs constantly. The problem has been waxing and waning. Associated symptoms include fatigue, joint swelling and myalgias. Pertinent negatives include no abdominal pain, anorexia, arthralgias, change in bowel habit, chest pain, chills, congestion, coughing, diaphoresis, fever, headaches, nausea, neck pain, numbness, rash, sore throat, swollen glands, urinary symptoms, vertigo, visual change, vomiting or weakness. The symptoms are aggravated by walking, standing and stress. Treatments tried: tramadol and gabapentin. The treatment provided significant relief.        The following portions of the patient's history were reviewed and updated as appropriate: allergies, current medications, past family history, past medical history, past social history, past surgical history and problem list.    Review of Systems   Constitutional: Positive for fatigue. Negative for chills, diaphoresis and fever.   HENT: Negative.  Negative for congestion and sore throat.    Respiratory: Negative.  Negative for cough.    Cardiovascular: Negative.  Negative for chest pain.   Gastrointestinal: Negative for abdominal pain, anorexia, change in bowel habit, nausea and vomiting.   Musculoskeletal: Positive for joint swelling and myalgias. Negative for arthralgias and neck pain.   Skin: Negative.  Negative for rash.   Neurological: Negative.  Negative for vertigo, weakness, numbness and headaches.   Psychiatric/Behavioral: Negative.        Objective   Physical Exam   Constitutional: She is oriented to person, place, and time. She appears well-developed and well-nourished. No distress.    HENT:   Head: Normocephalic.   Eyes: Pupils are equal, round, and reactive to light.   Neck: Normal range of motion. Neck supple. No thyromegaly present.   Cardiovascular: Normal rate, regular rhythm and normal heart sounds.  Exam reveals no friction rub.    No murmur heard.  Pulmonary/Chest: Effort normal and breath sounds normal. No respiratory distress. She has no wheezes. She has no rales.   Abdominal: Soft.   Musculoskeletal:   Has a walking cast on her left foot.  Enlarged joints noted in her thumbs and pip joints.   Neurological: She is alert and oriented to person, place, and time.   Skin: Skin is warm and dry.   Psychiatric: She has a normal mood and affect. Thought content normal.   Nursing note and vitals reviewed.        Assessment/Plan   Jyoti was seen today for peripheral neuropathy and restless legs syndrome.    Diagnoses and all orders for this visit:    Neuropathy  -     gabapentin (NEURONTIN) 800 MG tablet; Take 1 tablet by mouth 2 (Two) Times a Day.    Bilateral ankle pain, unspecified chronicity  -     traMADol (ULTRAM) 50 MG tablet; Take 1 tablet by mouth Every 8 (Eight) Hours As Needed for Moderate Pain .    Wheezing  -     albuterol (VENTOLIN HFA) 108 (90 Base) MCG/ACT inhaler; Inhale 2 puffs 4 (Four) Times a Day.    Rotator cuff strain, right, initial encounter  -     cyclobenzaprine (FLEXERIL) 10 MG tablet; Take 1 tablet by mouth 3 (Three) Times a Day As Needed for Muscle Spasms.    Arthralgia, unspecified joint  -     diclofenac (VOLTAREN) 50 MG EC tablet; Take 1 tablet by mouth 2 (Two) Times a Day.    Vitamin deficiency  -     folic acid (FOLVITE) 1 MG tablet; Take 1 tablet by mouth Daily.      [facility] # [id number] is reviewed.

## 2018-04-28 DIAGNOSIS — R42 VERTIGO: ICD-10-CM

## 2018-04-30 RX ORDER — MECLIZINE HYDROCHLORIDE 25 MG/1
TABLET ORAL
Qty: 30 TABLET | Refills: 5 | Status: SHIPPED | OUTPATIENT
Start: 2018-04-30 | End: 2018-05-21 | Stop reason: SDUPTHER

## 2018-05-03 DIAGNOSIS — E78.00 HIGH CHOLESTEROL: ICD-10-CM

## 2018-05-03 RX ORDER — ATORVASTATIN CALCIUM 40 MG/1
40 TABLET, FILM COATED ORAL DAILY
Qty: 30 TABLET | Refills: 5 | Status: SHIPPED | OUTPATIENT
Start: 2018-05-03 | End: 2018-05-21 | Stop reason: SDUPTHER

## 2018-05-10 ENCOUNTER — OFFICE VISIT (OUTPATIENT)
Dept: ORTHOPEDIC SURGERY | Facility: CLINIC | Age: 58
End: 2018-05-10

## 2018-05-10 VITALS — WEIGHT: 151 LBS | HEIGHT: 63 IN | BODY MASS INDEX: 26.75 KG/M2

## 2018-05-10 DIAGNOSIS — M79.672 LEFT FOOT PAIN: ICD-10-CM

## 2018-05-10 DIAGNOSIS — R29.898 ANKLE WEAKNESS: ICD-10-CM

## 2018-05-10 DIAGNOSIS — S92.355D CLOSED NONDISPLACED FRACTURE OF FIFTH METATARSAL BONE OF LEFT FOOT WITH ROUTINE HEALING, SUBSEQUENT ENCOUNTER: Primary | ICD-10-CM

## 2018-05-10 PROCEDURE — 99024 POSTOP FOLLOW-UP VISIT: CPT | Performed by: NURSE PRACTITIONER

## 2018-05-10 RX ORDER — HYDROCODONE BITARTRATE AND ACETAMINOPHEN 5; 325 MG/1; MG/1
1 TABLET ORAL EVERY 8 HOURS PRN
Qty: 40 TABLET | Refills: 0 | Status: SHIPPED | OUTPATIENT
Start: 2018-05-10 | End: 2018-05-24

## 2018-05-10 NOTE — PROGRESS NOTES
Jyoti Riddle is a 57 y.o. female returns for     Chief Complaint   Patient presents with   • Left Foot - Follow-up       HISTORY OF PRESENT ILLNESS: Patient presented to office for follow-up of left foot fracture.  Initial injury occurred on 4/10/2018 due to a fall.  Patient has been wearing the Orthoboot as instructed.  Patient has been using a walker for modified weight-bearing.  Patient reports her foot pain is slightly improved. X-rays are repeated today.      CONCURRENT MEDICAL HISTORY:    Past Medical History:   Diagnosis Date   • Acute bronchitis 07/19/2016   • Acute otitis externa 09/04/2014   • Acute otitis media 02/10/2015   • Acute sinusitis 07/19/2016   • Ankle injury    • Backache 12/08/2014   • Benign hypertension 05/13/2016   • Cerebrovascular accident 2015   • Dizziness 02/10/2015   • Encounter for gynecological examination without abnormal finding 03/10/2016   • Fatigue 08/28/2015   • Foot pain 06/09/2014   • Hyperkeratosis 04/28/2014   • Hypotensive episode 06/12/2015   • Keratoma 08/04/2014    intractable plantar   • Low blood pressure 08/28/2015   • Menopausal and perimenopausal disorder 03/10/2016    other specified   • Numbness of face 05/13/2016    improved   • Otitis media of right ear 07/30/2016    unspecified   • Perforated tympanic membrane 09/04/2014   • Puncture wound 06/17/2014    injury   • Right-sided face pain 05/13/2016   • Seizure    • Thumb pain 08/28/2015   • Upper respiratory infection 08/28/2015   • Wheezing 10/21/2014       Allergies   Allergen Reactions   • Lisinopril Anaphylaxis         Current Outpatient Prescriptions:   •  acetaminophen (TYLENOL) 500 MG tablet, Take 1 tablet by mouth Every 6 (Six) Hours As Needed (body aches)., Disp: 30 tablet, Rfl: 0  •  albuterol (VENTOLIN HFA) 108 (90 Base) MCG/ACT inhaler, Inhale 2 puffs 4 (Four) Times a Day., Disp: 18 g, Rfl: 3  •  ANORO ELLIPTA 62.5-25 MCG/INH aerosol powder , Inhale 62.5 inhalers Daily., Disp: , Rfl: 0  •   aspirin 81 MG chewable tablet, Chew 81 mg., Disp: , Rfl:   •  atorvastatin (LIPITOR) 40 MG tablet, Take 1 tablet by mouth Daily., Disp: 30 tablet, Rfl: 5  •  Blood Pressure Monitoring (B-D ASSURE BPM/AUTO WRIST CUFF) misc, 1 kit 2 (Two) Times a Day As Needed (fluctuating blood pressure)., Disp: 1 each, Rfl: 0  •  Calcium-Magnesium-Vitamin D (CALCIUM 500 PO), Take  by mouth., Disp: , Rfl:   •  cyclobenzaprine (FLEXERIL) 10 MG tablet, Take 1 tablet by mouth 3 (Three) Times a Day As Needed for Muscle Spasms., Disp: 90 tablet, Rfl: 3  •  diclofenac (VOLTAREN) 50 MG EC tablet, Take 1 tablet by mouth 2 (Two) Times a Day., Disp: 60 tablet, Rfl: 5  •  fluticasone (FLONASE) 50 MCG/ACT nasal spray, 2 sprays into each nostril Daily., Disp: 16 g, Rfl: 0  •  folic acid (FOLVITE) 1 MG tablet, Take 1 tablet by mouth Daily., Disp: 30 tablet, Rfl: 5  •  gabapentin (NEURONTIN) 800 MG tablet, Take 1 tablet by mouth 2 (Two) Times a Day., Disp: 60 tablet, Rfl: 0  •  guaiFENesin (MUCINEX) 600 MG 12 hr tablet, Take 1 tablet by mouth Every 12 (Twelve) Hours., Disp: 20 tablet, Rfl: 0  •  ibuprofen (ADVIL,MOTRIN) 800 MG tablet, Take 800 mg by mouth Daily., Disp: , Rfl:   •  levETIRAcetam (KEPPRA) 500 MG tablet, Take 1 tablet by mouth 2 (Two) Times a Day., Disp: 60 tablet, Rfl: 5  •  lidocaine (LIDODERM) 5 %, apply 1 patch to the affected area daily. Leave on for 12 hours and then off for 12 hours., Disp: 6 patch, Rfl: 1  •  meclizine (ANTIVERT) 25 MG tablet, take 1 tablet by mouth three times a day if needed, Disp: 30 tablet, Rfl: 5  •  meloxicam (MOBIC) 15 MG tablet, , Disp: , Rfl: 0  •  NIFEdipine XL (PROCARDIA XL) 30 MG 24 hr tablet, take 1 tablet by mouth once daily, Disp: 30 tablet, Rfl: 5  •  ondansetron (ZOFRAN) 4 MG tablet, Take 1 tablet by mouth Every 8 (Eight) Hours As Needed for Nausea or Vomiting., Disp: 10 tablet, Rfl: 0  •  RA FOLIC ACID 400 MCG tablet, take 1 tablet by mouth once daily, Disp: , Rfl: 0  •  RA VITAMIN B-1 100 MG  "tablet, take 1 tablet by mouth once daily, Disp: , Rfl: 0  •  rOPINIRole (REQUIP) 2 MG tablet, take 1 tablet by mouth at bedtime, Disp: 30 tablet, Rfl: 3  •  traMADol (ULTRAM) 50 MG tablet, Take 1 tablet by mouth Every 8 (Eight) Hours As Needed for Moderate Pain ., Disp: 90 tablet, Rfl: 0  •  triamcinolone (KENALOG) 0.1 % ointment, apply to affected area twice a day, Disp: 30 g, Rfl: 3  •  vitamin D (ERGOCALCIFEROL) 69264 units capsule capsule, take 1 capsule by mouth EVERY 2 WEEKS, Disp: 4 capsule, Rfl: 5  •  HYDROcodone-acetaminophen (NORCO) 5-325 MG per tablet, Take 1 tablet by mouth Every 8 (Eight) Hours As Needed (PRN) for up to 14 days., Disp: 40 tablet, Rfl: 0    Past Surgical History:   Procedure Laterality Date   • BREAST BIOPSY     • OTHER SURGICAL HISTORY  07/07/2014    Destruction of Benign Lesion (1-14)   • TUBAL ABDOMINAL LIGATION         ROS  No fevers or chills.  No chest pain or shortness of air.  No GI or  disturbances. Left foot pain.     PHYSICAL EXAMINATION:       Ht 160 cm (63\")   Wt 68.5 kg (151 lb)   BMI 26.75 kg/m²     Physical Exam   Constitutional: She is oriented to person, place, and time. Vital signs are normal. She appears well-developed and well-nourished. She is active and cooperative. She does not appear ill. No distress.   HENT:   Head: Normocephalic.   Pulmonary/Chest: Effort normal. No respiratory distress.   Abdominal: Soft. She exhibits no distension.   Musculoskeletal: She exhibits tenderness (Left foot, first metatarsal). She exhibits no edema or deformity.   Neurological: She is alert and oriented to person, place, and time. GCS eye subscore is 4. GCS verbal subscore is 5. GCS motor subscore is 6.   Skin: Skin is warm, dry and intact. Capillary refill takes less than 2 seconds. No erythema.   Psychiatric: She has a normal mood and affect. Her speech is normal and behavior is normal. Judgment and thought content normal. Cognition and memory are normal.   Vitals " reviewed.      GAIT:     []  Normal  [x]  Antalgic    Assistive device: []  None  [x]  Walker     []  Crutches  []  Cane     []  Wheelchair  []  Stretcher    Right Ankle Exam   Swelling: none    Tenderness   The patient is experiencing no tenderness.    Range of Motion   The patient has normal right ankle ROM.    Muscle Strength   Dorsiflexion:  4/5  Plantar flexion:  4/5  Other   Erythema: absent  Sensation: normal  Pulse: present       Left Ankle Exam   Swelling: none    Tenderness   Left ankle tenderness location: dorsal foot, first metatarsal.     Range of Motion   The patient has normal left ankle ROM.     Muscle Strength   Dorsiflexion:  4/5   Plantar flexion:  4/5     Other   Erythema: absent  Sensation: normal  Pulse: present    Comments:  Mild pain with range of motion of ankle and foot.  Moderate tenderness present at the first metatarsal.  No swelling.  No ecchymosis.  No deformity.            Xr Ankle 3+ View Left    Result Date: 4/11/2018  Narrative: 3 views of the left ankle reveal no evidence of fracture or dislocation.  Ankle mortise is intact.  No significant degenerative changes are noted.  No acute radiologic abnormalities are noted at this time.  No significant changes are noted when compared with prior images from 12/6/2017.04/11/18 at 3:12 PM by WESTON Yuen     Xr Foot 3+ View Left    Result Date: 5/10/2018  Narrative: 3 views of the left foot reveal a stable, small and transversely oriented fracture at the base of the first metatarsal with mild displacement.  There is increased displacement when compared with previous images from 4/11/2018.  This is only visualized on the oblique view.  No soft tissue abnormalities are noted.  No significant degenerative changes are noted.  No other acute radiologic abnormalities are noted at this time.05/10/18 at 3:18 PM by WESTON Yuen     Xr Foot 3+ View Left    Result Date: 4/11/2018  Narrative: 3 views of the left foot reveal an acute,  nondisplaced transversely oriented fracture at the base of the first metatarsal.  This is only visualized on the oblique view.  No significant degenerative changes are noted.  No other acute radiologic abnormalities are noted at this time.  No comparison images are available for review.04/11/18 at 3:09 PM by WESTON Yuen         ASSESSMENT:    Diagnoses and all orders for this visit:    Closed nondisplaced fracture of fifth metatarsal bone of left foot with routine healing, subsequent encounter  -     Ambulatory Referral to Physical Therapy Evaluate and treat; Stretching, ROM, Strengthening    Left foot pain  -     Ambulatory Referral to Physical Therapy Evaluate and treat; Stretching, ROM, Strengthening    Ankle weakness  -     Ambulatory Referral to Physical Therapy Evaluate and treat; Stretching, ROM, Strengthening    Other orders  -     HYDROcodone-acetaminophen (NORCO) 5-325 MG per tablet; Take 1 tablet by mouth Every 8 (Eight) Hours As Needed (PRN) for up to 14 days.    PLAN    X-rays of left foot reviewed and compared with previous images from 4/11/2018.  The small fracture noted at the base of the first metatarsal is stable, although noted as mildly displaced when compared with previous images.  Patient continues to complain of pain in her left foot that is worse with palpation and weightbearing.  Overall, patient reports she has had some improvement though.  Recommend to continue with with Orthoboot for immobilization.  Recommend to continue with modified activity and modified weight-bearing with use of her walker as tolerated and based on pain.  Discussed with patient that she may progress her weightbearing and activity as tolerated, as the pain begins to improve.  Recommend intermittent elevation of the left foot to minimize swelling.  Recommend ice therapy intermittently to the left foot as needed for pain/swelling. Continue Meloxicam daily for consistent dosing of oral NSAIDs, as previously  prescribed.  Norco is refilled to take as needed for moderate to severe pain. Patient complains of frequent/recurrent ankle and foot injuries over the last couple of years. Recommend physical therapy for conditioning and strengthening of her ankles and feet. Patient is motivated to do this and wants to do PT in [place] where she lives. Follow-up in 4 weeks for recheck and repeat x-rays at that time.    This patient has tried and failed a course of multiple treatment modalities which include the use of  anti-inflammatories/acetaminophen, rest, modified weight-bearing with use of a walker, Orthoboot for immobilization, ice therapy and elevation and has sustained a traumatic injury resulting in a fracture.  Therefore, I will recommend a course of narcotic pain medication for this patient until their pain has been sufficiently reduced to a level that I deem acceptable to be treated with alternative treatment options. SRINIVAS reviewed # [id number].      Return in about 4 weeks (around 6/7/2018) for Recheck.      This document has been electronically signed by WESTON Yuen on May 11, 2018 8:04 AM      WESTON Yuen

## 2018-05-21 DIAGNOSIS — R06.2 WHEEZING: ICD-10-CM

## 2018-05-21 DIAGNOSIS — E56.9 VITAMIN DEFICIENCY: ICD-10-CM

## 2018-05-21 DIAGNOSIS — M25.50 ARTHRALGIA, UNSPECIFIED JOINT: ICD-10-CM

## 2018-05-21 DIAGNOSIS — R42 VERTIGO: ICD-10-CM

## 2018-05-21 DIAGNOSIS — E78.00 HIGH CHOLESTEROL: ICD-10-CM

## 2018-05-21 RX ORDER — MECLIZINE HYDROCHLORIDE 25 MG/1
25 TABLET ORAL 3 TIMES DAILY PRN
Qty: 30 TABLET | Refills: 5 | Status: SHIPPED | OUTPATIENT
Start: 2018-05-21 | End: 2018-12-24

## 2018-05-21 RX ORDER — LEVETIRACETAM 500 MG/1
500 TABLET ORAL 2 TIMES DAILY
Qty: 60 TABLET | Refills: 5 | Status: SHIPPED | OUTPATIENT
Start: 2018-05-21 | End: 2018-11-29 | Stop reason: SDUPTHER

## 2018-05-21 RX ORDER — ATORVASTATIN CALCIUM 40 MG/1
40 TABLET, FILM COATED ORAL DAILY
Qty: 30 TABLET | Refills: 5 | Status: SHIPPED | OUTPATIENT
Start: 2018-05-21 | End: 2018-10-18 | Stop reason: SDUPTHER

## 2018-05-21 RX ORDER — FOLIC ACID 1 MG/1
1000 TABLET ORAL DAILY
Qty: 30 TABLET | Refills: 5 | Status: SHIPPED | OUTPATIENT
Start: 2018-05-21 | End: 2018-09-17 | Stop reason: SDUPTHER

## 2018-05-21 RX ORDER — ALBUTEROL SULFATE 90 UG/1
2 AEROSOL, METERED RESPIRATORY (INHALATION)
Qty: 18 G | Refills: 3 | Status: SHIPPED | OUTPATIENT
Start: 2018-05-21 | End: 2020-09-29

## 2018-06-04 DIAGNOSIS — G25.81 RESTLESS LEG SYNDROME: ICD-10-CM

## 2018-06-04 RX ORDER — ROPINIROLE 2 MG/1
TABLET, FILM COATED ORAL
Qty: 30 TABLET | Refills: 3 | Status: SHIPPED | OUTPATIENT
Start: 2018-06-04 | End: 2018-11-29 | Stop reason: SDUPTHER

## 2018-06-06 DIAGNOSIS — S92.355D CLOSED NONDISPLACED FRACTURE OF FIFTH METATARSAL BONE OF LEFT FOOT WITH ROUTINE HEALING, SUBSEQUENT ENCOUNTER: Primary | ICD-10-CM

## 2018-06-06 DIAGNOSIS — G62.9 NEUROPATHY: ICD-10-CM

## 2018-06-07 RX ORDER — GABAPENTIN 800 MG/1
TABLET ORAL
Qty: 60 TABLET | Refills: 0 | Status: SHIPPED | OUTPATIENT
Start: 2018-06-07 | End: 2018-07-13 | Stop reason: SDUPTHER

## 2018-06-08 DIAGNOSIS — M25.571 BILATERAL ANKLE PAIN, UNSPECIFIED CHRONICITY: ICD-10-CM

## 2018-06-08 DIAGNOSIS — R21 SKIN RASH: ICD-10-CM

## 2018-06-08 DIAGNOSIS — M25.572 BILATERAL ANKLE PAIN, UNSPECIFIED CHRONICITY: ICD-10-CM

## 2018-06-08 RX ORDER — TRAMADOL HYDROCHLORIDE 50 MG/1
50 TABLET ORAL EVERY 8 HOURS PRN
Qty: 90 TABLET | Refills: 0 | Status: SHIPPED | OUTPATIENT
Start: 2018-06-08 | End: 2018-07-13 | Stop reason: SDUPTHER

## 2018-07-06 ENCOUNTER — TELEPHONE (OUTPATIENT)
Dept: OTOLARYNGOLOGY | Facility: CLINIC | Age: 58
End: 2018-07-06

## 2018-07-06 RX ORDER — OFLOXACIN 3 MG/ML
SOLUTION AURICULAR (OTIC)
Qty: 10 ML | Refills: 0 | Status: SHIPPED | OUTPATIENT
Start: 2018-07-06 | End: 2018-08-15 | Stop reason: SDUPTHER

## 2018-07-06 NOTE — TELEPHONE ENCOUNTER
----- Message from Eloy Jurado sent at 7/6/2018  2:10 PM CDT -----  Jyoti Bae's companion called to say that she is a patient of 's and that she is having some bleeding in her left ear and her balance is off.  Is this something that needs to be seen or wait to speak with Dr. Reno when he returns.   Mr. Peña stated that she has had this issue before.       Reilly Peña [phone]

## 2018-07-06 NOTE — TELEPHONE ENCOUNTER
Called patient about ear bleeding; will send in some ear drops to her pharmacy start using those and we will give her a call next week to see if it has gotten any better and needs an appointment.     Attempted to contact patient but unable to reach, left a voicemail to return call. Will send in drops and contact patient Monday.

## 2018-07-09 ENCOUNTER — TELEPHONE (OUTPATIENT)
Dept: OTOLARYNGOLOGY | Facility: CLINIC | Age: 58
End: 2018-07-09

## 2018-07-09 NOTE — TELEPHONE ENCOUNTER
Contacted pt. Regarding message got Mr. Peña and explained to him that she needed to start the drops that were sent her her pharmacy and we can see her in [place] this Friday 13th at 11:15

## 2018-07-11 ENCOUNTER — OFFICE VISIT (OUTPATIENT)
Dept: ORTHOPEDIC SURGERY | Facility: CLINIC | Age: 58
End: 2018-07-11

## 2018-07-11 VITALS — HEIGHT: 63 IN | BODY MASS INDEX: 26.58 KG/M2 | WEIGHT: 150 LBS

## 2018-07-11 DIAGNOSIS — M79.672 LEFT FOOT PAIN: ICD-10-CM

## 2018-07-11 DIAGNOSIS — M25.542 JOINT PAIN IN FINGERS OF LEFT HAND: ICD-10-CM

## 2018-07-11 DIAGNOSIS — S92.355D CLOSED NONDISPLACED FRACTURE OF FIFTH METATARSAL BONE OF LEFT FOOT WITH ROUTINE HEALING, SUBSEQUENT ENCOUNTER: Primary | ICD-10-CM

## 2018-07-11 DIAGNOSIS — M25.541 JOINT PAIN IN FINGERS OF RIGHT HAND: ICD-10-CM

## 2018-07-11 PROCEDURE — 96372 THER/PROPH/DIAG INJ SC/IM: CPT | Performed by: NURSE PRACTITIONER

## 2018-07-11 PROCEDURE — 99212 OFFICE O/P EST SF 10 MIN: CPT | Performed by: NURSE PRACTITIONER

## 2018-07-11 PROCEDURE — 99024 POSTOP FOLLOW-UP VISIT: CPT | Performed by: NURSE PRACTITIONER

## 2018-07-11 RX ORDER — KETOROLAC TROMETHAMINE 30 MG/ML
60 INJECTION, SOLUTION INTRAMUSCULAR; INTRAVENOUS ONCE
Status: COMPLETED | OUTPATIENT
Start: 2018-07-11 | End: 2018-07-11

## 2018-07-11 RX ORDER — HYDROCODONE BITARTRATE AND ACETAMINOPHEN 5; 325 MG/1; MG/1
1 TABLET ORAL EVERY 8 HOURS PRN
Qty: 30 TABLET | Refills: 0 | Status: SHIPPED | OUTPATIENT
Start: 2018-07-11 | End: 2018-07-21

## 2018-07-11 RX ORDER — TRIAMCINOLONE ACETONIDE 40 MG/ML
80 INJECTION, SUSPENSION INTRA-ARTICULAR; INTRAMUSCULAR ONCE
Status: COMPLETED | OUTPATIENT
Start: 2018-07-11 | End: 2018-07-11

## 2018-07-11 RX ADMIN — KETOROLAC TROMETHAMINE 60 MG: 30 INJECTION, SOLUTION INTRAMUSCULAR; INTRAVENOUS at 15:26

## 2018-07-11 RX ADMIN — TRIAMCINOLONE ACETONIDE 80 MG: 40 INJECTION, SUSPENSION INTRA-ARTICULAR; INTRAMUSCULAR at 15:27

## 2018-07-11 NOTE — PROGRESS NOTES
Jyoti Riddle is a 57 y.o. female returns for     Chief Complaint   Patient presents with   • Left Foot - Follow-up       HISTORY OF PRESENT ILLNESS: Patient presents to office for follow up of left foot fracture and increased left foot pain and swelling recently. Initial injury occurred on 4/10/2018 due to a fall. Patient was previously treated with Orthoboot and was using a walker for modified weight-bearing. She was last seen in office on 5/11/2018 and was improving at that time. She did not keep her follow up appointments after that. She denies any new injury. She has increased pain and swelling in the dorsal aspect of her foot today, different from her previous assessment and site of known fracture. She also complains of joint pain in her bilateral hands, especially the thumbs. X-rays of the foot are repeated today.      CONCURRENT MEDICAL HISTORY:    Past Medical History:   Diagnosis Date   • Acute bronchitis 07/19/2016   • Acute otitis externa 09/04/2014   • Acute otitis media 02/10/2015   • Acute sinusitis 07/19/2016   • Ankle injury    • Backache 12/08/2014   • Benign hypertension 05/13/2016   • Cerebrovascular accident (CMS/[facility]) 2015   • Dizziness 02/10/2015   • Encounter for gynecological examination without abnormal finding 03/10/2016   • Fatigue 08/28/2015   • Foot pain 06/09/2014   • Hyperkeratosis 04/28/2014   • Hypotensive episode 06/12/2015   • Keratoma 08/04/2014    intractable plantar   • Low blood pressure 08/28/2015   • Menopausal and perimenopausal disorder 03/10/2016    other specified   • Numbness of face 05/13/2016    improved   • Otitis media of right ear 07/30/2016    unspecified   • Perforated tympanic membrane 09/04/2014   • Puncture wound 06/17/2014    injury   • Right-sided face pain 05/13/2016   • Seizure (CMS/[facility])    • Thumb pain 08/28/2015   • Upper respiratory infection 08/28/2015   • Wheezing 10/21/2014       Allergies   Allergen Reactions   • Lisinopril Anaphylaxis          Current Outpatient Prescriptions:   •  acetaminophen (TYLENOL) 500 MG tablet, Take 1 tablet by mouth Every 6 (Six) Hours As Needed (body aches)., Disp: 30 tablet, Rfl: 0  •  albuterol (VENTOLIN HFA) 108 (90 Base) MCG/ACT inhaler, Inhale 2 puffs 4 (Four) Times a Day., Disp: 18 g, Rfl: 3  •  ANORO ELLIPTA 62.5-25 MCG/INH aerosol powder , Inhale 62.5 inhalers Daily., Disp: , Rfl: 0  •  aspirin 81 MG chewable tablet, Chew 81 mg., Disp: , Rfl:   •  atorvastatin (LIPITOR) 40 MG tablet, Take 1 tablet by mouth Daily., Disp: 30 tablet, Rfl: 5  •  Blood Pressure Monitoring (B-D ASSURE BPM/AUTO WRIST CUFF) misc, 1 kit 2 (Two) Times a Day As Needed (fluctuating blood pressure)., Disp: 1 each, Rfl: 0  •  Calcium-Magnesium-Vitamin D (CALCIUM 500 PO), Take  by mouth., Disp: , Rfl:   •  cyclobenzaprine (FLEXERIL) 10 MG tablet, Take 1 tablet by mouth 3 (Three) Times a Day As Needed for Muscle Spasms., Disp: 90 tablet, Rfl: 3  •  diclofenac (VOLTAREN) 50 MG EC tablet, Take 1 tablet by mouth 2 (Two) Times a Day., Disp: 60 tablet, Rfl: 5  •  fluticasone (FLONASE) 50 MCG/ACT nasal spray, 2 sprays into each nostril Daily., Disp: 16 g, Rfl: 0  •  folic acid (FOLVITE) 1 MG tablet, Take 1 tablet by mouth Daily., Disp: 30 tablet, Rfl: 5  •  gabapentin (NEURONTIN) 800 MG tablet, take 1 tablet by mouth twice a day, Disp: 60 tablet, Rfl: 0  •  guaiFENesin (MUCINEX) 600 MG 12 hr tablet, Take 1 tablet by mouth Every 12 (Twelve) Hours., Disp: 20 tablet, Rfl: 0  •  ibuprofen (ADVIL,MOTRIN) 800 MG tablet, Take 800 mg by mouth Daily., Disp: , Rfl:   •  levETIRAcetam (KEPPRA) 500 MG tablet, Take 1 tablet by mouth 2 (Two) Times a Day., Disp: 60 tablet, Rfl: 5  •  lidocaine (LIDODERM) 5 %, apply 1 patch to the affected area daily. Leave on for 12 hours and then off for 12 hours., Disp: 6 patch, Rfl: 1  •  meclizine (ANTIVERT) 25 MG tablet, Take 1 tablet by mouth 3 (Three) Times a Day As Needed (for dizziness)., Disp: 30 tablet, Rfl: 5  •   "NIFEdipine XL (PROCARDIA XL) 30 MG 24 hr tablet, take 1 tablet by mouth once daily, Disp: 30 tablet, Rfl: 5  •  ofloxacin (FLOXIN) 0.3 % otic solution, 5 drops to left ear BID x 10 days., Disp: 10 mL, Rfl: 0  •  ondansetron (ZOFRAN) 4 MG tablet, Take 1 tablet by mouth Every 8 (Eight) Hours As Needed for Nausea or Vomiting., Disp: 10 tablet, Rfl: 0  •  RA FOLIC ACID 400 MCG tablet, take 1 tablet by mouth once daily, Disp: , Rfl: 0  •  RA VITAMIN B-1 100 MG tablet, take 1 tablet by mouth once daily, Disp: , Rfl: 0  •  rOPINIRole (REQUIP) 2 MG tablet, take 1 tablet by mouth at bedtime, Disp: 30 tablet, Rfl: 3  •  traMADol (ULTRAM) 50 MG tablet, Take 1 tablet by mouth Every 8 (Eight) Hours As Needed for Moderate Pain ., Disp: 90 tablet, Rfl: 0  •  triamcinolone (KENALOG) 0.1 % ointment, apply to affected area twice a day, Disp: 30 g, Rfl: 3  •  vitamin D (ERGOCALCIFEROL) 84276 units capsule capsule, take 1 capsule by mouth EVERY 2 WEEKS, Disp: 4 capsule, Rfl: 5  •  HYDROcodone-acetaminophen (NORCO) 5-325 MG per tablet, Take 1 tablet by mouth Every 8 (Eight) Hours As Needed (PRN) for up to 10 days., Disp: 30 tablet, Rfl: 0  No current facility-administered medications for this visit.     Past Surgical History:   Procedure Laterality Date   • BREAST BIOPSY     • OTHER SURGICAL HISTORY  07/07/2014    Destruction of Benign Lesion (1-14)   • TUBAL ABDOMINAL LIGATION         ROS  No fevers or chills.  No chest pain or shortness of air.  No GI or  disturbances. Left foot pain.     PHYSICAL EXAMINATION:       Ht 160 cm (63\")   Wt 68 kg (150 lb)   BMI 26.57 kg/m²     Physical Exam   Constitutional: She is oriented to person, place, and time. Vital signs are normal. She appears well-developed and well-nourished. She is active and cooperative. She does not appear ill. No distress.   HENT:   Head: Normocephalic.   Pulmonary/Chest: Effort normal. No respiratory distress.   Abdominal: Soft. She exhibits no distension. "   Musculoskeletal: She exhibits edema (dorsal foot) and tenderness (Left foot, first metatarsal and dorsal foot). She exhibits no deformity.   Neurological: She is alert and oriented to person, place, and time. GCS eye subscore is 4. GCS verbal subscore is 5. GCS motor subscore is 6.   Skin: Skin is warm, dry and intact. Capillary refill takes less than 2 seconds. There is erythema (Mild, dorsal foot, bilateral forearms and hands).   Psychiatric: She has a normal mood and affect. Her speech is normal and behavior is normal. Judgment and thought content normal. Cognition and memory are normal.   Vitals reviewed.      GAIT:     []  Normal  [x]  Antalgic    Assistive device: []  None  [x]  Walker     []  Crutches  []  Cane     []  Wheelchair  []  Stretcher    Right Ankle Exam   Swelling: none    Tenderness   The patient is experiencing no tenderness.    Range of Motion   The patient has normal right ankle ROM.    Muscle Strength   Dorsiflexion:  4/5  Plantar flexion:  4/5  Other   Erythema: absent  Sensation: normal  Pulse: present       Left Ankle Exam   Swelling: mild (dorsal foot)    Tenderness   Left ankle tenderness location: dorsal foot, first metatarsal.     Range of Motion   The patient has normal left ankle ROM.     Muscle Strength   Dorsiflexion:  4/5   Plantar flexion:  4/5     Other   Erythema: absent  Sensation: normal  Pulse: present    Comments:  Pain with range of motion of ankle and foot.  Mild tenderness present at the first metatarsal, improved from prior exam.  Moderate swelling to dorsal foot with mild erythema. Mild swelling to dorsal foot. No ecchymosis.  No deformity.            Xr Foot 3+ View Left    Result Date: 7/11/2018  Narrative: AP, oblique and lateral views of the left foot reveal degenerative changes at the base of the metatarsals on the lateral view.  There is mild soft tissue swelling to the dorsal foot at the base of the metatarsal seen on the lateral view. There is a stable, small  and transversely oriented fracture at the base of the first metatarsal with mild displacement.  This is subacute and unchanged when compared with prior images from 5/10/2018 and 4/11/2018.  No additional fractures are noted.  No other radiologic abnormalities are noted at this time.07/11/18 at 4:43 PM by WESTON Yuen         ASSESSMENT:    Diagnoses and all orders for this visit:    Closed nondisplaced fracture of fifth metatarsal bone of left foot with routine healing, subsequent encounter  -     triamcinolone acetonide (KENALOG-40) injection 80 mg; Inject 2 mL into the shoulder, thigh, or buttocks 1 (One) Time.  -     ketorolac (TORADOL) injection 60 mg; Inject 60 mg into the shoulder, thigh, or buttocks 1 (One) Time.    Left foot pain  -     Uric Acid; Future  -     CBC & Differential; Future  -     C-reactive Protein; Future  -     Sedimentation Rate; Future  -     triamcinolone acetonide (KENALOG-40) injection 80 mg; Inject 2 mL into the shoulder, thigh, or buttocks 1 (One) Time.  -     ketorolac (TORADOL) injection 60 mg; Inject 60 mg into the shoulder, thigh, or buttocks 1 (One) Time.    Joint pain in fingers of left hand  -     Uric Acid; Future  -     CBC & Differential; Future  -     C-reactive Protein; Future  -     Sedimentation Rate; Future  -     triamcinolone acetonide (KENALOG-40) injection 80 mg; Inject 2 mL into the shoulder, thigh, or buttocks 1 (One) Time.  -     ketorolac (TORADOL) injection 60 mg; Inject 60 mg into the shoulder, thigh, or buttocks 1 (One) Time.    Joint pain in fingers of right hand  -     Uric Acid; Future  -     CBC & Differential; Future  -     C-reactive Protein; Future  -     Sedimentation Rate; Future  -     triamcinolone acetonide (KENALOG-40) injection 80 mg; Inject 2 mL into the shoulder, thigh, or buttocks 1 (One) Time.  -     ketorolac (TORADOL) injection 60 mg; Inject 60 mg into the shoulder, thigh, or buttocks 1 (One) Time.    Other orders  -      HYDROcodone-acetaminophen (NORCO) 5-325 MG per tablet; Take 1 tablet by mouth Every 8 (Eight) Hours As Needed (PRN) for up to 10 days.    PLAN    X-rays of left foot reviewed today. The first metatarsal fracture from April appears stable and unchanged. She is primarily complaining of pain and swelling in the dorsal aspect of her foot, inconsistent with this fracture. She does not recall a new injury, but is somewhat of a poor historian today. She reports the pain is severe. She has some mild erythema. Discussed with possibility of gout. She has no history of this. Recommend labs today to evaluate uric acid level and inflammatory markers. She is to go to lab after her office visit. I will call her if her uric acid level is elevated and she needs gout medication. Recommend IM injections of Toradol and Kenalog today for pain. Recommend to continue her Voltaren twice daily as previously prescribed for consistent dosing of oral NSAIDs. Patient is also requesting pain medication. Norco is prescribed for moderate to severe pain as needed. Recommend elevation of the left foot to minimize swelling. Recommend ice therapy to the left foot intermittently 3 to 4 times daily for 20 minutes at a time to minimize pain/swelling. Patient is also encouraged to put her Orthboot back on for immobilization if she is having severe pain. She can transition out of it as her pain improves. Recommend modified weight-bearing with use of a cane or walker as tolerated and based on her pain. Follow up in 2 weeks for recheck. Consider MRI of left foot and/or podiatry referral if pain/symptoms are not improving.     This patient has tried and failed a course of multiple treatment modalities which include the use of  anti-inflammatories/acetaminophen, rest, modified weight-bearing with use of a walker, Orthoboot for immobilization, ice therapy and elevation and has sustained a traumatic injury resulting in a fracture. Therefore, I will recommend a  course of narcotic pain medication for this patient until their pain has been sufficiently reduced to a level that I deem acceptable to be treated with alternative treatment options. [facility] reviewed # [id number].     Return in about 2 weeks (around 7/25/2018) for Recheck.      This document has been electronically signed by WESTON Yuen on July 11, 2018 6:36 PM      WESTON Yuen

## 2018-07-12 RX ORDER — HYDROCODONE BITARTRATE AND ACETAMINOPHEN 5; 325 MG/1; MG/1
TABLET ORAL
Qty: 30 TABLET | Refills: 0 | OUTPATIENT
Start: 2018-07-12

## 2018-07-13 ENCOUNTER — OFFICE VISIT (OUTPATIENT)
Dept: FAMILY MEDICINE CLINIC | Facility: CLINIC | Age: 58
End: 2018-07-13

## 2018-07-13 ENCOUNTER — OFFICE VISIT (OUTPATIENT)
Dept: OTOLARYNGOLOGY | Facility: CLINIC | Age: 58
End: 2018-07-13

## 2018-07-13 VITALS
OXYGEN SATURATION: 97 % | TEMPERATURE: 98.3 F | WEIGHT: 154 LBS | HEIGHT: 63 IN | HEART RATE: 112 BPM | SYSTOLIC BLOOD PRESSURE: 136 MMHG | BODY MASS INDEX: 27.29 KG/M2 | DIASTOLIC BLOOD PRESSURE: 84 MMHG

## 2018-07-13 DIAGNOSIS — M25.50 ARTHRALGIA, UNSPECIFIED JOINT: ICD-10-CM

## 2018-07-13 DIAGNOSIS — Z53.21 PROCEDURE AND TREATMENT NOT CARRIED OUT DUE TO PATIENT LEAVING PRIOR TO BEING SEEN BY HEALTH CARE PROVIDER: Primary | ICD-10-CM

## 2018-07-13 DIAGNOSIS — M79.645 BILATERAL THUMB PAIN: ICD-10-CM

## 2018-07-13 DIAGNOSIS — M79.644 BILATERAL THUMB PAIN: ICD-10-CM

## 2018-07-13 DIAGNOSIS — G62.9 NEUROPATHY: ICD-10-CM

## 2018-07-13 DIAGNOSIS — H92.01 EARACHE ON RIGHT: Primary | ICD-10-CM

## 2018-07-13 PROCEDURE — 99214 OFFICE O/P EST MOD 30 MIN: CPT | Performed by: NURSE PRACTITIONER

## 2018-07-13 RX ORDER — CYCLOBENZAPRINE HCL 10 MG
10 TABLET ORAL 3 TIMES DAILY PRN
Qty: 90 TABLET | Refills: 3 | Status: SHIPPED | OUTPATIENT
Start: 2018-07-13 | End: 2018-11-29 | Stop reason: SDUPTHER

## 2018-07-13 RX ORDER — GABAPENTIN 800 MG/1
800 TABLET ORAL 2 TIMES DAILY
Qty: 60 TABLET | Refills: 0 | Status: SHIPPED | OUTPATIENT
Start: 2018-07-13 | End: 2018-08-30 | Stop reason: SDUPTHER

## 2018-07-13 RX ORDER — TRAMADOL HYDROCHLORIDE 50 MG/1
50 TABLET ORAL EVERY 8 HOURS PRN
Qty: 90 TABLET | Refills: 0 | Status: SHIPPED | OUTPATIENT
Start: 2018-07-13 | End: 2018-08-17 | Stop reason: SDUPTHER

## 2018-07-13 NOTE — PROGRESS NOTES
Patient was given a work in appointment today at her request and she registered for the appointment but then left without being seen and rescheduled for a later date

## 2018-07-13 NOTE — PROGRESS NOTES
"Hattie SARAVIA Person is a 57 y.o. female.     Here today with discomfort in both thumbs that runs down into her wrists.      She thought that she had something in her right ear, she used a pair of tweezers to \"dig something out.\"  She had an appointment today with ent, but did not stay for the appointment.  She has been using ear gtts that were called in yesterday by dr geiger.      Dizziness   This is a new problem. The current episode started in the past 7 days. The problem occurs intermittently. The problem has been unchanged. Pertinent negatives include no abdominal pain, anorexia, arthralgias, change in bowel habit, chest pain, chills, congestion, coughing, diaphoresis, fatigue, fever, headaches, joint swelling, myalgias, nausea, neck pain, numbness, rash, sore throat, swollen glands, urinary symptoms, vertigo, visual change, vomiting or weakness. Associated symptoms comments: Ear pain. Nothing aggravates the symptoms. She has tried nothing for the symptoms. The treatment provided no relief.   Earache    There is pain in the right ear. This is a new problem. The current episode started in the past 7 days. The problem occurs constantly. The problem has been unchanged. There has been no fever. The pain is at a severity of 4/10. The pain is moderate. Associated symptoms include ear discharge. Pertinent negatives include no abdominal pain, coughing, diarrhea, headaches, hearing loss, neck pain, rash, rhinorrhea, sore throat or vomiting. She has tried ear drops for the symptoms. The treatment provided no relief.   Upper Extremity Issue   This is a chronic (pain in both thumbs) problem. The problem occurs constantly. The problem has been waxing and waning. Pertinent negatives include no abdominal pain, anorexia, arthralgias, change in bowel habit, chest pain, chills, congestion, coughing, diaphoresis, fatigue, fever, headaches, joint swelling, myalgias, nausea, neck pain, numbness, rash, sore throat, swollen " glands, urinary symptoms, vertigo, visual change, vomiting or weakness. Exacerbated by: rom. Treatments tried: gabapentin and tramadol. The treatment provided mild relief.        The following portions of the patient's history were reviewed and updated as appropriate: allergies, current medications, past family history, past medical history, past social history, past surgical history and problem list.    Review of Systems   Constitutional: Negative.  Negative for chills, diaphoresis, fatigue and fever.   HENT: Positive for ear discharge and ear pain. Negative for congestion, hearing loss, rhinorrhea and sore throat.    Respiratory: Negative.  Negative for cough.    Cardiovascular: Negative.  Negative for chest pain.   Gastrointestinal: Negative for abdominal pain, anorexia, change in bowel habit, diarrhea, nausea and vomiting.   Musculoskeletal: Negative.  Negative for arthralgias, joint swelling, myalgias and neck pain.   Skin: Negative.  Negative for rash.   Neurological: Positive for dizziness. Negative for vertigo, weakness and numbness.   Psychiatric/Behavioral: Negative.        Objective   Physical Exam   Constitutional: She is oriented to person, place, and time. She appears well-developed and well-nourished. No distress.   HENT:   Head: Normocephalic.   Right Ear: Hearing and external ear normal. Tympanic membrane is perforated.   Left Ear: Hearing, tympanic membrane, external ear and ear canal normal.   Nose: Nose normal. Right sinus exhibits no maxillary sinus tenderness and no frontal sinus tenderness. Left sinus exhibits no maxillary sinus tenderness and no frontal sinus tenderness.   Mouth/Throat: Oropharynx is clear and moist. No oropharyngeal exudate.   There is some blood noted in the right canal and the tm appears perforated.   Eyes: Pupils are equal, round, and reactive to light.   Neck: Normal range of motion. Neck supple. No thyromegaly present.   Cardiovascular: Normal rate, regular rhythm and  normal heart sounds.  Exam reveals no friction rub.    No murmur heard.  Pulmonary/Chest: Effort normal and breath sounds normal. No respiratory distress. She has no wheezes. She has no rales.   Abdominal: Soft.   Musculoskeletal: Normal range of motion.   Has good rom of both thumbs, but does have tenderness at the base of each thumb with palpation.   Neurological: She is alert and oriented to person, place, and time.   Skin: Skin is warm and dry.   Psychiatric: She has a normal mood and affect.   Nursing note and vitals reviewed.        Assessment/Plan   Jyoti was seen today for dizziness, numbness and earache.    Diagnoses and all orders for this visit:    Earache on right    Bilateral thumb pain  -     traMADol (ULTRAM) 50 MG tablet; Take 1 tablet by mouth Every 8 (Eight) Hours As Needed for Moderate Pain .    Neuropathy  -     gabapentin (NEURONTIN) 800 MG tablet; Take 1 tablet by mouth 2 (Two) Times a Day.    Arthralgia, unspecified joint  -     cyclobenzaprine (FLEXERIL) 10 MG tablet; Take 1 tablet by mouth 3 (Three) Times a Day As Needed for Muscle Spasms.      She has an appointment coming up with her neurologist.  She needs to address the thumb pain with them.  She may needs emgs.    She also needs to cont with her ear gtts and not to put any foreign objects into her ear.  She also needs to reschedule her appointment with dr geiger to assure that the tm is healing properly.   edith # [id number] is reviewed.

## 2018-07-16 PROBLEM — M79.645 BILATERAL THUMB PAIN: Status: ACTIVE | Noted: 2018-07-16

## 2018-07-16 PROBLEM — M79.644 BILATERAL THUMB PAIN: Status: ACTIVE | Noted: 2018-07-16

## 2018-07-26 PROCEDURE — 85025 COMPLETE CBC W/AUTO DIFF WBC: CPT | Performed by: NURSE PRACTITIONER

## 2018-07-26 PROCEDURE — 84550 ASSAY OF BLOOD/URIC ACID: CPT | Performed by: NURSE PRACTITIONER

## 2018-07-26 PROCEDURE — 86140 C-REACTIVE PROTEIN: CPT | Performed by: NURSE PRACTITIONER

## 2018-07-26 PROCEDURE — 85651 RBC SED RATE NONAUTOMATED: CPT | Performed by: NURSE PRACTITIONER

## 2018-08-15 ENCOUNTER — OFFICE VISIT (OUTPATIENT)
Dept: OTOLARYNGOLOGY | Facility: CLINIC | Age: 58
End: 2018-08-15

## 2018-08-15 VITALS — BODY MASS INDEX: 26.44 KG/M2 | WEIGHT: 149.2 LBS | HEIGHT: 63 IN

## 2018-08-15 DIAGNOSIS — S00.411A ABRASION OF RIGHT EAR, INITIAL ENCOUNTER: ICD-10-CM

## 2018-08-15 DIAGNOSIS — H60.311 CHRONIC DIFFUSE OTITIS EXTERNA OF RIGHT EAR: Primary | ICD-10-CM

## 2018-08-15 PROCEDURE — 99213 OFFICE O/P EST LOW 20 MIN: CPT | Performed by: OTOLARYNGOLOGY

## 2018-08-15 RX ORDER — OFLOXACIN 3 MG/ML
SOLUTION AURICULAR (OTIC)
Qty: 10 ML | Refills: 0 | Status: SHIPPED | OUTPATIENT
Start: 2018-08-15 | End: 2019-10-15 | Stop reason: ALTCHOICE

## 2018-08-17 DIAGNOSIS — M79.644 BILATERAL THUMB PAIN: ICD-10-CM

## 2018-08-17 DIAGNOSIS — M79.645 BILATERAL THUMB PAIN: ICD-10-CM

## 2018-08-17 RX ORDER — TRAMADOL HYDROCHLORIDE 50 MG/1
50 TABLET ORAL EVERY 8 HOURS PRN
Qty: 90 TABLET | Refills: 0 | Status: SHIPPED | OUTPATIENT
Start: 2018-08-17 | End: 2018-11-12 | Stop reason: SDUPTHER

## 2018-08-17 NOTE — PROGRESS NOTES
Hattie Riddle is a 58 y.o. female.       History of Present Illness   Is a patient I followed previously with chronic noninfective otitis externa.  Should previously been using Q-tips.  She had repeatedly traumatized her ears with various instruments.  She was treated with ofloxacin.  She called for a work in because her ear is been bleeding again.  She says she has not been utilizing any instruments to manipulate her ear but admits trying to manipulate her ear with her fingernail.  She says this is unsuccessful because of the shape of her fingernail.  Nonetheless she feels like she is had some intermittent bleeding from the ear.      The following portions of the patient's history were reviewed and updated as appropriate: allergies, current medications, past family history, past medical history, past social history, past surgical history and problem list.     reports that she has been smoking Cigarettes.  She has been smoking about 0.25 packs per day. She has never used smokeless tobacco. She reports that she drinks alcohol. She reports that she uses drugs, including Cocaine.   Patient is a tobacco user and has been counseled for use of tobacco products      Review of Systems   Constitutional: Negative for fever.           Objective   Physical Exam  Ears: External ears no deformity.  Left ear canal shows minimal noninfected squamous debris.  Beyond this tympanic membranes intact and clear.  Right external ear shows an abrasion of the conchal bowl that is crusted without purulence.  In the ear canal itself there is some mucopurulent discharge and a large area of granulation tissue posteriorly and medially in the ear canal.  This is cauterized with silver nitrate.  Tympanic membrane is intact with no infection or effusion  Nose: Nares show no discharge mass polyp or purulence.  Boggy mucosa is present.  No gross external deformity.    Oral cavity: Lips and gums without lesions.  Tongue and floor of  mouth without lesions.  Parotid and submandibular ducts unobstructed.  No mucosal lesions on the buccal mucosa or vestibule of the mouth.  Pharynx: No erythema exudate or mass  Neck: No lymphadenopathy.  No thyromegaly.  Trachea and larynx midline.  No masses in the parotid or submandibular glands.      Assessment/Plan   Jyoti was seen today for ear drainage.    Diagnoses and all orders for this visit:    Chronic diffuse otitis externa of right ear    Abrasion of right ear, initial encounter    Other orders  -     ofloxacin (FLOXIN) 0.3 % otic solution; 5 drops to right ear BID x 10 days.  -     mupirocin (BACTROBAN) 2 % ointment; Apply to right ear twice a day      Plan: Silver nitrate cautery to the ear canal.  Prescribed ofloxacin 5 drops to the right ear twice a day.  Prescribed mupirocin ointment to be applied to the external abrasion twice a day.  Advise that she absolutely must not manipulate her ear externally or internally.  Return in 2 weeks, call sooner for problems.

## 2018-08-23 ENCOUNTER — OFFICE VISIT (OUTPATIENT)
Dept: ORTHOPEDIC SURGERY | Facility: CLINIC | Age: 58
End: 2018-08-23

## 2018-08-23 VITALS — HEIGHT: 63 IN

## 2018-08-23 DIAGNOSIS — M79.641 BILATERAL HAND PAIN: Primary | ICD-10-CM

## 2018-08-23 DIAGNOSIS — M79.642 BILATERAL HAND PAIN: Primary | ICD-10-CM

## 2018-08-23 DIAGNOSIS — R20.0 NUMBNESS AND TINGLING IN BOTH HANDS: ICD-10-CM

## 2018-08-23 DIAGNOSIS — R20.2 NUMBNESS AND TINGLING IN BOTH HANDS: ICD-10-CM

## 2018-08-23 DIAGNOSIS — G56.03 BILATERAL CARPAL TUNNEL SYNDROME: ICD-10-CM

## 2018-08-23 PROCEDURE — 99214 OFFICE O/P EST MOD 30 MIN: CPT | Performed by: NURSE PRACTITIONER

## 2018-08-23 RX ORDER — HYDROCODONE BITARTRATE AND ACETAMINOPHEN 5; 325 MG/1; MG/1
1 TABLET ORAL EVERY 8 HOURS PRN
Qty: 20 TABLET | Refills: 0 | Status: SHIPPED | OUTPATIENT
Start: 2018-08-23 | End: 2018-08-23 | Stop reason: SDUPTHER

## 2018-08-23 RX ORDER — HYDROCODONE BITARTRATE AND ACETAMINOPHEN 5; 325 MG/1; MG/1
1 TABLET ORAL EVERY 8 HOURS PRN
Qty: 20 TABLET | Refills: 0 | Status: SHIPPED | OUTPATIENT
Start: 2018-08-23 | End: 2018-09-19

## 2018-08-23 NOTE — PROGRESS NOTES
The patient is a 58 y.o. female who presents for followup.    Chief Complaint   Patient presents with   • Left Foot - Follow-up, Fracture   • Results     lab results       HPI: Patient presents to office for follow up of bilateral hand pain. Patient complains of chronic pain in her bilateral hands, especially the thumbs and associated numbness and tingling in the 1st, 2nd and 3rd fingers. Symptoms have been present for several years but continue to progressively worsening. Patient was given IM injections of Kenalog and Toradol done on 7/11/2018 which helped her pain temporarily, but the pain has returned.     Current Outpatient Prescriptions:   •  acetaminophen (TYLENOL) 500 MG tablet, Take 1 tablet by mouth Every 6 (Six) Hours As Needed (body aches)., Disp: 30 tablet, Rfl: 0  •  albuterol (VENTOLIN HFA) 108 (90 Base) MCG/ACT inhaler, Inhale 2 puffs 4 (Four) Times a Day., Disp: 18 g, Rfl: 3  •  ANORO ELLIPTA 62.5-25 MCG/INH aerosol powder , Inhale 62.5 inhalers Daily., Disp: , Rfl: 0  •  aspirin 81 MG chewable tablet, Chew 81 mg., Disp: , Rfl:   •  atorvastatin (LIPITOR) 40 MG tablet, Take 1 tablet by mouth Daily., Disp: 30 tablet, Rfl: 5  •  Blood Pressure Monitoring (B-D ASSURE BPM/AUTO WRIST CUFF) misc, 1 kit 2 (Two) Times a Day As Needed (fluctuating blood pressure)., Disp: 1 each, Rfl: 0  •  Calcium-Magnesium-Vitamin D (CALCIUM 500 PO), Take  by mouth., Disp: , Rfl:   •  cyclobenzaprine (FLEXERIL) 10 MG tablet, Take 1 tablet by mouth 3 (Three) Times a Day As Needed for Muscle Spasms., Disp: 90 tablet, Rfl: 3  •  diclofenac (VOLTAREN) 50 MG EC tablet, Take 1 tablet by mouth 2 (Two) Times a Day., Disp: 60 tablet, Rfl: 5  •  fluticasone (FLONASE) 50 MCG/ACT nasal spray, 2 sprays into each nostril Daily., Disp: 16 g, Rfl: 0  •  folic acid (FOLVITE) 1 MG tablet, Take 1 tablet by mouth Daily., Disp: 30 tablet, Rfl: 5  •  gabapentin (NEURONTIN) 800 MG tablet, Take 1 tablet by mouth 2 (Two) Times a Day., Disp: 60  tablet, Rfl: 0  •  guaiFENesin (MUCINEX) 600 MG 12 hr tablet, Take 1 tablet by mouth Every 12 (Twelve) Hours., Disp: 20 tablet, Rfl: 0  •  ibuprofen (ADVIL,MOTRIN) 800 MG tablet, Take 800 mg by mouth Daily., Disp: , Rfl:   •  levETIRAcetam (KEPPRA) 500 MG tablet, Take 1 tablet by mouth 2 (Two) Times a Day., Disp: 60 tablet, Rfl: 5  •  lidocaine (LIDODERM) 5 %, apply 1 patch to the affected area daily. Leave on for 12 hours and then off for 12 hours., Disp: 6 patch, Rfl: 1  •  meclizine (ANTIVERT) 25 MG tablet, Take 1 tablet by mouth 3 (Three) Times a Day As Needed (for dizziness)., Disp: 30 tablet, Rfl: 5  •  mupirocin (BACTROBAN) 2 % ointment, Apply to right ear twice a day, Disp: 15 g, Rfl: 0  •  NIFEdipine XL (PROCARDIA XL) 30 MG 24 hr tablet, take 1 tablet by mouth once daily, Disp: 30 tablet, Rfl: 5  •  ofloxacin (FLOXIN) 0.3 % otic solution, 5 drops to right ear BID x 10 days., Disp: 10 mL, Rfl: 0  •  ondansetron (ZOFRAN) 4 MG tablet, Take 1 tablet by mouth Every 8 (Eight) Hours As Needed for Nausea or Vomiting., Disp: 10 tablet, Rfl: 0  •  RA FOLIC ACID 400 MCG tablet, take 1 tablet by mouth once daily, Disp: , Rfl: 0  •  RA VITAMIN B-1 100 MG tablet, take 1 tablet by mouth once daily, Disp: , Rfl: 0  •  rOPINIRole (REQUIP) 2 MG tablet, take 1 tablet by mouth at bedtime, Disp: 30 tablet, Rfl: 3  •  traMADol (ULTRAM) 50 MG tablet, Take 1 tablet by mouth Every 8 (Eight) Hours As Needed for Moderate Pain ., Disp: 90 tablet, Rfl: 0  •  triamcinolone (KENALOG) 0.1 % ointment, apply to affected area twice a day, Disp: 30 g, Rfl: 3  •  vitamin D (ERGOCALCIFEROL) 03557 units capsule capsule, take 1 capsule by mouth EVERY 2 WEEKS, Disp: 4 capsule, Rfl: 5  •  HYDROcodone-acetaminophen (NORCO) 5-325 MG per tablet, Take 1 tablet by mouth Every 8 (Eight) Hours As Needed (PRN)., Disp: 20 tablet, Rfl: 0    Allergies   Allergen Reactions   • Lisinopril Anaphylaxis        ROS:  No fevers or chills.  No nausea or vomiting.  "Right hand pain. Left hand pain.     PHYSICAL EXAM:    Vitals:    08/23/18 1523   Height: 160 cm (63\")       GAIT:     []  Normal  []  Antalgic    Assistive device: []  None  []  Walker     []  Crutches  []  Cane     [x]  Wheelchair  []  Stretcher    Physical Exam   Constitutional: She is oriented to person, place, and time. Vital signs are normal. She appears well-developed and well-nourished. She is active and cooperative. She does not appear ill. No distress.   HENT:   Head: Normocephalic.   Pulmonary/Chest: Effort normal. No respiratory distress.   Abdominal: Soft. She exhibits no distension.   Musculoskeletal: She exhibits tenderness (Right hand, Left hand) and deformity (Mild, left wrist/ulna). She exhibits no edema.   Neurological: She is alert and oriented to person, place, and time. GCS eye subscore is 4. GCS verbal subscore is 5. GCS motor subscore is 6.   Skin: Skin is warm, dry and intact. Capillary refill takes less than 2 seconds. No erythema.   Psychiatric: She has a normal mood and affect. Her speech is normal and behavior is normal. Judgment and thought content normal. Cognition and memory are normal.   Vitals reviewed.    Right Hand Exam     Tenderness   The patient is experiencing tenderness in the radial area and dorsal area (Diffuse, especially proximal thumb).    Range of Motion   The patient has normal right wrist ROM.     Muscle Strength   Wrist Extension: 4/5   Wrist Flexion: 4/5   : 4/5     Tests   Phalen’s Sign: positive  Tinel’s Sign (Medial Nerve): positive  Finkelstein: negative    Other   Erythema: absent  Sensation: normal  Pulse: present    Comments:  Grind test negative. Finkelstein's test negative. Mild pain with range of motion, especially the proximal thumb.       Left Hand Exam     Tenderness   The patient is experiencing tenderness in the radial area and dorsal area (Diffuse, especially proximal thumb).     Range of Motion   The patient has normal left wrist ROM.    Muscle " Strength   Wrist Extension: 4/5   Wrist Flexion: 4/5   :  4/5     Tests   Phalen’s Sign: positive  Tinel’s Sign (Medial Nerve): positive  Finkelstein: negative    Other   Erythema: absent  Sensation: normal  Pulse: present    Comments:  Grind test negative. Finkelstein's test negative. Mild pain with range of motion, especially the proximal thumb. Mild deformity present to distal ulna (chronic).           Xr Hand 3+ View Bilateral    Result Date: 8/24/2018  Narrative: AP, oblique and lateral views of the bilateral hands reveal no evidence of acute fracture or dislocation. No significant degenerative changes are noted. Joint spaces are maintained. There is evidence of old injury and fracture noted in the left wrist to the ulnar styloid tip with a minimally displaced fracture fragment.  There is evidence of an old healed fracture in the left distal radius.  There is a small osteophyte complex noted at the scaphoid in the left hand.  No other radiologic abnormalities are noted at this time.  No comparison images are available for review.08/24/18 at 11:44 AM by WESTON Yuen     ASSESSMENT:  Diagnoses and all orders for this visit:    Bilateral hand pain  -     XR Hand 3+ View Bilateral; Future  -     EMG & Nerve Conduction Test; Future    Numbness and tingling in both hands  -     EMG & Nerve Conduction Test; Future    Bilateral carpal tunnel syndrome  -     EMG & Nerve Conduction Test; Future    Other orders  -     HYDROcodone-acetaminophen (NORCO) 5-325 MG per tablet; Take 1 tablet by mouth Every 8 (Eight) Hours As Needed (PRN).    PLAN: X-rays of bilateral hands reviewed today with no acute findings noted. Patient complains of chronic bilateral hand pain for several years that continues to progressively worsen. 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 Most of her pain is on the radial side at the proximal thumbs. There are no significant  degenerative changes noted on x-ray. There are signs of prior injury noted in the left wrist. Patient also complains of numbness, tingling and weakness in her hands. Symptoms are suspicious for carpal tunnel syndrome. Recommend EMG-NCS to evaluate for carpal tunnel syndrome. Patient wants to do this test with her established neurology provider she already sees in [place]. Patient is wearing bilateral velcro wrist splints today. Recommend to continue with wrist splints. Recommend to continue Voltaren twice daily as previously prescribed for consistent dosing of oral NSAIDs. Patient also requests a refill of Norco today for her pain, which she describes as severe and constant. Patient states Tramadol does not help her. A limited refill of Norco is prescribed today to take only as needed for severe pain. Patient is instructed to take sparingly and to rely primarily on anti-inflammatory medications for pain control. Patient verbalizes understanding. Avoid repetitive motions with the wrists and hands. Follow up after EMG-NCS. Anticipate referral to one of the orthopedics surgeons for treatment of carpal tunnel syndrome if indicated.     This patient has tried and failed a course of multiple treatment modalities which include but are not limited to the use of anti-inflammatories/acetaminophen, bilateral wrist splints, IM steroids, IM Toradol and rest/activity modification.Therefore, I will recommend a course of narcotic pain medication for this patient until their pain has been sufficiently reduced to a level that I deem acceptable to be treated with alternative treatment options. [facility] reviewed # [id number].      Return for follow up after EMG-NCS.        This document has been electronically signed by WESTON Yuen on August 26, 2018 5:39 PM      WESTON Yuen

## 2018-08-29 RX ORDER — OFLOXACIN 3 MG/ML
SOLUTION AURICULAR (OTIC)
Qty: 10 ML | Refills: 0 | OUTPATIENT
Start: 2018-08-29

## 2018-08-30 DIAGNOSIS — G62.9 NEUROPATHY: ICD-10-CM

## 2018-08-30 RX ORDER — GABAPENTIN 800 MG/1
800 TABLET ORAL 2 TIMES DAILY
Qty: 60 TABLET | Refills: 0 | Status: SHIPPED | OUTPATIENT
Start: 2018-08-30 | End: 2018-10-18 | Stop reason: SDUPTHER

## 2018-09-13 ENCOUNTER — OFFICE VISIT (OUTPATIENT)
Dept: OTOLARYNGOLOGY | Facility: CLINIC | Age: 58
End: 2018-09-13

## 2018-09-13 VITALS — HEIGHT: 63 IN | OXYGEN SATURATION: 99 % | WEIGHT: 149.6 LBS | BODY MASS INDEX: 26.51 KG/M2

## 2018-09-13 DIAGNOSIS — H60.311 CHRONIC DIFFUSE OTITIS EXTERNA OF RIGHT EAR: Primary | ICD-10-CM

## 2018-09-13 PROCEDURE — 99213 OFFICE O/P EST LOW 20 MIN: CPT | Performed by: OTOLARYNGOLOGY

## 2018-09-17 DIAGNOSIS — E56.9 VITAMIN DEFICIENCY: ICD-10-CM

## 2018-09-17 NOTE — PROGRESS NOTES
Subjective   Jyoti Riddle is a 58 y.o. female.       History of Present Illness     Patient has been followed with chronic noninfective otitis externa.  At last visit was noted to have what appeared to be dried blood from an abrasion of the right ear canal along with an abrasion of the conchal bowl.  She was given ofloxacin and mupirocin and advised not to manipulate her ear.  She returns today stating that she thinks she is doing better but still has one sore area just inside her ear.  It is not actively bleeding.    The following portions of the patient's history were reviewed and updated as appropriate: allergies, current medications, past family history, past medical history, past social history, past surgical history and problem list.     reports that she has been smoking Cigarettes.  She has been smoking about 0.25 packs per day. She has never used smokeless tobacco. She reports that she drinks alcohol. She reports that she uses drugs, including Cocaine.   Patient is a tobacco user and has been counseled for use of tobacco products      Review of Systems   Constitutional: Negative for fever.           Objective   Physical Exam  Ears: External ears no deformity.  Right ear canal shows some dried crusted blood now in the external os as well as some scaling and dried blood more medially in the canal.  This is all cleaned under the microscope using instrumentation.  The abrasion of the conchal bowl has resolved.        Assessment/Plan   Jyoti was seen today for follow-up.    Diagnoses and all orders for this visit:    Chronic diffuse otitis externa of right ear        And: Ear cleaned as described above.  Advised the patient gently apply mupirocin ointment just to the external os twice a day for the next week then avoid all manipulation and return in 4 months, call sooner for problems.

## 2018-09-18 RX ORDER — FOLIC ACID 1 MG/1
TABLET ORAL
Qty: 30 TABLET | Refills: 3 | Status: SHIPPED | OUTPATIENT
Start: 2018-09-18 | End: 2019-03-21 | Stop reason: SDUPTHER

## 2018-09-19 ENCOUNTER — OFFICE VISIT (OUTPATIENT)
Dept: FAMILY MEDICINE CLINIC | Facility: CLINIC | Age: 58
End: 2018-09-19

## 2018-09-19 VITALS
HEIGHT: 63 IN | DIASTOLIC BLOOD PRESSURE: 90 MMHG | HEART RATE: 116 BPM | TEMPERATURE: 97.5 F | BODY MASS INDEX: 26.4 KG/M2 | SYSTOLIC BLOOD PRESSURE: 120 MMHG | OXYGEN SATURATION: 98 % | WEIGHT: 149 LBS

## 2018-09-19 DIAGNOSIS — R53.1 DECREASED STRENGTH: ICD-10-CM

## 2018-09-19 DIAGNOSIS — M79.641 BILATERAL HAND PAIN: Primary | ICD-10-CM

## 2018-09-19 DIAGNOSIS — M79.605 BILATERAL LEG PAIN: ICD-10-CM

## 2018-09-19 DIAGNOSIS — M54.2 NECK PAIN: ICD-10-CM

## 2018-09-19 DIAGNOSIS — M79.642 BILATERAL HAND PAIN: Primary | ICD-10-CM

## 2018-09-19 DIAGNOSIS — M79.601 BILATERAL ARM PAIN: ICD-10-CM

## 2018-09-19 DIAGNOSIS — M79.602 BILATERAL ARM PAIN: ICD-10-CM

## 2018-09-19 DIAGNOSIS — M79.604 BILATERAL LEG PAIN: ICD-10-CM

## 2018-09-19 PROCEDURE — 99214 OFFICE O/P EST MOD 30 MIN: CPT | Performed by: NURSE PRACTITIONER

## 2018-09-19 NOTE — PROGRESS NOTES
"Hattie SARAVIA Person is a 58 y.o. female.     [facility] Visit    PCP: WESTON Kirk    CC: \"aches in legs, arms, neck and hands\"    History of stroke 2 years ago and has had some balance/weakness issues since that time.     Has been seeing ortho for her pain and has seen Neurology and has appointment next week to see bother for f/u.  Has also been referred to rheumatology in the past.     Currently wearing bilateral wrist braces which helps some.     Reports that at times her fingers get cold and turn purple.     Reports she now has to wear a wig because of decreased      Pain   This is a chronic (multiple areas of pain--hands, neck, upper back, arms, legs) problem. Episode onset: for the last 6 months. The problem occurs constantly. The problem has been gradually worsening. Associated symptoms include arthralgias, myalgias, neck pain and weakness. Pertinent negatives include no abdominal pain, anorexia, change in bowel habit, chest pain, chills, congestion, coughing, diaphoresis, fatigue, fever, headaches, joint swelling, nausea, numbness, rash, sore throat, swollen glands, urinary symptoms, vertigo, visual change or vomiting. Treatments tried: on multiple medications for pain. The treatment provided mild relief.        The following portions of the patient's history were reviewed and updated as appropriate: allergies, current medications, past medical history, past social history, past surgical history and problem list.    Review of Systems   Constitutional: Negative for chills, diaphoresis, fatigue and fever.   HENT: Negative for congestion and sore throat.    Respiratory: Negative for cough and shortness of breath.    Cardiovascular: Negative for chest pain.   Gastrointestinal: Negative for abdominal pain, anorexia, change in bowel habit, nausea and vomiting.   Musculoskeletal: Positive for arthralgias, back pain, gait problem ( unsteady, off balance --has been this way since her stroke), " "myalgias and neck pain. Negative for joint swelling.        +bilateral arm pain/leg pain/hand pain   Skin: Negative for rash.   Neurological: Positive for weakness. Negative for vertigo and numbness.       Objective    /90 (BP Location: Left arm, Patient Position: Sitting, Cuff Size: Adult)   Pulse 116   Temp 97.5 °F (36.4 °C) (Tympanic)   Ht 160 cm (63\")   Wt 67.6 kg (149 lb)   SpO2 98%   BMI 26.39 kg/m²     Physical Exam   Constitutional: She is oriented to person, place, and time. She appears well-developed and well-nourished. No distress.   Cardiovascular: Normal rate and regular rhythm.    Pulses:       Radial pulses are 2+ on the right side, and 2+ on the left side.   Pulmonary/Chest: Effort normal and breath sounds normal. She has no wheezes. She has no rales.   Musculoskeletal:        Right wrist: She exhibits decreased range of motion and tenderness.        Left wrist: She exhibits decreased range of motion and tenderness.        Cervical back: She exhibits spasm.   Slightly decreased strength to upper/lower extremities 4/5, but equal bilaterally   Neurological: She is alert and oriented to person, place, and time. Coordination ( unsteady gait, using cane which helps) abnormal.   Skin: Skin is warm.   Fingers are warm to touch, pink   Nursing note and vitals reviewed.    Review of recent labs noted:     Recent Results (from the past 5000 hour(s))   Uric Acid    Collection Time: 07/26/18  3:16 PM   Result Value Ref Range    Uric Acid 5.4 2.5 - 8.5 mg/dL   C-reactive Protein    Collection Time: 07/26/18  3:16 PM   Result Value Ref Range    C-Reactive Protein <0.50 0.00 - 1.00 mg/dL   Sedimentation Rate    Collection Time: 07/26/18  3:16 PM   Result Value Ref Range    Sed Rate 42 (H) 0 - 20 mm/hr   CBC Auto Differential    Collection Time: 07/26/18  3:16 PM   Result Value Ref Range    WBC 8.56 3.20 - 9.80 10*3/mm3    RBC 4.58 3.77 - 5.16 10*6/mm3    Hemoglobin 13.9 12.0 - 15.5 g/dL    Hematocrit " 42.0 35.0 - 45.0 %    MCV 91.7 80.0 - 98.0 fL    MCH 30.3 26.5 - 34.0 pg    MCHC 33.1 31.4 - 36.0 g/dL    RDW 13.1 11.5 - 14.5 %    RDW-SD 43.4 36.4 - 46.3 fl    MPV 12.5 (H) 8.0 - 12.0 fL    Platelets 212 150 - 450 10*3/mm3    Neutrophil % 58.4 37.0 - 80.0 %    Lymphocyte % 31.9 10.0 - 50.0 %    Monocyte % 6.8 0.0 - 12.0 %    Eosinophil % 2.2 0.0 - 7.0 %    Basophil % 0.2 0.0 - 2.0 %    Immature Grans % 0.5 0.0 - 0.5 %    Neutrophils, Absolute 5.00 2.00 - 8.60 10*3/mm3    Lymphocytes, Absolute 2.73 0.60 - 4.20 10*3/mm3    Monocytes, Absolute 0.58 0.00 - 0.90 10*3/mm3    Eosinophils, Absolute 0.19 0.00 - 0.70 10*3/mm3    Basophils, Absolute 0.02 0.00 - 0.20 10*3/mm3    Immature Grans, Absolute 0.04 (H) 0.00 - 0.02 10*3/mm3    nRBC 0.0 0.0 - 0.0 /100 WBC           Assessment/Plan   Jyoti was seen today for joint pain.    Diagnoses and all orders for this visit:    Bilateral hand pain    Bilateral arm pain    Bilateral leg pain    Decreased strength    Neck pain    No changes in current symptoms.   Continue with current meds.  Has only been taking Tramadol in the AM and advised that she may increase to BID or TID as needed--this is how original Rx was written  No additional medications provided today  Continue with wrist splints.     See Neurology and Ortho for f/u as scheduled.     See PCP for routine f/u visits as scheduled.

## 2018-09-24 DIAGNOSIS — I10 ESSENTIAL HYPERTENSION: ICD-10-CM

## 2018-09-24 RX ORDER — NIFEDIPINE 30 MG/1
30 TABLET, EXTENDED RELEASE ORAL DAILY
Qty: 90 TABLET | Refills: 1 | Status: SHIPPED | OUTPATIENT
Start: 2018-09-24 | End: 2019-04-02 | Stop reason: SDUPTHER

## 2018-09-25 ENCOUNTER — OFFICE VISIT (OUTPATIENT)
Dept: NEUROLOGY | Facility: CLINIC | Age: 58
End: 2018-09-25

## 2018-09-25 VITALS
WEIGHT: 147 LBS | SYSTOLIC BLOOD PRESSURE: 138 MMHG | HEART RATE: 102 BPM | DIASTOLIC BLOOD PRESSURE: 90 MMHG | BODY MASS INDEX: 26.05 KG/M2 | HEIGHT: 63 IN

## 2018-09-25 DIAGNOSIS — R20.0 BILATERAL HAND NUMBNESS: ICD-10-CM

## 2018-09-25 DIAGNOSIS — Z72.0 TOBACCO ABUSE: ICD-10-CM

## 2018-09-25 DIAGNOSIS — M79.642 BILATERAL HAND PAIN: ICD-10-CM

## 2018-09-25 DIAGNOSIS — M79.641 BILATERAL HAND PAIN: ICD-10-CM

## 2018-09-25 DIAGNOSIS — I63.032 CEREBROVASCULAR ACCIDENT (CVA) DUE TO THROMBOSIS OF LEFT CAROTID ARTERY (HCC): Primary | ICD-10-CM

## 2018-09-25 DIAGNOSIS — I10 ESSENTIAL HYPERTENSION: ICD-10-CM

## 2018-09-25 PROCEDURE — 99406 BEHAV CHNG SMOKING 3-10 MIN: CPT | Performed by: CLINICAL NURSE SPECIALIST

## 2018-09-25 PROCEDURE — 99214 OFFICE O/P EST MOD 30 MIN: CPT | Performed by: CLINICAL NURSE SPECIALIST

## 2018-09-25 PROCEDURE — 82607 VITAMIN B-12: CPT | Performed by: CLINICAL NURSE SPECIALIST

## 2018-09-25 PROCEDURE — 82746 ASSAY OF FOLIC ACID SERUM: CPT | Performed by: CLINICAL NURSE SPECIALIST

## 2018-09-25 PROCEDURE — 84443 ASSAY THYROID STIM HORMONE: CPT | Performed by: CLINICAL NURSE SPECIALIST

## 2018-09-25 PROCEDURE — 85027 COMPLETE CBC AUTOMATED: CPT | Performed by: CLINICAL NURSE SPECIALIST

## 2018-09-25 PROCEDURE — 84436 ASSAY OF TOTAL THYROXINE: CPT | Performed by: CLINICAL NURSE SPECIALIST

## 2018-09-25 PROCEDURE — 80053 COMPREHEN METABOLIC PANEL: CPT | Performed by: CLINICAL NURSE SPECIALIST

## 2018-09-25 NOTE — PROGRESS NOTES
Subjective     Chief Complaint   Patient presents with   • Stroke     pt states a mini stroke but does not know date   • Seizures     no seizures   • Neurologic Problem     fingers are numb and cold, pt has trouble picking things up and holding. pt falls several times in a month        Jyoti Riddle is a 58 y.o. female right handed .  She is here today for follow up for stroke and abnormal EEG concerning to be seizuregenic. She was last seen 3/2018. She has hx of noncompliance. She is accompanied by her significant other, Reilly, and sister. She thinks she had TIA of right sided weakness but also reports had not taken ASA and may have used illicit drugs. She continues with keppra to 500 mg BID and patient denies side effects. She denies seizure, staring spell, incontinence.      She denies loss of consciousness, tongue biting, incontinence, involuntary tremor.  She continues to use tobacco stating 1 pack last 3 days. She also tells me she has cut back on marijuana and but uses cocaine at least 3 times weekly with her last reported use yesterday.      She does have a new complaint of bilateral hand pain and numbness. Dropping things with left hand. She had seen orthopedics, Ms. Jeffrey, who recommended EMG/NCV but patient wanted to be done at [facility]. She is wearing splint to left hand but states lost the one for her right and is requesting order for right handed splint.   Patient did have xray of both hands: AP, oblique and lateral views of the bilateral hands reveal no evidence of acute fracture or dislocation. No significant degenerative changes are noted. Joint spaces are maintained. There is evidence of old injury and fracture noted in the left wrist to the ulnar styloid tip with a minimally displaced fracture fragment.  There is evidence of an old healed fracture in the left distal radius.  There is a small osteophyte complex noted at the scaphoid in the left hand.  No other radiologic abnormalities are noted at  this time.       Stroke   This is a chronic (image negative stroke in 2013 left hemisphere) problem. The current episode started more than 1 year ago. The problem has been unchanged. Associated symptoms include arthralgias (bilateral hand pain and numbness) and headaches. Pertinent negatives include no chest pain, fatigue, fever, myalgias, nausea, sore throat, vomiting or weakness. Associated symptoms comments: Right facial weakness, RUE/RLE weakness and decrease sensation. And mild dysarthria this has improved since last visit but imbalanced and walks with single point cane. Exacerbated by: hypertensive emergency, illicit drug use.   Seizures    This is a chronic problem. Associated symptoms include headaches and speech difficulty (mild dysarthria). Pertinent negatives include no confusion, no visual disturbance, no sore throat, no chest pain, no nausea, no vomiting and no diarrhea. Characteristics include eye blinking. body stiffens Possible causes include missed seizure meds. illicit drug use   Neurologic Problem   The patient's primary symptoms include an altered mental status. The patient's pertinent negatives include no weakness. Associated symptoms include headaches. Pertinent negatives include no chest pain, confusion, fatigue, fever, nausea or vomiting.   Altered Mental Status   This is a chronic (since 2013) problem. The current episode started more than 1 year ago. The problem has been gradually improving (has had 2 episodes since last visit but may be related to illliciit drug use). Associated symptoms include arthralgias (bilateral hand pain and numbness) and headaches. Pertinent negatives include no chest pain, fatigue, fever, myalgias, nausea, sore throat, vomiting or weakness. Exacerbated by: illicit drug use, JAQUELIN. Treatments tried: trileptal 150 mg BID.        Current Outpatient Prescriptions   Medication Sig Dispense Refill   • acetaminophen (TYLENOL) 500 MG tablet Take 1 tablet by mouth Every 6  (Six) Hours As Needed (body aches). 30 tablet 0   • albuterol (VENTOLIN HFA) 108 (90 Base) MCG/ACT inhaler Inhale 2 puffs 4 (Four) Times a Day. 18 g 3   • ANORO ELLIPTA 62.5-25 MCG/INH aerosol powder  Inhale 62.5 inhalers Daily.  0   • aspirin 81 MG chewable tablet Chew 81 mg.     • atorvastatin (LIPITOR) 40 MG tablet Take 1 tablet by mouth Daily. 30 tablet 5   • Blood Pressure Monitoring (B-D ASSURE BPM/AUTO WRIST CUFF) misc 1 kit 2 (Two) Times a Day As Needed (fluctuating blood pressure). 1 each 0   • Calcium-Magnesium-Vitamin D (CALCIUM 500 PO) Take  by mouth.     • cyclobenzaprine (FLEXERIL) 10 MG tablet Take 1 tablet by mouth 3 (Three) Times a Day As Needed for Muscle Spasms. 90 tablet 3   • diclofenac (VOLTAREN) 50 MG EC tablet Take 1 tablet by mouth 2 (Two) Times a Day. 60 tablet 5   • fluticasone (FLONASE) 50 MCG/ACT nasal spray 2 sprays into each nostril Daily. 16 g 0   • folic acid (FOLVITE) 1 MG tablet TAKE 1 TABLET BY MOUTH ONCE DAILY 30 tablet 3   • gabapentin (NEURONTIN) 800 MG tablet Take 1 tablet by mouth 2 (Two) Times a Day. 60 tablet 0   • ibuprofen (ADVIL,MOTRIN) 800 MG tablet Take 800 mg by mouth Daily.     • levETIRAcetam (KEPPRA) 500 MG tablet Take 1 tablet by mouth 2 (Two) Times a Day. 60 tablet 5   • lidocaine (LIDODERM) 5 % apply 1 patch to the affected area daily. Leave on for 12 hours and then off for 12 hours. 6 patch 1   • meclizine (ANTIVERT) 25 MG tablet Take 1 tablet by mouth 3 (Three) Times a Day As Needed (for dizziness). 30 tablet 5   • mupirocin (BACTROBAN) 2 % ointment Apply to right ear twice a day 15 g 0   • NIFEdipine XL (PROCARDIA XL) 30 MG 24 hr tablet Take 1 tablet by mouth Daily. 90 tablet 1   • ofloxacin (FLOXIN) 0.3 % otic solution 5 drops to right ear BID x 10 days. 10 mL 0   • RA VITAMIN B-1 100 MG tablet take 1 tablet by mouth once daily  0   • rOPINIRole (REQUIP) 2 MG tablet take 1 tablet by mouth at bedtime 30 tablet 3   • traMADol (ULTRAM) 50 MG tablet Take 1  tablet by mouth Every 8 (Eight) Hours As Needed for Moderate Pain . 90 tablet 0   • vitamin D (ERGOCALCIFEROL) 59810 units capsule capsule take 1 capsule by mouth EVERY 2 WEEKS 4 capsule 5     No current facility-administered medications for this visit.        Past Medical History:   Diagnosis Date   • Acute bronchitis 07/19/2016   • Acute otitis externa 09/04/2014   • Acute otitis media 02/10/2015   • Acute sinusitis 07/19/2016   • Ankle injury    • Backache 12/08/2014   • Benign hypertension 05/13/2016   • Cerebrovascular accident (CMS/[facility]) 2015   • Dizziness 02/10/2015   • Encounter for gynecological examination without abnormal finding 03/10/2016   • Fatigue 08/28/2015   • Foot pain 06/09/2014   • Hyperkeratosis 04/28/2014   • Hypotensive episode 06/12/2015   • Keratoma 08/04/2014    intractable plantar   • Low blood pressure 08/28/2015   • Menopausal and perimenopausal disorder 03/10/2016    other specified   • Numbness of face 05/13/2016    improved   • Otitis media of right ear 07/30/2016    unspecified   • Perforated tympanic membrane 09/04/2014   • Puncture wound 06/17/2014    injury   • Right-sided face pain 05/13/2016   • Seizure (CMS/[facility])    • Thumb pain 08/28/2015   • Upper respiratory infection 08/28/2015   • Wheezing 10/21/2014       Past Surgical History:   Procedure Laterality Date   • BREAST BIOPSY     • OTHER SURGICAL HISTORY  07/07/2014    Destruction of Benign Lesion (1-14)   • TUBAL ABDOMINAL LIGATION         family history includes Alzheimer's disease in her father and other; Bipolar disorder in her other; Depression in her other; Diabetes in her other; Heart attack in her father; Heart disease in her other; Hypertension in her other; Lung cancer in her other; Migraines in her other; No Known Problems in her daughter and mother; Sickle cell anemia in her other; Stroke in her other; Throat cancer in her father.    Social History   Substance Use Topics   • Smoking status: Current Every Day  "Smoker     Packs/day: 0.25     Types: Cigarettes   • Smokeless tobacco: Never Used   • Alcohol use Yes      Comment: rarely       Review of Systems   Constitutional: Negative for fatigue and fever.   HENT: Negative for sore throat.    Eyes: Negative for visual disturbance.   Cardiovascular: Negative for chest pain.   Gastrointestinal: Negative for diarrhea, nausea and vomiting.   Genitourinary: Negative for dysuria and frequency.   Musculoskeletal: Positive for arthralgias (bilateral hand pain and numbness) and gait problem (imbalance at times). Negative for myalgias.   Neurological: Positive for seizures, speech difficulty (mild dysarthria) and headaches. Negative for weakness.   Psychiatric/Behavioral: Negative for agitation, confusion and hallucinations.       Objective     /90   Pulse 102   Ht 160 cm (63\")   Wt 66.7 kg (147 lb)   BMI 26.04 kg/m² , Body mass index is 26.04 kg/m².    Physical Exam   Constitutional: She is oriented to person, place, and time. Vital signs are normal. She appears well-developed and well-nourished. She is cooperative.   HENT:   Head: Normocephalic and atraumatic.   Right Ear: Hearing and external ear normal. There is tenderness. Tympanic membrane is erythematous.   Left Ear: Hearing and external ear normal.   Nose: Nose normal.   Mouth/Throat: Oropharynx is clear and moist.   Eyes: Pupils are equal, round, and reactive to light. Conjunctivae, EOM and lids are normal. Right eye exhibits normal extraocular motion and no nystagmus. Left eye exhibits normal extraocular motion and no nystagmus. Right pupil is round. Left pupil is round and reactive. Pupils are equal.   Neck: Normal range of motion. Neck supple. Carotid bruit is not present.   Cardiovascular: Normal rate, regular rhythm, S1 normal, S2 normal and normal heart sounds.    No murmur heard.  Pulmonary/Chest: Effort normal and breath sounds normal.   Abdominal: Soft. Bowel sounds are normal.   Musculoskeletal: Normal " range of motion.   Positive phalen test bilateral.   Decrease  in right compared to left   Neurological: She is alert and oriented to person, place, and time. She has normal strength and normal reflexes. She displays no tremor. A cranial nerve deficit (right lower facial weakness) and sensory deficit (decrease sensation of right side) is present. She displays a negative Romberg sign. She displays no seizure activity. Coordination (ataxia bilateral finger to nose) abnormal. Abnormal gait: unsteady gait, negative romberg.   Reflex Scores:       Tricep reflexes are 2+ on the right side and 2+ on the left side.       Bicep reflexes are 2+ on the right side and 2+ on the left side.       Brachioradialis reflexes are 2+ on the right side and 2+ on the left side.       Patellar reflexes are 2+ on the right side and 2+ on the left side.       Achilles reflexes are 2+ on the right side and 2+ on the left side.  CN II:  Visual fields full.  Pupils equally reactive to light  CN III, IV, VI:  Extraocular Muscles full with no signs of nystagmus  CN V:  Facial sensory is symmetric with no asymetries.  CN VII:  Facial motor symmetric  CN VIII:  Gross hearing intact bilaterally  CN IX:  Palate elevates symmetrically  CN X:  Palate elevates symmetrically  CN XI:  Shoulder shrug symmetric  CN XII:  Tongue is midline on protrusion   Skin: Skin is warm and dry.   Psychiatric: She has a normal mood and affect. Her speech is normal and behavior is normal. Cognition and memory are normal.   Nursing note and vitals reviewed.      Results for orders placed or performed in visit on 07/26/18   Uric Acid   Result Value Ref Range    Uric Acid 5.4 2.5 - 8.5 mg/dL   C-reactive Protein   Result Value Ref Range    C-Reactive Protein <0.50 0.00 - 1.00 mg/dL   Sedimentation Rate   Result Value Ref Range    Sed Rate 42 (H) 0 - 20 mm/hr   CBC Auto Differential   Result Value Ref Range    WBC 8.56 3.20 - 9.80 10*3/mm3    RBC 4.58 3.77 - 5.16  10*6/mm3    Hemoglobin 13.9 12.0 - 15.5 g/dL    Hematocrit 42.0 35.0 - 45.0 %    MCV 91.7 80.0 - 98.0 fL    MCH 30.3 26.5 - 34.0 pg    MCHC 33.1 31.4 - 36.0 g/dL    RDW 13.1 11.5 - 14.5 %    RDW-SD 43.4 36.4 - 46.3 fl    MPV 12.5 (H) 8.0 - 12.0 fL    Platelets 212 150 - 450 10*3/mm3    Neutrophil % 58.4 37.0 - 80.0 %    Lymphocyte % 31.9 10.0 - 50.0 %    Monocyte % 6.8 0.0 - 12.0 %    Eosinophil % 2.2 0.0 - 7.0 %    Basophil % 0.2 0.0 - 2.0 %    Immature Grans % 0.5 0.0 - 0.5 %    Neutrophils, Absolute 5.00 2.00 - 8.60 10*3/mm3    Lymphocytes, Absolute 2.73 0.60 - 4.20 10*3/mm3    Monocytes, Absolute 0.58 0.00 - 0.90 10*3/mm3    Eosinophils, Absolute 0.19 0.00 - 0.70 10*3/mm3    Basophils, Absolute 0.02 0.00 - 0.20 10*3/mm3    Immature Grans, Absolute 0.04 (H) 0.00 - 0.02 10*3/mm3    nRBC 0.0 0.0 - 0.0 /100 WBC        ASSESSMENT/PLAN    Diagnoses and all orders for this visit:    Cerebrovascular accident (CVA) due to thrombosis of left carotid artery (CMS/HCC)    Essential hypertension    Tobacco abuse    Bilateral hand pain  -     EMG & Nerve Conduction Test; Future  -      Wrist Hand Orthosis, Wrist Extension Control Cock-up  -     Vitamin B12; Future  -     Folate; Future  -     CBC (No Diff); Future  -     Comprehensive Metabolic Panel; Future  -     TSH; Future  -     T4; Future    Bilateral hand numbness  -     Vitamin B12; Future  -     Folate; Future  -     CBC (No Diff); Future  -     Comprehensive Metabolic Panel; Future  -     TSH; Future  -     T4; Future      MEDICAL DECISION MAKIN. Continue with ASA 81 mg for secondary stroke prevention and counseled on compliance.  2. Continue with statin per PCP for LDL goal less than 70.  3. Continue with BP management per PCP for systolic less than 140.  4.continue  Keppra 500 mg BID and counseled on compliance.  5. Counseled on cessation of cocaine, marijuana, and tobacco. I did discuss rehabilitation and patient wishes to think about it.  6. Patient is  counseled on stroke signs and symptoms using FAST and Time Saved is Brain Saved.  7. Seizure precautions were discussed to include no tub baths, no swimming, avoiding lack of sleep, and avoiding known triggers. Education given of things that may contribute to a seizure to include, but not limited to: stressful situations, fever, fatigue, lack of sleep, low blood sugar, hyperventilation, flashing lights, and caffeine. Instructions given to take seizure medications as prescribed. Education given to family member on what to do during a seizure and care following the seizure. Education given to contact this office prior to stopping or changing any medications.  8.I advised the patient of the risks in continuing to use tobacco, and I provided this patient with smoking cessation educational materials.     During this visit, I spent > 3-10 minutes counseling the patient regarding smoking cessation.  9. Check EMG/NCV and send results to Ms. Jeffrey with orthopedics in [place]  10. Check labs looking for correctable cause of bilateral hand numbness.      HPI and ROS reviewed and updated.     allergies and all known medications/prescriptions have been reviewed using resources available on this encounter.    Return in about 3 months (around 12/25/2018).        Gladis Ritchie, WESTON

## 2018-10-02 ENCOUNTER — OFFICE VISIT (OUTPATIENT)
Dept: ORTHOPEDIC SURGERY | Facility: CLINIC | Age: 58
End: 2018-10-02

## 2018-10-02 VITALS — WEIGHT: 147 LBS | BODY MASS INDEX: 26.05 KG/M2 | HEIGHT: 63 IN

## 2018-10-02 DIAGNOSIS — M79.642 BILATERAL HAND PAIN: Primary | ICD-10-CM

## 2018-10-02 DIAGNOSIS — R20.2 NUMBNESS AND TINGLING IN BOTH HANDS: ICD-10-CM

## 2018-10-02 DIAGNOSIS — M79.641 BILATERAL HAND PAIN: Primary | ICD-10-CM

## 2018-10-02 DIAGNOSIS — R20.0 NUMBNESS AND TINGLING IN BOTH HANDS: ICD-10-CM

## 2018-10-02 DIAGNOSIS — G56.03 BILATERAL CARPAL TUNNEL SYNDROME: ICD-10-CM

## 2018-10-02 PROCEDURE — 99213 OFFICE O/P EST LOW 20 MIN: CPT | Performed by: NURSE PRACTITIONER

## 2018-10-02 RX ORDER — HYDROCODONE BITARTRATE AND ACETAMINOPHEN 5; 325 MG/1; MG/1
1 TABLET ORAL EVERY 8 HOURS PRN
Qty: 30 TABLET | Refills: 0 | Status: SHIPPED | OUTPATIENT
Start: 2018-10-02 | End: 2018-10-12

## 2018-10-02 NOTE — PROGRESS NOTES
"Jyoti Riddle is a 58 y.o. female returns for     Chief Complaint   Patient presents with   • Left Hand - Follow-up   • Right Hand - Follow-up       HISTORY OF PRESENT ILLNESS: Patient presents to office for follow up of chronic bilateral hand pain, especially in the bilateral thumbs, and chronic numbness and tingling in the bilateral first, second and third fingers. Symptoms have been present for several years but are progressively worsening and more severe recently. Patient continues to take Norco for pain. She already takes Voltaren and Neurontin, which were not helping her pain. She continues those medications. She continues to wear bilateral velcro wrist control splints, which offer only some mild relief. She is scheduled for an EMG-NCS in [place] at the end of October.    CONCURRENT MEDICAL HISTORY:    The following portions of the patient's history were reviewed and updated as appropriate: allergies, current medications, past family history, past medical history, past social history, past surgical history and problem list.     ROS  No fevers or chills.  No chest pain or shortness of air.  No GI or  disturbances. Right hand pain. Left hand pain. Numbness/tingling bilateral hands.     PHYSICAL EXAMINATION:       Ht 160 cm (63\")   Wt 66.7 kg (147 lb)   BMI 26.04 kg/m²     Physical Exam   Constitutional: She is oriented to person, place, and time. Vital signs are normal. She appears well-developed and well-nourished. She is active and cooperative. She does not appear ill. No distress.   HENT:   Head: Normocephalic.   Pulmonary/Chest: Effort normal. No respiratory distress.   Abdominal: Soft. She exhibits no distension.   Musculoskeletal: She exhibits tenderness (Right hand, Left hand) and deformity (Mild, left wrist/ulna). She exhibits no edema.   Neurological: She is alert and oriented to person, place, and time. A sensory deficit (Bilateral hands, chronic) is present. GCS eye subscore is 4. GCS verbal " subscore is 5. GCS motor subscore is 6.   Skin: Skin is warm, dry and intact. Capillary refill takes less than 2 seconds. No erythema.   Psychiatric: She has a normal mood and affect. Her speech is normal and behavior is normal. Judgment and thought content normal. Cognition and memory are normal.   Vitals reviewed.      GAIT:     [x]  Normal  []  Antalgic    Assistive device: [x]  None  []  Walker     []  Crutches  []  Cane     []  Wheelchair  []  Stretcher    Right Hand Exam     Tenderness   The patient is experiencing tenderness in the dorsal area and radial area (Diffuse, especially proximal thumb).    Range of Motion   The patient has normal right wrist ROM.     Muscle Strength   Wrist Extension: 4/5   Wrist Flexion: 4/5   : 4/5     Tests   Phalen’s Sign: positive  Tinel’s Sign (Medial Nerve): positive  Finkelstein: negative    Other   Erythema: absent  Sensation: decreased  Pulse: present    Comments:  Grind test negative. Finkelstein's test negative. Mild pain with range of motion, especially the proximal thumb. Numbness in the first, second and third fingers.       Left Hand Exam     Tenderness   The patient is experiencing tenderness in the dorsal area and radial area (Diffuse, especially proximal thumb).     Range of Motion   The patient has normal left wrist ROM.    Muscle Strength   Wrist Extension: 4/5   Wrist Flexion: 4/5   :  4/5     Tests   Phalen’s Sign: positive  Tinel’s Sign (Medial Nerve): positive  Finkelstein: negative    Other   Erythema: absent  Sensation: decreased  Pulse: present    Comments:  Grind test negative. Finkelstein's test negative. Mild pain with range of motion, especially the proximal thumb. Mild deformity present to distal ulna (chronic). Numbness in the first, second and third fingers.             Xr Hand 3+ View Bilateral     Result Date: 8/24/2018  Narrative: AP, oblique and lateral views of the bilateral hands reveal no evidence of acute fracture or dislocation. No  significant degenerative changes are noted. Joint spaces are maintained. There is evidence of old injury and fracture noted in the left wrist to the ulnar styloid tip with a minimally displaced fracture fragment.  There is evidence of an old healed fracture in the left distal radius.  There is a small osteophyte complex noted at the scaphoid in the left hand.  No other radiologic abnormalities are noted at this time.  No comparison images are available for review.08/24/18 at 11:44 AM by WESTON Yuen       ASSESSMENT:    Diagnoses and all orders for this visit:    Bilateral hand pain    Numbness and tingling in both hands    Bilateral carpal tunnel syndrome    Other orders  -     HYDROcodone-acetaminophen (NORCO) 5-325 MG per tablet; Take 1 tablet by mouth Every 8 (Eight) Hours As Needed (PRN) for up to 10 days.    PLAN    Patient continues to complain of chronic pain in her bilateral wrists and hands with chronic numbness/tingling in the first, second and third fingers bilaterally. She continues to wear velcro wrist splints bilaterally with minimal relief of symptoms. She is scheduled for an EMG-NCS per her neurologist in [place] at the end of the month. Patient continues to have symptoms consistent with bilateral carpal tunnel syndrome. She already takes Neurontin and Voltaren, but states they don't help her pain or sensory deficits much at all. She is requesting a refill of Norco. I have explained to the patient that I cannot treat her chronic pain on a long term basis with narcotic pain medication. Patient verbalizes understanding. Norco is refilled today to take as needed for moderate to severe pain. Patient is instructed to take the pain medication sparingly and focus more on non-narcotic treatments for pain including consistent dosing of oral NSAIDs, rest/activity modification and splinting of the bilateral wrists. Plan for referral to one of the orthopedic surgeons for surgical consult if indicated  based on the EMG-NCS results. Follow up after EMG-NCS.     This patient has tried and failed a course of multiple treatment modalities which include but are not limited to the use of anti-inflammatories/acetaminophen, bilateral wrist splints, IM steroids, IM Toradol and rest/activity modification.Therefore, I will recommend a course of narcotic pain medication for this patient until their pain has been sufficiently reduced to a level that I deem acceptable to be treated with alternative treatment options. [facility] reviewed # [id number].      Return for follow up after EMG-NCS.      This document has been electronically signed by WESTON Yuen on October 14, 2018 10:11 PM      WESTON Yuen

## 2018-10-18 ENCOUNTER — OFFICE VISIT (OUTPATIENT)
Dept: FAMILY MEDICINE CLINIC | Facility: CLINIC | Age: 58
End: 2018-10-18

## 2018-10-18 ENCOUNTER — APPOINTMENT (OUTPATIENT)
Dept: LAB | Facility: HOSPITAL | Age: 58
End: 2018-10-18

## 2018-10-18 VITALS
BODY MASS INDEX: 26.75 KG/M2 | WEIGHT: 151 LBS | OXYGEN SATURATION: 99 % | HEIGHT: 63 IN | TEMPERATURE: 97.7 F | RESPIRATION RATE: 20 BRPM | SYSTOLIC BLOOD PRESSURE: 130 MMHG | DIASTOLIC BLOOD PRESSURE: 89 MMHG | HEART RATE: 103 BPM

## 2018-10-18 DIAGNOSIS — E78.00 HIGH CHOLESTEROL: ICD-10-CM

## 2018-10-18 DIAGNOSIS — G62.9 NEUROPATHY: ICD-10-CM

## 2018-10-18 DIAGNOSIS — R30.0 DYSURIA: Primary | ICD-10-CM

## 2018-10-18 PROCEDURE — 99214 OFFICE O/P EST MOD 30 MIN: CPT | Performed by: NURSE PRACTITIONER

## 2018-10-18 RX ORDER — ATORVASTATIN CALCIUM 40 MG/1
40 TABLET, FILM COATED ORAL DAILY
Qty: 30 TABLET | Refills: 5 | Status: SHIPPED | OUTPATIENT
Start: 2018-10-18 | End: 2019-05-06 | Stop reason: SDUPTHER

## 2018-10-18 RX ORDER — GABAPENTIN 800 MG/1
800 TABLET ORAL 2 TIMES DAILY
Qty: 60 TABLET | Refills: 0 | Status: SHIPPED | OUTPATIENT
Start: 2018-10-18 | End: 2018-11-12 | Stop reason: SDUPTHER

## 2018-10-18 NOTE — PROGRESS NOTES
Hattie Riddle is a 58 y.o. female.     Here today for toño.  She needs a refill on her gabapentin for her neuropathy.  She cont to see ortho  In [place] due to recurrent fractures.  She also needs refills on her lipitor and she reports having strong smelling urine.  This has been going on for about a week.      Lower Extremity Issue   This is a chronic problem. The current episode started more than 1 year ago. The problem occurs constantly. The problem has been unchanged. Associated symptoms include arthralgias, fatigue and urinary symptoms. Pertinent negatives include no abdominal pain, anorexia, change in bowel habit, chest pain, chills, congestion, coughing, diaphoresis, fever, headaches, joint swelling, myalgias, nausea, neck pain, numbness, rash, sore throat, swollen glands, vertigo, visual change, vomiting or weakness. The symptoms are aggravated by walking and standing. Treatments tried: gabapentin. The treatment provided significant relief.   Difficulty Urinating   This is a new problem. The current episode started in the past 7 days. The problem occurs constantly. The problem has been unchanged. Associated symptoms include arthralgias, fatigue and urinary symptoms. Pertinent negatives include no abdominal pain, anorexia, change in bowel habit, chest pain, chills, congestion, coughing, diaphoresis, fever, headaches, joint swelling, myalgias, nausea, neck pain, numbness, rash, sore throat, swollen glands, vertigo, visual change, vomiting or weakness. Nothing aggravates the symptoms. She has tried nothing for the symptoms. The treatment provided no relief.        The following portions of the patient's history were reviewed and updated as appropriate: allergies, current medications, past family history, past medical history, past social history, past surgical history and problem list.    Review of Systems   Constitutional: Positive for fatigue. Negative for chills, diaphoresis and fever.   HENT:  Negative.  Negative for congestion and sore throat.    Respiratory: Negative.  Negative for cough.    Cardiovascular: Negative.  Negative for chest pain.   Gastrointestinal: Negative.  Negative for abdominal pain, anorexia, change in bowel habit, nausea and vomiting.   Genitourinary: Positive for difficulty urinating.   Musculoskeletal: Positive for arthralgias. Negative for joint swelling, myalgias and neck pain.   Skin: Negative.  Negative for rash.   Neurological: Negative.  Negative for vertigo, weakness and numbness.   Psychiatric/Behavioral: Negative.        Objective   Physical Exam   Constitutional: She is oriented to person, place, and time. She appears well-developed and well-nourished. No distress.   HENT:   Head: Normocephalic and atraumatic.   Eyes: Pupils are equal, round, and reactive to light.   Neck: Normal range of motion. Neck supple. No thyromegaly present.   Cardiovascular: Normal rate, regular rhythm and normal heart sounds.  Exam reveals no friction rub.    No murmur heard.  Pulmonary/Chest: Effort normal and breath sounds normal. No respiratory distress. She has no wheezes. She has no rales.   Abdominal: Soft.   Musculoskeletal: Normal range of motion.       Neurological Sensory Findings -  Altered sharp/dull right ankle/foot discrimination and altered sharp/dull left ankle/foot discrimination.  Vascular Status -  Her right foot exhibits normal foot vasculature . Her left foot exhibits normal foot vasculature  and no edema.  Skin Integrity  -  Her right foot skin is intact.Her left foot skin is intact..  Neurological: She is alert and oriented to person, place, and time.   Skin: Skin is warm and dry.   Psychiatric: She has a normal mood and affect.   Nursing note and vitals reviewed.        Assessment/Plan   Jyoti was seen today for peripheral neuropathy, restless legs syndrome and hypertension.    Diagnoses and all orders for this visit:    Dysuria  -     Urinalysis With Culture If  Indicated - Urine, Clean Catch    Neuropathy  -     gabapentin (NEURONTIN) 800 MG tablet; Take 1 tablet by mouth 2 (Two) Times a Day.    High cholesterol  -     atorvastatin (LIPITOR) 40 MG tablet; Take 1 tablet by mouth Daily.

## 2018-10-19 LAB
BACTERIA UR QL AUTO: ABNORMAL /HPF
BILIRUB UR QL STRIP: NEGATIVE
CLARITY UR: CLEAR
COLOR UR: YELLOW
GLUCOSE UR STRIP-MCNC: NEGATIVE MG/DL
HGB UR QL STRIP.AUTO: NEGATIVE
HYALINE CASTS UR QL AUTO: ABNORMAL /LPF
KETONES UR QL STRIP: NEGATIVE
LEUKOCYTE ESTERASE UR QL STRIP.AUTO: ABNORMAL
NITRITE UR QL STRIP: POSITIVE
PH UR STRIP.AUTO: 5.5 [PH] (ref 5–9)
PROT UR QL STRIP: NEGATIVE
RBC # UR: ABNORMAL /HPF
REF LAB TEST METHOD: ABNORMAL
SP GR UR STRIP: 1.01 (ref 1–1.03)
SQUAMOUS #/AREA URNS HPF: ABNORMAL /HPF
UROBILINOGEN UR QL STRIP: ABNORMAL
WBC UR QL AUTO: ABNORMAL /HPF

## 2018-10-19 PROCEDURE — 81001 URINALYSIS AUTO W/SCOPE: CPT | Performed by: NURSE PRACTITIONER

## 2018-10-19 PROCEDURE — 87077 CULTURE AEROBIC IDENTIFY: CPT | Performed by: NURSE PRACTITIONER

## 2018-10-19 PROCEDURE — 87086 URINE CULTURE/COLONY COUNT: CPT | Performed by: NURSE PRACTITIONER

## 2018-10-19 PROCEDURE — 87186 SC STD MICRODIL/AGAR DIL: CPT | Performed by: NURSE PRACTITIONER

## 2018-10-21 LAB — BACTERIA SPEC AEROBE CULT: ABNORMAL

## 2018-10-22 ENCOUNTER — TELEPHONE (OUTPATIENT)
Dept: FAMILY MEDICINE CLINIC | Facility: CLINIC | Age: 58
End: 2018-10-22

## 2018-10-22 RX ORDER — NITROFURANTOIN 25; 75 MG/1; MG/1
100 CAPSULE ORAL 2 TIMES DAILY
Qty: 20 CAPSULE | Refills: 1 | Status: SHIPPED | OUTPATIENT
Start: 2018-10-22 | End: 2019-01-11

## 2018-10-22 NOTE — TELEPHONE ENCOUNTER
----- Message from WESTON Sutton sent at 10/22/2018 12:45 PM CDT -----  Send her in some macrobid bid.

## 2018-10-29 ENCOUNTER — HOSPITAL ENCOUNTER (OUTPATIENT)
Dept: NEUROLOGY | Facility: HOSPITAL | Age: 58
Discharge: HOME | End: 2018-10-29
Admitting: CLINICAL NURSE SPECIALIST

## 2018-10-29 DIAGNOSIS — M79.642 BILATERAL HAND PAIN: ICD-10-CM

## 2018-10-29 DIAGNOSIS — M79.641 BILATERAL HAND PAIN: ICD-10-CM

## 2018-10-29 DIAGNOSIS — E55.9 VITAMIN D DEFICIENCY: ICD-10-CM

## 2018-10-29 PROCEDURE — 95911 NRV CNDJ TEST 9-10 STUDIES: CPT

## 2018-10-29 PROCEDURE — 95886 MUSC TEST DONE W/N TEST COMP: CPT

## 2018-10-29 RX ORDER — ERGOCALCIFEROL 1.25 MG/1
50000 CAPSULE ORAL
Qty: 4 CAPSULE | Refills: 5 | Status: SHIPPED | OUTPATIENT
Start: 2018-10-29 | End: 2018-12-24 | Stop reason: SDUPTHER

## 2018-11-12 DIAGNOSIS — G62.9 NEUROPATHY: ICD-10-CM

## 2018-11-12 DIAGNOSIS — M79.644 BILATERAL THUMB PAIN: ICD-10-CM

## 2018-11-12 DIAGNOSIS — M79.645 BILATERAL THUMB PAIN: ICD-10-CM

## 2018-11-12 RX ORDER — GABAPENTIN 800 MG/1
800 TABLET ORAL 2 TIMES DAILY
Qty: 60 TABLET | Refills: 0 | Status: SHIPPED | OUTPATIENT
Start: 2018-11-12 | End: 2018-11-12 | Stop reason: SDUPTHER

## 2018-11-12 RX ORDER — GABAPENTIN 800 MG/1
800 TABLET ORAL 2 TIMES DAILY
Qty: 60 TABLET | Refills: 0 | Status: SHIPPED | OUTPATIENT
Start: 2018-11-12 | End: 2019-01-21 | Stop reason: SDUPTHER

## 2018-11-12 RX ORDER — TRAMADOL HYDROCHLORIDE 50 MG/1
50 TABLET ORAL EVERY 8 HOURS PRN
Qty: 90 TABLET | Refills: 0 | Status: SHIPPED | OUTPATIENT
Start: 2018-11-12 | End: 2019-01-21 | Stop reason: SDUPTHER

## 2018-11-12 RX ORDER — TRAMADOL HYDROCHLORIDE 50 MG/1
50 TABLET ORAL EVERY 8 HOURS PRN
Qty: 90 TABLET | Refills: 0 | Status: SHIPPED | OUTPATIENT
Start: 2018-11-12 | End: 2018-11-12 | Stop reason: SDUPTHER

## 2018-11-15 ENCOUNTER — OFFICE VISIT (OUTPATIENT)
Dept: ORTHOPEDIC SURGERY | Facility: CLINIC | Age: 58
End: 2018-11-15

## 2018-11-15 VITALS — HEIGHT: 63 IN | BODY MASS INDEX: 26.75 KG/M2

## 2018-11-15 DIAGNOSIS — R20.0 NUMBNESS AND TINGLING IN BOTH HANDS: ICD-10-CM

## 2018-11-15 DIAGNOSIS — R20.2 NUMBNESS AND TINGLING IN BOTH HANDS: ICD-10-CM

## 2018-11-15 DIAGNOSIS — G56.03 BILATERAL CARPAL TUNNEL SYNDROME: ICD-10-CM

## 2018-11-15 DIAGNOSIS — M79.642 BILATERAL HAND PAIN: Primary | ICD-10-CM

## 2018-11-15 DIAGNOSIS — M79.641 BILATERAL HAND PAIN: Primary | ICD-10-CM

## 2018-11-15 PROCEDURE — 99213 OFFICE O/P EST LOW 20 MIN: CPT | Performed by: NURSE PRACTITIONER

## 2018-11-15 NOTE — PROGRESS NOTES
"Jyoti Riddle is a 58 y.o. female returns for     Chief Complaint   Patient presents with   • Left Wrist - Follow-up   • Right Wrist - Follow-up   • Results     EMG       HISTORY OF PRESENT ILLNESS: Patient presents to office for follow-up of chronic bilateral wrist and hand pain, especially in the bilateral thumbs, chronic numbness and tingling in the hands and fingers and EMG-NCS results.  No new complaints or concerns.  Patient describes her pain as severe.  Patient continues to wear bilateral Velcro wrist splints, which do not offer her any relief.  She takes Voltaren and Neurontin as well, as prescribed by other providers.     CONCURRENT MEDICAL HISTORY:    The following portions of the patient's history were reviewed and updated as appropriate: allergies, current medications, past family history, past medical history, past social history, past surgical history and problem list.     ROS  No fevers or chills.  No chest pain or shortness of air.  No GI or  disturbances. Right wrist pain. Left wrist pain.     PHYSICAL EXAMINATION:       Ht 160 cm (63\")   BMI 26.75 kg/m²     Physical Exam   Constitutional: She is oriented to person, place, and time. Vital signs are normal. She appears well-developed and well-nourished. She is active and cooperative. She does not appear ill. No distress.   HENT:   Head: Normocephalic.   Pulmonary/Chest: Effort normal. No respiratory distress.   Abdominal: Soft. She exhibits no distension.   Musculoskeletal: She exhibits tenderness (Right hand, Left hand) and deformity (Mild, left wrist/ulna). She exhibits no edema.   Neurological: She is alert and oriented to person, place, and time. A sensory deficit (Bilateral hands, chronic) is present. GCS eye subscore is 4. GCS verbal subscore is 5. GCS motor subscore is 6.   Skin: Skin is warm, dry and intact. Capillary refill takes less than 2 seconds. No erythema.   Psychiatric: She has a normal mood and affect. Her speech is normal and " behavior is normal. Judgment and thought content normal. Cognition and memory are normal.   Vitals reviewed.      GAIT:     [x]  Normal  []  Antalgic    Assistive device: [x]  None  []  Walker     []  Crutches  []  Cane     []  Wheelchair  []  Stretcher    Right Hand Exam     Tenderness   The patient is experiencing tenderness in the dorsal area and radial area (Diffuse, especially proximal thumb).    Range of Motion   The patient has normal right wrist ROM.     Muscle Strength   Wrist extension: 4/5   Wrist flexion: 4/5   : 4/5     Tests   Phalen’s sign: positive  Tinel's sign (median nerve): positive  Finkelstein's test: negative    Other   Erythema: absent  Sensation: decreased  Pulse: present    Comments:  Grind test negative. Finkelstein's test negative. Mild pain with range of motion, especially the proximal thumb. Numbness in the first, second and third fingers.       Left Hand Exam     Tenderness   The patient is experiencing tenderness in the dorsal area and radial area (Diffuse, especially proximal thumb).     Range of Motion   The patient has normal left wrist ROM.    Muscle Strength   Wrist extension: 4/5   Wrist flexion: 4/5   :  4/5     Tests   Phalen’s sign: positive  Tinel's sign (median nerve): positive  Finkelstein's test: negative    Other   Erythema: absent  Sensation: decreased  Pulse: present    Comments:  Grind test negative. Finkelstein's test negative. Mild pain with range of motion, especially the proximal thumb. Mild deformity present to distal ulna (chronic). Numbness in the first, second and third fingers.             EMG-NCS done on 10/29/2018    Impression: This is an abnormal NCV study with normal EMG findings of the bilateral upper extremities.    1.  Electrophysiologic evidence was recorded consistent with a mild right median nerve compromise at or near the wrist involving primarily sensory fibers which are characterized by demyelination.    2.  Electrophysiologic evidence  was recorded consistent with a mild left median nerve compromise at or near the wrist involving primarily sensory fibers which are characterized by demyelination.    Summary of Normal Findings:     1.  No electrodiagnostic evidence was recorded suggestive of an ulnar nerve compromise bilaterally.  2.  No electrodiagnostic evidence was recorded suggestive of a cervical radiculopathic process involving the C5-T1 nerve roots bilaterally.  3.  No electrodiagnostic evidence was recorded suggestive of a neurogenic thoracic outlet involvement bilaterally.  4.  No electrodiagnostic evidence was recorded suggestive of an underlying peripheral polyneuropathy process.  5.  No electrodiagnostic evidence was recorded suggestive of a myelopathic disease process.  6.  No electrodiagnostic evidence was recorded suggestive of a motor neuron disease process.  7.  EMG-NCV testing cannot rule out small fiber neuropathic changes, central disorders or transient conduction phenomena.      ASSESSMENT:    Diagnoses and all orders for this visit:    Bilateral hand pain    Numbness and tingling in both hands    Bilateral carpal tunnel syndrome    PLAN    EMG-NCS results reviewed and discussed with patient today.  Patient continues to complain of chronic pain in her bilateral wrists and hands with chronic numbness and tingling in her hands and fingers.  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  Patient reports that her pain and symptoms continue to progressively worsen.  She has tried oral NSAIDs and wearing bilateral Velcro wrist splints without improvement.  She also takes Neurontin, as prescribed by her primary care provider.  Patient requests pain medication today.  I have denied this request as she has Tramadol at home she can take as well as NSAIDs and Neurontin.  This should be more than adequate to help with her mild carpal tunnel syndrome.  Recommend following up with one of the orthopedic surgeons to discuss further  treatment recommendations including possible surgical consult if indicated.  Patient wants to see Dr. Peace.     Return for follow up with Dr. Peace.        This document has been electronically signed by WESTON Yuen on November 16, 2018 8:21 AM      WESTON Yuen

## 2018-11-19 ENCOUNTER — OFFICE VISIT (OUTPATIENT)
Dept: ORTHOPEDIC SURGERY | Facility: CLINIC | Age: 58
End: 2018-11-19

## 2018-11-19 VITALS — WEIGHT: 150 LBS | BODY MASS INDEX: 26.58 KG/M2 | HEIGHT: 63 IN

## 2018-11-19 DIAGNOSIS — G56.03 BILATERAL CARPAL TUNNEL SYNDROME: Primary | ICD-10-CM

## 2018-11-19 PROCEDURE — 99214 OFFICE O/P EST MOD 30 MIN: CPT | Performed by: ORTHOPAEDIC SURGERY

## 2018-11-19 NOTE — PROGRESS NOTES
"Jyoti Riddle is a 58 y.o. female returns for     Chief Complaint   Patient presents with   • Left Hand - Pain   • Right Hand - Pain       HISTORY OF PRESENT ILLNESS: Patient is here today for bilateral hand pain. Patient has seen WESTON Vergara for this issue. She states that her pain is 10/10. She has had previous EMG studies.  She's not sure how limits gone on but probably worse over the past 2 months was bothering her quite some time.  She was formally a hairdresser.  She complains of pain in the thumb.  It wakes her at night hurts her on daily basis.  She's been treated with splints, Neurontin as well as anti-inflammatories without relief.  He tells me she is a borderline diabetic.       CONCURRENT MEDICAL HISTORY:    The following portions of the patient's history were reviewed and updated as appropriate: allergies, current medications, past family history, past medical history, past social history, past surgical history and problem list.     ROS  No fevers or chills.  No chest pain or shortness of air.  No GI or  disturbances.    PHYSICAL EXAMINATION:       Ht 160 cm (63\")   Wt 68 kg (150 lb)   BMI 26.57 kg/m²     Physical Exam   Constitutional: She is oriented to person, place, and time. She appears well-developed.   HENT:   Head: Normocephalic and atraumatic.   Eyes: EOM are normal. Pupils are equal, round, and reactive to light.   Neck: Neck supple.   Pulmonary/Chest: Effort normal.   Musculoskeletal: She exhibits tenderness.   Neurological: She is alert and oriented to person, place, and time. A sensory deficit is present.   Skin: Skin is warm and dry.   Psychiatric: She has a normal mood and affect.   Vitals reviewed.      GAIT:     [x]  Normal  []  Antalgic    Assistive device: [x]  None  []  Walker     []  Crutches  []  Cane     []  Wheelchair  []  Stretcher    Ortho Exam  Positive Phalen's bilaterally at the wristpositive on the right not on the left.  Numbness in median nerve distribution " she is tender over the CMC joint right side greater than left.  She is able to make a fist.  No obvious atrophy.  Appears to large wound of the right hand over the left.  Good capillary refill.    No results found.    MG studies show bilateral carpal tunnel      ASSESSMENT:    Diagnoses and all orders for this visit:    Bilateral carpal tunnel syndrome          PLAN the right side which is the worst released.  I then risks and benefits of bleeding, blood clot, neurovascular injury, need for further surgery, failure to resolve her pain, infection, scan tenderness, medical anesthetic complications, etc. she understands we'll proceed as planned.  We have also discussed the fact that her pain may well be from her CMC joint but that certainly not causing the numbness.  She also has coolness in her hand and she is a smoker and this is certainly contributing to her symptoms here.    No Follow-up on file.    Sarahy Salamanca MA

## 2018-11-20 ENCOUNTER — OFFICE VISIT (OUTPATIENT)
Dept: OTOLARYNGOLOGY | Facility: CLINIC | Age: 58
End: 2018-11-20

## 2018-11-20 VITALS — WEIGHT: 150 LBS | BODY MASS INDEX: 26.58 KG/M2 | OXYGEN SATURATION: 99 % | HEIGHT: 63 IN

## 2018-11-20 DIAGNOSIS — H60.311 CHRONIC DIFFUSE OTITIS EXTERNA OF RIGHT EAR: Primary | ICD-10-CM

## 2018-11-20 PROCEDURE — 99212 OFFICE O/P EST SF 10 MIN: CPT | Performed by: OTOLARYNGOLOGY

## 2018-11-20 NOTE — PROGRESS NOTES
Subjective   Jyoti Riddle is a 58 y.o. female.       History of Present Illness     Patient has been seen previously with an abrasion of her right ear canal and then subsequently chronic noninfectious otitis externa.  States she suffered a fall last week but was not associated with spinning vertigo.  Says her ear popped when this happened.  Not having any otorrhea.  Says she is not using any Q-tips.    The following portions of the patient's history were reviewed and updated as appropriate: allergies, current medications, past family history, past medical history, past social history, past surgical history and problem list.     reports that she has been smoking cigarettes.  She has been smoking about 0.25 packs per day. she has never used smokeless tobacco. She reports that she drinks alcohol. She reports that she uses drugs. Drug: Cocaine.   Patient is a tobacco user and has been counseled for use of tobacco products      Review of Systems        Objective   Physical Exam  External ears no deformity.  Right ear canal shows some flaky dry skin that cleaned under the microscope.  Tympanic membranes intact clear and mobile.  Left ear no discharge.  Tympanic membranes intact clear and mobile.      Assessment/Plan   Jyoti was seen today for follow-up.    Diagnoses and all orders for this visit:    Chronic diffuse otitis externa of right ear      Plan: Reassured the patient that she had no evidence of acute otologic pathology.  Suspect her fall was related to either blood pressure fluctuation or other non-otologic imbalance.  Suggested she use a walking stick when ambulating.  Return to see me in 4 months regarding her ears.

## 2018-11-29 ENCOUNTER — OFFICE VISIT (OUTPATIENT)
Dept: FAMILY MEDICINE CLINIC | Facility: CLINIC | Age: 58
End: 2018-11-29

## 2018-11-29 VITALS
HEIGHT: 63 IN | HEART RATE: 96 BPM | OXYGEN SATURATION: 98 % | BODY MASS INDEX: 26.57 KG/M2 | DIASTOLIC BLOOD PRESSURE: 77 MMHG | TEMPERATURE: 97.6 F | SYSTOLIC BLOOD PRESSURE: 116 MMHG | RESPIRATION RATE: 20 BRPM

## 2018-11-29 DIAGNOSIS — M25.50 ARTHRALGIA, UNSPECIFIED JOINT: ICD-10-CM

## 2018-11-29 DIAGNOSIS — G56.03 BILATERAL CARPAL TUNNEL SYNDROME: Primary | ICD-10-CM

## 2018-11-29 DIAGNOSIS — G25.81 RESTLESS LEG SYNDROME: ICD-10-CM

## 2018-11-29 DIAGNOSIS — G40.309 GENERALIZED SEIZURE DISORDER (HCC): ICD-10-CM

## 2018-11-29 PROCEDURE — 99214 OFFICE O/P EST MOD 30 MIN: CPT | Performed by: NURSE PRACTITIONER

## 2018-11-29 RX ORDER — ROPINIROLE 2 MG/1
2 TABLET, FILM COATED ORAL
Qty: 30 TABLET | Refills: 3 | Status: SHIPPED | OUTPATIENT
Start: 2018-11-29 | End: 2019-05-06 | Stop reason: SDUPTHER

## 2018-11-29 RX ORDER — CYCLOBENZAPRINE HCL 10 MG
10 TABLET ORAL 3 TIMES DAILY PRN
Qty: 90 TABLET | Refills: 3 | Status: SHIPPED | OUTPATIENT
Start: 2018-11-29 | End: 2019-05-17 | Stop reason: SDUPTHER

## 2018-11-29 RX ORDER — LEVETIRACETAM 500 MG/1
500 TABLET ORAL 2 TIMES DAILY
Qty: 60 TABLET | Refills: 5 | Status: SHIPPED | OUTPATIENT
Start: 2018-11-29 | End: 2018-11-30 | Stop reason: SDUPTHER

## 2018-11-29 NOTE — PROGRESS NOTES
Hattie Riddle is a 58 y.o. female.     Here today for tñoo.  She reports that she is supposed to have carpal tunnel surgery pending insurance approval.      Hand Pain    The incident occurred more than 1 week ago (not only her hands and wrists, but other sites as well). There was no injury mechanism. The pain is present in the left hand, right hand, right wrist and left wrist (left hand and wrist are worst.). The quality of the pain is described as aching. The pain does not radiate. The pain is at a severity of 10/10. The pain is severe. The pain has been worsening since the incident. The symptoms are aggravated by movement. She has tried NSAIDs for the symptoms. The treatment provided significant relief.        The following portions of the patient's history were reviewed and updated as appropriate: allergies, current medications, past family history, past medical history, past social history, past surgical history and problem list.    Review of Systems   Constitutional: Negative.    HENT: Negative.    Respiratory: Negative.    Cardiovascular: Negative.    Musculoskeletal: Positive for arthralgias, gait problem and myalgias.   Skin: Negative.        Objective   Physical Exam   Constitutional: She is oriented to person, place, and time. She appears well-developed and well-nourished. No distress.   HENT:   Head: Normocephalic.   Eyes: Pupils are equal, round, and reactive to light.   Neck: Normal range of motion. Neck supple. No thyromegaly present.   Cardiovascular: Normal rate, regular rhythm and normal heart sounds. Exam reveals no friction rub.   No murmur heard.  Pulmonary/Chest: Effort normal and breath sounds normal. No stridor. No respiratory distress. She has no wheezes. She has no rales.   Abdominal: Soft.   Musculoskeletal: Normal range of motion.   Has proflex splints on both wrists.   Neurological: She is alert and oriented to person, place, and time.   Skin: Skin is warm and dry.    Psychiatric: She has a normal mood and affect. Thought content normal.   Nursing note and vitals reviewed.        Assessment/Plan   Jyoti was seen today for hand pain.    Diagnoses and all orders for this visit:    Bilateral carpal tunnel syndrome    Arthralgia, unspecified joint  -     cyclobenzaprine (FLEXERIL) 10 MG tablet; Take 1 tablet by mouth 3 (Three) Times a Day As Needed for Muscle Spasms.  -     diclofenac (VOLTAREN) 50 MG EC tablet; Take 1 tablet by mouth 2 (Two) Times a Day.    Restless leg syndrome  -     rOPINIRole (REQUIP) 2 MG tablet; Take 1 tablet by mouth every night at bedtime.    Generalized seizure disorder (CMS/HCC)  -     Discontinue: levETIRAcetam (KEPPRA) 500 MG tablet; Take 1 tablet by mouth 2 (Two) Times a Day.  -     levETIRAcetam (KEPPRA) 500 MG tablet; Take 1 tablet by mouth 2 (Two) Times a Day.

## 2018-11-30 RX ORDER — LEVETIRACETAM 500 MG/1
500 TABLET ORAL 2 TIMES DAILY
Qty: 60 TABLET | Refills: 5 | Status: SHIPPED | OUTPATIENT
Start: 2018-11-30 | End: 2019-05-06 | Stop reason: SDUPTHER

## 2018-12-21 ENCOUNTER — OFFICE VISIT (OUTPATIENT)
Dept: FAMILY MEDICINE CLINIC | Facility: CLINIC | Age: 58
End: 2018-12-21

## 2018-12-21 VITALS
SYSTOLIC BLOOD PRESSURE: 136 MMHG | BODY MASS INDEX: 26.57 KG/M2 | RESPIRATION RATE: 20 BRPM | DIASTOLIC BLOOD PRESSURE: 91 MMHG | OXYGEN SATURATION: 98 % | HEART RATE: 105 BPM | HEIGHT: 63 IN | TEMPERATURE: 97.3 F

## 2018-12-21 DIAGNOSIS — E55.9 VITAMIN D DEFICIENCY: ICD-10-CM

## 2018-12-21 DIAGNOSIS — R42 DIZZINESS: Primary | ICD-10-CM

## 2018-12-21 PROCEDURE — 99214 OFFICE O/P EST MOD 30 MIN: CPT | Performed by: NURSE PRACTITIONER

## 2018-12-21 RX ORDER — ERGOCALCIFEROL 1.25 MG/1
50000 CAPSULE ORAL
Qty: 4 CAPSULE | Refills: 5 | Status: SHIPPED | OUTPATIENT
Start: 2018-12-21 | End: 2019-05-06 | Stop reason: SDUPTHER

## 2018-12-21 RX ORDER — MECLIZINE HYDROCHLORIDE 25 MG/1
25 TABLET ORAL 3 TIMES DAILY PRN
Qty: 30 TABLET | Refills: 3 | Status: SHIPPED | OUTPATIENT
Start: 2018-12-21 | End: 2019-04-11

## 2018-12-21 NOTE — PROGRESS NOTES
Hattie Riddle is a 58 y.o. female.     Here today reporting that she cont to have some dizziness.  This is not a new problem.  Has had off and on.  Has taken meclizine but is out at this time.      Dizziness   This is a chronic problem. The current episode started more than 1 year ago. The problem occurs intermittently. The problem has been unchanged. Associated symptoms include arthralgias (has some diff with ambulation due to fractures in feet and ankles.). Pertinent negatives include no abdominal pain, anorexia, change in bowel habit, chest pain, chills, congestion, coughing, diaphoresis, fatigue, fever, headaches, joint swelling, myalgias, nausea, neck pain, numbness, rash, sore throat, swollen glands, urinary symptoms, vertigo, visual change, vomiting or weakness. The symptoms are aggravated by standing (quick position changes.). Treatments tried: meclizine. The treatment provided significant relief.        The following portions of the patient's history were reviewed and updated as appropriate: allergies, current medications, past family history, past medical history, past social history, past surgical history and problem list.    Review of Systems   Constitutional: Negative.  Negative for chills, diaphoresis, fatigue and fever.   HENT: Negative.  Negative for congestion and sore throat.    Respiratory: Negative.  Negative for cough.    Cardiovascular: Negative.  Negative for chest pain.   Gastrointestinal: Negative for abdominal pain, anorexia, change in bowel habit, nausea and vomiting.   Musculoskeletal: Positive for arthralgias (has some diff with ambulation due to fractures in feet and ankles.). Negative for joint swelling, myalgias and neck pain.   Skin: Negative.  Negative for rash.   Neurological: Positive for dizziness. Negative for vertigo, weakness and numbness.   Psychiatric/Behavioral: Negative.        Objective   Physical Exam   Constitutional: She is oriented to person, place, and  time. She appears well-developed and well-nourished. No distress.   HENT:   Head: Normocephalic.   Right Ear: External ear normal.   Left Ear: External ear normal.   Nose: Nose normal.   Mouth/Throat: Oropharynx is clear and moist. No oropharyngeal exudate.   Eyes: Pupils are equal, round, and reactive to light.   Neck: Normal range of motion. Neck supple. No thyromegaly present.   Cardiovascular: Normal rate, regular rhythm and normal heart sounds. Exam reveals no friction rub.   No murmur heard.  Pulmonary/Chest: Effort normal and breath sounds normal. No stridor. No respiratory distress. She has no wheezes. She has no rales.   Abdominal: Soft.   Musculoskeletal:   In a wheel chair today   Neurological: She is alert and oriented to person, place, and time.   Skin: Skin is warm and dry.   Psychiatric: She has a normal mood and affect. Thought content normal.   Nursing note and vitals reviewed.        Assessment/Plan   Jyoti was seen today for dizziness.    Diagnoses and all orders for this visit:    Dizziness  -     meclizine (ANTIVERT) 25 MG tablet; Take 1 tablet by mouth 3 (Three) Times a Day As Needed for dizziness.    Vitamin D deficiency  -     vitamin D (ERGOCALCIFEROL) 90787 units capsule capsule; Take 1 capsule by mouth Every 14 (Fourteen) Days.

## 2019-01-11 ENCOUNTER — OFFICE VISIT (OUTPATIENT)
Dept: FAMILY MEDICINE CLINIC | Facility: CLINIC | Age: 59
End: 2019-01-11

## 2019-01-11 ENCOUNTER — OFFICE VISIT (OUTPATIENT)
Dept: NEUROLOGY | Facility: CLINIC | Age: 59
End: 2019-01-11

## 2019-01-11 VITALS
HEIGHT: 63 IN | SYSTOLIC BLOOD PRESSURE: 132 MMHG | DIASTOLIC BLOOD PRESSURE: 70 MMHG | HEART RATE: 88 BPM | WEIGHT: 155 LBS | BODY MASS INDEX: 27.46 KG/M2

## 2019-01-11 VITALS
BODY MASS INDEX: 27.46 KG/M2 | OXYGEN SATURATION: 98 % | SYSTOLIC BLOOD PRESSURE: 140 MMHG | HEART RATE: 100 BPM | WEIGHT: 155 LBS | HEIGHT: 63 IN | DIASTOLIC BLOOD PRESSURE: 100 MMHG | TEMPERATURE: 97.5 F

## 2019-01-11 DIAGNOSIS — R20.2 NUMBNESS AND TINGLING IN BOTH HANDS: ICD-10-CM

## 2019-01-11 DIAGNOSIS — J06.9 ACUTE URI: Primary | ICD-10-CM

## 2019-01-11 DIAGNOSIS — R05.9 COUGH: ICD-10-CM

## 2019-01-11 DIAGNOSIS — M25.552 LEFT HIP PAIN: ICD-10-CM

## 2019-01-11 DIAGNOSIS — R20.0 NUMBNESS AND TINGLING IN BOTH HANDS: ICD-10-CM

## 2019-01-11 DIAGNOSIS — I63.032 CEREBROVASCULAR ACCIDENT (CVA) DUE TO THROMBOSIS OF LEFT CAROTID ARTERY (HCC): Primary | ICD-10-CM

## 2019-01-11 DIAGNOSIS — G40.309 GENERALIZED SEIZURE DISORDER (HCC): ICD-10-CM

## 2019-01-11 DIAGNOSIS — I10 ESSENTIAL HYPERTENSION: ICD-10-CM

## 2019-01-11 PROCEDURE — 99213 OFFICE O/P EST LOW 20 MIN: CPT | Performed by: CLINICAL NURSE SPECIALIST

## 2019-01-11 PROCEDURE — 99214 OFFICE O/P EST MOD 30 MIN: CPT | Performed by: NURSE PRACTITIONER

## 2019-01-11 RX ORDER — FLUCONAZOLE 150 MG/1
TABLET ORAL
Qty: 2 TABLET | Refills: 0 | Status: SHIPPED | OUTPATIENT
Start: 2019-01-11 | End: 2019-03-11

## 2019-01-11 RX ORDER — ACETAMINOPHEN 500 MG
500 TABLET ORAL EVERY 6 HOURS PRN
Qty: 30 TABLET | Refills: 2 | Status: SHIPPED | OUTPATIENT
Start: 2019-01-11 | End: 2019-03-11 | Stop reason: SDUPTHER

## 2019-01-11 RX ORDER — FLUTICASONE PROPIONATE 50 MCG
2 SPRAY, SUSPENSION (ML) NASAL DAILY
Qty: 16 G | Refills: 2 | Status: SHIPPED | OUTPATIENT
Start: 2019-01-11 | End: 2019-05-17 | Stop reason: SDUPTHER

## 2019-01-11 RX ORDER — DEXTROMETHORPHAN HYDROBROMIDE AND PROMETHAZINE HYDROCHLORIDE 15; 6.25 MG/5ML; MG/5ML
SYRUP ORAL
Qty: 120 ML | Refills: 0 | Status: SHIPPED | OUTPATIENT
Start: 2019-01-11 | End: 2019-03-11

## 2019-01-11 RX ORDER — AZITHROMYCIN 250 MG/1
TABLET, FILM COATED ORAL
Qty: 6 TABLET | Refills: 0 | Status: SHIPPED | OUTPATIENT
Start: 2019-01-11 | End: 2019-03-11

## 2019-01-11 RX ORDER — ASPIRIN 81 MG/1
81 TABLET ORAL DAILY
Qty: 90 TABLET | Refills: 2 | Status: SHIPPED | OUTPATIENT
Start: 2019-01-11 | End: 2019-09-30

## 2019-01-11 RX ORDER — GUAIFENESIN 600 MG/1
600 TABLET, EXTENDED RELEASE ORAL EVERY 12 HOURS SCHEDULED
Qty: 20 TABLET | Refills: 0 | Status: SHIPPED | OUTPATIENT
Start: 2019-01-11 | End: 2019-03-11

## 2019-01-11 NOTE — PATIENT INSTRUCTIONS
Epilepsy  Epilepsy is a condition in which a person has repeated seizures over time. A seizure is a sudden burst of abnormal electrical and chemical activity in the brain. Seizures can cause a change in attention, behavior, or the ability to remain awake and alert (altered mental status).  Epilepsy increases a person's risk of falls, accidents, and injury. It can also lead to complications, including:  · Depression.  · Poor memory.  · Sudden unexplained death in epilepsy (SUDEP). This complication is rare, and its cause is not known.    Most people with epilepsy lead normal lives.  What are the causes?  This condition may be caused by:  · A head injury.  · An injury that happens at birth.  · A high fever during childhood.  · A stroke.  · Bleeding that goes into or around the brain.  · Certain medicines and drugs.  · Having too little oxygen for a long period of time.  · Abnormal brain development.  · Certain infections, such as meningitis and encephalitis.  · Brain tumors.  · Conditions that are passed along from parent to child (are hereditary).    What are the signs or symptoms?  Symptoms of a seizure vary greatly from person to person. They include:  · Convulsions.  · Stiffening of the body.  · Involuntary movements of the arms or legs.  · Loss of consciousness.  · Breathing problems.  · Falling suddenly.  · Confusion.  · Head nodding.  · Eye blinking or fluttering.  · Lip smacking.  · Drooling.  · Rapid eye movements.  · Grunting.  · Loss of bladder control and bowel control.  · Staring.  · Unresponsiveness.    Some people have symptoms right before a seizure happens (aura) and right after a seizure happens. Symptoms of an aura include:  · Fear or anxiety.  · Nausea.  · Feeling like the room is spinning (vertigo).  · A feeling of having seen or heard something before (tony vu).  · Odd tastes or smells.  · Changes in vision, such as seeing flashing lights or spots.    Symptoms that follow a seizure  include:  · Confusion.  · Sleepiness.  · Headache.    How is this diagnosed?  This condition is diagnosed based on:  · Your symptoms.  · Your medical history.  · A physical exam.  · A neurological exam. A neurological exam is similar to a physical exam. It involves checking your strength, reflexes, coordination, and sensations.  · Tests, such as:  ? An electroencephalogram (EEG). This is a painless test that creates a diagram of your brain waves.  ? An MRI of the brain.  ? A CT scan of the brain.  ? A lumbar puncture, also called a spinal tap.  ? Blood tests to check for signs of infection or abnormal blood chemistry.    How is this treated?  There is no cure for this condition, but treatment can help control seizures. Treatment may involve:  · Taking medicines to control seizures. These include medicines to prevent seizures and medicines to stop seizures as they occur.  · Having a device called a vagus nerve stimulator implanted in the chest. The device sends electrical impulses to the vagus nerve and to the brain to prevent seizures. This treatment may be recommended if medicines do not help.  · Brain surgery. There are several kinds of surgeries that may be done to stop seizures from happening or to reduce how often seizures happen.  · Having regular blood tests. You may need to have blood tests regularly to check that you are getting the right amount of medicine.    Once this condition has been diagnosed, it is important to begin treatment as soon as possible. For some people, epilepsy eventually goes away.  Follow these instructions at home:  Medicines    · Take over-the-counter and prescription medicines only as told by your health care provider.  · Avoid any substances that may prevent your medicine from working properly, such as alcohol.  Activity  · Get enough rest. Lack of sleep can make seizures more likely to occur.  · Follow instructions from your health care provider about driving, swimming, and doing  any other activities that would be dangerous if you had a seizure.  Educating others  Teach friends and family what to do if you have a seizure. They should:  · Lay you on the ground to prevent a fall.  · Cushion your head and body.  · Loosen any tight clothing around your neck.  · Turn you on your side. If vomiting occurs, this helps keep your airway clear.  · Stay with you until you recover.  · Not hold you down. Holding you down will not stop the seizure.  · Not put anything in your mouth.  · Know whether or not you need emergency care.    General instructions  · Avoid anything that has ever triggered a seizure for you.  · Keep a seizure diary. Record what you remember about each seizure, especially anything that might have triggered the seizure.  · Keep all follow-up visits as told by your health care provider. This is important.  Contact a health care provider if:  · Your seizure pattern changes.  · You have symptoms of infection or another illness. This might increase your risk of having a seizure.  Get help right away if:  · You have a seizure that does not stop after 5 minutes.  · You have several seizures in a row without a complete recovery in between seizures.  · You have a seizure that makes it harder to breathe.  · You have a seizure that is different from previous seizures.  · You have a seizure that leaves you unable to speak or use a part of your body.  · You did not wake up immediately after a seizure.  This information is not intended to replace advice given to you by your health care provider. Make sure you discuss any questions you have with your health care provider.  Document Released: 12/18/2006 Document Revised: 07/15/2017 Document Reviewed: 06/27/2017  [employer] Interactive Patient Education © 2018 [employer]  Stroke Prevention  Some medical conditions and behaviors are associated with a higher chance of having a stroke. You can help prevent a stroke by making nutrition, lifestyle, and  other changes, including managing any medical conditions you may have.  What nutrition changes can be made?  · Eat healthy foods. You can do this by:  ? Choosing foods high in fiber, such as fresh fruits and vegetables and whole grains.  ? Eating at least 5 or more servings of fruits and vegetables a day. Try to fill half of your plate at each meal with fruits and vegetables.  ? Choosing lean protein foods, such as lean cuts of meat, poultry without skin, fish, tofu, beans, and nuts.  ? Eating low-fat dairy products.  ? Avoiding foods that are high in salt (sodium). This can help lower blood pressure.  ? Avoiding foods that have saturated fat, trans fat, and cholesterol. This can help prevent high cholesterol.  ? Avoiding processed and premade foods.  · Follow your health care provider's specific guidelines for losing weight, controlling high blood pressure (hypertension), lowering high cholesterol, and managing diabetes. These may include:  ? Reducing your daily calorie intake.  ? Limiting your daily sodium intake to 1,500 milligrams (mg).  ? Using only healthy fats for cooking, such as olive oil, canola oil, or sunflower oil.  ? Counting your daily carbohydrate intake.  What lifestyle changes can be made?  · Maintain a healthy weight. Talk to your health care provider about your ideal weight.  · Get at least 30 minutes of moderate physical activity at least 5 days a week. Moderate activity includes brisk walking, biking, and swimming.  · Do not use any products that contain nicotine or tobacco, such as cigarettes and e-cigarettes. If you need help quitting, ask your health care provider. It may also be helpful to avoid exposure to secondhand smoke.  · Limit alcohol intake to no more than 1 drink a day for nonpregnant women and 2 drinks a day for men. One drink equals 12 oz of beer, 5 oz of wine, or 1½ oz of hard liquor.  · Stop any illegal drug use.  · Avoid taking birth control pills. Talk to your health care  provider about the risks of taking birth control pills if:  ? You are over 35 years old.  ? You smoke.  ? You get migraines.  ? You have ever had a blood clot.  What other changes can be made?  · Manage your cholesterol levels.  ? Eating a healthy diet is important for preventing high cholesterol. If cholesterol cannot be managed through diet alone, you may also need to take medicines.  ? Take any prescribed medicines to control your cholesterol as told by your health care provider.  · Manage your diabetes.  ? Eating a healthy diet and exercising regularly are important parts of managing your blood sugar. If your blood sugar cannot be managed through diet and exercise, you may need to take medicines.  ? Take any prescribed medicines to control your diabetes as told by your health care provider.  · Control your hypertension.  ? To reduce your risk of stroke, try to keep your blood pressure below 130/80.  ? Eating a healthy diet and exercising regularly are an important part of controlling your blood pressure. If your blood pressure cannot be managed through diet and exercise, you may need to take medicines.  ? Take any prescribed medicines to control hypertension as told by your health care provider.  ? Ask your health care provider if you should monitor your blood pressure at home.  ? Have your blood pressure checked every year, even if your blood pressure is normal. Blood pressure increases with age and some medical conditions.  · Get evaluated for sleep disorders (sleep apnea). Talk to your health care provider about getting a sleep evaluation if you snore a lot or have excessive sleepiness.  · Take over-the-counter and prescription medicines only as told by your health care provider. Aspirin or blood thinners (antiplatelets or anticoagulants) may be recommended to reduce your risk of forming blood clots that can lead to stroke.  · Make sure that any other medical conditions you have, such as atrial fibrillation  or atherosclerosis, are managed.  What are the warning signs of a stroke?  The warning signs of a stroke can be easily remembered as BEFAST.  · B is for balance. Signs include:  ? Dizziness.  ? Loss of balance or coordination.  ? Sudden trouble walking.  · E is for eyes. Signs include:  ? A sudden change in vision.  ? Trouble seeing.  · F is for face. Signs include:  ? Sudden weakness or numbness of the face.  ? The face or eyelid drooping to one side.  · A is for arms. Signs include:  ? Sudden weakness or numbness of the arm, usually on one side of the body.  · S is for speech. Signs include:  ? Trouble speaking (aphasia).  ? Trouble understanding.  · T is for time.  ? These symptoms may represent a serious problem that is an emergency. Do not wait to see if the symptoms will go away. Get medical help right away. Call your local emergency services (911 in the [place] Do not drive yourself to the hospital.  · Other signs of stroke may include:  ? A sudden, severe headache with no known cause.  ? Nausea or vomiting.  ? Seizure.    Where to find more information:  For more information, visit:  · American Stroke Association: www.strokeassociation.org  · National Stroke Association: www.stroke.org    Summary  · You can prevent a stroke by eating healthy, exercising, not smoking, limiting alcohol intake, and managing any medical conditions you may have.  · Do not use any products that contain nicotine or tobacco, such as cigarettes and e-cigarettes. If you need help quitting, ask your health care provider. It may also be helpful to avoid exposure to secondhand smoke.  · Remember BEFAST for warning signs of stroke. Get help right away if you or a loved one has any of these signs.  This information is not intended to replace advice given to you by your health care provider. Make sure you discuss any questions you have with your health care provider.  Document Released: 01/25/2006 Document Revised: 01/23/2018 Document  Reviewed: 01/23/2018  [employer] Interactive Patient Education © 2018 [employer]

## 2019-01-11 NOTE — PROGRESS NOTES
"Hattie Montes U Person is a 58 y.o. female.     [facility] Visit    PCP: WESTON Kirk    CC: \"bad cold; hip pain\"      URI    This is a new problem. Episode onset: x 2 weeks. The problem has been gradually worsening. There has been no fever. Associated symptoms include congestion, coughing ( productive of thick, green mucus), headaches, rhinorrhea ( green), sneezing and a sore throat ( scratchy). Pertinent negatives include no abdominal pain, chest pain, diarrhea, dysuria, ear pain, joint pain, joint swelling, nausea, neck pain, plugged ear sensation, rash, sinus pain, swollen glands, vomiting or wheezing. She has tried nothing for the symptoms.   Hip Pain    The incident occurred more than 1 week ago. The incident occurred at home. The injury mechanism was a fall (fell in the driveway on concrete). The pain is present in the left hip. The quality of the pain is described as aching. The pain is at a severity of 7/10. The pain has been constant since onset. Associated symptoms include an inability to bear weight (hurts to bear weight, using a wheelchair today). Pertinent negatives include no loss of motion, loss of sensation, muscle weakness, numbness or tingling. The symptoms are aggravated by movement, weight bearing and palpation. Treatments tried: on Diclofenac and Tramadol and reports she has been taking 2 Tramadol at a time because the pain has been so bad. The treatment provided mild relief.        The following portions of the patient's history were reviewed and updated as appropriate: allergies, current medications, past medical history, past social history, past surgical history and problem list.    Review of Systems   Constitutional: Positive for fatigue. Negative for appetite change, chills and fever.   HENT: Positive for congestion, postnasal drip, rhinorrhea ( green), sinus pressure, sneezing and sore throat ( scratchy). Negative for ear pain.    Eyes: Negative for discharge and " "itching.   Respiratory: Positive for cough ( productive of thick, green mucus). Negative for chest tightness, shortness of breath, wheezing and stridor.    Cardiovascular: Negative for chest pain.   Gastrointestinal: Negative for abdominal pain, diarrhea, nausea and vomiting.   Genitourinary: Negative for difficulty urinating and dysuria.   Musculoskeletal: Positive for arthralgias ( left hip pain). Negative for joint pain and neck pain.   Skin: Negative for rash.   Neurological: Positive for light-headedness ( off/on ) and headache ( sinus pressure). Negative for tingling and numbness.       Objective    /100 (BP Location: Right arm, Patient Position: Sitting, Cuff Size: Adult)   Pulse 100   Temp 97.5 °F (36.4 °C) (Tympanic)   Ht 160 cm (63\")   Wt 70.3 kg (155 lb)   SpO2 98%   BMI 27.46 kg/m²   Just took meds right before coming in to office.     Physical Exam   Constitutional: She is oriented to person, place, and time. She appears well-developed and well-nourished. No distress.   HENT:   Head: Normocephalic and atraumatic.   Right Ear: Tympanic membrane and ear canal normal.   Left Ear: Tympanic membrane and ear canal normal.   Nose: Mucosal edema ( erythema) and congestion present. Right sinus exhibits no maxillary sinus tenderness and no frontal sinus tenderness. Left sinus exhibits no maxillary sinus tenderness and no frontal sinus tenderness.   Mouth/Throat: Uvula is midline and mucous membranes are normal. Posterior oropharyngeal erythema ( mild injection with PND) present. No oropharyngeal exudate.   Generalized sinus pressure, but no localized pain     Eyes: Conjunctivae are normal. Right eye exhibits no discharge. Left eye exhibits no discharge.   Neck: Neck supple.   Cardiovascular: Normal rate and regular rhythm.   Pulmonary/Chest: Effort normal. She has decreased breath sounds ( slightly decreased). She has no wheezes. She has no rales.   Loose, congested cough     Musculoskeletal:        " Left hip: She exhibits decreased range of motion (due to pain) and tenderness.        Legs:  Lymphadenopathy:     She has no cervical adenopathy.   Neurological: She is alert and oriented to person, place, and time.   Nursing note and vitals reviewed.        Assessment/Plan   Jyoti was seen today for cough, sore throat, uri, nasal congestion and hip injury.    Diagnoses and all orders for this visit:    Acute URI  -     fluticasone (FLONASE) 50 MCG/ACT nasal spray; 2 sprays into the nostril(s) as directed by provider Daily.  -     guaiFENesin (MUCINEX) 600 MG 12 hr tablet; Take 1 tablet by mouth Every 12 (Twelve) Hours.  -     azithromycin (ZITHROMAX Z-VELIA) 250 MG tablet; Take 2 tablets the first day, then 1 tablet daily for 4 days.    Left hip pain  -     XR Hip With or Without Pelvis 2 - 3 View Left  -     acetaminophen (TYLENOL) 500 MG tablet; Take 1 tablet by mouth Every 6 (Six) Hours As Needed (hip pain).    Cough  -     guaiFENesin (MUCINEX) 600 MG 12 hr tablet; Take 1 tablet by mouth Every 12 (Twelve) Hours.  -     promethazine-dextromethorphan (PROMETHAZINE-DM) 6.25-15 MG/5ML syrup; 1-2 tsp po QHS prn cough    Other orders  -     fluconazole (DIFLUCAN) 150 MG tablet; 1 tab po x 1 now, may repeat in 4 days prn yeast      Push fluids  Rest  Rx Zithromax, Mucinex, refill of Flonase, Phenergan DM at bedtime for URI/cough  Xray on her way out of left hip--will call with results.  [phone].  Rx for Tylenol.  Needs to try and take Tramadol as prescribed, no more than 1 tab TID.  May continue with Diclofenac.     See PCP if symptoms persist/worsen.

## 2019-01-11 NOTE — PROGRESS NOTES
Subjective     Chief Complaint   Patient presents with   • Stroke   • Seizures         Jyoti Riddle is a 58 y.o. female right handed .  She is here today for follow up for stroke and abnormal EEG concerning to be seizuregenic. She is accompanied by her significant other. She is in a wheelchair today because she had fallen and has right hip pain. She had seen her PCP this AM and did have xray but does not know results.  She was last seen 9/2018.  At that time was complaining of bilateral hand pain and numbness. She did have EMG/NCV that showed mild median nerve irritation. She has been wearing wrist splints since but continues to complain of significant bone pain. She is to follow up with PCP and orthopedics. She has hx of noncompliance. She has remaining mild right sided weakness and ataxia. For some reason she is not taking ASA and thinks she had run out and did not purchase more.. She has hx of illicit drug use and last used cocaine about 3 days ago. She continues with keppra to 500 mg BID and patient denies side effects. She denies seizure, staring spell, incontinence.      She denies loss of consciousness, tongue biting, incontinence, involuntary tremor.  She continues to use tobacco stating 1 pack last 3 days. She also tells me she has cut back on marijuana and but uses cocaine at least 3 times weekly with her last reported use yesterday.         Stroke   This is a chronic (image negative stroke in 2013 left hemisphere) problem. The current episode started more than 1 year ago. The problem has been unchanged. Associated symptoms include arthralgias (bilateral hand pain and numbness) and headaches. Pertinent negatives include no chest pain, fatigue, fever, myalgias, nausea, sore throat, vomiting or weakness. Associated symptoms comments: Right facial weakness, RUE/RLE weakness and decrease sensation. And mild dysarthria this has improved since last visit but imbalanced and walks with single point cane.  Exacerbated by: hypertensive emergency, illicit drug use.   Seizures    This is a chronic problem. Associated symptoms include headaches and speech difficulty (mild dysarthria). Pertinent negatives include no confusion, no visual disturbance, no sore throat, no chest pain, no nausea, no vomiting and no diarrhea. Characteristics include eye blinking. Characteristics do not include apnea. body stiffens Possible causes include missed seizure meds. illicit drug use   Neurologic Problem   The patient's primary symptoms include an altered mental status. The patient's pertinent negatives include no weakness. Associated symptoms include headaches. Pertinent negatives include no chest pain, confusion, fatigue, fever, nausea, shortness of breath or vomiting.   Altered Mental Status   This is a chronic (since 2013) problem. The current episode started more than 1 year ago. The problem has been gradually improving (has had 2 episodes since last visit but may be related to illliciit drug use). Associated symptoms include arthralgias (bilateral hand pain and numbness) and headaches. Pertinent negatives include no chest pain, fatigue, fever, myalgias, nausea, sore throat, vomiting or weakness. Exacerbated by: illicit drug use, JAQUELIN. Treatments tried: trileptal 150 mg BID.        Current Outpatient Medications   Medication Sig Dispense Refill   • acetaminophen (TYLENOL) 500 MG tablet Take 1 tablet by mouth Every 6 (Six) Hours As Needed (hip pain). 30 tablet 2   • albuterol (VENTOLIN HFA) 108 (90 Base) MCG/ACT inhaler Inhale 2 puffs 4 (Four) Times a Day. 18 g 3   • ANORO ELLIPTA 62.5-25 MCG/INH aerosol powder  Inhale 62.5 inhalers Daily.  0   • atorvastatin (LIPITOR) 40 MG tablet Take 1 tablet by mouth Daily. 30 tablet 5   • azithromycin (ZITHROMAX Z-VELIA) 250 MG tablet Take 2 tablets the first day, then 1 tablet daily for 4 days. 6 tablet 0   • Blood Pressure Monitoring (B-D ASSURE BPM/AUTO WRIST CUFF) misc 1 kit 2 (Two) Times a Day  As Needed (fluctuating blood pressure). 1 each 0   • Calcium-Magnesium-Vitamin D (CALCIUM 500 PO) Take  by mouth.     • cyclobenzaprine (FLEXERIL) 10 MG tablet Take 1 tablet by mouth 3 (Three) Times a Day As Needed for Muscle Spasms. 90 tablet 3   • diclofenac (VOLTAREN) 50 MG EC tablet Take 1 tablet by mouth 2 (Two) Times a Day. 60 tablet 5   • fluconazole (DIFLUCAN) 150 MG tablet 1 tab po x 1 now, may repeat in 4 days prn yeast 2 tablet 0   • fluticasone (FLONASE) 50 MCG/ACT nasal spray 2 sprays into the nostril(s) as directed by provider Daily. 16 g 2   • folic acid (FOLVITE) 1 MG tablet TAKE 1 TABLET BY MOUTH ONCE DAILY 30 tablet 3   • gabapentin (NEURONTIN) 800 MG tablet Take 1 tablet by mouth 2 (Two) Times a Day. 60 tablet 0   • guaiFENesin (MUCINEX) 600 MG 12 hr tablet Take 1 tablet by mouth Every 12 (Twelve) Hours. 20 tablet 0   • levETIRAcetam (KEPPRA) 500 MG tablet Take 1 tablet by mouth 2 (Two) Times a Day. 60 tablet 5   • lidocaine (LIDODERM) 5 % apply 1 patch to the affected area daily. Leave on for 12 hours and then off for 12 hours. 6 patch 1   • meclizine (ANTIVERT) 25 MG tablet Take 1 tablet by mouth 3 (Three) Times a Day As Needed for dizziness. 30 tablet 3   • mupirocin (BACTROBAN) 2 % ointment Apply to right ear twice a day 15 g 0   • NIFEdipine XL (PROCARDIA XL) 30 MG 24 hr tablet Take 1 tablet by mouth Daily. 90 tablet 1   • ofloxacin (FLOXIN) 0.3 % otic solution 5 drops to right ear BID x 10 days. 10 mL 0   • promethazine-dextromethorphan (PROMETHAZINE-DM) 6.25-15 MG/5ML syrup 1-2 tsp po QHS prn cough 120 mL 0   • RA VITAMIN B-1 100 MG tablet take 1 tablet by mouth once daily  0   • rOPINIRole (REQUIP) 2 MG tablet Take 1 tablet by mouth every night at bedtime. 30 tablet 3   • traMADol (ULTRAM) 50 MG tablet Take 1 tablet by mouth Every 8 (Eight) Hours As Needed for Moderate Pain . 90 tablet 0   • vitamin D (ERGOCALCIFEROL) 26216 units capsule capsule Take 1 capsule by mouth Every 14 (Fourteen)  Days. 4 capsule 5   • aspirin 81 MG EC tablet Take 1 tablet by mouth Daily. 90 tablet 2     No current facility-administered medications for this visit.        Past Medical History:   Diagnosis Date   • Acute bronchitis 07/19/2016   • Acute otitis externa 09/04/2014   • Acute otitis media 02/10/2015   • Acute sinusitis 07/19/2016   • Ankle injury    • Backache 12/08/2014   • Benign hypertension 05/13/2016   • Cerebrovascular accident (CMS/[facility]) 2015   • Dizziness 02/10/2015   • Encounter for gynecological examination without abnormal finding 03/10/2016   • Fatigue 08/28/2015   • Foot pain 06/09/2014   • Hyperkeratosis 04/28/2014   • Hypotensive episode 06/12/2015   • Keratoma 08/04/2014    intractable plantar   • Low blood pressure 08/28/2015   • Menopausal and perimenopausal disorder 03/10/2016    other specified   • Numbness of face 05/13/2016    improved   • Otitis media of right ear 07/30/2016    unspecified   • Perforated tympanic membrane 09/04/2014   • Puncture wound 06/17/2014    injury   • Right-sided face pain 05/13/2016   • Seizure (CMS/[facility])    • Thumb pain 08/28/2015   • Upper respiratory infection 08/28/2015   • Wheezing 10/21/2014       Past Surgical History:   Procedure Laterality Date   • BREAST BIOPSY     • OTHER SURGICAL HISTORY  07/07/2014    Destruction of Benign Lesion (1-14)   • TUBAL ABDOMINAL LIGATION         family history includes Alzheimer's disease in her father and other; Bipolar disorder in her other; Depression in her other; Diabetes in her other; Heart attack in her father; Heart disease in her other; Hypertension in her other; Lung cancer in her other; Migraines in her other; No Known Problems in her daughter and mother; Sickle cell anemia in her other; Stroke in her other; Throat cancer in her father.    Social History     Tobacco Use   • Smoking status: Current Every Day Smoker     Packs/day: 0.25     Types: Cigarettes   • Smokeless tobacco: Never Used   Substance Use Topics   •  "Alcohol use: Yes     Comment: rarely   • Drug use: Yes     Types: Cocaine       Review of Systems   Constitutional: Negative for fatigue and fever.   HENT: Negative for sore throat.    Eyes: Negative for visual disturbance.   Respiratory: Negative for apnea and shortness of breath.    Cardiovascular: Negative for chest pain.   Gastrointestinal: Negative for diarrhea, nausea and vomiting.   Endocrine: Negative for cold intolerance and heat intolerance.   Genitourinary: Negative for dysuria and frequency.   Musculoskeletal: Positive for arthralgias (bilateral hand pain and numbness) and gait problem (imbalance at times). Negative for myalgias.   Neurological: Positive for seizures, speech difficulty (mild dysarthria) and headaches. Negative for weakness.   Psychiatric/Behavioral: Negative for agitation, confusion and hallucinations.       Objective     /70   Pulse 88   Ht 160 cm (63\")   Wt 70.3 kg (155 lb)   BMI 27.46 kg/m² , Body mass index is 27.46 kg/m².    Physical Exam   Constitutional: She is oriented to person, place, and time. Vital signs are normal. She appears well-developed and well-nourished. She is cooperative.   HENT:   Head: Normocephalic and atraumatic.   Right Ear: Hearing and external ear normal. There is tenderness. Tympanic membrane is erythematous.   Left Ear: Hearing and external ear normal.   Nose: Nose normal.   Mouth/Throat: Oropharynx is clear and moist.   Eyes: Conjunctivae, EOM and lids are normal. Pupils are equal, round, and reactive to light. Right eye exhibits normal extraocular motion and no nystagmus. Left eye exhibits normal extraocular motion and no nystagmus. Right pupil is round and reactive. Left pupil is round and reactive. Pupils are equal.   Neck: Normal range of motion. Neck supple. Carotid bruit is not present.   Cardiovascular: Normal rate, regular rhythm, S1 normal, S2 normal and normal heart sounds.   No murmur heard.  Pulmonary/Chest: Effort normal and breath " sounds normal.   Abdominal: Soft. Bowel sounds are normal.   Musculoskeletal: Normal range of motion.   Positive phalen test bilateral.   Decrease  in right compared to left   Neurological: She is alert and oriented to person, place, and time. She has normal strength and normal reflexes. She displays no tremor. A cranial nerve deficit (right lower facial weakness) and sensory deficit (decrease sensation of right side) is present. She displays a negative Romberg sign. She displays no seizure activity. Coordination (ataxia bilateral finger to nose) abnormal. Abnormal gait: unsteady gait, negative romberg.   Reflex Scores:       Tricep reflexes are 2+ on the right side and 2+ on the left side.       Bicep reflexes are 2+ on the right side and 2+ on the left side.       Brachioradialis reflexes are 2+ on the right side and 2+ on the left side.       Patellar reflexes are 2+ on the right side and 2+ on the left side.       Achilles reflexes are 2+ on the right side and 2+ on the left side.  CN II:  Visual fields full.  Pupils equally reactive to light  CN III, IV, VI:  Extraocular Muscles full with no signs of nystagmus  CN V:  Facial sensory is symmetric with no asymetries.  CN VII:  Facial motor symmetric  CN VIII:  Gross hearing intact bilaterally  CN IX:  Palate elevates symmetrically  CN X:  Palate elevates symmetrically  CN XI:  Shoulder shrug symmetric  CN XII:  Tongue is midline on protrusion   Skin: Skin is warm and dry.   Psychiatric: She has a normal mood and affect. Her speech is normal and behavior is normal. Cognition and memory are normal.   Nursing note and vitals reviewed.           ASSESSMENT/PLAN    Diagnoses and all orders for this visit:    Cerebrovascular accident (CVA) due to thrombosis of left carotid artery (CMS/HCC)    Essential hypertension    Numbness and tingling in both hands    Generalized seizure disorder (CMS/HCC)    Other orders  -     aspirin 81 MG EC tablet; Take 1 tablet by mouth  Daily.      MEDICAL DECISION MAKIN. Continue with ASA 81 mg for secondary stroke prevention and counseled on compliance.  2. Continue with statin per PCP for LDL goal less than 70.  3. Continue with BP management per PCP for systolic less than 140.  4.continue  Keppra 500 mg BID and counseled on compliance.  5. Counseled on cessation of cocaine, marijuana, and tobacco. I did discuss rehabilitation and patient wishes to think about it.  6. Patient is counseled on stroke signs and symptoms using FAST and Time Saved is Brain Saved.  7. Seizure precautions were discussed to include no tub baths, no swimming, avoiding lack of sleep, and avoiding known triggers. Education given of things that may contribute to a seizure to include, but not limited to: stressful situations, fever, fatigue, lack of sleep, low blood sugar, hyperventilation, flashing lights, and caffeine. Instructions given to take seizure medications as prescribed. Education given to family member on what to do during a seizure and care following the seizure. Education given to contact this office prior to stopping or changing any medications.  8.I advised the patient of the risks in continuing to use tobacco, and I provided this patient with smoking cessation educational materials.     During this visit, I spent > 3-10 minutes counseling the patient regarding smoking cessation.  9. patient to follow up with PCP and orthopedics for bone pain    HPI and ROS reviewed and updated.      allergies and all known medications/prescriptions have been reviewed using resources available on this encounter.    Return in about 6 months (around 2019).        WESTON Lara

## 2019-01-11 NOTE — PATIENT INSTRUCTIONS
Hip Pain  The hip is the joint between the upper legs and the lower pelvis. The bones, cartilage, tendons, and muscles of your hip joint support your body and allow you to move around.  Hip pain can range from a minor ache to severe pain in one or both of your hips. The pain may be felt on the inside of the hip joint near the groin, or the outside near the buttocks and upper thigh. You may also have swelling or stiffness.  Follow these instructions at home:  Managing pain, stiffness, and swelling  · If directed, apply ice to the injured area.  ? Put ice in a plastic bag.  ? Place a towel between your skin and the bag.  ? Leave the ice on for 20 minutes, 2-3 times a day  · Sleep with a pillow between your legs on your most comfortable side.  · Avoid any activities that cause pain.  General instructions  · Take over-the-counter and prescription medicines only as told by your health care provider.  · Do any exercises as told by your health care provider.  · Record the following:  ? How often you have hip pain.  ? The location of your pain.  ? What the pain feels like.  ? What makes the pain worse.  · Keep all follow-up visits as told by your health care provider. This is important.  Contact a health care provider if:  · You cannot put weight on your leg.  · Your pain or swelling continues or gets worse after one week.  · It gets harder to walk.  · You have a fever.  Get help right away if:  · You fall.  · You have a sudden increase in pain and swelling in your hip.  · Your hip is red or swollen or very tender to touch.  Summary  · Hip pain can range from a minor ache to severe pain in one or both of your hips.  · The pain may be felt on the inside of the hip joint near the groin, or the outside near the buttocks and upper thigh.  · Avoid any activities that cause pain.  · Record how often you have hip pain, the location of the pain, what makes it worse and what it feels like.  This information is not intended to  "replace advice given to you by your health care provider. Make sure you discuss any questions you have with your health care provider.  Document Released: 06/07/2011 Document Revised: 11/20/2017 Document Reviewed: 11/20/2017  [employer] Interactive Patient Education © 2018 [employer]  Upper Respiratory Infection, Adult  Most upper respiratory infections (URIs) are a viral infection of the air passages leading to the lungs. A URI affects the nose, throat, and upper air passages. The most common type of URI is nasopharyngitis and is typically referred to as \"the common cold.\"  URIs run their course and usually go away on their own. Most of the time, a URI does not require medical attention, but sometimes a bacterial infection in the upper airways can follow a viral infection. This is called a secondary infection. Sinus and middle ear infections are common types of secondary upper respiratory infections.  Bacterial pneumonia can also complicate a URI. A URI can worsen asthma and chronic obstructive pulmonary disease (COPD). Sometimes, these complications can require emergency medical care and may be life threatening.  What are the causes?  Almost all URIs are caused by viruses. A virus is a type of germ and can spread from one person to another.  What increases the risk?  You may be at risk for a URI if:  · You smoke.  · You have chronic heart or lung disease.  · You have a weakened defense (immune) system.  · You are very young or very old.  · You have nasal allergies or asthma.  · You work in crowded or poorly ventilated areas.  · You work in health care facilities or schools.    What are the signs or symptoms?  Symptoms typically develop 2-3 days after you come in contact with a cold virus. Most viral URIs last 7-10 days. However, viral URIs from the influenza virus (flu virus) can last 14-18 days and are typically more severe. Symptoms may include:  · Runny or stuffy (congested) " nose.  · Sneezing.  · Cough.  · Sore throat.  · Headache.  · Fatigue.  · Fever.  · Loss of appetite.  · Pain in your forehead, behind your eyes, and over your cheekbones (sinus pain).  · Muscle aches.    How is this diagnosed?  Your health care provider may diagnose a URI by:  · Physical exam.  · Tests to check that your symptoms are not due to another condition such as:  ? Strep throat.  ? Sinusitis.  ? Pneumonia.  ? Asthma.    How is this treated?  A URI goes away on its own with time. It cannot be cured with medicines, but medicines may be prescribed or recommended to relieve symptoms. Medicines may help:  · Reduce your fever.  · Reduce your cough.  · Relieve nasal congestion.    Follow these instructions at home:  · Take medicines only as directed by your health care provider.  · Gargle warm saltwater or take cough drops to comfort your throat as directed by your health care provider.  · Use a warm mist humidifier or inhale steam from a shower to increase air moisture. This may make it easier to breathe.  · Drink enough fluid to keep your urine clear or pale yellow.  · Eat soups and other clear broths and maintain good nutrition.  · Rest as needed.  · Return to work when your temperature has returned to normal or as your health care provider advises. You may need to stay home longer to avoid infecting others. You can also use a face mask and careful hand washing to prevent spread of the virus.  · Increase the usage of your inhaler if you have asthma.  · Do not use any tobacco products, including cigarettes, chewing tobacco, or electronic cigarettes. If you need help quitting, ask your health care provider.  How is this prevented?  The best way to protect yourself from getting a cold is to practice good hygiene.  · Avoid oral or hand contact with people with cold symptoms.  · Wash your hands often if contact occurs.    There is no clear evidence that vitamin C, vitamin E, echinacea, or exercise reduces the chance  of developing a cold. However, it is always recommended to get plenty of rest, exercise, and practice good nutrition.  Contact a health care provider if:  · You are getting worse rather than better.  · Your symptoms are not controlled by medicine.  · You have chills.  · You have worsening shortness of breath.  · You have brown or red mucus.  · You have yellow or brown nasal discharge.  · You have pain in your face, especially when you bend forward.  · You have a fever.  · You have swollen neck glands.  · You have pain while swallowing.  · You have white areas in the back of your throat.  Get help right away if:  · You have severe or persistent:  ? Headache.  ? Ear pain.  ? Sinus pain.  ? Chest pain.  · You have chronic lung disease and any of the following:  ? Wheezing.  ? Prolonged cough.  ? Coughing up blood.  ? A change in your usual mucus.  · You have a stiff neck.  · You have changes in your:  ? Vision.  ? Hearing.  ? Thinking.  ? Mood.  This information is not intended to replace advice given to you by your health care provider. Make sure you discuss any questions you have with your health care provider.  Document Released: 06/13/2002 Document Revised: 08/20/2017 Document Reviewed: 03/25/2015  [employer] Interactive Patient Education © 2018 [employer]

## 2019-01-21 DIAGNOSIS — M79.644 BILATERAL THUMB PAIN: ICD-10-CM

## 2019-01-21 DIAGNOSIS — G62.9 NEUROPATHY: ICD-10-CM

## 2019-01-21 DIAGNOSIS — M79.645 BILATERAL THUMB PAIN: ICD-10-CM

## 2019-01-21 RX ORDER — TRAMADOL HYDROCHLORIDE 50 MG/1
50 TABLET ORAL EVERY 8 HOURS PRN
Qty: 90 TABLET | Refills: 0 | Status: SHIPPED | OUTPATIENT
Start: 2019-01-21 | End: 2019-02-21 | Stop reason: SDUPTHER

## 2019-01-21 RX ORDER — GABAPENTIN 800 MG/1
800 TABLET ORAL 2 TIMES DAILY
Qty: 60 TABLET | Refills: 0 | Status: SHIPPED | OUTPATIENT
Start: 2019-01-21 | End: 2019-02-21 | Stop reason: SDUPTHER

## 2019-01-25 ENCOUNTER — OFFICE VISIT (OUTPATIENT)
Dept: OTOLARYNGOLOGY | Facility: CLINIC | Age: 59
End: 2019-01-25

## 2019-01-25 ENCOUNTER — OFFICE VISIT (OUTPATIENT)
Dept: FAMILY MEDICINE CLINIC | Facility: CLINIC | Age: 59
End: 2019-01-25

## 2019-01-25 VITALS
SYSTOLIC BLOOD PRESSURE: 105 MMHG | HEIGHT: 63 IN | BODY MASS INDEX: 27.46 KG/M2 | OXYGEN SATURATION: 97 % | WEIGHT: 155 LBS | TEMPERATURE: 98.1 F | HEART RATE: 93 BPM | RESPIRATION RATE: 20 BRPM | DIASTOLIC BLOOD PRESSURE: 51 MMHG

## 2019-01-25 VITALS — RESPIRATION RATE: 17 BRPM | BODY MASS INDEX: 27.46 KG/M2 | HEIGHT: 63 IN | WEIGHT: 155 LBS

## 2019-01-25 DIAGNOSIS — M25.532 LEFT WRIST PAIN: ICD-10-CM

## 2019-01-25 DIAGNOSIS — M25.552 PAIN OF LEFT HIP JOINT: Primary | ICD-10-CM

## 2019-01-25 DIAGNOSIS — H60.63 CHRONIC NON-INFECTIVE OTITIS EXTERNA OF BOTH EARS, UNSPECIFIED TYPE: Primary | ICD-10-CM

## 2019-01-25 PROCEDURE — 99214 OFFICE O/P EST MOD 30 MIN: CPT | Performed by: NURSE PRACTITIONER

## 2019-01-25 PROCEDURE — 96372 THER/PROPH/DIAG INJ SC/IM: CPT | Performed by: NURSE PRACTITIONER

## 2019-01-25 PROCEDURE — 99213 OFFICE O/P EST LOW 20 MIN: CPT | Performed by: OTOLARYNGOLOGY

## 2019-01-25 RX ORDER — BETAMETHASONE SODIUM PHOSPHATE AND BETAMETHASONE ACETATE 3; 3 MG/ML; MG/ML
12 INJECTION, SUSPENSION INTRA-ARTICULAR; INTRALESIONAL; INTRAMUSCULAR; SOFT TISSUE EVERY 24 HOURS
Status: SHIPPED | OUTPATIENT
Start: 2019-01-25 | End: 2019-01-27

## 2019-01-25 RX ORDER — TRIAMCINOLONE ACETONIDE 0.25 MG/G
OINTMENT TOPICAL
Qty: 15 G | Refills: 1 | Status: SHIPPED | OUTPATIENT
Start: 2019-01-25 | End: 2019-03-11

## 2019-01-25 RX ADMIN — BETAMETHASONE SODIUM PHOSPHATE AND BETAMETHASONE ACETATE 12 MG: 3; 3 INJECTION, SUSPENSION INTRA-ARTICULAR; INTRALESIONAL; INTRAMUSCULAR; SOFT TISSUE at 16:14

## 2019-01-25 NOTE — PROGRESS NOTES
Hattie Riddle is a 58 y.o. female.     Here today with pain in her left wrist and her left hip.  These problems are chronic.  She wants a brace for her wrist and she reports she has not had a cortisone injection for many months and she thinks this will help the arthritis in her left hip.      Lower Extremity Issue   This is a chronic problem. The current episode started more than 1 year ago. The problem occurs intermittently. The problem has been waxing and waning. Associated symptoms include arthralgias (left hip, left wrist). Pertinent negatives include no abdominal pain, anorexia, change in bowel habit, chest pain, chills, congestion, coughing, diaphoresis, fatigue, fever, headaches, joint swelling, myalgias, nausea, neck pain, numbness, rash, sore throat, swollen glands, urinary symptoms, vertigo, visual change, vomiting or weakness. The symptoms are aggravated by walking and standing. She has tried NSAIDs for the symptoms. The treatment provided moderate relief.   Upper Extremity Issue   This is a chronic problem. The current episode started more than 1 year ago. The problem occurs intermittently. The problem has been waxing and waning. Associated symptoms include arthralgias (left hip, left wrist). Pertinent negatives include no abdominal pain, anorexia, change in bowel habit, chest pain, chills, congestion, coughing, diaphoresis, fatigue, fever, headaches, joint swelling, myalgias, nausea, neck pain, numbness, rash, sore throat, swollen glands, urinary symptoms, vertigo, visual change, vomiting or weakness. The symptoms are aggravated by exertion (rom). She has tried NSAIDs for the symptoms. The treatment provided mild relief.        The following portions of the patient's history were reviewed and updated as appropriate: allergies, current medications, past family history, past medical history, past social history, past surgical history and problem list.    Review of Systems   Constitutional:  Negative.  Negative for chills, diaphoresis, fatigue and fever.   HENT: Negative.  Negative for congestion and sore throat.    Respiratory: Negative.  Negative for cough.    Cardiovascular: Negative.  Negative for chest pain.   Gastrointestinal: Negative for abdominal pain, anorexia, change in bowel habit, nausea and vomiting.   Musculoskeletal: Positive for arthralgias (left hip, left wrist). Negative for joint swelling, myalgias and neck pain.   Skin: Negative.  Negative for rash.   Neurological: Negative.  Negative for vertigo, weakness and numbness.   Psychiatric/Behavioral: Negative.        Objective   Physical Exam   Constitutional: She is oriented to person, place, and time. She appears well-developed and well-nourished. No distress.   HENT:   Head: Normocephalic and atraumatic.   Eyes: EOM are normal. Pupils are equal, round, and reactive to light.   Neck: Normal range of motion. Neck supple. No thyromegaly present.   Cardiovascular: Normal rate, regular rhythm and normal heart sounds. Exam reveals no friction rub.   No murmur heard.  Pulmonary/Chest: Effort normal and breath sounds normal. No stridor. No respiratory distress. She has no wheezes. She has no rales.   Abdominal: Soft.   Musculoskeletal:        Left wrist: She exhibits decreased range of motion and tenderness.        Left hip: She exhibits decreased range of motion, decreased strength, tenderness and crepitus.   Neurological: She is alert and oriented to person, place, and time.   Skin: Skin is warm and dry.   Psychiatric: She has a normal mood and affect. Thought content normal.   Nursing note and vitals reviewed.        Assessment/Plan   Jyoti was seen today for hypertension, peripheral neuropathy and seizures.    Diagnoses and all orders for this visit:    Pain of left hip joint  -     betamethasone acetate-betamethasone sodium phosphate (CELESTONE SOLUSPAN) injection 12 mg; Inject 2 mL into the appropriate muscle as directed by prescriber  Daily.    Left wrist pain  Comments:  is given a proflex splint

## 2019-01-28 NOTE — PROGRESS NOTES
Subjective   Jyoti Riddle is a 58 y.o. female.       History of Present Illness     Patient states her ears the been itching.  She says her ear on the right bled last week.  States that she traumatize this with her fingernail.  Says she's not using any Q-tips.    The following portions of the patient's history were reviewed and updated as appropriate: allergies, current medications, past family history, past medical history, past social history, past surgical history and problem list.     reports that she has been smoking cigarettes.  She has been smoking about 0.25 packs per day. she has never used smokeless tobacco. She reports that she drinks alcohol. She reports that she uses drugs. Drug: Cocaine.   Patient is a tobacco user and has been counseled for use of tobacco products      Review of Systems   Constitutional: Negative for fever.           Objective   Physical Exam  Ears: External ears no deformity.  Right ear canal shows a good bit of dried blood along with some scaly skin.  This is gently removed under the microscope using instrumentation.  Beneath this the skin of the ear canal is now intact area did tympanic membrane intact and clear.  Left ear canal shows a modest amount of noninfected squamous debris that cleaned under the microscope from the external os area.  Tympanic membrane is intact.      eAssessment/Plan   Jyoti was seen today for ear drainage.    Diagnoses and all orders for this visit:    Chronic non-infective otitis externa of both ears, unspecified type    Other orders  -     triamcinolone (KENALOG) 0.025 % ointment; Apply to ears twice a day as needed for itching         plan: Urged the patient not to manipulate her ears.  We'll prescribe some Kenalog ointment to be applied twice a day as needed for itching.  Keep regularly scheduled appointment for March.

## 2019-02-20 ENCOUNTER — OFFICE VISIT (OUTPATIENT)
Dept: ORTHOPEDIC SURGERY | Facility: CLINIC | Age: 59
End: 2019-02-20

## 2019-02-20 VITALS — BODY MASS INDEX: 27.46 KG/M2 | HEIGHT: 63 IN | WEIGHT: 155 LBS

## 2019-02-20 DIAGNOSIS — M79.641 BILATERAL HAND PAIN: ICD-10-CM

## 2019-02-20 DIAGNOSIS — R20.2 NUMBNESS AND TINGLING IN BOTH HANDS: ICD-10-CM

## 2019-02-20 DIAGNOSIS — M79.642 BILATERAL HAND PAIN: ICD-10-CM

## 2019-02-20 DIAGNOSIS — G56.03 BILATERAL CARPAL TUNNEL SYNDROME: Primary | ICD-10-CM

## 2019-02-20 DIAGNOSIS — R20.0 NUMBNESS AND TINGLING IN BOTH HANDS: ICD-10-CM

## 2019-02-20 PROCEDURE — 99213 OFFICE O/P EST LOW 20 MIN: CPT | Performed by: ORTHOPAEDIC SURGERY

## 2019-02-20 NOTE — PROGRESS NOTES
Jyoti SARAVIA Person is a 58 y.o. female returns for     Chief Complaint   Patient presents with   • Right Hand - Follow-up   • Left Hand - Follow-up       HISTORY OF PRESENT ILLNESS: f/u on bilateral hand pain, patient states that she has a lot of numbness and tingling, we had seen her previously and tried to schedule her for surgery after discussing this with her.  We called her multiple times what were never able to get a hold of her.  She tells me that her phone had been turned off for whatever reason.  She still has pain but now her left side is worse       CONCURRENT MEDICAL HISTORY:    Past Medical History:   Diagnosis Date   • Acute bronchitis 07/19/2016   • Acute otitis externa 09/04/2014   • Acute otitis media 02/10/2015   • Acute sinusitis 07/19/2016   • Ankle injury    • Backache 12/08/2014   • Benign hypertension 05/13/2016   • Cerebrovascular accident (CMS/Formerly [facility]) 2015   • Dizziness 02/10/2015   • Encounter for gynecological examination without abnormal finding 03/10/2016   • Fatigue 08/28/2015   • Foot pain 06/09/2014   • Hyperkeratosis 04/28/2014   • Hypotensive episode 06/12/2015   • Keratoma 08/04/2014    intractable plantar   • Low blood pressure 08/28/2015   • Menopausal and perimenopausal disorder 03/10/2016    other specified   • Numbness of face 05/13/2016    improved   • Otitis media of right ear 07/30/2016    unspecified   • Perforated tympanic membrane 09/04/2014   • Puncture wound 06/17/2014    injury   • Right-sided face pain 05/13/2016   • Seizure (CMS/[facility])    • Thumb pain 08/28/2015   • Upper respiratory infection 08/28/2015   • Wheezing 10/21/2014       Allergies   Allergen Reactions   • Lisinopril Anaphylaxis         Current Outpatient Medications:   •  acetaminophen (TYLENOL) 500 MG tablet, Take 1 tablet by mouth Every 6 (Six) Hours As Needed (hip pain)., Disp: 30 tablet, Rfl: 2  •  albuterol (VENTOLIN HFA) 108 (90 Base) MCG/ACT inhaler, Inhale 2 puffs 4 (Four) Times a Day., Disp: 18 g, Rfl:  3  •  ANORO ELLIPTA 62.5-25 MCG/INH aerosol powder , Inhale 62.5 inhalers Daily., Disp: , Rfl: 0  •  aspirin 81 MG EC tablet, Take 1 tablet by mouth Daily., Disp: 90 tablet, Rfl: 2  •  atorvastatin (LIPITOR) 40 MG tablet, Take 1 tablet by mouth Daily., Disp: 30 tablet, Rfl: 5  •  azithromycin (ZITHROMAX Z-VELIA) 250 MG tablet, Take 2 tablets the first day, then 1 tablet daily for 4 days., Disp: 6 tablet, Rfl: 0  •  Blood Pressure Monitoring (B-D ASSURE BPM/AUTO WRIST CUFF) misc, 1 kit 2 (Two) Times a Day As Needed (fluctuating blood pressure)., Disp: 1 each, Rfl: 0  •  Calcium-Magnesium-Vitamin D (CALCIUM 500 PO), Take  by mouth., Disp: , Rfl:   •  cyclobenzaprine (FLEXERIL) 10 MG tablet, Take 1 tablet by mouth 3 (Three) Times a Day As Needed for Muscle Spasms., Disp: 90 tablet, Rfl: 3  •  diclofenac (VOLTAREN) 50 MG EC tablet, Take 1 tablet by mouth 2 (Two) Times a Day., Disp: 60 tablet, Rfl: 5  •  fluconazole (DIFLUCAN) 150 MG tablet, 1 tab po x 1 now, may repeat in 4 days prn yeast, Disp: 2 tablet, Rfl: 0  •  fluticasone (FLONASE) 50 MCG/ACT nasal spray, 2 sprays into the nostril(s) as directed by provider Daily., Disp: 16 g, Rfl: 2  •  folic acid (FOLVITE) 1 MG tablet, TAKE 1 TABLET BY MOUTH ONCE DAILY, Disp: 30 tablet, Rfl: 3  •  gabapentin (NEURONTIN) 800 MG tablet, Take 1 tablet by mouth 2 (Two) Times a Day., Disp: 60 tablet, Rfl: 0  •  guaiFENesin (MUCINEX) 600 MG 12 hr tablet, Take 1 tablet by mouth Every 12 (Twelve) Hours., Disp: 20 tablet, Rfl: 0  •  levETIRAcetam (KEPPRA) 500 MG tablet, Take 1 tablet by mouth 2 (Two) Times a Day., Disp: 60 tablet, Rfl: 5  •  lidocaine (LIDODERM) 5 %, apply 1 patch to the affected area daily. Leave on for 12 hours and then off for 12 hours., Disp: 6 patch, Rfl: 1  •  meclizine (ANTIVERT) 25 MG tablet, Take 1 tablet by mouth 3 (Three) Times a Day As Needed for dizziness., Disp: 30 tablet, Rfl: 3  •  mupirocin (BACTROBAN) 2 % ointment, Apply to right ear twice a day, Disp: 15  "g, Rfl: 0  •  NIFEdipine XL (PROCARDIA XL) 30 MG 24 hr tablet, Take 1 tablet by mouth Daily., Disp: 90 tablet, Rfl: 1  •  ofloxacin (FLOXIN) 0.3 % otic solution, 5 drops to right ear BID x 10 days., Disp: 10 mL, Rfl: 0  •  promethazine-dextromethorphan (PROMETHAZINE-DM) 6.25-15 MG/5ML syrup, 1-2 tsp po QHS prn cough, Disp: 120 mL, Rfl: 0  •  RA VITAMIN B-1 100 MG tablet, take 1 tablet by mouth once daily, Disp: , Rfl: 0  •  rOPINIRole (REQUIP) 2 MG tablet, Take 1 tablet by mouth every night at bedtime., Disp: 30 tablet, Rfl: 3  •  traMADol (ULTRAM) 50 MG tablet, Take 1 tablet by mouth Every 8 (Eight) Hours As Needed for Moderate Pain ., Disp: 90 tablet, Rfl: 0  •  triamcinolone (KENALOG) 0.025 % ointment, Apply to ears twice a day as needed for itching, Disp: 15 g, Rfl: 1  •  vitamin D (ERGOCALCIFEROL) 65396 units capsule capsule, Take 1 capsule by mouth Every 14 (Fourteen) Days., Disp: 4 capsule, Rfl: 5    Past Surgical History:   Procedure Laterality Date   • BREAST BIOPSY     • OTHER SURGICAL HISTORY  07/07/2014    Destruction of Benign Lesion (1-14)   • TUBAL ABDOMINAL LIGATION             ROS: Review of systems has been updated as of today's date.  All other systems are negative except as noted previously.    PHYSICAL EXAMINATION:       Ht 160 cm (63\")   Wt 70.3 kg (155 lb)   BMI 27.46 kg/m²     Physical Exam   Constitutional: She is oriented to person, place, and time. She appears well-developed.   HENT:   Head: Normocephalic and atraumatic.   Eyes: EOM are normal. Pupils are equal, round, and reactive to light.   Neck: Neck supple.   Pulmonary/Chest: Effort normal.   Musculoskeletal: She exhibits tenderness and deformity.   Neurological: She is alert and oriented to person, place, and time. A sensory deficit is present.   Skin: Skin is warm and dry.   Psychiatric: She has a normal mood and affect.       GAIT:     []  Normal  []  Antalgic    Assistive device: []  None  []  Walker     []  Crutches  []  " Cane     [x]  Wheelchair  []  Stretcher    Ortho Exam  Mild deformity of the left wrist from previous fracture with prominence of the ulnar styloid.  Motion pretty well preserved.  Phalen's is mildly positive Tinel's is negative.  There is no atrophy.  Numbness in the median nerve distributional bilaterally.  Good capillary refill.  Motion well-maintained    No results found.          ASSESSMENT:    Diagnoses and all orders for this visit:    Bilateral carpal tunnel syndrome    Bilateral hand pain    Numbness and tingling in both hands          PLAN again gone over her studies which show mild carpal tunnel bilaterally.  Fairly symmetrical of the studies.  The left is most symptomatic.  I once again go over the risks and benefits of surgery including but not limited to bleeding, blood clot, failure to resolve her pain, need for further surgery, neurovascular injury, infection, medical anesthetic complications, etc. she understands glad to proceed as planned as detailed informed consent is given.    No Follow-up on file.      This document has been electronically signed by Ede Peace MD on February 20, 2019 3:24 PM

## 2019-02-21 DIAGNOSIS — G62.9 NEUROPATHY: ICD-10-CM

## 2019-02-21 DIAGNOSIS — M79.645 BILATERAL THUMB PAIN: ICD-10-CM

## 2019-02-21 DIAGNOSIS — M79.644 BILATERAL THUMB PAIN: ICD-10-CM

## 2019-02-21 RX ORDER — TRAMADOL HYDROCHLORIDE 50 MG/1
50 TABLET ORAL EVERY 8 HOURS PRN
Qty: 90 TABLET | Refills: 0 | Status: SHIPPED | OUTPATIENT
Start: 2019-02-21 | End: 2019-04-02 | Stop reason: SDUPTHER

## 2019-02-21 RX ORDER — GABAPENTIN 800 MG/1
800 TABLET ORAL 2 TIMES DAILY
Qty: 60 TABLET | Refills: 0 | Status: SHIPPED | OUTPATIENT
Start: 2019-02-21 | End: 2019-04-02 | Stop reason: SDUPTHER

## 2019-03-04 ENCOUNTER — DOCUMENTATION (OUTPATIENT)
Dept: ORTHOPEDIC SURGERY | Facility: CLINIC | Age: 59
End: 2019-03-04

## 2019-03-05 ENCOUNTER — TELEPHONE (OUTPATIENT)
Dept: ORTHOPEDIC SURGERY | Facility: CLINIC | Age: 59
End: 2019-03-05

## 2019-03-05 ENCOUNTER — TELEPHONE (OUTPATIENT)
Dept: FAMILY MEDICINE CLINIC | Facility: CLINIC | Age: 59
End: 2019-03-05

## 2019-03-05 NOTE — TELEPHONE ENCOUNTER
[facility] called and stated that they can not find her gabapentin advised we can not refill until it is due last filled 02/21/2019

## 2019-03-07 ENCOUNTER — TELEPHONE (OUTPATIENT)
Dept: ORTHOPEDIC SURGERY | Facility: CLINIC | Age: 59
End: 2019-03-07

## 2019-03-07 NOTE — TELEPHONE ENCOUNTER
OMAR GILBERT BOYFRIENKIMBERLY WOOD CALLED WANTING TO SCHEDULE SURGERY WOULD LIKE A CALL BACK AS SOON AS POSSIBLE     [phone]

## 2019-03-11 ENCOUNTER — HOSPITAL ENCOUNTER (OUTPATIENT)
Facility: HOSPITAL | Age: 59
Setting detail: HOSPITAL OUTPATIENT SURGERY
End: 2019-03-11
Attending: ORTHOPAEDIC SURGERY | Admitting: ORTHOPAEDIC SURGERY

## 2019-03-11 ENCOUNTER — OFFICE VISIT (OUTPATIENT)
Dept: FAMILY MEDICINE CLINIC | Facility: CLINIC | Age: 59
End: 2019-03-11

## 2019-03-11 VITALS
HEIGHT: 63 IN | SYSTOLIC BLOOD PRESSURE: 124 MMHG | BODY MASS INDEX: 27.29 KG/M2 | OXYGEN SATURATION: 96 % | HEART RATE: 118 BPM | TEMPERATURE: 96 F | DIASTOLIC BLOOD PRESSURE: 92 MMHG | WEIGHT: 154 LBS

## 2019-03-11 DIAGNOSIS — G89.29 CHRONIC LEFT HIP PAIN: Primary | ICD-10-CM

## 2019-03-11 DIAGNOSIS — M25.552 CHRONIC LEFT HIP PAIN: Primary | ICD-10-CM

## 2019-03-11 PROCEDURE — 99213 OFFICE O/P EST LOW 20 MIN: CPT | Performed by: NURSE PRACTITIONER

## 2019-03-11 RX ORDER — ACETAMINOPHEN 500 MG
500 TABLET ORAL EVERY 6 HOURS PRN
Qty: 90 TABLET | Refills: 1 | Status: SHIPPED | OUTPATIENT
Start: 2019-03-11 | End: 2019-05-17 | Stop reason: SDUPTHER

## 2019-03-11 NOTE — PROGRESS NOTES
"Hattie Montes U Person is a 58 y.o. female.     [facility] Visit    PCP: WESTON Kirk    CC: \"pain in left hip and leg\"    Would like to see Ortho for her hip.  Already seeing Dr. Peace and WESTON Vergara for knee and hand/wrist problems, but never seen for her hip in the past.       Hip Pain    Incident onset: chronic pain. Incident location: no new injury. There was no injury mechanism. The pain is present in the left knee. The quality of the pain is described as aching and shooting. The pain is at a severity of 9/10. The pain has been constant since onset. Associated symptoms include a loss of motion and muscle weakness. Pertinent negatives include no inability to bear weight, loss of sensation, numbness or tingling. She reports no foreign bodies present. The symptoms are aggravated by movement, palpation and weight bearing. Treatments tried: currently on Tramadol, Diclofenac, Flexeril and had staroid injection in January--was on Tylenol as needed, but needs new RX. The treatment provided mild relief.        The following portions of the patient's history were reviewed and updated as appropriate: allergies, current medications, past medical history, past social history, past surgical history and problem list.    Review of Systems   Constitutional: Negative for appetite change and fever.   Respiratory: Negative for cough, chest tightness, shortness of breath and wheezing.    Cardiovascular: Negative for chest pain and leg swelling.   Gastrointestinal: Negative for nausea and vomiting.   Genitourinary: Negative for difficulty urinating.   Musculoskeletal: Positive for arthralgias (left hip pain).   Skin: Negative for rash.   Neurological: Negative for dizziness, tingling, numbness and headache.        Hx of stroke and followed by neurology at [facility]   Hematological: Negative for adenopathy.       Objective    /92 (BP Location: Left arm, Patient Position: Sitting, Cuff " "Size: Adult)   Pulse 118   Temp 96 °F (35.6 °C) (Tympanic)   Ht 160 cm (63\")   Wt 69.9 kg (154 lb)   SpO2 96%   BMI 27.28 kg/m²     Physical Exam   Constitutional: She is oriented to person, place, and time. She appears well-developed and well-nourished. No distress.   Cardiovascular: Normal rate and regular rhythm.   Pulmonary/Chest: Effort normal. She has no wheezes. She has no rales.   Slightly diminished, but clear     Musculoskeletal:        Left hip: She exhibits decreased range of motion, decreased strength and tenderness ( across iliac crest).   Neurological: She is alert and oriented to person, place, and time. Coordination ( no change from previous) abnormal.   Nursing note and vitals reviewed.    Previous hip xrays reviewed:   Xr Hip With Or Without Pelvis 2 - 3 View Left    Result Date: 1/11/2019  CONCLUSION: No fracture or dislocation. Minimal degenerative change left iliac crest. Degenerative disc disease lower lumbar spine. [id number] Electronically signed by:  Josef Deal MD  1/11/2019 2:11 PM "[employer] Workstation: [email or web address]      Assessment/Plan   Jyoti was seen today for leg pain and hip pain.    Diagnoses and all orders for this visit:    Chronic left hip pain  -     Ambulatory Referral to Orthopedic Surgery  -     acetaminophen (TYLENOL) 500 MG tablet; Take 1 tablet by mouth Every 6 (Six) Hours As Needed (hip pain).    Continue with all current meds as prescribed  Refill of Tylenol as needed  Moist heat or ice to hip as needed  Ortho referral for hip pain    See PCP for routine f/u visits and management of chronic medical conditions               "

## 2019-03-18 ENCOUNTER — TELEPHONE (OUTPATIENT)
Dept: ORTHOPEDIC SURGERY | Facility: CLINIC | Age: 59
End: 2019-03-18

## 2019-03-18 ENCOUNTER — APPOINTMENT (OUTPATIENT)
Dept: PREADMISSION TESTING | Facility: HOSPITAL | Age: 59
End: 2019-03-18

## 2019-03-21 DIAGNOSIS — E56.9 VITAMIN DEFICIENCY: ICD-10-CM

## 2019-03-22 RX ORDER — FOLIC ACID 1 MG/1
TABLET ORAL
Qty: 30 TABLET | Refills: 5 | Status: SHIPPED | OUTPATIENT
Start: 2019-03-22 | End: 2019-05-17 | Stop reason: SDUPTHER

## 2019-04-01 DIAGNOSIS — I10 ESSENTIAL HYPERTENSION: ICD-10-CM

## 2019-04-02 ENCOUNTER — TELEPHONE (OUTPATIENT)
Dept: FAMILY MEDICINE CLINIC | Facility: CLINIC | Age: 59
End: 2019-04-02

## 2019-04-02 DIAGNOSIS — M79.645 BILATERAL THUMB PAIN: ICD-10-CM

## 2019-04-02 DIAGNOSIS — M79.644 BILATERAL THUMB PAIN: ICD-10-CM

## 2019-04-02 DIAGNOSIS — G62.9 NEUROPATHY: ICD-10-CM

## 2019-04-02 DIAGNOSIS — I10 ESSENTIAL HYPERTENSION: ICD-10-CM

## 2019-04-02 RX ORDER — GABAPENTIN 800 MG/1
800 TABLET ORAL 2 TIMES DAILY
Qty: 60 TABLET | Refills: 0 | Status: SHIPPED | OUTPATIENT
Start: 2019-04-02 | End: 2019-05-06 | Stop reason: SDUPTHER

## 2019-04-02 RX ORDER — NIFEDIPINE 30 MG/1
30 TABLET, EXTENDED RELEASE ORAL DAILY
Qty: 90 TABLET | Refills: 1 | Status: SHIPPED | OUTPATIENT
Start: 2019-04-02 | End: 2019-05-06 | Stop reason: SDUPTHER

## 2019-04-02 RX ORDER — NIFEDIPINE 30 MG/1
30 TABLET, EXTENDED RELEASE ORAL DAILY
Qty: 90 TABLET | Refills: 1 | Status: SHIPPED | OUTPATIENT
Start: 2019-04-02 | End: 2019-04-11 | Stop reason: SDUPTHER

## 2019-04-02 RX ORDER — TRAMADOL HYDROCHLORIDE 50 MG/1
50 TABLET ORAL EVERY 8 HOURS PRN
Qty: 90 TABLET | Refills: 0 | Status: SHIPPED | OUTPATIENT
Start: 2019-04-02 | End: 2019-05-06 | Stop reason: SDUPTHER

## 2019-04-02 NOTE — TELEPHONE ENCOUNTER
Reilly Peña called asking for a return call concerning Jyoti Riddle, he stated she is needing a refill on her tramadol and her blood pressure medication, he did not give the name of the BP medication.

## 2019-04-04 DIAGNOSIS — M79.645 BILATERAL THUMB PAIN: ICD-10-CM

## 2019-04-04 DIAGNOSIS — M79.644 BILATERAL THUMB PAIN: ICD-10-CM

## 2019-04-04 DIAGNOSIS — G62.9 NEUROPATHY: ICD-10-CM

## 2019-04-04 RX ORDER — TRAMADOL HYDROCHLORIDE 50 MG/1
TABLET ORAL
Qty: 90 TABLET | Refills: 0 | OUTPATIENT
Start: 2019-04-04

## 2019-04-04 RX ORDER — GABAPENTIN 800 MG/1
TABLET ORAL
Qty: 60 TABLET | Refills: 0 | OUTPATIENT
Start: 2019-04-04

## 2019-04-11 ENCOUNTER — OFFICE VISIT (OUTPATIENT)
Dept: ORTHOPEDIC SURGERY | Facility: CLINIC | Age: 59
End: 2019-04-11

## 2019-04-11 VITALS — HEIGHT: 63 IN | BODY MASS INDEX: 27.28 KG/M2

## 2019-04-11 DIAGNOSIS — Z74.09 MOBILITY IMPAIRED: ICD-10-CM

## 2019-04-11 DIAGNOSIS — M25.552 LEFT HIP PAIN: Primary | ICD-10-CM

## 2019-04-11 DIAGNOSIS — M53.3 DISORDER OF SI (SACROILIAC) JOINT: ICD-10-CM

## 2019-04-11 PROCEDURE — 96372 THER/PROPH/DIAG INJ SC/IM: CPT | Performed by: NURSE PRACTITIONER

## 2019-04-11 PROCEDURE — 99214 OFFICE O/P EST MOD 30 MIN: CPT | Performed by: NURSE PRACTITIONER

## 2019-04-11 RX ORDER — TRIAMCINOLONE ACETONIDE 40 MG/ML
80 INJECTION, SUSPENSION INTRA-ARTICULAR; INTRAMUSCULAR ONCE
Status: COMPLETED | OUTPATIENT
Start: 2019-04-11 | End: 2019-04-11

## 2019-04-11 RX ORDER — KETOROLAC TROMETHAMINE 30 MG/ML
60 INJECTION, SOLUTION INTRAMUSCULAR; INTRAVENOUS
Status: COMPLETED | OUTPATIENT
Start: 2019-04-11 | End: 2019-04-11

## 2019-04-11 RX ADMIN — TRIAMCINOLONE ACETONIDE 80 MG: 40 INJECTION, SUSPENSION INTRA-ARTICULAR; INTRAMUSCULAR at 15:25

## 2019-04-11 RX ADMIN — KETOROLAC TROMETHAMINE 60 MG: 30 INJECTION, SOLUTION INTRAMUSCULAR; INTRAVENOUS at 15:24

## 2019-04-11 NOTE — PROGRESS NOTES
"Jyoti SARAVIA Person is a 58 y.o. female   Primary provider:  Sharon Post APRN       Chief Complaint   Patient presents with   • Left Hip - Pain   • Establish Care       HISTORY OF PRESENT ILLNESS:     58-year-old female patient presents to office for evaluation of left hip pain.  Onset \"a few months ago\".  Patient denies any known injury today.  However, the patient has been evaluated on 1/11/2019 at Urgent Care by WESTON Briscoe and there was a complaint of a fall at that time that occurred at her home onto concrete.  X-rays of the left hip were completed on that date.  Patient reports that she has pain all of the time and she has difficulty getting out of bed and doing any activity due to her pain.  Patient states \"I just lay around all day\".  Pain is described as constant and severe.  Pain is described as aching in nature with associated bruising.  Pain is worse with standing, walking and exertional activity.  Patient also complains of severe pain when she bends over.  Patient has tried rest/activity modification, Tylenol, Diclofenac and Tramadol with only some mild, temporary relief.  Patient states she has also been using a walker at home to help with ambulation as it makes her feel more secure and balanced.       Pain   This is a new problem. The current episode started more than 1 month ago. The problem occurs constantly. The problem has been gradually worsening. Associated symptoms include arthralgias, headaches and joint swelling. Pertinent negatives include no numbness. Associated symptoms comments: Aching pain; bruising. The symptoms are aggravated by standing, walking, exertion and bending. She has tried lying down, rest, position changes, NSAIDs, oral narcotics, relaxation and acetaminophen for the symptoms. The treatment provided mild relief.        CONCURRENT MEDICAL HISTORY:    Past Medical History:   Diagnosis Date   • Acute bronchitis 07/19/2016   • Acute otitis externa 09/04/2014   • Acute " otitis media 02/10/2015   • Acute sinusitis 07/19/2016   • Ankle injury    • Backache 12/08/2014   • Benign hypertension 05/13/2016   • Cerebrovascular accident (CMS/HCC) 2015   • COPD (chronic obstructive pulmonary disease) (CMS/HCC)    • Depression    • Dizziness 02/10/2015   • Encounter for gynecological examination without abnormal finding 03/10/2016   • Fatigue 08/28/2015   • Foot pain 06/09/2014   • Hyperkeratosis 04/28/2014   • Hypotensive episode 06/12/2015   • Keratoma 08/04/2014    intractable plantar   • Low blood pressure 08/28/2015   • Menopausal and perimenopausal disorder 03/10/2016    other specified   • Numbness of face 05/13/2016    improved   • Otitis media of right ear 07/30/2016    unspecified   • Perforated tympanic membrane 09/04/2014   • Puncture wound 06/17/2014    injury   • Right-sided face pain 05/13/2016   • Seizure (CMS/HCC)    • Sleep apnea    • Thumb pain 08/28/2015   • Upper respiratory infection 08/28/2015   • Wheezing 10/21/2014       Allergies   Allergen Reactions   • Lisinopril Anaphylaxis         Current Outpatient Medications:   •  acetaminophen (TYLENOL) 500 MG tablet, Take 1 tablet by mouth Every 6 (Six) Hours As Needed (hip pain)., Disp: 90 tablet, Rfl: 1  •  albuterol (VENTOLIN HFA) 108 (90 Base) MCG/ACT inhaler, Inhale 2 puffs 4 (Four) Times a Day., Disp: 18 g, Rfl: 3  •  ANORO ELLIPTA 62.5-25 MCG/INH aerosol powder , Inhale 62.5 inhalers Daily., Disp: , Rfl: 0  •  aspirin 81 MG EC tablet, Take 1 tablet by mouth Daily., Disp: 90 tablet, Rfl: 2  •  atorvastatin (LIPITOR) 40 MG tablet, Take 1 tablet by mouth Daily., Disp: 30 tablet, Rfl: 5  •  Blood Pressure Monitoring (B-D ASSURE BPM/AUTO WRIST CUFF) misc, 1 kit 2 (Two) Times a Day As Needed (fluctuating blood pressure)., Disp: 1 each, Rfl: 0  •  Calcium-Magnesium-Vitamin D (CALCIUM 500 PO), Take  by mouth., Disp: , Rfl:   •  cyclobenzaprine (FLEXERIL) 10 MG tablet, Take 1 tablet by mouth 3 (Three) Times a Day As Needed for  Muscle Spasms., Disp: 90 tablet, Rfl: 3  •  diclofenac (VOLTAREN) 50 MG EC tablet, Take 1 tablet by mouth 2 (Two) Times a Day., Disp: 60 tablet, Rfl: 5  •  fluticasone (FLONASE) 50 MCG/ACT nasal spray, 2 sprays into the nostril(s) as directed by provider Daily., Disp: 16 g, Rfl: 2  •  folic acid (FOLVITE) 1 MG tablet, TAKE 1 TABLET BY MOUTH DAILY, Disp: 30 tablet, Rfl: 5  •  gabapentin (NEURONTIN) 800 MG tablet, Take 1 tablet by mouth 2 (Two) Times a Day., Disp: 60 tablet, Rfl: 0  •  levETIRAcetam (KEPPRA) 500 MG tablet, Take 1 tablet by mouth 2 (Two) Times a Day., Disp: 60 tablet, Rfl: 5  •  lidocaine (LIDODERM) 5 %, apply 1 patch to the affected area daily. Leave on for 12 hours and then off for 12 hours., Disp: 6 patch, Rfl: 1  •  NIFEdipine XL (PROCARDIA XL) 30 MG 24 hr tablet, TAKE 1 TABLET BY MOUTH DAILY, Disp: 90 tablet, Rfl: 1  •  ofloxacin (FLOXIN) 0.3 % otic solution, 5 drops to right ear BID x 10 days., Disp: 10 mL, Rfl: 0  •  rOPINIRole (REQUIP) 2 MG tablet, Take 1 tablet by mouth every night at bedtime., Disp: 30 tablet, Rfl: 3  •  traMADol (ULTRAM) 50 MG tablet, Take 1 tablet by mouth Every 8 (Eight) Hours As Needed for Moderate Pain ., Disp: 90 tablet, Rfl: 0  •  vitamin D (ERGOCALCIFEROL) 65056 units capsule capsule, Take 1 capsule by mouth Every 14 (Fourteen) Days., Disp: 4 capsule, Rfl: 5    Past Surgical History:   Procedure Laterality Date   • BREAST BIOPSY     • OTHER SURGICAL HISTORY  07/07/2014    Destruction of Benign Lesion (1-14)   • TUBAL ABDOMINAL LIGATION         Family History   Problem Relation Age of Onset   • Alzheimer's disease Other    • Bipolar disorder Other    • Depression Other    • Diabetes Other    • Heart disease Other    • Hypertension Other    • Lung cancer Other    • Migraines Other    • Sickle cell anemia Other    • Stroke Other    • No Known Problems Mother    • Alzheimer's disease Father    • Heart attack Father    • Throat cancer Father    • No Known Problems Daughter  "       Social History     Socioeconomic History   • Marital status:      Spouse name: Not on file   • Number of children: Not on file   • Years of education: Not on file   • Highest education level: Not on file   Tobacco Use   • Smoking status: Current Every Day Smoker     Packs/day: 0.25     Years: 40.00     Pack years: 10.00     Types: Cigarettes   • Smokeless tobacco: Never Used   Substance and Sexual Activity   • Alcohol use: Yes     Comment: rarely   • Drug use: Yes     Types: Cocaine   • Sexual activity: Defer        Review of Systems   Constitutional: Positive for activity change and unexpected weight change.        Night sweats   HENT: Positive for tinnitus.    Eyes: Positive for pain.   Respiratory: Negative.    Cardiovascular: Negative.    Gastrointestinal: Negative.    Endocrine: Negative.    Genitourinary: Negative.    Musculoskeletal: Positive for arthralgias, back pain, gait problem and joint swelling.        Joint stiffness. Left hip pain.    Skin: Negative.    Allergic/Immunologic: Negative.    Neurological: Positive for dizziness and headaches. Negative for numbness.   Hematological: Bruises/bleeds easily.   Psychiatric/Behavioral: Positive for sleep disturbance. The patient is nervous/anxious.    All other systems reviewed and are negative.      PHYSICAL EXAMINATION:       Ht 160 cm (63\")   BMI 27.28 kg/m²     Physical Exam   Constitutional: She is oriented to person, place, and time. Vital signs are normal. She appears well-developed and well-nourished. She is active and cooperative. She does not appear ill. No distress.   HENT:   Head: Normocephalic.   Pulmonary/Chest: Effort normal. No respiratory distress.   Abdominal: Soft. She exhibits no distension.   Musculoskeletal: She exhibits tenderness (Left SI joint). She exhibits no edema or deformity.   Neurological: She is alert and oriented to person, place, and time. GCS eye subscore is 4. GCS verbal subscore is 5. GCS motor subscore is " 6.   Skin: Skin is warm, dry and intact. Capillary refill takes less than 2 seconds. No erythema.   Psychiatric: She has a normal mood and affect. Her speech is normal and behavior is normal. Judgment and thought content normal. Cognition and memory are normal.   Vitals reviewed.      GAIT:     []  Normal  []  Antalgic    Assistive device: []  None  []  Walker     []  Crutches  []  Cane     [x]  Wheelchair  []  Stretcher    Right Hip Exam     Tenderness   The patient is experiencing no tenderness.     Range of Motion   The patient has normal right hip ROM.    Muscle Strength   Abduction: 4/5   Flexion: 4/5     Tests   TERI: negative  Fadir:  Negative FADIR test    Other   Erythema: absent  Sensation: normal  Pulse: present      Left Hip Exam     Tenderness   The patient is experiencing tenderness in the posterior.    Range of Motion   The patient has normal left hip ROM.    Muscle Strength   Abduction: 4/5   Flexion: 4/5     Tests   TERI: negative  Fadir:  Negative FADIR test    Other   Erythema: absent  Sensation: normal  Pulse: present    Comments:  Mild pain with range of motion, elicited in the left SI joint.  Overall good motion and rotation of the hip joint without significant pain or limitations.  No swelling.  No deformity.  No ecchymosis.  No leg length discrepancy noted.      Back Exam     Tenderness   The patient is experiencing tenderness in the sacroiliac (Left).    Range of Motion   Extension: abnormal   Flexion: abnormal   Rotation right: abnormal   Rotation left: abnormal     Muscle Strength   Right Quadriceps:  4/5   Left Quadriceps:  4/5     Tests   Straight leg raise right: negative  Straight leg raise left: positive at 90 deg (Mild (pain elicited in left SI joint))    Reflexes   Patellar: normal    Other   Sensation: normal  Gait: antalgic     Comments:  Pain and limitations with range of motion.  Pain is elicited in the left SI joint.  Significant tenderness to palpation noted in the left SI  joint.  No spinal tenderness noted.  No focal neurological deficits noted.            Two view left hip with single view pelvis     HISTORY: Left hip pain. Multiple falls.     AP and frog-leg lateral views of the left hip and AP film of the  pelvis obtained.     COMPARISON: November 14, 2017     No fracture or dislocation.  Minimal degenerative change left iliac crest.  Degenerative disc disease lower lumbar spine.  No other osseous or articular abnormality.     Atherosclerotic calcification femoral arteries.  Pelvic phleboliths.     IMPRESSION:  CONCLUSION:  No fracture or dislocation.  Minimal degenerative change left iliac crest.  Degenerative disc disease lower lumbar spine.     Electronically signed by:  Josef Deal MD  1/11/2019 2:11 PM Scan  Workstation: [employer]      ASSESSMENT:    Diagnoses and all orders for this visit:    Left hip pain  -     triamcinolone acetonide (KENALOG-40) injection 80 mg  -     ketorolac (TORADOL) injection 60 mg  -     Ambulatory Referral to Physical Therapy Evaluate and treat; Stretching, ROM, Strengthening    Disorder of SI (sacroiliac) joint  -     Ambulatory Referral to Physical Therapy Evaluate and treat; Stretching, ROM, Strengthening    Mobility impaired  -     Ambulatory Referral to Physical Therapy Evaluate and treat; Stretching, ROM, Strengthening    PLAN    X-rays of the left hip and pelvis performed on 1/11/2019 are reviewed independently by myself today with no acute findings noted.  Patient complains of severe left hip pain that is been ongoing for several months.  On physical exam, she has localized tenderness in the left SI joint as opposed to the left hip.  She has good range of motion and rotation of the left hip joint.  We discussed that her subjective complaints and physical exam are most consistent with SI joint inflammation and/or injury.  Recommend IM injections of Toradol and Kenalog today for management of pain/inflammation.  Patient states that she  "just \"lays around all day\" and has difficulty walking due to severe pain.  She is in a wheelchair today.  However, she has always been in a wheelchair when seen in this office, even when she is here for carpal tunnel syndrome symptoms.  Recommend referral to physical therapy for range of motion, conditioning and strengthening and help with her left SI joint pain.  We discussed that she will decondition and she needs to regain her strength and gait.  Recommend to continue with Tylenol as needed for pain.  Continue diclofenac daily for consistent dosing of oral NSAIDs as previously prescribed.  Patient also has Tramadol at home she can take as needed for worse pain, as prescribed by her primary care provider.  Recommend ice therapy and/or heat therapy to the left SI joint as needed to minimize pain/inflammation.  We discussed ambulating with a cane or walker at home as opposed to just lying in bed or using a wheelchair.  The patient states that she has both a cane and walker at home that she can use.  Follow-up in 6 weeks for recheck.  Consider MRI of pelvis if she is not improving with conservative treatment efforts.    Return in about 6 weeks (around 5/23/2019) for Recheck.        This document has been electronically signed by WESTON Yuen on April 15, 2019 1:33 PM      WESTON Yuen      "

## 2019-04-12 PROBLEM — Z74.09 MOBILITY IMPAIRED: Status: ACTIVE | Noted: 2019-04-12

## 2019-04-12 PROBLEM — M53.3 DISORDER OF SI (SACROILIAC) JOINT: Status: ACTIVE | Noted: 2019-04-12

## 2019-04-17 ENCOUNTER — APPOINTMENT (OUTPATIENT)
Dept: PHYSICAL THERAPY | Facility: HOSPITAL | Age: 59
End: 2019-04-17

## 2019-04-24 ENCOUNTER — OFFICE VISIT (OUTPATIENT)
Dept: OTOLARYNGOLOGY | Facility: CLINIC | Age: 59
End: 2019-04-24

## 2019-04-24 VITALS — HEIGHT: 63 IN | WEIGHT: 154 LBS | BODY MASS INDEX: 27.29 KG/M2 | RESPIRATION RATE: 18 BRPM

## 2019-04-24 DIAGNOSIS — H60.63 CHRONIC NON-INFECTIVE OTITIS EXTERNA OF BOTH EARS, UNSPECIFIED TYPE: Primary | ICD-10-CM

## 2019-04-24 PROCEDURE — 92504 EAR MICROSCOPY EXAMINATION: CPT | Performed by: OTOLARYNGOLOGY

## 2019-04-24 PROCEDURE — 99212 OFFICE O/P EST SF 10 MIN: CPT | Performed by: OTOLARYNGOLOGY

## 2019-04-28 NOTE — PROGRESS NOTES
Subjective   Jyoti Guerra Person is a 58 y.o. female.       History of Present Illness     Patient is followed with chronic noninfective otitis externa.  States she has not been manipulating her ears lately.  Says her symptoms are improved since she been using the triamcinolone ointment but still feels like her ears need cleaning    The following portions of the patient's history were reviewed and updated as appropriate: allergies, current medications, past family history, past medical history, past social history, past surgical history and problem list.     reports that she has been smoking cigarettes.  She has a 10.00 pack-year smoking history. She has never used smokeless tobacco. She reports that she drinks alcohol. She reports that she uses drugs. Drug: Cocaine.   Patient is a tobacco user and has been counseled for use of tobacco products      Review of Systems        Objective   Physical Exam  Both ear canals show uninfected squamous debris.  There is more on the right than the left but both ears are cleaned under the microscope using instrumentation.  Beyond this tympanic membranes are intact and clear.      Assessment/Plan   Jyoti was seen today for ear problem.    Diagnoses and all orders for this visit:    Chronic non-infective otitis externa of both ears, unspecified type      Plan: Ears cleaned as described above.  Advise return 4 months.  Continue to use triamcinolone as needed.

## 2019-04-29 ENCOUNTER — HOSPITAL ENCOUNTER (OUTPATIENT)
Dept: PHYSICAL THERAPY | Facility: HOSPITAL | Age: 59
Setting detail: THERAPIES SERIES
Discharge: HOME | End: 2019-04-29

## 2019-04-29 DIAGNOSIS — M53.3 DISORDER OF SI (SACROILIAC) JOINT: ICD-10-CM

## 2019-04-29 DIAGNOSIS — M25.552 LEFT HIP PAIN: Primary | ICD-10-CM

## 2019-04-29 PROCEDURE — 97162 PT EVAL MOD COMPLEX 30 MIN: CPT | Performed by: PHYSICAL THERAPIST

## 2019-04-29 NOTE — THERAPY EVALUATION
Outpatient Physical Therapy Ortho Initial Evaluation  [facility]     Patient Name: Jyoti Guerra Person  : 1960  MRN: [id number]  Today's Date: 2019      Visit Date: 2019  Visit   Return to MD: BRANDY  Re-cert date: 19  Patient Active Problem List   Diagnosis   • Vitamin D deficiency   • Vertigo   • Neuropathy   • Restless leg syndrome   • Pain in joint   • Allergic rhinitis   • Essential hypertension   • Abnormal EEG   • Cerebrovascular accident (CVA) due to thrombosis of left carotid artery (CMS/HCC)   • Illicit drug use   • Tobacco abuse   • Sleep apnea   • Neck pain   • Vitamin deficiency   • Acute otitis externa of right ear   • Generalized seizure disorder (CMS/HCC)   • Rotator cuff strain   • Acute pain of right shoulder   • Closed fracture of distal end of right fibula   • Right ankle pain   • Allergic reaction   • Left ankle pain   • Closed fracture of talus   • Hypotension   • Left leg pain   • Acute pain of left shoulder   • Closed nondisplaced fracture of lateral malleolus of left fibula   • Closed nondisplaced fracture of lateral malleolus of left fibula with routine healing   • Renal insufficiency   • ESR raised   • Pain   • Left foot pain   • Closed nondisplaced fracture of fifth left metatarsal bone   • Cause of injury, fall   • Ankle weakness   • Joint pain in fingers of left hand   • Joint pain in fingers of right hand   • Bilateral thumb pain   • Bilateral hand pain   • Numbness and tingling in both hands   • Bilateral carpal tunnel syndrome   • Bilateral arm pain   • Bilateral leg pain   • Decreased strength   • Acute URI   • Cough   • Left hip pain   • BMI 27.0-27.9,adult   • Disorder of SI (sacroiliac) joint   • Mobility impaired        Past Medical History:   Diagnosis Date   • Acute bronchitis 2016   • Acute otitis externa 2014   • Acute otitis media 02/10/2015   • Acute sinusitis 2016   • Ankle injury    • Backache 2014    • Benign hypertension 05/13/2016   • Cerebrovascular accident (CMS/Formerly [facility]) 2015   • COPD (chronic obstructive pulmonary disease) (CMS/[facility])    • Depression    • Dizziness 02/10/2015   • Encounter for gynecological examination without abnormal finding 03/10/2016   • Fatigue 08/28/2015   • Foot pain 06/09/2014   • Hyperkeratosis 04/28/2014   • Hypotensive episode 06/12/2015   • Keratoma 08/04/2014    intractable plantar   • Low blood pressure 08/28/2015   • Menopausal and perimenopausal disorder 03/10/2016    other specified   • Numbness of face 05/13/2016    improved   • Otitis media of right ear 07/30/2016    unspecified   • Perforated tympanic membrane 09/04/2014   • Puncture wound 06/17/2014    injury   • Right-sided face pain 05/13/2016   • Seizure (CMS/[facility])    • Sleep apnea    • Thumb pain 08/28/2015   • Upper respiratory infection 08/28/2015   • Wheezing 10/21/2014        Past Surgical History:   Procedure Laterality Date   • BREAST BIOPSY     • OTHER SURGICAL HISTORY  07/07/2014    Destruction of Benign Lesion (1-14)   • TUBAL ABDOMINAL LIGATION         Visit Dx:     ICD-10-CM ICD-9-CM   1. Left hip pain M25.552 719.45   2. Disorder of SI (sacroiliac) joint M53.3 724.6     Medications (Admitted on 4/29/2019)     acetaminophen (TYLENOL) 500 MG tablet     albuterol (VENTOLIN HFA) 108 (90 Base) MCG/ACT inhaler     ANORO ELLIPTA 62.5-25 MCG/INH aerosol powder      aspirin 81 MG EC tablet     atorvastatin (LIPITOR) 40 MG tablet     Blood Pressure Monitoring (B-D ASSURE BPM/AUTO WRIST CUFF) misc     Calcium-Magnesium-Vitamin D (CALCIUM 500 PO)     cyclobenzaprine (FLEXERIL) 10 MG tablet     diclofenac (VOLTAREN) 50 MG EC tablet     fluticasone (FLONASE) 50 MCG/ACT nasal spray     folic acid (FOLVITE) 1 MG tablet     gabapentin (NEURONTIN) 800 MG tablet     levETIRAcetam (KEPPRA) 500 MG tablet     lidocaine (LIDODERM) 5 %     NIFEdipine XL (PROCARDIA XL) 30 MG 24 hr tablet     ofloxacin (FLOXIN) 0.3 % otic solution      rOPINIRole (REQUIP) 2 MG tablet     traMADol (ULTRAM) 50 MG tablet     vitamin D (ERGOCALCIFEROL) 06778 units capsule capsule      Allergies: Lisinopril        PT Ortho     Row Name 04/29/19 1600       Subjective Comments    Subjective Comments  57 yo female with L hip pain for multiple months, and SI region pain. Had steroid injections 1-2 weeks ago with referring provider. Aggravating factors: walking short household distance (10-20 ft), rising from a chair, dressing tasks (donning pants).  -BS       Precautions and Contraindications    Precautions/Limitations  fall precautions  -BS       Subjective Pain    Able to rate subjective pain?  yes  -BS    Pre-Treatment Pain Level  8  -BS    Post-Treatment Pain Level  7  -BS    Subjective Pain Comment  L hip > SI region superior to coccyx  -BS       Posture/Observations    Posture/Observations Comments  level SIJ along iliac crest, +TTP along L greater trochanter  -BS       Special Tests/Palpation    Special Tests/Palpation  Lumbar/SI;Hip  -BS       Lumbar/SI Special Tests    SI Compression Test (SI Dysfunction)  Positive  -BS    SI Distraction Test (SI Dysfunction)  Positive  -BS    TERI (hip vs. SI Dysfunction)  Left:;Positive;Right:;Negative  -BS    FAIR Test (Piriformis Syndrome)  Left:;Positive;Right:;Negative  -BS       Hip Special Tests    TERI (hip vs SI pathology)  Left:  -BS    Roger’s test (tightness of ITB)  Left:;Positive  -BS    Hip scour test (labral vs hip pathology)  Left:;Positive;Right:;Negative  -BS    FAIR test (piriformis syndrome)  Left:;Bilateral:;Right:;Negative  -BS       General ROM    GENERAL ROM COMMENTS  AROM: R hip-flex 85° abd 25° add 21° knee 0-125° L LE: hip-flex 80° abd 17° add 16° knee 0-123°   -BS       MMT (Manual Muscle Testing)    General MMT Comments  MMT: R LE-hip flex 3+/5 hip abd 4/5 hip ext 3-/5 knee ext 4+/5 ankle DF 5/5 knee flex 3+/5 L LE-hip flex 4+/5 hip abd 4+/5 hip ext 3+/5 knee ext 5/5 ankle DF 5/5 PF 5/5 knee flex  4/5    -BS       Sensation    Light Touch  Partial deficits in the LLE  -BS       Transfers    Comment (Transfers)  pain in L hip elicited with sit to stand transfer from hi/low table.  -BS       Gait/Stairs Assessment/Training    Comment (Gait/Stairs)  ambulates with own RW, antalgic gait pattern  -BS      User Key  (r) = Recorded By, (t) = Taken By, (c) = Cosigned By    Initials Name Provider Type    BS Solis Barrientos, PT Physical Therapist                            PT OP Goals     Row Name 04/29/19 1640 04/29/19 1600       PT Short Term Goals    STG Date to Achieve  05/20/19  -BS  --    STG 1  Pt independent with HEP.  -BS  --    STG 1 Progress  New  -BS  --    STG 2  Improve L hip flex AROM to 105°  -BS  --    STG 2 Progress  New  -BS  --    STG 3  Improve L hip abduction AROM to 25°  -BS  --    STG 3 Progress  New  -BS  --    STG 4  Improve L hip extension MMT to 4/5 or greater  -BS  --    STG 4 Progress  New  -BS  --    STG 5  Reduce L hip pain and SI region pain by 50% with sit to stand transfers  -BS  --    STG 5 Progress  New  -BS  --    STG 6  Reduce L hip and SI region pain by 50% while walking short distances  -BS  --    STG 6 Progress  New  -BS  --       Time Calculation    PT Goal Re-Cert Due Date  05/20/19  -BS  --  -BS      User Key  (r) = Recorded By, (t) = Taken By, (c) = Cosigned By    Initials Name Provider Type    Solis Alexander, PT Physical Therapist          PT Assessment/Plan     Row Name 04/29/19 1640          PT Assessment    Functional Limitations  Impaired gait;Limitation in home management;Limitations in community activities;Performance in self-care ADL;Performance in work activities;Performance in sport activities;Performance in leisure activities  -BS     Impairments  Gait;Balance;Pain;Range of motion;Poor body mechanics;Endurance  -BS     Assessment Comments  L hip pain and SI region pain due to OA.  -BS     Please refer to paper survey for additional self-reported information   Yes  -BS     Rehab Potential  Fair  -BS     Patient/caregiver participated in establishment of treatment plan and goals  Yes  -BS     Patient would benefit from skilled therapy intervention  Yes  -BS        PT Plan    PT Frequency  2x/week  -BS     Predicted Duration of Therapy Intervention (Therapy Eval)  3 weeks  -BS     Planned CPT's?  PT EVAL MOD COMPLELITY: 05077;PT RE-EVAL: 05104;PT THER PROC EA 15 MIN: 53202;PT ELECTRICAL STIM UNATTEND: ;PT HOT/COLD PACK WC NONMCARE: 75699;PT THER SUPP EA 15 MIN;PT NEUROMUSC RE-EDUCATION EA 15 MIN: 64227;PT MANUAL THERAPY EA 15 MIN: 93919;PT THER ACT EA 15 MIN: 49335  -BS     Physical Therapy Interventions (Optional Details)  balance training;home exercise program;joint mobilization;lumbar stabilization;manual therapy techniques;modalities;patient/family education;postural re-education;stair training;strengthening;stretching;ROM (Range of Motion);transfer training  -BS     PT Plan Comments  address core stability, B hip strengthening as well as overall mobility of B hips. SKC S, fwd lunge S at steps, 3 way hip in supine.   -BS       User Key  (r) = Recorded By, (t) = Taken By, (c) = Cosigned By    Initials Name Provider Type    Solis Alexander, PT Physical Therapist            Exercises     Row Name 04/29/19 1600             Subjective Comments    Subjective Comments  59 yo female with L hip pain for multiple months, and SI region pain. Had steroid injections 1-2 weeks ago with referring provider. Aggravating factors: walking short household distance (10-20 ft), rising from a chair, dressing tasks (donning pants).  -BS         Subjective Pain    Able to rate subjective pain?  yes  -BS      Pre-Treatment Pain Level  8  -BS      Post-Treatment Pain Level  7  -BS      Subjective Pain Comment  L hip > SI region superior to coccyx  -BS        User Key  (r) = Recorded By, (t) = Taken By, (c) = Cosigned By    Initials Name Provider Type    Solis Alexander, PT Physical  Therapist                        Outcome Measure Options: Monica Noriega, Lower Extremity Functional Scale (LEFS)  Lower Extremity Functional Index  Any of your usual work, housework or school activities: Moderate difficulty  Your usual hobbies, recreational or sporting activities: Moderate difficulty  Getting into or out of the bath: Moderate difficulty  Walking between rooms: Moderate difficulty  Putting on your shoes or socks: Quite a bit of difficulty  Squatting: Extreme difficulty or unable to perform activity  Lifting an object, like a bag of groceries from the floor: Moderate difficulty  Performing light activities around your home: Moderate difficulty  Performing heavy activities around your home: Extreme difficulty or unable to perform activity  Getting into or out of a car: No difficulty  Walking 2 blocks: Extreme difficulty or unable to perform activity  Walking a mile: Extreme difficulty or unable to perform activity  Going up or down 10 stairs (about 1 flight of stairs): Extreme difficulty or unable to perform activity  Standing for 1 hour: Extreme difficulty or unable to perform activity  Sitting for 1 hour: No difficulty  Running on even ground: Extreme difficulty or unable to perform activity  Running on uneven ground: Extreme difficulty or unable to perform activity  Making sharp turns while running fast: Extreme difficulty or unable to perform activity  Hopping: Extreme difficulty or unable to perform activity  Rolling over in bed: No difficulty  Total: 25  Modified Oswestry  Modified Oswestry Score/Comments: 32/50      Time Calculation:     Start Time: 1640  Stop Time: 1718  Time Calculation (min): 38 min  Total Timed Code Minutes- PT: 38 minute(s)     Therapy Charges for Today     Code Description Service Date Service Provider Modifiers Qty    [id number] HC PT EVAL MOD COMPLEXITY 3 4/29/2019 Solis Barrientos, PT GP 1          PT G-Codes  Outcome Measure Options: Monica Noriega, Lower Extremity  Functional Scale (LEFS)  Total: 25  Modified Oswestry Score/Comments: 32/50         Solis Barrientos, PT  4/29/2019

## 2019-05-03 ENCOUNTER — OFFICE VISIT (OUTPATIENT)
Dept: FAMILY MEDICINE CLINIC | Facility: CLINIC | Age: 59
End: 2019-05-03

## 2019-05-03 VITALS
HEIGHT: 63 IN | WEIGHT: 158 LBS | TEMPERATURE: 97.6 F | DIASTOLIC BLOOD PRESSURE: 73 MMHG | HEART RATE: 91 BPM | RESPIRATION RATE: 20 BRPM | OXYGEN SATURATION: 97 % | SYSTOLIC BLOOD PRESSURE: 110 MMHG | BODY MASS INDEX: 28 KG/M2

## 2019-05-03 DIAGNOSIS — M25.50 ARTHRALGIA, UNSPECIFIED JOINT: ICD-10-CM

## 2019-05-03 DIAGNOSIS — E78.00 HIGH CHOLESTEROL: ICD-10-CM

## 2019-05-03 DIAGNOSIS — G40.309 GENERALIZED SEIZURE DISORDER (HCC): ICD-10-CM

## 2019-05-03 DIAGNOSIS — M79.645 BILATERAL THUMB PAIN: ICD-10-CM

## 2019-05-03 DIAGNOSIS — I10 ESSENTIAL HYPERTENSION: ICD-10-CM

## 2019-05-03 DIAGNOSIS — M25.572 LEFT ANKLE PAIN, UNSPECIFIED CHRONICITY: Primary | ICD-10-CM

## 2019-05-03 DIAGNOSIS — G25.81 RESTLESS LEG SYNDROME: ICD-10-CM

## 2019-05-03 DIAGNOSIS — E55.9 VITAMIN D DEFICIENCY: ICD-10-CM

## 2019-05-03 DIAGNOSIS — M79.644 BILATERAL THUMB PAIN: ICD-10-CM

## 2019-05-03 DIAGNOSIS — M79.672 LEFT FOOT PAIN: ICD-10-CM

## 2019-05-03 DIAGNOSIS — G62.9 NEUROPATHY: ICD-10-CM

## 2019-05-03 PROCEDURE — 99214 OFFICE O/P EST MOD 30 MIN: CPT | Performed by: NURSE PRACTITIONER

## 2019-05-03 RX ORDER — GABAPENTIN 800 MG/1
800 TABLET ORAL 2 TIMES DAILY
Qty: 60 TABLET | Refills: 0 | Status: SHIPPED | OUTPATIENT
Start: 2019-05-03 | End: 2019-05-17 | Stop reason: SDUPTHER

## 2019-05-03 RX ORDER — TRAMADOL HYDROCHLORIDE 50 MG/1
50 TABLET ORAL EVERY 8 HOURS PRN
Qty: 90 TABLET | Refills: 0 | Status: SHIPPED | OUTPATIENT
Start: 2019-05-03 | End: 2019-05-17 | Stop reason: SDUPTHER

## 2019-05-03 RX ORDER — ERGOCALCIFEROL 1.25 MG/1
50000 CAPSULE ORAL
Qty: 4 CAPSULE | Refills: 5 | Status: SHIPPED | OUTPATIENT
Start: 2019-05-03 | End: 2019-09-18 | Stop reason: SDUPTHER

## 2019-05-03 RX ORDER — ATORVASTATIN CALCIUM 40 MG/1
40 TABLET, FILM COATED ORAL DAILY
Qty: 30 TABLET | Refills: 5 | Status: SHIPPED | OUTPATIENT
Start: 2019-05-03 | End: 2019-05-17 | Stop reason: SDUPTHER

## 2019-05-03 RX ORDER — ROPINIROLE 2 MG/1
2 TABLET, FILM COATED ORAL
Qty: 30 TABLET | Refills: 3 | Status: SHIPPED | OUTPATIENT
Start: 2019-05-03 | End: 2019-05-17 | Stop reason: SDUPTHER

## 2019-05-03 RX ORDER — NIFEDIPINE 30 MG/1
30 TABLET, EXTENDED RELEASE ORAL DAILY
Qty: 90 TABLET | Refills: 1 | Status: SHIPPED | OUTPATIENT
Start: 2019-05-03 | End: 2019-05-17 | Stop reason: SDUPTHER

## 2019-05-03 RX ORDER — LEVETIRACETAM 500 MG/1
500 TABLET ORAL 2 TIMES DAILY
Qty: 60 TABLET | Refills: 5 | Status: SHIPPED | OUTPATIENT
Start: 2019-05-03 | End: 2019-12-05 | Stop reason: SDUPTHER

## 2019-05-06 ENCOUNTER — HOSPITAL ENCOUNTER (OUTPATIENT)
Dept: PHYSICAL THERAPY | Facility: HOSPITAL | Age: 59
Setting detail: THERAPIES SERIES
Discharge: HOME | End: 2019-05-06

## 2019-05-06 DIAGNOSIS — M25.552 LEFT HIP PAIN: Primary | ICD-10-CM

## 2019-05-06 DIAGNOSIS — M53.3 DISORDER OF SI (SACROILIAC) JOINT: ICD-10-CM

## 2019-05-06 PROCEDURE — 97110 THERAPEUTIC EXERCISES: CPT | Performed by: PHYSICAL THERAPIST

## 2019-05-06 NOTE — PATIENT INSTRUCTIONS
Calorie Counting for Weight Loss  Calories are units of energy. Your body needs a certain amount of calories from food to keep you going throughout the day. When you eat more calories than your body needs, your body stores the extra calories as fat. When you eat fewer calories than your body needs, your body burns fat to get the energy it needs.  Calorie counting means keeping track of how many calories you eat and drink each day. Calorie counting can be helpful if you need to lose weight. If you make sure to eat fewer calories than your body needs, you should lose weight. Ask your health care provider what a healthy weight is for you.  For calorie counting to work, you will need to eat the right number of calories in a day in order to lose a healthy amount of weight per week. A dietitian can help you determine how many calories you need in a day and will give you suggestions on how to reach your calorie goal.  · A healthy amount of weight to lose per week is usually 1-2 lb (0.5-0.9 kg). This usually means that your daily calorie intake should be reduced by 500-750 calories.  · Eating 1,200 - 1,500 calories per day can help most women lose weight.  · Eating 1,500 - 1,800 calories per day can help most men lose weight.    What is my plan?  My goal is to have __________ calories per day.  If I have this many calories per day, I should lose around __________ pounds per week.  What do I need to know about calorie counting?  In order to meet your daily calorie goal, you will need to:  · Find out how many calories are in each food you would like to eat. Try to do this before you eat.  · Decide how much of the food you plan to eat.  · Write down what you ate and how many calories it had. Doing this is called keeping a food log.    To successfully lose weight, it is important to balance calorie counting with a healthy lifestyle that includes regular activity. Aim for 150 minutes of moderate exercise (such as walking) or 75  minutes of vigorous exercise (such as running) each week.  Where do I find calorie information?    The number of calories in a food can be found on a Nutrition Facts label. If a food does not have a Nutrition Facts label, try to look up the calories online or ask your dietitian for help.  Remember that calories are listed per serving. If you choose to have more than one serving of a food, you will have to multiply the calories per serving by the amount of servings you plan to eat. For example, the label on a package of bread might say that a serving size is 1 slice and that there are 90 calories in a serving. If you eat 1 slice, you will have eaten 90 calories. If you eat 2 slices, you will have eaten 180 calories.  How do I keep a food log?  Immediately after each meal, record the following information in your food log:  · What you ate. Don't forget to include toppings, sauces, and other extras on the food.  · How much you ate. This can be measured in cups, ounces, or number of items.  · How many calories each food and drink had.  · The total number of calories in the meal.    Keep your food log near you, such as in a small notebook in your pocket, or use a mobile mykel or website. Some programs will calculate calories for you and show you how many calories you have left for the day to meet your goal.  What are some calorie counting tips?  · Use your calories on foods and drinks that will fill you up and not leave you hungry:  ? Some examples of foods that fill you up are nuts and nut butters, vegetables, lean proteins, and high-fiber foods like whole grains. High-fiber foods are foods with more than 5 g fiber per serving.  ? Drinks such as sodas, specialty coffee drinks, alcohol, and juices have a lot of calories, yet do not fill you up.  · Eat nutritious foods and avoid empty calories. Empty calories are calories you get from foods or beverages that do not have many vitamins or protein, such as candy, sweets, and  "soda. It is better to have a nutritious high-calorie food (such as an avocado) than a food with few nutrients (such as a bag of chips).  · Know how many calories are in the foods you eat most often. This will help you calculate calorie counts faster.  · Pay attention to calories in drinks. Low-calorie drinks include water and unsweetened drinks.  · Pay attention to nutrition labels for \"low fat\" or \"fat free\" foods. These foods sometimes have the same amount of calories or more calories than the full fat versions. They also often have added sugar, starch, or salt, to make up for flavor that was removed with the fat.  · Find a way of tracking calories that works for you. Get creative. Try different apps or programs if writing down calories does not work for you.  What are some portion control tips?  · Know how many calories are in a serving. This will help you know how many servings of a certain food you can have.  · Use a measuring cup to measure serving sizes. You could also try weighing out portions on a kitchen scale. With time, you will be able to estimate serving sizes for some foods.  · Take some time to put servings of different foods on your favorite plates, bowls, and cups so you know what a serving looks like.  · Try not to eat straight from a bag or box. Doing this can lead to overeating. Put the amount you would like to eat in a cup or on a plate to make sure you are eating the right portion.  · Use smaller plates, glasses, and bowls to prevent overeating.  · Try not to multitask (for example, watch TV or use your computer) while eating. If it is time to eat, sit down at a table and enjoy your food. This will help you to know when you are full. It will also help you to be aware of what you are eating and how much you are eating.  What are tips for following this plan?  Reading food labels  · Check the calorie count compared to the serving size. The serving size may be smaller than what you are used to " eating.  · Check the source of the calories. Make sure the food you are eating is high in vitamins and protein and low in saturated and trans fats.  Shopping  · Read nutrition labels while you shop. This will help you make healthy decisions before you decide to purchase your food.  · Make a grocery list and stick to it.  Cooking  · Try to cook your favorite foods in a healthier way. For example, try baking instead of frying.  · Use low-fat dairy products.  Meal planning  · Use more fruits and vegetables. Half of your plate should be fruits and vegetables.  · Include lean proteins like poultry and fish.  How do I count calories when eating out?  · Ask for smaller portion sizes.  · Consider sharing an entree and sides instead of getting your own entree.  · If you get your own entree, eat only half. Ask for a box at the beginning of your meal and put the rest of your entree in it so you are not tempted to eat it.  · If calories are listed on the menu, choose the lower calorie options.  · Choose dishes that include vegetables, fruits, whole grains, low-fat dairy products, and lean protein.  · Choose items that are boiled, broiled, grilled, or steamed. Stay away from items that are buttered, battered, fried, or served with cream sauce. Items labeled “crispy” are usually fried, unless stated otherwise.  · Choose water, low-fat milk, unsweetened iced tea, or other drinks without added sugar. If you want an alcoholic beverage, choose a lower calorie option such as a glass of wine or light beer.  · Ask for dressings, sauces, and syrups on the side. These are usually high in calories, so you should limit the amount you eat.  · If you want a salad, choose a garden salad and ask for grilled meats. Avoid extra toppings like raza, cheese, or fried items. Ask for the dressing on the side, or ask for olive oil and vinegar or lemon to use as dressing.  · Estimate how many servings of a food you are given. For example, a serving of  cooked rice is ½ cup or about the size of half a baseball. Knowing serving sizes will help you be aware of how much food you are eating at restaurants. The list below tells you how big or small some common portion sizes are based on everyday objects:  ? 1 oz--4 stacked dice.  ? 3 oz--1 deck of cards.  ? 1 tsp--1 die.  ? 1 Tbsp--½ a ping-pong ball.  ? 2 Tbsp--1 ping-pong ball.  ? ½ cup--½ baseball.  ? 1 cup--1 baseball.  Summary  · Calorie counting means keeping track of how many calories you eat and drink each day. If you eat fewer calories than your body needs, you should lose weight.  · A healthy amount of weight to lose per week is usually 1-2 lb (0.5-0.9 kg). This usually means reducing your daily calorie intake by 500-750 calories.  · The number of calories in a food can be found on a Nutrition Facts label. If a food does not have a Nutrition Facts label, try to look up the calories online or ask your dietitian for help.  · Use your calories on foods and drinks that will fill you up, and not on foods and drinks that will leave you hungry.  · Use smaller plates, glasses, and bowls to prevent overeating.  This information is not intended to replace advice given to you by your health care provider. Make sure you discuss any questions you have with your health care provider.  Document Released: 12/18/2006 Document Revised: 11/17/2017 Document Reviewed: 11/17/2017  [employer] Patient Education © 2019 [employer]      Exercising to Lose Weight  Exercising can help you to lose weight. In order to lose weight through exercise, you need to do vigorous-intensity exercise. You can tell that you are exercising with vigorous intensity if you are breathing very hard and fast and cannot hold a conversation while exercising.  Moderate-intensity exercise helps to maintain your current weight. You can tell that you are exercising at a moderate level if you have a higher heart rate and faster breathing, but you are  still able to hold a conversation.  How often should I exercise?  Choose an activity that you enjoy and set realistic goals. Your health care provider can help you to make an activity plan that works for you. Exercise regularly as directed by your health care provider. This may include:  · Doing resistance training twice each week, such as:  ? Push-ups.  ? Sit-ups.  ? Lifting weights.  ? Using resistance bands.  · Doing a given intensity of exercise for a given amount of time. Choose from these options:  ? 150 minutes of moderate-intensity exercise every week.  ? 75 minutes of vigorous-intensity exercise every week.  ? A mix of moderate-intensity and vigorous-intensity exercise every week.    Children, pregnant women, people who are out of shape, people who are overweight, and older adults may need to consult a health care provider for individual recommendations. If you have any sort of medical condition, be sure to consult your health care provider before starting a new exercise program.  What are some activities that can help me to lose weight?  · Walking at a rate of at least 4.5 miles an hour.  · Jogging or running at a rate of 5 miles per hour.  · Biking at a rate of at least 10 miles per hour.  · Lap swimming.  · Roller-skating or in-line skating.  · Cross-country skiing.  · Vigorous competitive sports, such as football, basketball, and soccer.  · Jumping rope.  · Aerobic dancing.  How can I be more active in my day-to-day activities?  · Use the stairs instead of the elevator.  · Take a walk during your lunch break.  · If you drive, park your car farther away from work or school.  · If you take public transportation, get off one stop early and walk the rest of the way.  · Make all of your phone calls while standing up and walking around.  · Get up, stretch, and walk around every 30 minutes throughout the day.  What guidelines should I follow while exercising?  · Do not exercise so much that you hurt yourself,  feel dizzy, or get very short of breath.  · Consult your health care provider prior to starting a new exercise program.  · Wear comfortable clothes and shoes with good support.  · Drink plenty of water while you exercise to prevent dehydration or heat stroke. Body water is lost during exercise and must be replaced.  · Work out until you breathe faster and your heart beats faster.  This information is not intended to replace advice given to you by your health care provider. Make sure you discuss any questions you have with your health care provider.  Document Released: 01/20/2012 Document Revised: 05/25/2017 Document Reviewed: 05/21/2015  [employer] Interactive Patient Education © 2019 [employer]

## 2019-05-06 NOTE — PROGRESS NOTES
Hattie Riddle is a 58 y.o. female.     Here today for toño.  States she fell  2 days ago and reinjured her left foot.  She is having pain, swelling and difficulty weight bearing.  She is here today for toño.      Foot Injury    The incident occurred 2 days ago. The incident occurred at home. The injury mechanism was a fall. The pain is present in the left ankle and left foot. The quality of the pain is described as aching. The pain is at a severity of 5/10. The pain is moderate. The pain has been constant since onset. Associated symptoms include an inability to bear weight. Pertinent negatives include no loss of motion, loss of sensation, muscle weakness, numbness or tingling. She reports no foreign bodies present. The symptoms are aggravated by weight bearing. She has tried non-weight bearing and rest for the symptoms. The treatment provided mild relief.        The following portions of the patient's history were reviewed and updated as appropriate: allergies, current medications, past family history, past medical history, past social history, past surgical history and problem list.    Review of Systems   Constitutional: Negative.    HENT: Negative.    Eyes: Negative.    Respiratory: Negative.    Cardiovascular: Negative.    Gastrointestinal: Negative.    Endocrine: Negative.    Genitourinary: Negative.    Musculoskeletal: Arthralgias: left foot pain.   Skin: Negative.    Allergic/Immunologic: Negative.    Neurological: Negative.  Negative for tingling and numbness.   Hematological: Negative.    Psychiatric/Behavioral: Negative.        Objective   Physical Exam   Constitutional: She is oriented to person, place, and time. She appears well-developed and well-nourished. No distress.   HENT:   Head: Normocephalic and atraumatic.   Right Ear: External ear normal.   Left Ear: External ear normal.   Nose: Nose normal.   Mouth/Throat: Oropharynx is clear and moist. No oropharyngeal exudate.   Eyes:  Pupils are equal, round, and reactive to light.   Neck: Normal range of motion. Neck supple. No thyromegaly present.   Cardiovascular: Normal rate, regular rhythm and normal heart sounds. Exam reveals no friction rub.   No murmur heard.  Pulmonary/Chest: Effort normal and breath sounds normal. No respiratory distress. She has no wheezes. She has no rales.   Abdominal: Soft.   Musculoskeletal: Normal range of motion.   Left foot and ankle are swollen.  On xray she has a 6mm talar beak, degenerative changes and an old avulsion fx tip of the lateral malleolus.   Neurological: She is alert and oriented to person, place, and time.   Skin: Skin is warm and dry.   Psychiatric: She has a normal mood and affect. Thought content normal.   Nursing note and vitals reviewed.        Assessment/Plan   Jyoit was seen today for seizures, peripheral neuropathy and restless legs syndrome.    Diagnoses and all orders for this visit:    Left ankle pain, unspecified chronicity  -     XR Ankle 3+ View Left (In Office)    Left foot pain  -     XR Foot 3+ View Left (In Office)    Bilateral thumb pain  -     traMADol (ULTRAM) 50 MG tablet; Take 1 tablet by mouth Every 8 (Eight) Hours As Needed for Moderate Pain .    Neuropathy  -     gabapentin (NEURONTIN) 800 MG tablet; Take 1 tablet by mouth 2 (Two) Times a Day.    Restless leg syndrome  -     rOPINIRole (REQUIP) 2 MG tablet; Take 1 tablet by mouth every night at bedtime.    Essential hypertension  -     NIFEdipine XL (PROCARDIA XL) 30 MG 24 hr tablet; Take 1 tablet by mouth Daily.    High cholesterol  -     atorvastatin (LIPITOR) 40 MG tablet; Take 1 tablet by mouth Daily.    Arthralgia, unspecified joint  -     diclofenac (VOLTAREN) 50 MG EC tablet; Take 1 tablet by mouth 2 (Two) Times a Day.    Vitamin D deficiency  -     vitamin D (ERGOCALCIFEROL) 59168 units capsule capsule; Take 1 capsule by mouth Every 14 (Fourteen) Days.    Generalized seizure disorder (CMS/HCC)  -      levETIRAcetam (KEPPRA) 500 MG tablet; Take 1 tablet by mouth 2 (Two) Times a Day.      She is advised to see her ortho.

## 2019-05-06 NOTE — THERAPY TREATMENT NOTE
Outpatient Physical Therapy Ortho Treatment Note  [facility]     Patient Name: Jyoti Guerra Person  : 1960  MRN: [id number]  Today's Date: 2019      Visit Date: 2019  Visit 2/  Return to MD: BRANDY  Re-cert date: 19  % improvement: none      Visit Dx:    ICD-10-CM ICD-9-CM   1. Left hip pain M25.552 719.45   2. Disorder of SI (sacroiliac) joint M53.3 724.6       Patient Active Problem List   Diagnosis   • Vitamin D deficiency   • Vertigo   • Neuropathy   • Restless leg syndrome   • Pain in joint   • Allergic rhinitis   • Essential hypertension   • Abnormal EEG   • Cerebrovascular accident (CVA) due to thrombosis of left carotid artery (CMS/[facility])   • Illicit drug use   • Tobacco abuse   • Sleep apnea   • Neck pain   • Vitamin deficiency   • Acute otitis externa of right ear   • Generalized seizure disorder (CMS/HCC)   • Rotator cuff strain   • Acute pain of right shoulder   • Closed fracture of distal end of right fibula   • Right ankle pain   • Allergic reaction   • Left ankle pain   • Closed fracture of talus   • Hypotension   • Left leg pain   • Acute pain of left shoulder   • Closed nondisplaced fracture of lateral malleolus of left fibula   • Closed nondisplaced fracture of lateral malleolus of left fibula with routine healing   • Renal insufficiency   • ESR raised   • Pain   • Left foot pain   • Closed nondisplaced fracture of fifth left metatarsal bone   • Cause of injury, fall   • Ankle weakness   • Joint pain in fingers of left hand   • Joint pain in fingers of right hand   • Bilateral thumb pain   • Bilateral hand pain   • Numbness and tingling in both hands   • Bilateral carpal tunnel syndrome   • Bilateral arm pain   • Bilateral leg pain   • Decreased strength   • Acute URI   • Cough   • Left hip pain   • BMI 27.0-27.9,adult   • Disorder of SI (sacroiliac) joint   • Mobility impaired        Past Medical History:   Diagnosis Date   • Acute bronchitis 2016    • Acute otitis externa 09/04/2014   • Acute otitis media 02/10/2015   • Acute sinusitis 07/19/2016   • Ankle injury    • Backache 12/08/2014   • Benign hypertension 05/13/2016   • Cerebrovascular accident (CMS/HCC) 2015   • COPD (chronic obstructive pulmonary disease) (CMS/[facility])    • Depression    • Dizziness 02/10/2015   • Encounter for gynecological examination without abnormal finding 03/10/2016   • Fatigue 08/28/2015   • Foot pain 06/09/2014   • Hyperkeratosis 04/28/2014   • Hypotensive episode 06/12/2015   • Keratoma 08/04/2014    intractable plantar   • Low blood pressure 08/28/2015   • Menopausal and perimenopausal disorder 03/10/2016    other specified   • Numbness of face 05/13/2016    improved   • Otitis media of right ear 07/30/2016    unspecified   • Perforated tympanic membrane 09/04/2014   • Puncture wound 06/17/2014    injury   • Right-sided face pain 05/13/2016   • Seizure (CMS/HCC)    • Sleep apnea    • Thumb pain 08/28/2015   • Upper respiratory infection 08/28/2015   • Wheezing 10/21/2014        Past Surgical History:   Procedure Laterality Date   • BREAST BIOPSY     • OTHER SURGICAL HISTORY  07/07/2014    Destruction of Benign Lesion (1-14)   • TUBAL ABDOMINAL LIGATION         PT Ortho     Row Name 05/06/19 6560       Subjective Comments    Subjective Comments  pt reports had a fall since the PT eval. Was outside her home when she fall. Now has R hip pain in addition to L hip and SIJ symptoms.  -BS       Precautions and Contraindications    Precautions/Limitations  fall precautions  -BS       Subjective Pain    Able to rate subjective pain?  yes  -BS    Pre-Treatment Pain Level  6  -BS    Post-Treatment Pain Level  5  -BS    Subjective Pain Comment  L hip<R hip pain (8/10), intermittent 8/10 SI pain  -BS      User Key  (r) = Recorded By, (t) = Taken By, (c) = Cosigned By    Initials Name Provider Type    Solis Alexander, PT Physical Therapist                      PT Assessment/Plan     Row  "Name 05/06/19 1650          PT Assessment    Assessment Comments  reduced L hip and SI region pain post tx. R>L hip extension weakness today after recent fall.  -BS        PT Plan    PT Frequency  2x/week  -BS     PT Plan Comments  advance core stability with marching and LAQ's on dynadisc  -BS       User Key  (r) = Recorded By, (t) = Taken By, (c) = Cosigned By    Initials Name Provider Type    Solis Alexander, PT Physical Therapist          Modalities     Row Name 05/06/19 1650             Ice    Ice Applied  No pt declined  -BS        User Key  (r) = Recorded By, (t) = Taken By, (c) = Cosigned By    Initials Name Provider Type    Solis Alexander, PT Physical Therapist        Exercises     Row Name 05/06/19 1650             Subjective Comments    Subjective Comments  pt reports had a fall since the PT humberto. Was outside her home when she fall. Now has R hip pain in addition to L hip and SIJ symptoms.  -BS         Subjective Pain    Able to rate subjective pain?  yes  -BS      Pre-Treatment Pain Level  6  -BS      Post-Treatment Pain Level  5  -BS      Subjective Pain Comment  L hip<R hip pain (8/10), intermittent 8/10 SI pain  -BS         Exercise 1    Exercise Name 1  Pro II, level 3  -BS      Time 1  10'  -BS         Exercise 2    Exercise Name 2  SKC S  -BS      Sets 2  1  -BS      Reps 2  2  -BS      Time 2  30\" hold  -BS      Additional Comments  bilateral  -BS         Exercise 3    Exercise Name 3  3 way hip: supine  -BS      Sets 3  1  -BS      Reps 3  10  -BS         Exercise 4    Exercise Name 4  LTR  -BS      Sets 4  1  -BS      Reps 4  10  -BS      Time 4  10\" hold  -BS         Exercise 5    Exercise Name 5  fwd lunge S at steps  -BS      Sets 5  1  -BS      Reps 5  10   -BS      Time 5  10\" hold  -BS         Exercise 6    Exercise Name 6  sit to stand, B UE A  -BS      Sets 6  1  -BS      Reps 6  10  -BS         Exercise 7    Exercise Name 7  H/L alt leg lifts  -BS      Sets 7  1  -BS      Reps 7  " 10 ea  -BS        User Key  (r) = Recorded By, (t) = Taken By, (c) = Cosigned By    Initials Name Provider Type    BS Solis Barrientos, PT Physical Therapist                       PT OP Goals     Row Name 05/06/19 1650 05/06/19 1600       PT Short Term Goals    STG Date to Achieve  05/20/19  -BS  --    STG 1  Pt independent with HEP.  -BS  --    STG 1 Progress  Ongoing  -BS  --    STG 2  Improve L hip flex AROM to 105°  -BS  --    STG 2 Progress  Ongoing  -BS  --    STG 3  Improve L hip abduction AROM to 25°  -BS  --    STG 3 Progress  Ongoing  -BS  --    STG 4  Improve L hip extension MMT to 4/5 or greater  -BS  --    STG 4 Progress  Ongoing  -BS  --    STG 5  Reduce L hip pain and SI region pain by 50% with sit to stand transfers  -BS  --    STG 5 Progress  Ongoing  -BS  --    STG 6  Reduce L hip and SI region pain by 50% while walking short distances  -BS  --    STG 6 Progress  Ongoing  -BS  --       Time Calculation    PT Goal Re-Cert Due Date  05/20/19  -BS  --  -BS      User Key  (r) = Recorded By, (t) = Taken By, (c) = Cosigned By    Initials Name Provider Type    Solis Alexander, PT Physical Therapist          Therapy Education  Education Details: HEP: supine 3 way hip, fwd lunge S at steps, LTR, sit to stand  Given: HEP  Program: New  How Provided: Verbal, Demonstration, Written  Provided to: Patient  Level of Understanding: Verbalized, Teach back education performed, Demonstrated              Time Calculation:   Start Time: 1650  Stop Time: 1735  Time Calculation (min): 45 min  Total Timed Code Minutes- PT: 45 minute(s)  Therapy Charges for Today     Code Description Service Date Service Provider Modifiers Qty    13941329115 HC PT THER PROC EA 15 MIN 5/6/2019 Solis Barrientos, PT GP 3                    Solis Barrientos, PT  5/6/2019

## 2019-05-08 ENCOUNTER — APPOINTMENT (OUTPATIENT)
Dept: PHYSICAL THERAPY | Facility: HOSPITAL | Age: 59
End: 2019-05-08

## 2019-05-13 ENCOUNTER — HOSPITAL ENCOUNTER (OUTPATIENT)
Dept: PHYSICAL THERAPY | Facility: HOSPITAL | Age: 59
Setting detail: THERAPIES SERIES
Discharge: HOME | End: 2019-05-13

## 2019-05-13 DIAGNOSIS — M25.552 LEFT HIP PAIN: Primary | ICD-10-CM

## 2019-05-13 DIAGNOSIS — M53.3 DISORDER OF SI (SACROILIAC) JOINT: ICD-10-CM

## 2019-05-13 PROCEDURE — 97110 THERAPEUTIC EXERCISES: CPT

## 2019-05-13 NOTE — THERAPY TREATMENT NOTE
Outpatient Physical Therapy Ortho Treatment Note  [facility]     Patient Name: Jyoti Guerra Person  : 1960  MRN: [id number]  Today's Date: 2019      Visit Date: 2019  Visit 3/4  Return to MD: BRANDY  Re-cert date: 19  % improvement: none      Visit Dx:    ICD-10-CM ICD-9-CM   1. Left hip pain M25.552 719.45   2. Disorder of SI (sacroiliac) joint M53.3 724.6       Patient Active Problem List   Diagnosis   • Vitamin D deficiency   • Vertigo   • Neuropathy   • Restless leg syndrome   • Pain in joint   • Allergic rhinitis   • Essential hypertension   • Abnormal EEG   • Cerebrovascular accident (CVA) due to thrombosis of left carotid artery (CMS/[facility])   • Illicit drug use   • Tobacco abuse   • Sleep apnea   • Neck pain   • Vitamin deficiency   • Acute otitis externa of right ear   • Generalized seizure disorder (CMS/[facility])   • Rotator cuff strain   • Acute pain of right shoulder   • Closed fracture of distal end of right fibula   • Right ankle pain   • Allergic reaction   • Left ankle pain   • Closed fracture of talus   • Hypotension   • Left leg pain   • Acute pain of left shoulder   • Closed nondisplaced fracture of lateral malleolus of left fibula   • Closed nondisplaced fracture of lateral malleolus of left fibula with routine healing   • Renal insufficiency   • ESR raised   • Pain   • Left foot pain   • Closed nondisplaced fracture of fifth left metatarsal bone   • Cause of injury, fall   • Ankle weakness   • Joint pain in fingers of left hand   • Joint pain in fingers of right hand   • Bilateral thumb pain   • Bilateral hand pain   • Numbness and tingling in both hands   • Bilateral carpal tunnel syndrome   • Bilateral arm pain   • Bilateral leg pain   • Decreased strength   • Acute URI   • Cough   • Left hip pain   • BMI 27.0-27.9,adult   • Disorder of SI (sacroiliac) joint   • Mobility impaired        Past Medical History:   Diagnosis Date   • Acute bronchitis 2016   •  Acute otitis externa 09/04/2014   • Acute otitis media 02/10/2015   • Acute sinusitis 07/19/2016   • Ankle injury    • Backache 12/08/2014   • Benign hypertension 05/13/2016   • Cerebrovascular accident (CMS/[facility]) 2015   • COPD (chronic obstructive pulmonary disease) (CMS/[facility])    • Depression    • Dizziness 02/10/2015   • Encounter for gynecological examination without abnormal finding 03/10/2016   • Fatigue 08/28/2015   • Foot pain 06/09/2014   • Hyperkeratosis 04/28/2014   • Hypotensive episode 06/12/2015   • Keratoma 08/04/2014    intractable plantar   • Low blood pressure 08/28/2015   • Menopausal and perimenopausal disorder 03/10/2016    other specified   • Numbness of face 05/13/2016    improved   • Otitis media of right ear 07/30/2016    unspecified   • Perforated tympanic membrane 09/04/2014   • Puncture wound 06/17/2014    injury   • Right-sided face pain 05/13/2016   • Seizure (CMS/HCC)    • Sleep apnea    • Thumb pain 08/28/2015   • Upper respiratory infection 08/28/2015   • Wheezing 10/21/2014        Past Surgical History:   Procedure Laterality Date   • BREAST BIOPSY     • OTHER SURGICAL HISTORY  07/07/2014    Destruction of Benign Lesion (1-14)   • TUBAL ABDOMINAL LIGATION         PT Ortho     Row Name 05/13/19 1500       Posture/Observations    Posture/Observations Comments  Amb with FWW.   -      User Key  (r) = Recorded By, (t) = Taken By, (c) = Cosigned By    Initials Name Provider Type    Jose Dumont, PTA Physical Therapy Assistant                      PT Assessment/Plan     Row Name 05/13/19 1600          PT Assessment    Assessment Comments  Good margarita of today's visit with increased ther ex intensity. No recorded goals met. Some B hip weakness observed with ther ex. Pt continues to benefit from further PT.     -        PT Plan    PT Frequency  2x/week  -     Predicted Duration of Therapy Intervention (Therapy Eval)  3 weeks  -     PT Plan Comments  Cont per POC  -       User Key   "(r) = Recorded By, (t) = Taken By, (c) = Cosigned By    Initials Name Provider Type    MICHAEL Jose Jean PTA Physical Therapy Assistant            Exercises     Row Name 05/13/19 1500             Subjective Comments    Subjective Comments  Pt c/o B hip pain today. She is trying to do HEP.   -JW         Subjective Pain    Able to rate subjective pain?  yes  -JW      Pre-Treatment Pain Level  7  -JW      Post-Treatment Pain Level  -- no report  -JW         Exercise 1    Exercise Name 1  PRO2  -JW      Time 1  8'  -JW      Additional Comments  L3  -JW         Exercise 2    Exercise Name 2  LTR  -JW      Reps 2  10x ea  -JW         Exercise 3    Exercise Name 3  BKLL  -JW      Reps 3  10x ea  -JW         Exercise 4    Exercise Name 4  SLR  -JW      Sets 4  2  -JW      Reps 4  10  -JW         Exercise 5    Exercise Name 5  Bridges  -JW      Sets 5  2  -JW      Reps 5  10  -JW         Exercise 6    Exercise Name 6  Clamshells  -JW      Sets 6  2  -JW      Reps 6  10  -JW         Exercise 7    Exercise Name 7  Hip add squeeze  -JW      Reps 7  20  -JW         Exercise 8    Exercise Name 8  H/L core isometric  -JW      Reps 8  10x  -JW         Exercise 9    Exercise Name 9  Seated lumbar ext iso  -JW      Reps 9  10x   -JW      Time 9  3\"  -JW         Exercise 10    Exercise Name 10  Seated dynadisc LAQ  -JW      Reps 10  20  -JW         Exercise 11    Exercise Name 11  Standing core isometric. anti rotation  -JW      Reps 11  10x  -JW        User Key  (r) = Recorded By, (t) = Taken By, (c) = Cosigned By    Initials Name Provider Type    MICHAEL Jose Jean PTA Physical Therapy Assistant                       PT OP Goals     Row Name 05/13/19 1639 05/13/19 1500       PT Short Term Goals    STG Date to Achieve  --  05/20/19  -JW    STG 1  --  Pt independent with HEP.  -JW    STG 1 Progress  --  Ongoing  -JW    STG 2  --  Improve L hip flex AROM to 105°  -JW    STG 2 Progress  --  Ongoing  -JW    STG 3  --  Improve L hip " abduction AROM to 25°  -[facility] 3 Progress  --  Ongoing  -    STG 4  --  Improve L hip extension MMT to 4/5 or greater  -[facility] 4 Progress  --  Ongoing  -    STG 5  --  Reduce L hip pain and SI region pain by 50% with sit to stand transfers  -[facility] 5 Progress  --  Ongoing  -    STG 6  --  Reduce L hip and SI region pain by 50% while walking short distances  -[facility] 6 Progress  --  Ongoing  -       Time Calculation    PT Goal Re-Cert Due Date  05/20/19  -  --      User Key  (r) = Recorded By, (t) = Taken By, (c) = Cosigned By    Initials Name Provider Type    Jose Dumont PTA Physical Therapy Assistant                         Time Calculation:   Start Time: 1523  Stop Time: 1606  Time Calculation (min): 43 min  Total Timed Code Minutes- PT: 43 minute(s)  Therapy Charges for Today     Code Description Service Date Service Provider Modifiers Qty    84395886026 HC PT THER PROC EA 15 MIN 5/13/2019 Jose Jean PTA GP 3                    Jose Jean PTA  5/13/2019

## 2019-05-15 ENCOUNTER — HOSPITAL ENCOUNTER (OUTPATIENT)
Dept: PHYSICAL THERAPY | Facility: HOSPITAL | Age: 59
Setting detail: THERAPIES SERIES
Discharge: HOME | End: 2019-05-15

## 2019-05-15 DIAGNOSIS — M25.552 LEFT HIP PAIN: Primary | ICD-10-CM

## 2019-05-15 DIAGNOSIS — M53.3 DISORDER OF SI (SACROILIAC) JOINT: ICD-10-CM

## 2019-05-15 PROCEDURE — 97110 THERAPEUTIC EXERCISES: CPT

## 2019-05-15 NOTE — THERAPY TREATMENT NOTE
Outpatient Physical Therapy Ortho Treatment Note  [facility]     Patient Name: Jyoti Guerra Person  : 1960  MRN: [id number]  Today's Date: 5/15/2019      Visit Date: 05/15/2019  Visit   Return to MD: BRANDY  Re-cert date: 19  % improvement: none      Visit Dx:    ICD-10-CM ICD-9-CM   1. Left hip pain M25.552 719.45   2. Disorder of SI (sacroiliac) joint M53.3 724.6       Patient Active Problem List   Diagnosis   • Vitamin D deficiency   • Vertigo   • Neuropathy   • Restless leg syndrome   • Pain in joint   • Allergic rhinitis   • Essential hypertension   • Abnormal EEG   • Cerebrovascular accident (CVA) due to thrombosis of left carotid artery (CMS/[facility])   • Illicit drug use   • Tobacco abuse   • Sleep apnea   • Neck pain   • Vitamin deficiency   • Acute otitis externa of right ear   • Generalized seizure disorder (CMS/[facility])   • Rotator cuff strain   • Acute pain of right shoulder   • Closed fracture of distal end of right fibula   • Right ankle pain   • Allergic reaction   • Left ankle pain   • Closed fracture of talus   • Hypotension   • Left leg pain   • Acute pain of left shoulder   • Closed nondisplaced fracture of lateral malleolus of left fibula   • Closed nondisplaced fracture of lateral malleolus of left fibula with routine healing   • Renal insufficiency   • ESR raised   • Pain   • Left foot pain   • Closed nondisplaced fracture of fifth left metatarsal bone   • Cause of injury, fall   • Ankle weakness   • Joint pain in fingers of left hand   • Joint pain in fingers of right hand   • Bilateral thumb pain   • Bilateral hand pain   • Numbness and tingling in both hands   • Bilateral carpal tunnel syndrome   • Bilateral arm pain   • Bilateral leg pain   • Decreased strength   • Acute URI   • Cough   • Left hip pain   • BMI 27.0-27.9,adult   • Disorder of SI (sacroiliac) joint   • Mobility impaired        Past Medical History:   Diagnosis Date   • Acute bronchitis 2016   •  Acute otitis externa 09/04/2014   • Acute otitis media 02/10/2015   • Acute sinusitis 07/19/2016   • Ankle injury    • Backache 12/08/2014   • Benign hypertension 05/13/2016   • Cerebrovascular accident (CMS/[facility]) 2015   • COPD (chronic obstructive pulmonary disease) (CMS/[facility])    • Depression    • Dizziness 02/10/2015   • Encounter for gynecological examination without abnormal finding 03/10/2016   • Fatigue 08/28/2015   • Foot pain 06/09/2014   • Hyperkeratosis 04/28/2014   • Hypotensive episode 06/12/2015   • Keratoma 08/04/2014    intractable plantar   • Low blood pressure 08/28/2015   • Menopausal and perimenopausal disorder 03/10/2016    other specified   • Numbness of face 05/13/2016    improved   • Otitis media of right ear 07/30/2016    unspecified   • Perforated tympanic membrane 09/04/2014   • Puncture wound 06/17/2014    injury   • Right-sided face pain 05/13/2016   • Seizure (CMS/HCC)    • Sleep apnea    • Thumb pain 08/28/2015   • Upper respiratory infection 08/28/2015   • Wheezing 10/21/2014        Past Surgical History:   Procedure Laterality Date   • BREAST BIOPSY     • OTHER SURGICAL HISTORY  07/07/2014    Destruction of Benign Lesion (1-14)   • TUBAL ABDOMINAL LIGATION         PT Ortho     Row Name 05/13/19 1500       Posture/Observations    Posture/Observations Comments  Amb with FWW.   -      User Key  (r) = Recorded By, (t) = Taken By, (c) = Cosigned By    Initials Name Provider Type    Jose Dumont PTA Physical Therapy Assistant                      PT Assessment/Plan     Row Name 05/15/19 1600          PT Assessment    Assessment Comments  Continued pain reports again today at greater area than just L hip. Good effort and participation with ther ex. Gradual progress towards goals being made.   -        PT Plan    PT Frequency  2x/week  -     Predicted Duration of Therapy Intervention (Therapy Eval)  3 weeks  -     PT Plan Comments  Cont to progress as pt margarita  -       User Key   (r) = Recorded By, (t) = Taken By, (c) = Cosigned By    Initials Name Provider Type    MICHAEL Jose Jean PTA Physical Therapy Assistant            Exercises     Row Name 05/15/19 1500             Subjective Comments    Subjective Comments  Pt c/o B LE pain today. No complaints after prev visit.   -JW         Subjective Pain    Able to rate subjective pain?  yes  -JW      Pre-Treatment Pain Level  7  -JW         Exercise 1    Exercise Name 1  PRO2  -JW      Time 1  8'  -JW      Additional Comments  L3  -JW         Exercise 2    Exercise Name 2  LTR  -JW      Reps 2  10x ea  -JW         Exercise 3    Exercise Name 3  BKLL  -JW      Sets 3  2  -JW      Reps 3  10  -JW         Exercise 4    Exercise Name 4  SLR  -JW      Sets 4  2  -JW      Reps 4  10  -JW         Exercise 5    Exercise Name 5  Bridges  -JW      Sets 5  2  -JW      Reps 5  10  -JW         Exercise 6    Exercise Name 6  Clamshells  -JW      Reps 6  20  -JW         Exercise 7    Exercise Name 7  Hip add squeeze  -JW      Reps 7  30  -JW         Exercise 8    Exercise Name 8  Seated lumbar ext iso  -JW      Reps 8  15x  -JW         Exercise 9    Exercise Name 9  Seated dynadisc march  -JW      Sets 9  2  -JW      Reps 9  10  -JW         Exercise 10    Exercise Name 10  Seated dynadisc LAQ  -JW      Reps 10  20  -JW        User Key  (r) = Recorded By, (t) = Taken By, (c) = Cosigned By    Initials Name Provider Type    MICHAEL Jose Jean PTA Physical Therapy Assistant                       PT OP Goals     Row Name 05/15/19 1500          PT Short Term Goals    STG Date to Achieve  05/20/19  -JW     STG 1  Pt independent with HEP.  -JW     STG 1 Progress  Ongoing  -JW     STG 2  Improve L hip flex AROM to 105°  -JW     STG 2 Progress  Ongoing  -JW     STG 3  Improve L hip abduction AROM to 25°  -JW     STG 3 Progress  Ongoing  -JW     STG 4  Improve L hip extension MMT to 4/5 or greater  -JW     STG 4 Progress  Ongoing  -JW     STG 5  Reduce L hip pain  and SI region pain by 50% with sit to stand transfers  -     STG 5 Progress  Ongoing  -     STG 6  Reduce L hip and SI region pain by 50% while walking short distances  -     STG 6 Progress  Ongoing  -       User Key  (r) = Recorded By, (t) = Taken By, (c) = Cosigned By    Initials Name Provider Type    Jose Dumont PTA Physical Therapy Assistant                         Time Calculation:   Start Time: 1515  Stop Time: 1558  Time Calculation (min): 43 min  Total Timed Code Minutes- PT: 43 minute(s)  Therapy Charges for Today     Code Description Service Date Service Provider Modifiers Qty    27853840186 HC PT THER PROC EA 15 MIN 5/15/2019 Jose Jean PTA GP 3                    Jose Jean PTA  5/15/2019

## 2019-05-17 ENCOUNTER — OFFICE VISIT (OUTPATIENT)
Dept: FAMILY MEDICINE CLINIC | Facility: CLINIC | Age: 59
End: 2019-05-17

## 2019-05-17 ENCOUNTER — OFFICE VISIT (OUTPATIENT)
Dept: ORTHOPEDIC SURGERY | Facility: CLINIC | Age: 59
End: 2019-05-17

## 2019-05-17 VITALS
TEMPERATURE: 98.5 F | SYSTOLIC BLOOD PRESSURE: 114 MMHG | BODY MASS INDEX: 27.19 KG/M2 | WEIGHT: 153.5 LBS | DIASTOLIC BLOOD PRESSURE: 77 MMHG | OXYGEN SATURATION: 96 % | HEART RATE: 113 BPM

## 2019-05-17 VITALS — WEIGHT: 151 LBS | BODY MASS INDEX: 26.75 KG/M2 | HEIGHT: 63 IN

## 2019-05-17 DIAGNOSIS — M25.552 LEFT HIP PAIN: Primary | ICD-10-CM

## 2019-05-17 DIAGNOSIS — R29.6 FREQUENT FALLS: ICD-10-CM

## 2019-05-17 DIAGNOSIS — M25.572 PAIN IN JOINT INVOLVING LEFT ANKLE AND FOOT: Primary | ICD-10-CM

## 2019-05-17 DIAGNOSIS — Z74.09 MOBILITY IMPAIRED: ICD-10-CM

## 2019-05-17 DIAGNOSIS — M53.3 DISORDER OF SI (SACROILIAC) JOINT: ICD-10-CM
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
## 2019-05-17 DIAGNOSIS — I10 ESSENTIAL HYPERTENSION: ICD-10-CM

## 2019-05-17 DIAGNOSIS — M25.50 ARTHRALGIA, UNSPECIFIED JOINT: ICD-10-CM

## 2019-05-17 DIAGNOSIS — E78.00 HIGH CHOLESTEROL: ICD-10-CM

## 2019-05-17 DIAGNOSIS — G25.81 RESTLESS LEG SYNDROME: ICD-10-CM

## 2019-05-17 DIAGNOSIS — G89.29 CHRONIC LEFT HIP PAIN: ICD-10-CM

## 2019-05-17 DIAGNOSIS — J06.9 ACUTE URI: ICD-10-CM

## 2019-05-17 DIAGNOSIS — E56.9 VITAMIN DEFICIENCY: ICD-10-CM

## 2019-05-17 DIAGNOSIS — G62.9 NEUROPATHY: ICD-10-CM

## 2019-05-17 DIAGNOSIS — M25.572 ACUTE LEFT ANKLE PAIN: ICD-10-CM

## 2019-05-17 DIAGNOSIS — M25.552 CHRONIC LEFT HIP PAIN: ICD-10-CM

## 2019-05-17 PROCEDURE — 99214 OFFICE O/P EST MOD 30 MIN: CPT | Performed by: NURSE PRACTITIONER

## 2019-05-17 RX ORDER — FLUTICASONE PROPIONATE 50 MCG
2 SPRAY, SUSPENSION (ML) NASAL DAILY
Qty: 16 G | Refills: 2 | Status: SHIPPED | OUTPATIENT
Start: 2019-05-17 | End: 2019-09-05 | Stop reason: SDUPTHER

## 2019-05-17 RX ORDER — CYCLOBENZAPRINE HCL 10 MG
10 TABLET ORAL 3 TIMES DAILY PRN
Qty: 90 TABLET | Refills: 3 | Status: SHIPPED | OUTPATIENT
Start: 2019-05-17 | End: 2019-07-26 | Stop reason: ALTCHOICE

## 2019-05-17 RX ORDER — ACETAMINOPHEN 500 MG
500 TABLET ORAL EVERY 6 HOURS PRN
Qty: 90 TABLET | Refills: 1 | Status: SHIPPED | OUTPATIENT
Start: 2019-05-17 | End: 2019-09-30

## 2019-05-17 RX ORDER — ATORVASTATIN CALCIUM 40 MG/1
40 TABLET, FILM COATED ORAL DAILY
Qty: 30 TABLET | Refills: 5 | Status: SHIPPED | OUTPATIENT
Start: 2019-05-17 | End: 2019-09-05 | Stop reason: SDUPTHER

## 2019-05-17 RX ORDER — GABAPENTIN 800 MG/1
800 TABLET ORAL 2 TIMES DAILY
Qty: 60 TABLET | Refills: 0 | Status: SHIPPED | OUTPATIENT
Start: 2019-05-17 | End: 2019-06-17 | Stop reason: SDUPTHER

## 2019-05-17 RX ORDER — NIFEDIPINE 30 MG/1
30 TABLET, EXTENDED RELEASE ORAL DAILY
Qty: 90 TABLET | Refills: 1 | Status: SHIPPED | OUTPATIENT
Start: 2019-05-17 | End: 2019-12-05 | Stop reason: SDUPTHER

## 2019-05-17 RX ORDER — ROPINIROLE 2 MG/1
2 TABLET, FILM COATED ORAL
Qty: 30 TABLET | Refills: 3 | Status: SHIPPED | OUTPATIENT
Start: 2019-05-17 | End: 2019-09-05 | Stop reason: SDUPTHER

## 2019-05-17 RX ORDER — FOLIC ACID 1 MG/1
1000 TABLET ORAL DAILY
Qty: 30 TABLET | Refills: 5 | Status: SHIPPED | OUTPATIENT
Start: 2019-05-17 | End: 2019-10-21 | Stop reason: SDUPTHER

## 2019-05-17 RX ORDER — TRAMADOL HYDROCHLORIDE 50 MG/1
50 TABLET ORAL EVERY 8 HOURS PRN
Qty: 90 TABLET | Refills: 0 | Status: SHIPPED | OUTPATIENT
Start: 2019-05-17 | End: 2019-07-22 | Stop reason: SDUPTHER

## 2019-05-17 NOTE — PROGRESS NOTES
"Jyoti Guerra Person is a 58 y.o. female returns for     Chief Complaint   Patient presents with   • Left Hip - Follow-up       HISTORY OF PRESENT ILLNESS: Patient presents to office for follow-up of left hip/left SI joint pain.  This is been an ongoing issue for several months and has continued to progressively worsen.  Patient has gone to physical therapy as recommended with no improvement.  Patient continues to complain of severe left-sided hip/low back pain with difficulty walking at times due to pain.  Patient was given IM injections of Kenalog and Toradol at last office visit on 4/11/2019.  She continues to take Diclofenac daily and Tramadol as needed for pain.  Patient also complains of acute left ankle pain due to another recent fall. Patient has tried wearing an Ace wrap and rest with minimal improvement.  She was recently seen by her primary care provider, WESTON Solis with x-rays done of the left ankle.  Patient has experienced multiple previous sprains and fractures to the left ankle in the past.      CONCURRENT MEDICAL HISTORY:    The following portions of the patient's history were reviewed and updated as appropriate: allergies, current medications, past family history, past medical history, past social history, past surgical history and problem list.     ROS  No fevers or chills.  No chest pain or shortness of air.  No GI or  disturbances. Left hip pain. Low back pain. Left ankle pain.     PHYSICAL EXAMINATION:       Ht 160 cm (63\")   Wt 68.5 kg (151 lb)   BMI 26.75 kg/m²     Physical Exam   Constitutional: She is oriented to person, place, and time. Vital signs are normal. She appears well-developed and well-nourished. She is active and cooperative. She does not appear ill. No distress.   HENT:   Head: Normocephalic.   Pulmonary/Chest: Effort normal. No respiratory distress.   Abdominal: Soft. She exhibits no distension.   Musculoskeletal: She exhibits edema (Mild, left ankle) and " tenderness (Left SI joint, Left ankle). She exhibits no deformity.   Neurological: She is alert and oriented to person, place, and time. GCS eye subscore is 4. GCS verbal subscore is 5. GCS motor subscore is 6.   Skin: Skin is warm, dry and intact. Capillary refill takes less than 2 seconds. No erythema.   Psychiatric: She has a normal mood and affect. Her speech is normal and behavior is normal. Judgment and thought content normal. Cognition and memory are normal.   Vitals reviewed.      GAIT:     []  Normal  []  Antalgic    Assistive device: []  None  []  Walker     []  Crutches  []  Cane     [x]  Wheelchair  []  Stretcher    Right Ankle Exam     Tenderness   The patient is experiencing no tenderness.  Swelling: none    Range of Motion   The patient has normal right ankle ROM.    Muscle Strength   Dorsiflexion:  4/5  Plantar flexion:  4/5    Other   Erythema: absent  Sensation: normal  Pulse: present       Left Ankle Exam     Tenderness   The patient is experiencing tenderness in the lateral malleolus and ATF.   Swelling: mild (Lateral ankle)    Range of Motion   Dorsiflexion: 20   Plantar flexion: 35   Eversion: 10   Inversion: 20     Muscle Strength   Dorsiflexion:  4/5   Plantar flexion:  4/5     Other   Erythema: absent  Sensation: normal  Pulse: present    Comments:  Mild pain and limitations with range of motion.  Mild/minimal swelling noted to the lateral ankle.  No deformity.  No ecchymosis.  No erythema.  Stable joint exam.      Right Hip Exam     Tenderness   The patient is experiencing no tenderness.     Range of Motion   The patient has normal right hip ROM.    Muscle Strength   Abduction: 4/5   Flexion: 4/5     Tests   TERI: negative  Fadir:  Negative FADIR test    Other   Erythema: absent  Sensation: normal  Pulse: present      Left Hip Exam     Tenderness   The patient is experiencing tenderness in the posterior.    Range of Motion   The patient has normal left hip ROM.    Muscle Strength    Abduction: 4/5   Flexion: 4/5     Tests   TERI: negative  Fadir:  Negative FADIR test    Other   Erythema: absent  Sensation: normal  Pulse: present    Comments:  Mild pain with range of motion, elicited in the left SI joint.  Overall good motion and rotation of the hip joint without significant pain or limitations.  No swelling.  No deformity.  No ecchymosis.  No leg length discrepancy noted.      Back Exam     Tenderness   The patient is experiencing tenderness in the sacroiliac (Left).    Range of Motion   Extension: abnormal   Flexion: abnormal   Rotation right: abnormal   Rotation left: abnormal     Muscle Strength   Right Quadriceps:  4/5   Left Quadriceps:  4/5     Tests   Straight leg raise right: negative  Straight leg raise left: positive at 90 deg (Mild (pain elicited in left SI joint))    Reflexes   Patellar: normal    Other   Sensation: normal  Gait: antalgic     Comments:  Pain and limitations with range of motion.  Pain is elicited in the left SI joint.  Significant tenderness to palpation noted in the left SI joint.  No spinal tenderness noted.  No focal neurological deficits noted.            Two view left hip with single view pelvis     HISTORY: Left hip pain. Multiple falls.     AP and frog-leg lateral views of the left hip and AP film of the  pelvis obtained.     COMPARISON: November 14, 2017     No fracture or dislocation.  Minimal degenerative change left iliac crest.  Degenerative disc disease lower lumbar spine.  No other osseous or articular abnormality.     Atherosclerotic calcification femoral arteries.  Pelvic phleboliths.     IMPRESSION:  CONCLUSION:  No fracture or dislocation.  Minimal degenerative change left iliac crest.  Degenerative disc disease lower lumbar spine.     Electronically signed by:  Josef Deal MD  1/11/2019 2:11 PM [employer]  Workstation: [employer]    Three view left ankle     HISTORY: Left ankle and foot pain     AP, lateral and oblique views  obtained.     COMPARISON: April 11, 2018     Old 4 mm avulsion inferior to the tip the lateral malleolus.  6 mm talar beak.  No acute fracture or dislocation.  No other osseous or articular abnormality.     IMPRESSION:  CONCLUSION:  Old 4 mm avulsion inferior to the tip the lateral malleolus.  6 mm talar beak.     Electronically signed by:  Josef Deal MD  5/3/2019 3:29 PM CDT  Workstation: [employer]    Three view left foot     HISTORY: Left foot pain     AP, lateral and oblique views obtained.     COMPARISON: July 11, 2018     6 mm talar beak.  Degenerative change first cuneiform metatarsal joint, similar to  prior study.  No fracture or dislocation.  No other osseous or articular abnormality.     IMPRESSION:  CONCLUSION:  6 mm talar beak.  Degenerative change first cuneiform metatarsal joint, similar to  prior study.     Electronically signed by:  Josef Deal MD  5/3/2019 3:32 PM CDT  Workstation: [employer]      ASSESSMENT:    Diagnoses and all orders for this visit:    Left hip pain  -     MRI Pelvis Without Contrast; Future    Disorder of SI (sacroiliac) joint  -     MRI Pelvis Without Contrast; Future    Mobility impaired  -     MRI Pelvis Without Contrast; Future    Fall with injury, subsequent encounter  -     MRI Pelvis Without Contrast; Future    Acute left ankle pain    Frequent falls  -     MRI Pelvis Without Contrast; Future    PLAN    Patient continues to complain of chronic pain in her left SI joint and left hip.  She has tried and failed multiple conservative treatment efforts over the past few months including IM injections of Toradol and Kenalog, prescription oral NSAIDs, Tramadol, rest/activity modification, ice therapy, heat therapy and physical therapy.  Patient has been attending physical therapy at [facility] as recommended.  She reports no improvement with physical therapy.  Recommend MRI of pelvis to evaluate for SI joint disorders, SI joint injury and/or  pelvic fracture.  Patient does fall frequently for unknown reasons.  Recommend to continue with her current course of physical therapy.  Recommend to continue with modified weightbearing with use of her walker.  She should probably continue to use a walker even when she progresses to full weightbearing for improved balance due to her being a high risk for fall.  Continue Diclofenac daily for consistent dosing of oral NSAIDs.  Patient also has Tramadol at home that she can take as needed for worse pain, as prescribed by her primary care provider.  Continue with ice therapy and/or heat therapy to the left SI joint to minimize pain/inflammation.  Patient also complains of acute left ankle pain after a recent fall at home.  She has been evaluated by her primary care provider recently for that with x-rays done.  Recommend application of a stirrup splint/Aircast to the left ankle today for added support/stability.  Recommend to continue with modified weightbearing with use of her walker.  Recommend elevation and ice therapy to the left ankle as needed to minimize pain/swelling.  Patient has sustained multiple previous fractures and sprains to her ankles in the past.  We discussed that she may have chronic ligament injuries and instabilities.  Patient may need MRI of left ankle if she does not improve.  Plan to reassess the ankle at follow-up.  Follow-up after MRI of pelvis.     Return for follow up after MRI.        This document has been electronically signed by WESTON Yuen on May 21, 2019 4:59 PM      WESTON Yuen

## 2019-05-20 NOTE — PROGRESS NOTES
Subjective   Jyoti Riddle is a 58 y.o. female.     Here today for toño on her left foot and ankle.  She had fallen the last time I saw her and had re-injured her left foot and ankle.  She has had fractures in this extremity in the past.  She was advised to see her ortho which she did do this am.  She is going to start therapy and will cont to follow up with the ortho.      Pain   This is a recurrent (left foot and ankle) problem. The current episode started 1 to 4 weeks ago. The problem occurs constantly. The problem has been unchanged. Associated symptoms include arthralgias. Pertinent negatives include no abdominal pain, anorexia, change in bowel habit, chest pain, chills, congestion, coughing, diaphoresis, fatigue, fever, headaches, joint swelling, myalgias, nausea, neck pain, numbness, rash, sore throat, swollen glands, urinary symptoms, vertigo, visual change, vomiting or weakness. The symptoms are aggravated by walking and standing. Treatments tried: is in an air cast now and is receiving therapy. The treatment provided moderate relief.        The following portions of the patient's history were reviewed and updated as appropriate: allergies, current medications, past family history, past medical history, past social history, past surgical history and problem list.    Review of Systems   Constitutional: Negative.  Negative for chills, diaphoresis, fatigue and fever.   HENT: Negative.  Negative for congestion and sore throat.    Eyes: Negative.    Respiratory: Negative.  Negative for cough.    Cardiovascular: Negative.  Negative for chest pain.   Gastrointestinal: Negative.  Negative for abdominal pain, anorexia, change in bowel habit, nausea and vomiting.   Endocrine: Negative.    Genitourinary: Negative.    Musculoskeletal: Positive for arthralgias and gait problem. Negative for joint swelling, myalgias and neck pain.   Skin: Negative.  Negative for rash.   Allergic/Immunologic: Negative.     Neurological: Negative for vertigo, weakness and numbness.   Hematological: Negative.    Psychiatric/Behavioral: Negative.        Objective   Physical Exam   Constitutional: She is oriented to person, place, and time. She appears well-developed and well-nourished. No distress.   HENT:   Head: Normocephalic and atraumatic.   Mouth/Throat: No oropharyngeal exudate.   Eyes: Pupils are equal, round, and reactive to light.   Neck: Normal range of motion. Neck supple. No thyromegaly present.   Cardiovascular: Normal rate, regular rhythm and normal heart sounds. Exam reveals no friction rub.   No murmur heard.  Pulmonary/Chest: Effort normal and breath sounds normal. No respiratory distress. She has no wheezes. She has no rales.   Abdominal: Soft.   Musculoskeletal: Normal range of motion.   Has just mild swelling of left foot.  Has air cast in place and comes in seated in a wheelchair.   Neurological: She is alert and oriented to person, place, and time.   Skin: Skin is warm and dry.   Psychiatric: She has a normal mood and affect. Thought content normal.   Nursing note and vitals reviewed.        Assessment/Plan   Jyoti was seen today for pain.    Diagnoses and all orders for this visit:    Pain in joint involving left ankle and foot  -     traMADol (ULTRAM) 50 MG tablet; Take 1 tablet by mouth Every 8 (Eight) Hours As Needed for Moderate Pain .    Chronic left hip pain  -     acetaminophen (TYLENOL) 500 MG tablet; Take 1 tablet by mouth Every 6 (Six) Hours As Needed (hip pain).    Restless leg syndrome  -     rOPINIRole (REQUIP) 2 MG tablet; Take 1 tablet by mouth every night at bedtime.    Arthralgia, unspecified joint  -     cyclobenzaprine (FLEXERIL) 10 MG tablet; Take 1 tablet by mouth 3 (Three) Times a Day As Needed for Muscle Spasms.  -     diclofenac (VOLTAREN) 50 MG EC tablet; Take 1 tablet by mouth 2 (Two) Times a Day.    Essential hypertension  -     NIFEdipine XL (PROCARDIA XL) 30 MG 24 hr tablet; Take 1  tablet by mouth Daily.    Neuropathy  -     gabapentin (NEURONTIN) 800 MG tablet; Take 1 tablet by mouth 2 (Two) Times a Day.    High cholesterol  -     atorvastatin (LIPITOR) 40 MG tablet; Take 1 tablet by mouth Daily.    Acute URI  -     fluticasone (FLONASE) 50 MCG/ACT nasal spray; 2 sprays into the nostril(s) as directed by provider Daily.    Vitamin deficiency  -     folic acid (FOLVITE) 1 MG tablet; Take 1 tablet by mouth Daily.

## 2019-05-21 ENCOUNTER — HOSPITAL ENCOUNTER (OUTPATIENT)
Dept: PHYSICAL THERAPY | Facility: HOSPITAL | Age: 59
Setting detail: THERAPIES SERIES
Discharge: HOME | End: 2019-05-21

## 2019-05-21 DIAGNOSIS — M25.552 LEFT HIP PAIN: Primary | ICD-10-CM

## 2019-05-21 DIAGNOSIS — M53.3 DISORDER OF SI (SACROILIAC) JOINT: ICD-10-CM

## 2019-05-21 PROBLEM — M25.572 ACUTE LEFT ANKLE PAIN: Status: ACTIVE | Noted: 2019-05-21

## 2019-05-21 PROBLEM — R29.6 FREQUENT FALLS: Status: ACTIVE | Noted: 2019-05-21

## 2019-05-21 PROCEDURE — 97110 THERAPEUTIC EXERCISES: CPT

## 2019-05-21 NOTE — THERAPY TREATMENT NOTE
Outpatient Physical Therapy Ortho Treatment Note  [facility]     Patient Name: Jyoti Guerra Person  : 1960  MRN: [id number]  Today's Date: 2019      Visit Date: 2019    Visit   Return to MD: BRANDY  Re-cert date: 19  % improvement: ?      Visit Dx:    ICD-10-CM ICD-9-CM   1. Left hip pain M25.552 719.45   2. Disorder of SI (sacroiliac) joint M53.3 724.6       Patient Active Problem List   Diagnosis   • Vitamin D deficiency   • Vertigo   • Neuropathy   • Restless leg syndrome   • Pain in joint   • Allergic rhinitis   • Essential hypertension   • Abnormal EEG   • Cerebrovascular accident (CVA) due to thrombosis of left carotid artery (CMS/[facility])   • Illicit drug use   • Tobacco abuse   • Sleep apnea   • Neck pain   • Vitamin deficiency   • Acute otitis externa of right ear   • Generalized seizure disorder (CMS/[facility])   • Rotator cuff strain   • Acute pain of right shoulder   • Closed fracture of distal end of right fibula   • Right ankle pain   • Allergic reaction   • Left ankle pain   • Closed fracture of talus   • Hypotension   • Left leg pain   • Acute pain of left shoulder   • Closed nondisplaced fracture of lateral malleolus of left fibula   • Closed nondisplaced fracture of lateral malleolus of left fibula with routine healing   • Renal insufficiency   • ESR raised   • Pain   • Left foot pain   • Closed nondisplaced fracture of fifth left metatarsal bone   • Cause of injury, fall   • Ankle weakness   • Joint pain in fingers of left hand   • Joint pain in fingers of right hand   • Bilateral thumb pain   • Bilateral hand pain   • Numbness and tingling in both hands   • Bilateral carpal tunnel syndrome   • Bilateral arm pain   • Bilateral leg pain   • Decreased strength   • Acute URI   • Cough   • Left hip pain   • BMI 27.0-27.9,adult   • Disorder of SI (sacroiliac) joint   • Mobility impaired        Past Medical History:   Diagnosis Date   • Acute bronchitis 2016   •  Acute otitis externa 09/04/2014   • Acute otitis media 02/10/2015   • Acute sinusitis 07/19/2016   • Ankle injury    • Backache 12/08/2014   • Benign hypertension 05/13/2016   • Cerebrovascular accident (CMS/HCC) 2015   • COPD (chronic obstructive pulmonary disease) (CMS/HCC)    • Depression    • Dizziness 02/10/2015   • Encounter for gynecological examination without abnormal finding 03/10/2016   • Fatigue 08/28/2015   • Foot pain 06/09/2014   • Hyperkeratosis 04/28/2014   • Hypotensive episode 06/12/2015   • Keratoma 08/04/2014    intractable plantar   • Low blood pressure 08/28/2015   • Menopausal and perimenopausal disorder 03/10/2016    other specified   • Numbness of face 05/13/2016    improved   • Otitis media of right ear 07/30/2016    unspecified   • Perforated tympanic membrane 09/04/2014   • Puncture wound 06/17/2014    injury   • Right-sided face pain 05/13/2016   • Seizure (CMS/HCC)    • Sleep apnea    • Thumb pain 08/28/2015   • Upper respiratory infection 08/28/2015   • Wheezing 10/21/2014        Past Surgical History:   Procedure Laterality Date   • BREAST BIOPSY     • OTHER SURGICAL HISTORY  07/07/2014    Destruction of Benign Lesion (1-14)   • TUBAL ABDOMINAL LIGATION         PT Ortho     Row Name 05/21/19 1400       General ROM    GENERAL ROM COMMENTS  L hip AROM flex 90°  -JW    Row Name 05/21/19 1300       Posture/Observations    Posture/Observations Comments  Amb Ind.   -      User Key  (r) = Recorded By, (t) = Taken By, (c) = Cosigned By    Initials Name Provider Type    Jose Dumont, PTA Physical Therapy Assistant                      PT Assessment/Plan     Row Name 05/21/19 1400          PT Assessment    Assessment Comments  Increased L hip AROM measurement today. Mod unsteadiness with Ind amb. SBA with pt amb in clinic. Advised pt to bring FWW next visit.   -MICHAEL        PT Plan    PT Frequency  2x/week  -MICHAEL     Predicted Duration of Therapy Intervention (Therapy Eval)  3 weeks  -MICHAEL   "   PT Plan Comments  Recheck  -JW       User Key  (r) = Recorded By, (t) = Taken By, (c) = Cosigned By    Initials Name Provider Type    Jose Dumont PTA Physical Therapy Assistant            Exercises     Row Name 05/21/19 1300             Subjective Comments    Subjective Comments  Pt c/o B hip pain. She reports amb into clinic Ind secondary to \" he wouldn't get the walker out of the back \".   -JW         Subjective Pain    Able to rate subjective pain?  yes  -JW      Pre-Treatment Pain Level  8  -JW      Post-Treatment Pain Level  -- decreased  -JW         Exercise 1    Exercise Name 1  PROII  -JW      Time 1  8'  -JW      Additional Comments  L4  -JW         Exercise 2    Exercise Name 2  R SKC  -JW      Sets 2  2  -JW      Time 2  30\"  -JW         Exercise 3    Exercise Name 3  BKLL  -JW      Reps 3  10x ea  -JW         Exercise 4    Exercise Name 4  SLR  -JW      Sets 4  2  -JW      Reps 4  10  -JW         Exercise 5    Exercise Name 5  GS  -JW      Sets 5  2  -JW      Reps 5  10  -JW         Exercise 6    Exercise Name 6  Sahrmanns. level 2  -JW      Sets 6  2  -JW      Reps 6  10  -JW         Exercise 7    Exercise Name 7  S/L hip abd  -JW      Sets 7  2  -JW      Reps 7  10  -JW         Exercise 8    Exercise Name 8  Prone B TKE with bolster   -JW      Reps 8  20  -JW         Exercise 9    Exercise Name 9  Seated trunk ext  -JW      Reps 9  25  -JW      Additional Comments  grey tb  -JW         Exercise 10    Exercise Name 10  Seated core anti rotation isometric  -JW      Reps 10  10x ea  -JW      Additional Comments  grey tb  -JW        User Key  (r) = Recorded By, (t) = Taken By, (c) = Cosigned By    Initials Name Provider Type    Jose Dumont PTA Physical Therapy Assistant                       PT OP Goals     Row Name 05/21/19 1507 05/21/19 1400       PT Short Term Goals    STG Date to Achieve  --  05/20/19  -JW    STG 1  --  Pt independent with HEP.  -JW    STG 1 Progress  --  Ongoing  " -    STG 2  --  Improve L hip flex AROM to 105°  -[facility] 2 Progress  --  Ongoing  -    STG 3  --  Improve L hip abduction AROM to 25°  -[facility] 3 Progress  --  Ongoing  -    STG 4  --  Improve L hip extension MMT to 4/5 or greater  -[facility] 4 Progress  --  Ongoing  -    STG 5  --  Reduce L hip pain and SI region pain by 50% with sit to stand transfers  -[facility] 5 Progress  --  Ongoing  -    STG 6  --  Reduce L hip and SI region pain by 50% while walking short distances  -[facility] 6 Progress  --  Ongoing  -       Time Calculation    PT Goal Re-Cert Due Date  05/23/19  -  --      User Key  (r) = Recorded By, (t) = Taken By, (c) = Cosigned By    Initials Name Provider Type    Jose Dumont PTA Physical Therapy Assistant                         Time Calculation:   Start Time: 1350  Stop Time: 1445  Time Calculation (min): 55 min  Total Timed Code Minutes- PT: 55 minute(s)  Therapy Charges for Today     Code Description Service Date Service Provider Modifiers Qty    [id number] HC PT THER PROC EA 15 MIN 5/21/2019 Jose Jean PTA GP 4                    Jose Jean PTA  5/21/2019

## 2019-05-28 ENCOUNTER — HOSPITAL ENCOUNTER (OUTPATIENT)
Dept: PHYSICAL THERAPY | Facility: HOSPITAL | Age: 59
Setting detail: THERAPIES SERIES
Discharge: HOME | End: 2019-05-28

## 2019-05-28 DIAGNOSIS — M53.3 DISORDER OF SI (SACROILIAC) JOINT: ICD-10-CM

## 2019-05-28 DIAGNOSIS — M25.552 LEFT HIP PAIN: Primary | ICD-10-CM

## 2019-05-28 PROCEDURE — 97110 THERAPEUTIC EXERCISES: CPT | Performed by: PHYSICAL THERAPIST

## 2019-05-28 NOTE — THERAPY PROGRESS REPORT/RE-CERT
Outpatient Physical Therapy Ortho Progress Note  [facility]     Patient Name: Jyoti Guerra Person  : 1960  MRN: [id number]  Today's Date: 2019      Visit Date: 2019  Visit   Return to MD: BRANDY  Re-cert date: 19  % improvement: 10      Patient Active Problem List   Diagnosis   • Vitamin D deficiency   • Vertigo   • Neuropathy   • Restless leg syndrome   • Pain in joint   • Allergic rhinitis   • Essential hypertension   • Abnormal EEG   • Cerebrovascular accident (CVA) due to thrombosis of left carotid artery (CMS/HCC)   • Illicit drug use   • Tobacco abuse   • Sleep apnea   • Neck pain   • Vitamin deficiency   • Acute otitis externa of right ear   • Generalized seizure disorder (CMS/HCC)   • Rotator cuff strain   • Acute pain of right shoulder   • Closed fracture of distal end of right fibula   • Right ankle pain   • Allergic reaction   • Left ankle pain   • Closed fracture of talus   • Hypotension   • Left leg pain   • Acute pain of left shoulder   • Closed nondisplaced fracture of lateral malleolus of left fibula   • Closed nondisplaced fracture of lateral malleolus of left fibula with routine healing   • Renal insufficiency   • ESR raised   • Pain   • Left foot pain   • Closed nondisplaced fracture of fifth left metatarsal bone   • Cause of injury, fall   • Ankle weakness   • Joint pain in fingers of left hand   • Joint pain in fingers of right hand   • Bilateral thumb pain   • Bilateral hand pain   • Numbness and tingling in both hands   • Bilateral carpal tunnel syndrome   • Bilateral arm pain   • Bilateral leg pain   • Decreased strength   • Acute URI   • Cough   • Left hip pain   • BMI 27.0-27.9,adult   • Disorder of SI (sacroiliac) joint   • Mobility impaired   • Acute left ankle pain   • Frequent falls        Past Medical History:   Diagnosis Date   • Acute bronchitis 2016   • Acute otitis externa 2014   • Acute otitis media 02/10/2015   • Acute sinusitis  07/19/2016   • Ankle injury    • Backache 12/08/2014   • Benign hypertension 05/13/2016   • Cerebrovascular accident (CMS/HCC) 2015   • COPD (chronic obstructive pulmonary disease) (CMS/HCC)    • Depression    • Dizziness 02/10/2015   • Encounter for gynecological examination without abnormal finding 03/10/2016   • Fatigue 08/28/2015   • Foot pain 06/09/2014   • Hyperkeratosis 04/28/2014   • Hypotensive episode 06/12/2015   • Keratoma 08/04/2014    intractable plantar   • Low blood pressure 08/28/2015   • Menopausal and perimenopausal disorder 03/10/2016    other specified   • Numbness of face 05/13/2016    improved   • Otitis media of right ear 07/30/2016    unspecified   • Perforated tympanic membrane 09/04/2014   • Puncture wound 06/17/2014    injury   • Right-sided face pain 05/13/2016   • Seizure (CMS/HCC)    • Sleep apnea    • Thumb pain 08/28/2015   • Upper respiratory infection 08/28/2015   • Wheezing 10/21/2014        Past Surgical History:   Procedure Laterality Date   • BREAST BIOPSY     • OTHER SURGICAL HISTORY  07/07/2014    Destruction of Benign Lesion (1-14)   • TUBAL ABDOMINAL LIGATION         Visit Dx:     ICD-10-CM ICD-9-CM   1. Left hip pain M25.552 719.45   2. Disorder of SI (sacroiliac) joint M53.3 724.6             PT Ortho     Row Name 05/28/19 1700       Precautions and Contraindications    Precautions/Limitations  --  -BS       General ROM    GENERAL ROM COMMENTS  AROM: L hip flex 96° L hip abduction 18°  -BS    Row Name 05/28/19 1655       Precautions and Contraindications    Precautions/Limitations  fall precautions  -BS      User Key  (r) = Recorded By, (t) = Taken By, (c) = Cosigned By    Initials Name Provider Type    Solis Alexander, PT Physical Therapist                      Therapy Education  Education Details: HEP: sit to stand, no arms  Given: HEP  Program: New, Reinforced  How Provided: Verbal, Demonstration, Written  Provided to: Patient  Level of Understanding: Verbalized,  Teach back education performed, Demonstrated     PT OP Goals     Row Name 05/28/19 1700 05/28/19 1655       PT Short Term Goals    STG Date to Achieve  05/20/19  -BS  --    STG 1  Pt independent with HEP.  -BS  --    STG 1 Progress  Ongoing  -BS  --    STG 2  Improve L hip flex AROM to 105°  -BS  --    STG 2 Progress  Ongoing  -BS  --    STG 3  Improve L hip abduction AROM to 25°  -BS  --    STG 3 Progress  Ongoing  -BS  --    STG 4  Improve L hip extension MMT to 3+/5 or greater  -BS  --    STG 4 Progress  Ongoing;Goal Revised  -BS  --    STG 5  Reduce L hip pain and SI region pain by 50% with sit to stand transfers  -BS  --    STG 5 Progress  Ongoing;Progressing  -BS  --    STG 5 Progress Comments  10%  -BS  --    STG 6  Reduce L hip and SI region pain by 50% while walking short distances  -BS  --    STG 6 Progress  Ongoing  -BS  --       Time Calculation    PT Goal Re-Cert Due Date  --  -BS  06/18/19  -BS      User Key  (r) = Recorded By, (t) = Taken By, (c) = Cosigned By    Initials Name Provider Type    BS Solis Barrientos, PT Physical Therapist          PT Assessment/Plan     Row Name 05/28/19 1700          PT Assessment    Functional Limitations  Impaired gait;Limitation in home management;Limitations in community activities;Performance in self-care ADL;Performance in work activities;Performance in sport activities;Performance in leisure activities  -BS     Impairments  Gait;Balance;Pain;Range of motion;Poor body mechanics;Endurance  -BS     Assessment Comments  slowly improving toward goals. Limited by pain and ROM restrictions as well as severe glut/hip extensor weaknesss (2+/5 L hip extension MMT).  -BS     Please refer to paper survey for additional self-reported information  Yes  -BS     Rehab Potential  Fair  -BS     Patient/caregiver participated in establishment of treatment plan and goals  Yes  -BS     Patient would benefit from skilled therapy intervention  Yes  -BS        PT Plan    PT Frequency   2x/week  -BS     Predicted Duration of Therapy Intervention (Therapy Eval)  3 weeks  -BS     Planned CPT's?  PT EVAL MOD COMPLELITY: 10698;PT RE-EVAL: 41110;PT THER PROC EA 15 MIN: 10186;PT THER ACT EA 15 MIN: 55332;PT MANUAL THERAPY EA 15 MIN: 33631;PT NEUROMUSC RE-EDUCATION EA 15 MIN: 38955;PT ELECTRICAL STIM UNATTEND: ;PT HOT/COLD PACK WC NONMCARE: 14135;PT THER SUPP EA 15 MIN  -BS     Physical Therapy Interventions (Optional Details)  balance training;home exercise program;joint mobilization;lumbar stabilization;manual therapy techniques;modalities;neuromuscular re-education;patient/family education;strengthening;stretching;transfer training  -BS     PT Plan Comments  improve hip extension strength, step ups w/ multifidi recruitment  -BS       User Key  (r) = Recorded By, (t) = Taken By, (c) = Cosigned By    Initials Name Provider Type    BS Solis Barrientos, PT Physical Therapist            Exercises     Row Name 05/28/19 1700 05/28/19 1771          Subjective Comments    Subjective Comments  --  -BS  pt reports new onset spasms throughout her body. Has worse L hip pain rising from a chair. Overall, 10% improvement.   -BS        Subjective Pain    Able to rate subjective pain?  --  -BS  yes  -BS     Pre-Treatment Pain Level  --  -BS  7  -BS     Post-Treatment Pain Level  --  -BS  5  -BS        Exercise 1    Exercise Name 1  --  PROII  -BS     Time 1  --  8'  -BS     Additional Comments  --  L3.0  -BS        Exercise 2    Exercise Name 2  --  LTR  -BS     Reps 2  --  10x ea  -BS        Exercise 3    Exercise Name 3  --  BKLL  -BS     Reps 3  --  10x ea  -BS        Exercise 4    Exercise Name 4  --  SLR  -BS     Sets 4  --  1  -BS     Reps 4  --  10  -BS        Exercise 5    Exercise Name 5  --  prone hip extension L  -BS     Time 5  --  unable to clear table due to weakness  -BS        Exercise 6    Exercise Name 6  --  sit to stand, arms crossed  -BS     Sets 6  --  1  -BS     Reps 6  --  10  -BS         Exercise 7    Exercise Name 7  --  ROM/MMT reassessment  -BS     Time 7  --  2 minutes  -BS       User Key  (r) = Recorded By, (t) = Taken By, (c) = Cosigned By    Initials Name Provider Type    Solis Alexander, PT Physical Therapist                        Outcome Measure Options: Lower Extremity Functional Scale (LEFS)  Lower Extremity Functional Index  Any of your usual work, housework or school activities: Quite a bit of difficulty  Your usual hobbies, recreational or sporting activities: Quite a bit of difficulty  Getting into or out of the bath: A little bit of difficulty  Walking between rooms: No difficulty  Putting on your shoes or socks: No difficulty  Squatting: Extreme difficulty or unable to perform activity  Lifting an object, like a bag of groceries from the floor: Moderate difficulty  Performing light activities around your home: Moderate difficulty  Performing heavy activities around your home: Extreme difficulty or unable to perform activity  Getting into or out of a car: Moderate difficulty  Walking 2 blocks: Extreme difficulty or unable to perform activity  Walking a mile: Extreme difficulty or unable to perform activity  Going up or down 10 stairs (about 1 flight of stairs): Extreme difficulty or unable to perform activity  Standing for 1 hour: Extreme difficulty or unable to perform activity  Sitting for 1 hour: No difficulty  Running on even ground: Extreme difficulty or unable to perform activity  Running on uneven ground: Extreme difficulty or unable to perform activity  Making sharp turns while running fast: Extreme difficulty or unable to perform activity  Hopping: Extreme difficulty or unable to perform activity  Rolling over in bed: Moderate difficulty  Total: 25      Time Calculation:     Start Time: 1655  Stop Time: 1737  Time Calculation (min): 42 min  Total Timed Code Minutes- PT: 42 minute(s)     Therapy Charges for Today     Code Description Service Date Service Provider Modifiers  Qty    98109586478  PT THER PROC EA 15 MIN 5/28/2019 Solis Barrientos, PT GP 3          PT G-Codes  Outcome Measure Options: Lower Extremity Functional Scale (LEFS)  Total: 25         Solis Barrientos, PT  5/28/2019

## 2019-06-03 ENCOUNTER — HOSPITAL ENCOUNTER (OUTPATIENT)
Dept: PHYSICAL THERAPY | Facility: HOSPITAL | Age: 59
Setting detail: THERAPIES SERIES
Discharge: HOME | End: 2019-06-03

## 2019-06-03 DIAGNOSIS — M53.3 DISORDER OF SI (SACROILIAC) JOINT: ICD-10-CM

## 2019-06-03 DIAGNOSIS — M25.552 LEFT HIP PAIN: ICD-10-CM

## 2019-06-03 PROCEDURE — 97110 THERAPEUTIC EXERCISES: CPT | Performed by: PHYSICAL THERAPIST

## 2019-06-03 NOTE — THERAPY TREATMENT NOTE
Outpatient Physical Therapy Ortho Treatment Note  [facility]     Patient Name: Jyoti Guerra Person  : 1960  MRN: [id number]  Today's Date: 6/3/2019      Visit Date: 2019  Visit   Return to MD: BRANDY  Re-cert date: 19  % improvement: 10%      Visit Dx:    ICD-10-CM ICD-9-CM   1. Left hip pain M25.552 719.45   2. Disorder of SI (sacroiliac) joint M53.3 724.6       Patient Active Problem List   Diagnosis   • Vitamin D deficiency   • Vertigo   • Neuropathy   • Restless leg syndrome   • Pain in joint   • Allergic rhinitis   • Essential hypertension   • Abnormal EEG   • Cerebrovascular accident (CVA) due to thrombosis of left carotid artery (CMS/[facility])   • Illicit drug use   • Tobacco abuse   • Sleep apnea   • Neck pain   • Vitamin deficiency   • Acute otitis externa of right ear   • Generalized seizure disorder (CMS/HCC)   • Rotator cuff strain   • Acute pain of right shoulder   • Closed fracture of distal end of right fibula   • Right ankle pain   • Allergic reaction   • Left ankle pain   • Closed fracture of talus   • Hypotension   • Left leg pain   • Acute pain of left shoulder   • Closed nondisplaced fracture of lateral malleolus of left fibula   • Closed nondisplaced fracture of lateral malleolus of left fibula with routine healing   • Renal insufficiency   • ESR raised   • Pain   • Left foot pain   • Closed nondisplaced fracture of fifth left metatarsal bone   • Cause of injury, fall   • Ankle weakness   • Joint pain in fingers of left hand   • Joint pain in fingers of right hand   • Bilateral thumb pain   • Bilateral hand pain   • Numbness and tingling in both hands   • Bilateral carpal tunnel syndrome   • Bilateral arm pain   • Bilateral leg pain   • Decreased strength   • Acute URI   • Cough   • Left hip pain   • BMI 27.0-27.9,adult   • Disorder of SI (sacroiliac) joint   • Mobility impaired   • Acute left ankle pain   • Frequent falls        Past Medical History:    Diagnosis Date   • Acute bronchitis 07/19/2016   • Acute otitis externa 09/04/2014   • Acute otitis media 02/10/2015   • Acute sinusitis 07/19/2016   • Ankle injury    • Backache 12/08/2014   • Benign hypertension 05/13/2016   • Cerebrovascular accident (CMS/HCC) 2015   • COPD (chronic obstructive pulmonary disease) (CMS/HCC)    • Depression    • Dizziness 02/10/2015   • Encounter for gynecological examination without abnormal finding 03/10/2016   • Fatigue 08/28/2015   • Foot pain 06/09/2014   • Hyperkeratosis 04/28/2014   • Hypotensive episode 06/12/2015   • Keratoma 08/04/2014    intractable plantar   • Low blood pressure 08/28/2015   • Menopausal and perimenopausal disorder 03/10/2016    other specified   • Numbness of face 05/13/2016    improved   • Otitis media of right ear 07/30/2016    unspecified   • Perforated tympanic membrane 09/04/2014   • Puncture wound 06/17/2014    injury   • Right-sided face pain 05/13/2016   • Seizure (CMS/HCC)    • Sleep apnea    • Thumb pain 08/28/2015   • Upper respiratory infection 08/28/2015   • Wheezing 10/21/2014        Past Surgical History:   Procedure Laterality Date   • BREAST BIOPSY     • OTHER SURGICAL HISTORY  07/07/2014    Destruction of Benign Lesion (1-14)   • TUBAL ABDOMINAL LIGATION         PT Ortho     Row Name 06/03/19 1440       Subjective Comments    Subjective Comments  pt reports increased L hip pain after last session with introduction of new ex's in the clinic. Wears a brace with L ankle and it helps relieve her L ankle symptoms from old ankle fracture last year.   -BS       Precautions and Contraindications    Precautions/Limitations  fall precautions  -BS       Subjective Pain    Able to rate subjective pain?  yes  -BS    Pre-Treatment Pain Level  5  -BS    Post-Treatment Pain Level  5  -BS    Subjective Pain Comment  L hip, 0/10 SI region  -BS      User Key  (r) = Recorded By, (t) = Taken By, (c) = Cosigned By    Initials Name Provider Type    BS  "Solis Barrientos, PT Physical Therapist                      PT Assessment/Plan     Row Name 06/03/19 1440          PT Assessment    Assessment Comments  limited by L hip pain, L hip weakness/ROM deficits. High fall risk as pt is very impulsive, lacks insight into deficits and concedes to falling at least once weekly at home. One LOB episode today in the clinic and pt able to self correct with andre from PT to regain her balance.   -BS        PT Plan    PT Frequency  2x/week  -BS     Predicted Duration of Therapy Intervention (Therapy Eval)  3 weeks  -BS     PT Plan Comments  continue w/ L hip strengthening. Add bridges and shuttle to poc.  -BS       User Key  (r) = Recorded By, (t) = Taken By, (c) = Cosigned By    Initials Name Provider Type    BS Solis Barrientos, PT Physical Therapist            Exercises     Row Name 06/03/19 1440             Subjective Comments    Subjective Comments  pt reports increased L hip pain after last session with introduction of new ex's in the clinic. Wears a brace with L ankle and it helps relieve her L ankle symptoms from old ankle fracture last year.   -BS         Subjective Pain    Able to rate subjective pain?  yes  -BS      Pre-Treatment Pain Level  5  -BS      Post-Treatment Pain Level  5  -BS      Subjective Pain Comment  L hip, 0/10 SI region  -BS         Exercise 1    Exercise Name 1  PROII  -BS      Time 1  10'  -BS      Additional Comments  L 3.0  -BS         Exercise 2    Exercise Name 2  step ups, 4\"   -BS      Sets 2  1  -BS      Reps 2  10  -BS         Exercise 3    Exercise Name 3  sit to stand  -BS      Sets 3  1  -BS      Reps 3  10  -BS      Time 3  w/ RW  -BS         Exercise 4    Exercise Name 4  sidestepping in // bars  -BS      Time 4  2 laps  -BS         Exercise 5    Exercise Name 5  backward walking in // bars  -BS      Time 5  2 laps  -BS         Exercise 6    Exercise Name 6  standing hip ext/abd in // bars  -BS      Sets 6  1  -BS      Reps 6  10  -BS      "   User Key  (r) = Recorded By, (t) = Taken By, (c) = Cosigned By    Initials Name Provider Type    Solis Alexander, PT Physical Therapist                       PT OP Goals     Row Name 06/03/19 1440          PT Short Term Goals    STG Date to Achieve  05/20/19  -BS     STG 1  Pt independent with HEP.  -BS     STG 1 Progress  Ongoing  -BS     STG 2  Improve L hip flex AROM to 105°  -BS     STG 2 Progress  Ongoing  -BS     STG 3  Improve L hip abduction AROM to 25°  -BS     STG 3 Progress  Ongoing  -BS     STG 4  Improve L hip extension MMT to 3+/5 or greater  -BS     STG 4 Progress  Ongoing;Goal Revised  -BS     STG 5  Reduce L hip pain and SI region pain by 50% with sit to stand transfers  -BS     STG 5 Progress  Ongoing;Progressing  -BS     STG 6  Reduce L hip and SI region pain by 50% while walking short distances  -BS     STG 6 Progress  Ongoing  -BS        Time Calculation    PT Goal Re-Cert Due Date  06/18/19  -BS       User Key  (r) = Recorded By, (t) = Taken By, (c) = Cosigned By    Initials Name Provider Type    Solis Alexander PT Physical Therapist          Therapy Education  Education Details: HEP: standing hip abd/ext @ // bars, bwd walking and sidestepping in // bars  Given: HEP  Program: New, Reinforced  How Provided: Verbal, Demonstration, Written  Provided to: Patient  Level of Understanding: Verbalized, Teach back education performed, Demonstrated              Time Calculation:   Start Time: 1440  Stop Time: 1519  Time Calculation (min): 39 min  Total Timed Code Minutes- PT: 39 minute(s)  Therapy Charges for Today     Code Description Service Date Service Provider Modifiers Qty    [id number] HC PT THER PROC EA 15 MIN 6/3/2019 Solis Barrientos, PT GP 3                    Solis Barrientos, PT  6/3/2019

## 2019-06-05 ENCOUNTER — HOSPITAL ENCOUNTER (OUTPATIENT)
Dept: PHYSICAL THERAPY | Facility: HOSPITAL | Age: 59
Setting detail: THERAPIES SERIES
Discharge: HOME | End: 2019-06-05

## 2019-06-05 DIAGNOSIS — M25.552 LEFT HIP PAIN: Primary | ICD-10-CM

## 2019-06-05 DIAGNOSIS — M53.3 DISORDER OF SI (SACROILIAC) JOINT: ICD-10-CM

## 2019-06-05 PROCEDURE — 97110 THERAPEUTIC EXERCISES: CPT

## 2019-06-05 NOTE — THERAPY TREATMENT NOTE
Outpatient Physical Therapy Ortho Treatment Note  [facility]     Patient Name: Jyoti Guerra Person  : 1960  MRN: [id number]  Today's Date: 2019      Visit Date: 2019  Pt reports 6/10 pain pre treatment, 6/10 pain post treatment  Reports 0% of improvement.  Attended 8/9 visits.  Insurance available: 16 visits approved  Next MD appt: TBKIMBERLY .  Recertification: 2019.  Visit Dx:    ICD-10-CM ICD-9-CM   1. Left hip pain M25.552 719.45   2. Disorder of SI (sacroiliac) joint M53.3 724.6       Patient Active Problem List   Diagnosis   • Vitamin D deficiency   • Vertigo   • Neuropathy   • Restless leg syndrome   • Pain in joint   • Allergic rhinitis   • Essential hypertension   • Abnormal EEG   • Cerebrovascular accident (CVA) due to thrombosis of left carotid artery (CMS/HCC)   • Illicit drug use   • Tobacco abuse   • Sleep apnea   • Neck pain   • Vitamin deficiency   • Acute otitis externa of right ear   • Generalized seizure disorder (CMS/HCC)   • Rotator cuff strain   • Acute pain of right shoulder   • Closed fracture of distal end of right fibula   • Right ankle pain   • Allergic reaction   • Left ankle pain   • Closed fracture of talus   • Hypotension   • Left leg pain   • Acute pain of left shoulder   • Closed nondisplaced fracture of lateral malleolus of left fibula   • Closed nondisplaced fracture of lateral malleolus of left fibula with routine healing   • Renal insufficiency   • ESR raised   • Pain   • Left foot pain   • Closed nondisplaced fracture of fifth left metatarsal bone   • Cause of injury, fall   • Ankle weakness   • Joint pain in fingers of left hand   • Joint pain in fingers of right hand   • Bilateral thumb pain   • Bilateral hand pain   • Numbness and tingling in both hands   • Bilateral carpal tunnel syndrome   • Bilateral arm pain   • Bilateral leg pain   • Decreased strength   • Acute URI   • Cough   • Left hip pain   • BMI 27.0-27.9,adult   • Disorder of SI  (sacroiliac) joint   • Mobility impaired   • Acute left ankle pain   • Frequent falls        Past Medical History:   Diagnosis Date   • Acute bronchitis 07/19/2016   • Acute otitis externa 09/04/2014   • Acute otitis media 02/10/2015   • Acute sinusitis 07/19/2016   • Ankle injury    • Backache 12/08/2014   • Benign hypertension 05/13/2016   • Cerebrovascular accident (CMS/HCC) 2015   • COPD (chronic obstructive pulmonary disease) (CMS/HCC)    • Depression    • Dizziness 02/10/2015   • Encounter for gynecological examination without abnormal finding 03/10/2016   • Fatigue 08/28/2015   • Foot pain 06/09/2014   • Hyperkeratosis 04/28/2014   • Hypotensive episode 06/12/2015   • Keratoma 08/04/2014    intractable plantar   • Low blood pressure 08/28/2015   • Menopausal and perimenopausal disorder 03/10/2016    other specified   • Numbness of face 05/13/2016    improved   • Otitis media of right ear 07/30/2016    unspecified   • Perforated tympanic membrane 09/04/2014   • Puncture wound 06/17/2014    injury   • Right-sided face pain 05/13/2016   • Seizure (CMS/HCC)    • Sleep apnea    • Thumb pain 08/28/2015   • Upper respiratory infection 08/28/2015   • Wheezing 10/21/2014        Past Surgical History:   Procedure Laterality Date   • BREAST BIOPSY     • OTHER SURGICAL HISTORY  07/07/2014    Destruction of Benign Lesion (1-14)   • TUBAL ABDOMINAL LIGATION         PT Ortho     Row Name 06/05/19 1500       Precautions and Contraindications    Precautions/Limitations  fall precautions  -TL       Subjective Pain    Able to rate subjective pain?  yes  -TL    Pre-Treatment Pain Level  4  -TL    Row Name 06/03/19 1440       Subjective Comments    Subjective Comments  pt reports increased L hip pain after last session with introduction of new ex's in the clinic. Wears a brace with L ankle and it helps relieve her L ankle symptoms from old ankle fracture last year.   -BS       Precautions and Contraindications     Precautions/Limitations  fall precautions  -BS       Subjective Pain    Able to rate subjective pain?  yes  -BS    Pre-Treatment Pain Level  5  -BS    Post-Treatment Pain Level  5  -BS    Subjective Pain Comment  L hip, 0/10 SI region  -BS      User Key  (r) = Recorded By, (t) = Taken By, (c) = Cosigned By    Initials Name Provider Type    BS Solis Barrientos, PT Physical Therapist    Rocio Marshall PTA Physical Therapy Assistant                      PT Assessment/Plan     Row Name 06/05/19 1500          PT Assessment    Assessment Comments  Pt tolerated new ex of bridges and SLR fwd/SL. Pt did not have any trouble performing shuttle leg press. Pt did require cues not to hyperextend knee with shuttle press. No new goals met at this time.  -TL        PT Plan    PT Frequency  2x/week  -TL     Predicted Duration of Therapy Intervention (Therapy Eval)  3 weeks  -TL     PT Plan Comments  add prone SLR next visit  -TL       User Key  (r) = Recorded By, (t) = Taken By, (c) = Cosigned By    Initials Name Provider Type    Rocio Marshall PTA Physical Therapy Assistant          Modalities     Row Name 06/05/19 1500             Ice    Patient denies application of Ice  Yes  -TL        User Key  (r) = Recorded By, (t) = Taken By, (c) = Cosigned By    Initials Name Provider Type    Rocio Marshall PTA Physical Therapy Assistant        Exercises     Row Name 06/05/19 1500             Subjective Comments    Subjective Comments  Pt stated that she doing alright today.  -TL         Subjective Pain    Able to rate subjective pain?  yes  -TL      Pre-Treatment Pain Level  4  -TL      Post-Treatment Pain Level  4  -TL         Exercise 1    Exercise Name 1  shuttle leg press 2L  -TL      Reps 1  20  -TL         Exercise 2    Exercise Name 2  shuttle leg press 1L  -TL      Reps 2  20  -TL         Exercise 3    Exercise Name 3  sit to stands  -TL      Reps 3  20  -TL      Additional Comments  cues push up from chair  -TL          Exercise 4    Exercise Name 4  Bridges  -TL      Sets 4  2  -TL      Reps 4  10  -TL      Time 4  5 sec hold  -TL         Exercise 5    Exercise Name 5  SLR  -TL      Cueing 5  Tactile;Demo  -TL      Sets 5  2  -TL      Reps 5  10  -TL         Exercise 6    Exercise Name 6  SL SLR hip abd  -TL      Sets 6  2  -TL      Reps 6  10  -TL         Exercise 7    Exercise Name 7  Prone on elbows  -TL      Time 7  5 mins  -TL         Exercise 8    Exercise Name 8  pro ll legs  -TL      Time 8  10 mins  -TL      Additional Comments  level 4  -TL        User Key  (r) = Recorded By, (t) = Taken By, (c) = Cosigned By    Initials Name Provider Type    TL Rocio Kaye PTA Physical Therapy Assistant                       PT OP Goals     Row Name 06/05/19 1500          PT Short Term Goals    STG Date to Achieve  05/20/19  -TL     STG 1  Pt independent with HEP.  -TL     STG 1 Progress  Ongoing  -TL     STG 2  Improve L hip flex AROM to 105°  -TL     STG 2 Progress  Ongoing  -TL     STG 3  Improve L hip abduction AROM to 25°  -TL     STG 3 Progress  Ongoing  -TL     STG 4  Improve L hip extension MMT to 3+/5 or greater  -TL     STG 4 Progress  Ongoing;Goal Revised  -TL     STG 5  Reduce L hip pain and SI region pain by 50% with sit to stand transfers  -TL     STG 5 Progress  Ongoing;Progressing  -TL     STG 6  Reduce L hip and SI region pain by 50% while walking short distances  -TL     STG 6 Progress  Ongoing  -TL        Time Calculation    PT Goal Re-Cert Due Date  06/18/19  -TL       User Key  (r) = Recorded By, (t) = Taken By, (c) = Cosigned By    Initials Name Provider Type    Rocio Marshall PTA Physical Therapy Assistant          Therapy Education  Education Details: LYDIA,SL SLR hip abd, SLR fwd  Given: HEP, Symptoms/condition management, Pain management, Posture/body mechanics  Program: New, Reinforced  How Provided: Verbal, Demonstration, Written  Provided to: Patient  Level of Understanding: Verbalized,  Demonstrated              Time Calculation:   Start Time: 1521  Stop Time: 1605  Time Calculation (min): 44 min  Total Timed Code Minutes- PT: 44 minute(s)  Therapy Charges for Today     Code Description Service Date Service Provider Modifiers Qty    47169451022 HC PT THER PROC EA 15 MIN 6/5/2019 Rocio Kaye, PTA GP 3                    Rocio Kaye, LIDIA  6/5/2019

## 2019-06-10 ENCOUNTER — HOSPITAL ENCOUNTER (OUTPATIENT)
Dept: PHYSICAL THERAPY | Facility: HOSPITAL | Age: 59
Setting detail: THERAPIES SERIES
Discharge: HOME | End: 2019-06-10

## 2019-06-10 DIAGNOSIS — M53.3 DISORDER OF SI (SACROILIAC) JOINT: ICD-10-CM

## 2019-06-10 DIAGNOSIS — M25.552 LEFT HIP PAIN: Primary | ICD-10-CM

## 2019-06-10 PROCEDURE — 97110 THERAPEUTIC EXERCISES: CPT

## 2019-06-10 NOTE — THERAPY TREATMENT NOTE
"    Outpatient Physical Therapy Ortho Treatment Note  [facility]     Patient Name: Jyoti Guerra Person  : 1960  MRN: [id number]  Today's Date: 6/10/2019      Visit Date: 06/10/2019  Pt reports 8/10 pain pre treatment,3/10 pain post treatment  Reports \"some\"% of improvement.  Attended 9/10 visits.  Insurance available:16 visits approved   Next MD appt:  TBKIMBERLY .  Recertification: 2019.  Visit Dx:    ICD-10-CM ICD-9-CM   1. Left hip pain M25.552 719.45   2. Disorder of SI (sacroiliac) joint M53.3 724.6       Patient Active Problem List   Diagnosis   • Vitamin D deficiency   • Vertigo   • Neuropathy   • Restless leg syndrome   • Pain in joint   • Allergic rhinitis   • Essential hypertension   • Abnormal EEG   • Cerebrovascular accident (CVA) due to thrombosis of left carotid artery (CMS/[facility])   • Illicit drug use   • Tobacco abuse   • Sleep apnea   • Neck pain   • Vitamin deficiency   • Acute otitis externa of right ear   • Generalized seizure disorder (CMS/HCC)   • Rotator cuff strain   • Acute pain of right shoulder   • Closed fracture of distal end of right fibula   • Right ankle pain   • Allergic reaction   • Left ankle pain   • Closed fracture of talus   • Hypotension   • Left leg pain   • Acute pain of left shoulder   • Closed nondisplaced fracture of lateral malleolus of left fibula   • Closed nondisplaced fracture of lateral malleolus of left fibula with routine healing   • Renal insufficiency   • ESR raised   • Pain   • Left foot pain   • Closed nondisplaced fracture of fifth left metatarsal bone   • Cause of injury, fall   • Ankle weakness   • Joint pain in fingers of left hand   • Joint pain in fingers of right hand   • Bilateral thumb pain   • Bilateral hand pain   • Numbness and tingling in both hands   • Bilateral carpal tunnel syndrome   • Bilateral arm pain   • Bilateral leg pain   • Decreased strength   • Acute URI   • Cough   • Left hip pain   • BMI 27.0-27.9,adult   • " Disorder of SI (sacroiliac) joint   • Mobility impaired   • Acute left ankle pain   • Frequent falls        Past Medical History:   Diagnosis Date   • Acute bronchitis 07/19/2016   • Acute otitis externa 09/04/2014   • Acute otitis media 02/10/2015   • Acute sinusitis 07/19/2016   • Ankle injury    • Backache 12/08/2014   • Benign hypertension 05/13/2016   • Cerebrovascular accident (CMS/[facility]) 2015   • COPD (chronic obstructive pulmonary disease) (CMS/[facility])    • Depression    • Dizziness 02/10/2015   • Encounter for gynecological examination without abnormal finding 03/10/2016   • Fatigue 08/28/2015   • Foot pain 06/09/2014   • Hyperkeratosis 04/28/2014   • Hypotensive episode 06/12/2015   • Keratoma 08/04/2014    intractable plantar   • Low blood pressure 08/28/2015   • Menopausal and perimenopausal disorder 03/10/2016    other specified   • Numbness of face 05/13/2016    improved   • Otitis media of right ear 07/30/2016    unspecified   • Perforated tympanic membrane 09/04/2014   • Puncture wound 06/17/2014    injury   • Right-sided face pain 05/13/2016   • Seizure (CMS/HCC)    • Sleep apnea    • Thumb pain 08/28/2015   • Upper respiratory infection 08/28/2015   • Wheezing 10/21/2014        Past Surgical History:   Procedure Laterality Date   • BREAST BIOPSY     • OTHER SURGICAL HISTORY  07/07/2014    Destruction of Benign Lesion (1-14)   • TUBAL ABDOMINAL LIGATION         PT Ortho     Row Name 06/10/19 1300       Subjective Comments    Subjective Comments  pt stated that she is not feeling good today. pt stated that she is hurting more today. Pt reports that she is fatigued all the time and has trouble with balance.  -TL       Precautions and Contraindications    Precautions/Limitations  no known precautions/limitations  -TL       Subjective Pain    Able to rate subjective pain?  yes  -TL    Pre-Treatment Pain Level  8  -TL      User Key  (r) = Recorded By, (t) = Taken By, (c) = Cosigned By    Initials Name  Provider Type    Rocio Marshall PTA Physical Therapy Assistant                      PT Assessment/Plan     Row Name 06/10/19 1300          PT Assessment    Assessment Comments  Pt has met 1 and 4 short term goal. Pt hip ext weak but progressing. Pt tolerated new hip ext ex and prone TKE well this date. pt's pain decrease with therapy. Pt hip flexor ROM has improved.   -TL        PT Plan    PT Frequency  2x/week  -TL     Predicted Duration of Therapy Intervention (Therapy Eval)  3 weeks  -TL     PT Plan Comments  add step up with hip ext  -TL       User Key  (r) = Recorded By, (t) = Taken By, (c) = Cosigned By    Initials Name Provider Type    Rocio Marshall PTA Physical Therapy Assistant          Modalities     Row Name 06/10/19 1300             Ice    Patient denies application of Ice  Yes  -TL        User Key  (r) = Recorded By, (t) = Taken By, (c) = Cosigned By    Initials Name Provider Type    Rocio Marshall PTA Physical Therapy Assistant        Exercises     Row Name 06/10/19 1300             Subjective Comments    Subjective Comments  pt stated that she is not feeling good today. pt stated that she is hurting more today. Pt reports that she is fatigued all the time and has trouble with balance.  -TL         Subjective Pain    Able to rate subjective pain?  yes  -TL      Pre-Treatment Pain Level  8  -TL      Post-Treatment Pain Level  3  -TL         Exercise 1    Exercise Name 1  pro ll UE/LE  -TL      Time 1  10 mins   -TL      Additional Comments  level 5  -TL         Exercise 2    Exercise Name 2  sit to stands  -TL      Reps 2  20  -TL         Exercise 3    Exercise Name 3  measured hip flexion  -TL      Additional Comments  105 degrees left  -TL         Exercise 4    Exercise Name 4  Bridges with hip add using ball  -TL      Reps 4  20  -TL      Time 4  5 sec hold  -TL         Exercise 5    Exercise Name 5  SLR  -TL         Exercise 6    Exercise Name 6  SL SLR hip abd  -TL      Sets 6  2   -TL      Reps 6  10  -TL      Time 6  5 sec hold  -TL         Exercise 7    Exercise Name 7  SL hip ext  -TL      Sets 7  2  -TL      Reps 7  10  -TL         Exercise 8    Exercise Name 8  prone ext  -TL      Sets 8  2  -TL      Reps 8  10  -TL         Exercise 9    Exercise Name 9  Prone TKE with glutes  -TL      Sets 9  2  -TL      Reps 9  10  -TL      Time 9  5 sec hold  -TL        User Key  (r) = Recorded By, (t) = Taken By, (c) = Cosigned By    Initials Name Provider Type    Rocio Marshall PTA Physical Therapy Assistant                       PT OP Goals     Row Name 06/10/19 1300          PT Short Term Goals    STG Date to Achieve  05/20/19  -TL     STG 1  Pt independent with HEP.  -TL     STG 1 Progress  Ongoing;Progressing  -TL     STG 2  Improve L hip flex AROM to 105°  -TL     STG 2 Progress  Met  -TL     STG 2 Progress Comments  105 degress arom  -TL     STG 3  Improve L hip abduction AROM to 25°  -TL     STG 3 Progress  Ongoing;Progressing  -TL     STG 3 Progress Comments  20 degrees  -TL     STG 4  Improve L hip extension MMT to 3+/5 or greater  -TL     STG 4 Progress  Met  -TL     STG 5  Reduce L hip pain and SI region pain by 50% with sit to stand transfers  -TL     STG 5 Progress  Ongoing;Progressing  -TL     STG 6  Reduce L hip and SI region pain by 50% while walking short distances  -TL     STG 6 Progress  Ongoing  -TL        Time Calculation    PT Goal Re-Cert Due Date  06/18/19  -TL       User Key  (r) = Recorded By, (t) = Taken By, (c) = Cosigned By    Initials Name Provider Type    Rocio Marshall PTA Physical Therapy Assistant          Therapy Education  Education Details: bridges,prone SLR  Given: HEP, Symptoms/condition management, Pain management, Posture/body mechanics  Program: New, Reinforced  How Provided: Verbal, Demonstration, Written  Provided to: Patient  Level of Understanding: Verbalized, Demonstrated              Time Calculation:   Start Time: 1300  Stop Time:  1348  Time Calculation (min): 48 min  Total Timed Code Minutes- PT: 48 minute(s)  Therapy Charges for Today     Code Description Service Date Service Provider Modifiers Qty    30563450790 HC PT THER PROC EA 15 MIN 6/10/2019 Rocio Kaye, PTA GP 3                    Rocio Kaye, LIDIA  6/10/2019

## 2019-06-13 ENCOUNTER — APPOINTMENT (OUTPATIENT)
Dept: PHYSICAL THERAPY | Facility: HOSPITAL | Age: 59
End: 2019-06-13

## 2019-06-14 ENCOUNTER — OFFICE VISIT (OUTPATIENT)
Dept: ORTHOPEDIC SURGERY | Facility: CLINIC | Age: 59
End: 2019-06-14

## 2019-06-14 DIAGNOSIS — R29.6 FREQUENT FALLS: ICD-10-CM

## 2019-06-14 DIAGNOSIS — M25.552 LEFT HIP PAIN: ICD-10-CM

## 2019-06-14 DIAGNOSIS — M25.572 ACUTE LEFT ANKLE PAIN: ICD-10-CM

## 2019-06-14 DIAGNOSIS — S39.92XA INJURY OF SACRUM, INITIAL ENCOUNTER: ICD-10-CM
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
## 2019-06-14 DIAGNOSIS — Z74.09 MOBILITY IMPAIRED: ICD-10-CM

## 2019-06-14 DIAGNOSIS — M53.3 DISORDER OF SI (SACROILIAC) JOINT: Primary | ICD-10-CM

## 2019-06-14 PROCEDURE — 99214 OFFICE O/P EST MOD 30 MIN: CPT | Performed by: NURSE PRACTITIONER

## 2019-06-14 NOTE — PROGRESS NOTES
Jyoti Riddle is a 58 y.o. female returns for     Chief Complaint   Patient presents with   • Left Hip - Follow-up, Pain   • Results     mri pelvis done on 6/4/2019 @ [facility]        HISTORY OF PRESENT ILLNESS: Patient presents office for follow-up of left hip/left SI joint pain and MRI results of pelvis.  This is been an ongoing issue for several months.  Patient does not recall a specific injury resulting in her pain but she does fall frequently.  Patient has continued with her course of physical therapy and has attended 9/10 visits thus far.  She reports some mild improvement with physical therapy.  Patient also continues to complain of acute left ankle pain due to one of her falls.  She has continued with the Aircast/stirrup splint for support.  Swelling in the left ankle is improved.  No new complaints or concerns noted.      CONCURRENT MEDICAL HISTORY:    The following portions of the patient's history were reviewed and updated as appropriate: allergies, current medications, past family history, past medical history, past social history, past surgical history and problem list.     ROS  No fevers or chills.  No chest pain or shortness of air.  No GI or  disturbances. Left hip pain. Low back pain. Left ankle pain.     PHYSICAL EXAMINATION:       There were no vitals taken for this visit.    Physical Exam   Constitutional: She is oriented to person, place, and time. Vital signs are normal. She appears well-developed and well-nourished. She is active and cooperative. She does not appear ill. No distress.   HENT:   Head: Normocephalic.   Pulmonary/Chest: Effort normal. No respiratory distress.   Abdominal: Soft. She exhibits no distension.   Musculoskeletal: She exhibits tenderness (Left SI joint, Left ankle). She exhibits no edema or deformity.   Neurological: She is alert and oriented to person, place, and time. GCS eye subscore is 4. GCS verbal subscore is 5. GCS motor subscore is 6.   Skin: Skin  is warm, dry and intact. Capillary refill takes less than 2 seconds. No erythema.   Psychiatric: She has a normal mood and affect. Her speech is normal and behavior is normal. Judgment and thought content normal. Cognition and memory are normal.   Vitals reviewed.      GAIT:     []  Normal  []  Antalgic    Assistive device: []  None  []  Walker     []  Crutches  []  Cane     [x]  Wheelchair  []  Stretcher    Right Ankle Exam     Tenderness   The patient is experiencing no tenderness.  Swelling: none    Range of Motion   The patient has normal right ankle ROM.    Muscle Strength   Dorsiflexion:  4/5  Plantar flexion:  4/5    Other   Erythema: absent  Sensation: normal  Pulse: present       Left Ankle Exam     Tenderness   The patient is experiencing tenderness in the lateral malleolus and ATF.   Swelling: none    Range of Motion   Dorsiflexion: 20   Plantar flexion: 35   Eversion: 10   Inversion: 20     Muscle Strength   Dorsiflexion:  4/5   Plantar flexion:  4/5     Other   Erythema: absent  Sensation: normal  Pulse: present    Comments:  Mild pain and limitations with range of motion. No swelling noted. No deformity.  No ecchymosis.  No erythema.  Stable joint exam.      Right Hip Exam     Tenderness   The patient is experiencing no tenderness.     Range of Motion   The patient has normal right hip ROM.    Muscle Strength   Abduction: 4/5   Flexion: 4/5     Tests   TERI: negative  Fadir:  Negative FADIR test    Other   Erythema: absent  Sensation: normal  Pulse: present      Left Hip Exam     Tenderness   The patient is experiencing tenderness in the posterior.    Range of Motion   The patient has normal left hip ROM.    Muscle Strength   Abduction: 4/5   Flexion: 4/5     Tests   TERI: negative  Fadir:  Negative FADIR test    Other   Erythema: absent  Sensation: normal  Pulse: present    Comments:  Mild pain with range of motion, elicited in the left SI joint.  Overall good motion and rotation of the hip joint  without significant pain or limitations.  No swelling.  No deformity.  No ecchymosis.  No leg length discrepancy noted.      Back Exam     Tenderness   The patient is experiencing tenderness in the sacroiliac and lumbar (Left).    Range of Motion   Extension: abnormal   Flexion: abnormal   Rotation right: abnormal   Rotation left: abnormal     Muscle Strength   Right Quadriceps:  4/5   Left Quadriceps:  4/5     Tests   Straight leg raise right: negative  Straight leg raise left: positive at 90 deg (Mild (pain elicited in left SI joint))    Reflexes   Patellar: normal    Other   Sensation: normal  Gait: antalgic     Comments:  Pain and limitations with range of motion.  Pain is elicited in the left SI joint.  Significant tenderness to palpation noted in the left SI joint and mid low back/sacrum. No focal neurological deficits noted.            Two view left hip with single view pelvis     HISTORY: Left hip pain. Multiple falls.     AP and frog-leg lateral views of the left hip and AP film of the  pelvis obtained.     COMPARISON: November 14, 2017     No fracture or dislocation.  Minimal degenerative change left iliac crest.  Degenerative disc disease lower lumbar spine.  No other osseous or articular abnormality.     Atherosclerotic calcification femoral arteries.  Pelvic phleboliths.     IMPRESSION:  CONCLUSION:  No fracture or dislocation.  Minimal degenerative change left iliac crest.  Degenerative disc disease lower lumbar spine.     Electronically signed by:  Josef Deal MD  1/11/2019 2:11 PM [facility]  Workstation: [employer]     Three view left ankle     HISTORY: Left ankle and foot pain     AP, lateral and oblique views obtained.     COMPARISON: April 11, 2018     Old 4 mm avulsion inferior to the tip the lateral malleolus.  6 mm talar beak.  No acute fracture or dislocation.  No other osseous or articular abnormality.     IMPRESSION:  CONCLUSION:  Old 4 mm avulsion inferior to the tip the lateral  malleolus.  6 mm talar beak.     Electronically signed by:  Josef Deal MD  5/3/2019 3:29 PM CDT  Workstation: [employer]     Three view left foot     HISTORY: Left foot pain     AP, lateral and oblique views obtained.     COMPARISON: July 11, 2018     6 mm talar beak.  Degenerative change first cuneiform metatarsal joint, similar to  prior study.  No fracture or dislocation.  No other osseous or articular abnormality.     IMPRESSION:  CONCLUSION:  6 mm talar beak.  Degenerative change first cuneiform metatarsal joint, similar to  prior study.     Electronically signed by:  Josef Deal MD  5/3/2019 3:32 PM CDT  Workstation: [employer]    [facility] Pelvis done on [facility]/2019 [facility]    Impression:  Unremarkable hips.  Bony edema upper sacrum.       ASSESSMENT:    Diagnoses and all orders for this visit:    Disorder of SI (sacroiliac) joint    Left hip pain    Acute left ankle pain    Mobility impaired    Fall with injury, subsequent encounter    Frequent falls    Injury of sacrum, initial encounter    PLAN    MRI of pelvis reviewed and results discussed with patient.  We discussed the evidence of bony edema in the upper sacrum.  This is likely due to one of her frequent falls.  Patient continues to complain of pain in her left SI joint with tenderness on exam.  She has been attending physical therapy (9/10 visits) in [place] and reports some improvement.  We discussed recommendation to continue with physical therapy if she is having improvement.  PT can also help with gait training.  The patient experiences multiple falls of unknown etiology.  Patient also complains of continued left ankle pain but her swelling has resolved since last visit.  She seems to think that there is a deformity at the lateral malleolus.  There is some bony prominence there and it appears larger than the right side but no deformity or signs of injury noted.  Recommend to continue with her Aircast/stirrup splint  to the left ankle.  She has experienced multiple ankle sprains and previous fractures in the past.  We had previously discussed that she likely has some chronic ligament injuries and instabilities.  The brace will help stabilize her ankle when she walks and hopefully prevent falls.  If she continues to have pain in the left ankle, consider MRI of left ankle at follow-up.  If she is improving, plan to transition to an active ankle brace.  Continue Diclofenac daily for consistent dosing of oral NSAIDs.  Patient also has Tramadol at home that she can take as needed for worse pain, as prescribed by her primary care provider.  Continue with ice therapy and/or heat therapy to the left SI joint and low back to minimize pain/inflammation/muscle spasms.  Continue to progress activity as tolerated.  We also discussed trying a chiropractor for her chronic SI joint dysfunction/inflammation as they may be able to help with alignment.  Follow-up in 6 weeks for recheck.  Follow-up sooner as needed for any new or worsening symptoms or any concerns.    Return in about 6 weeks (around 7/26/2019) for Recheck.        This document has been electronically signed by WESTON Yuen on June 19, 2019 9:21 AM      WESTON Yuen

## 2019-06-17 ENCOUNTER — HOSPITAL ENCOUNTER (OUTPATIENT)
Dept: PHYSICAL THERAPY | Facility: HOSPITAL | Age: 59
Setting detail: THERAPIES SERIES
Discharge: HOME | End: 2019-06-17

## 2019-06-17 DIAGNOSIS — M25.552 LEFT HIP PAIN: Primary | ICD-10-CM

## 2019-06-17 DIAGNOSIS — G62.9 NEUROPATHY: ICD-10-CM

## 2019-06-17 DIAGNOSIS — M53.3 DISORDER OF SI (SACROILIAC) JOINT: ICD-10-CM

## 2019-06-17 PROCEDURE — 97110 THERAPEUTIC EXERCISES: CPT | Performed by: PHYSICAL THERAPIST

## 2019-06-17 RX ORDER — GABAPENTIN 800 MG/1
800 TABLET ORAL 2 TIMES DAILY
Qty: 60 TABLET | Refills: 0 | Status: SHIPPED | OUTPATIENT
Start: 2019-06-17 | End: 2019-08-06 | Stop reason: SDUPTHER

## 2019-06-17 RX ORDER — GABAPENTIN 800 MG/1
TABLET ORAL
Qty: 60 TABLET | Refills: 0 | Status: SHIPPED | OUTPATIENT
Start: 2019-06-17 | End: 2019-08-06 | Stop reason: SDUPTHER

## 2019-06-17 NOTE — THERAPY PROGRESS REPORT/RE-CERT
Outpatient Physical Therapy Ortho Progress Note/Discharge  [facility]     Patient Name: Jyoti Guerra Person  : 1960  MRN: [id number]  Today's Date: 2019      Visit Date: 2019  Pt reports 0/10 pain pre treatment,0/10 pain post treatment  Reports 40% of improvement.  Attended 10/ visits.  Insurance available: 16 visits approved   Next MD appt:  TBD .  Recertification: n/A.      Patient Active Problem List   Diagnosis   • Vitamin D deficiency   • Vertigo   • Neuropathy   • Restless leg syndrome   • Pain in joint   • Allergic rhinitis   • Essential hypertension   • Abnormal EEG   • Cerebrovascular accident (CVA) due to thrombosis of left carotid artery (CMS/HCC)   • Illicit drug use   • Tobacco abuse   • Sleep apnea   • Neck pain   • Vitamin deficiency   • Acute otitis externa of right ear   • Generalized seizure disorder (CMS/HCC)   • Rotator cuff strain   • Acute pain of right shoulder   • Closed fracture of distal end of right fibula   • Right ankle pain   • Allergic reaction   • Left ankle pain   • Closed fracture of talus   • Hypotension   • Left leg pain   • Acute pain of left shoulder   • Closed nondisplaced fracture of lateral malleolus of left fibula   • Closed nondisplaced fracture of lateral malleolus of left fibula with routine healing   • Renal insufficiency   • ESR raised   • Pain   • Left foot pain   • Closed nondisplaced fracture of fifth left metatarsal bone   • Cause of injury, fall   • Ankle weakness   • Joint pain in fingers of left hand   • Joint pain in fingers of right hand   • Bilateral thumb pain   • Bilateral hand pain   • Numbness and tingling in both hands   • Bilateral carpal tunnel syndrome   • Bilateral arm pain   • Bilateral leg pain   • Decreased strength   • Acute URI   • Cough   • Left hip pain   • BMI 27.0-27.9,adult   • Disorder of SI (sacroiliac) joint   • Mobility impaired   • Acute left ankle pain   • Frequent falls        Past  Medical History:   Diagnosis Date   • Acute bronchitis 07/19/2016   • Acute otitis externa 09/04/2014   • Acute otitis media 02/10/2015   • Acute sinusitis 07/19/2016   • Ankle injury    • Backache 12/08/2014   • Benign hypertension 05/13/2016   • Cerebrovascular accident (CMS/HCC) 2015   • COPD (chronic obstructive pulmonary disease) (CMS/HCC)    • Depression    • Dizziness 02/10/2015   • Encounter for gynecological examination without abnormal finding 03/10/2016   • Fatigue 08/28/2015   • Foot pain 06/09/2014   • Hyperkeratosis 04/28/2014   • Hypotensive episode 06/12/2015   • Keratoma 08/04/2014    intractable plantar   • Low blood pressure 08/28/2015   • Menopausal and perimenopausal disorder 03/10/2016    other specified   • Numbness of face 05/13/2016    improved   • Otitis media of right ear 07/30/2016    unspecified   • Perforated tympanic membrane 09/04/2014   • Puncture wound 06/17/2014    injury   • Right-sided face pain 05/13/2016   • Seizure (CMS/HCC)    • Sleep apnea    • Thumb pain 08/28/2015   • Upper respiratory infection 08/28/2015   • Wheezing 10/21/2014        Past Surgical History:   Procedure Laterality Date   • BREAST BIOPSY     • OTHER SURGICAL HISTORY  07/07/2014    Destruction of Benign Lesion (1-14)   • TUBAL ABDOMINAL LIGATION         Visit Dx:     ICD-10-CM ICD-9-CM   1. Left hip pain M25.552 719.45   2. Disorder of SI (sacroiliac) joint M53.3 724.6             PT Ortho     Row Name 06/17/19 1500       Subjective Comments    Subjective Comments  Pt arrived 14 minutes late, forgot to check herself in with the schedulers at the front office. pt reports 40% improvement, able to walk much better and with less L hip and pelvic pain.  -BS       Precautions and Contraindications    Precautions/Limitations  no known precautions/limitations  -BS       Subjective Pain    Able to rate subjective pain?  yes  -BS    Pre-Treatment Pain Level  0  -BS    Post-Treatment Pain Level  0  -BS       General  ROM    GENERAL ROM COMMENTS  AROM: R hip abduction 29° flex 81° L hip flex 98?8 abd 27°  -BS      User Key  (r) = Recorded By, (t) = Taken By, (c) = Cosigned By    Initials Name Provider Type    Solis Alexander, PT Physical Therapist                      Therapy Education  Given: HEP, Symptoms/condition management  Program: Reinforced  How Provided: Verbal, Demonstration  Provided to: Patient  Level of Understanding: Verbalized, Demonstrated     PT OP Goals     Row Name 06/17/19 1500          PT Short Term Goals    STG Date to Achieve  05/20/19  -BS     STG 1  Pt independent with HEP.  -BS     STG 1 Progress  Met  -BS     STG 2  Improve L hip flex AROM to 105°  -BS     STG 2 Progress  Met  -BS     STG 3  Improve L hip abduction AROM to 25°  -BS     STG 3 Progress  Met  -BS     STG 4  Improve L hip extension MMT to 3+/5 or greater  -BS     STG 4 Progress  Met  -BS     STG 5  Reduce L hip pain and SI region pain by 50% with sit to stand transfers  -BS     STG 5 Progress  Met  -BS     STG 6  Reduce L hip and SI region pain by 50% while walking short distances  -BS     STG 6 Progress  Met  -BS        Time Calculation    PT Goal Re-Cert Due Date  -- N/A  -BS       User Key  (r) = Recorded By, (t) = Taken By, (c) = Cosigned By    Initials Name Provider Type    Solis Alexander, PT Physical Therapist          PT Assessment/Plan     Row Name 06/17/19 1600          PT Assessment    Assessment Comments  Met all goals. No L hip pain nor SI pain today upon reassessment. Pt able to safely d/c at this time.   -BS        PT Plan    PT Plan Comments  D/c'd with goals met.  -BS       User Key  (r) = Recorded By, (t) = Taken By, (c) = Cosigned By    Initials Name Provider Type    Solis Alexander, PT Physical Therapist            Exercises     Row Name 06/17/19 1500             Subjective Comments    Subjective Comments  Pt arrived 14 minutes late, forgot to check herself in with the schedulers at the front office. pt reports 40%  improvement, able to walk much better and with less L hip and pelvic pain.  -BS         Subjective Pain    Able to rate subjective pain?  yes  -BS      Pre-Treatment Pain Level  0  -BS      Post-Treatment Pain Level  0  -BS         Exercise 1    Exercise Name 1  pro ll UE/LE  -BS      Time 1  6'  -BS      Additional Comments  L4  -BS         Exercise 2    Exercise Name 2  sit to stands  -BS      Reps 2  20  -BS         Exercise 3    Exercise Name 3  prone hip ext B  -BS      Sets 3  1  -BS      Reps 3  10 ea  -BS         Exercise 4    Exercise Name 4  Bridges with hip add using ball  -BS      Sets 4  2  -BS      Reps 4  10  -BS      Time 4  5 sec hold  -BS         Exercise 5    Exercise Name 5  SLR  -BS      Sets 5  1  -BS      Reps 5  10  -BS         Exercise 6    Exercise Name 6  shuttle: 2L  -BS      Reps 6  20ea  -BS      Additional Comments  2L/1L  -BS         Exercise 7    Exercise Name 7  SL hip abduction  -BS      Sets 7  2  -BS      Reps 7  10  -BS         Exercise 8    Exercise Name 8  prone on elbows  -BS      Reps 8  3'  -BS        User Key  (r) = Recorded By, (t) = Taken By, (c) = Cosigned By    Initials Name Provider Type    Solis Alexander, PT Physical Therapist                        Outcome Measure Options: Lower Extremity Functional Scale (LEFS)  Lower Extremity Functional Index  Any of your usual work, housework or school activities: Moderate difficulty  Your usual hobbies, recreational or sporting activities: Extreme difficulty or unable to perform activity  Getting into or out of the bath: Moderate difficulty  Walking between rooms: Moderate difficulty  Putting on your shoes or socks: No difficulty  Squatting: Extreme difficulty or unable to perform activity  Lifting an object, like a bag of groceries from the floor: Moderate difficulty  Performing light activities around your home: No difficulty  Performing heavy activities around your home: Extreme difficulty or unable to perform  activity  Getting into or out of a car: Extreme difficulty or unable to perform activity  Walking 2 blocks: Extreme difficulty or unable to perform activity  Walking a mile: Extreme difficulty or unable to perform activity  Going up or down 10 stairs (about 1 flight of stairs): Moderate difficulty  Standing for 1 hour: Extreme difficulty or unable to perform activity  Sitting for 1 hour: No difficulty  Running on even ground: Extreme difficulty or unable to perform activity  Running on uneven ground: Extreme difficulty or unable to perform activity  Making sharp turns while running fast: Extreme difficulty or unable to perform activity  Hopping: Extreme difficulty or unable to perform activity  Rolling over in bed: No difficulty  Total: 26      Time Calculation:     Start Time: 1529  Stop Time: 1610  Time Calculation (min): 41 min  Total Timed Code Minutes- PT: 41 minute(s)     Therapy Charges for Today     Code Description Service Date Service Provider Modifiers Qty    07701399146 HC PT THER PROC EA 15 MIN 6/17/2019 Solis Barrientos, PT GP 3          PT G-Codes  Outcome Measure Options: Lower Extremity Functional Scale (LEFS)  Total: 26         Solis Barrientos, PT  6/17/2019

## 2019-06-19 PROBLEM — S39.92XA: Status: ACTIVE | Noted: 2019-06-19

## 2019-06-20 ENCOUNTER — APPOINTMENT (OUTPATIENT)
Dept: PHYSICAL THERAPY | Facility: HOSPITAL | Age: 59
End: 2019-06-20

## 2019-07-22 DIAGNOSIS — M25.572 PAIN IN JOINT INVOLVING LEFT ANKLE AND FOOT: ICD-10-CM

## 2019-07-22 RX ORDER — TRAMADOL HYDROCHLORIDE 50 MG/1
TABLET ORAL
Qty: 90 TABLET | Refills: 0 | Status: SHIPPED | OUTPATIENT
Start: 2019-07-22 | End: 2019-08-06 | Stop reason: SDUPTHER

## 2019-07-26 ENCOUNTER — OFFICE VISIT (OUTPATIENT)
Dept: ORTHOPEDIC SURGERY | Facility: CLINIC | Age: 59
End: 2019-07-26

## 2019-07-26 VITALS — HEIGHT: 66 IN | BODY MASS INDEX: 24.59 KG/M2 | WEIGHT: 153 LBS

## 2019-07-26 DIAGNOSIS — M79.604 CHRONIC PAIN OF BOTH LOWER EXTREMITIES: ICD-10-CM

## 2019-07-26 DIAGNOSIS — M79.605 CHRONIC PAIN OF BOTH LOWER EXTREMITIES: ICD-10-CM

## 2019-07-26 DIAGNOSIS — G89.29 CHRONIC PAIN OF BOTH LOWER EXTREMITIES: ICD-10-CM

## 2019-07-26 DIAGNOSIS — M25.552 LEFT HIP PAIN: Primary | ICD-10-CM

## 2019-07-26 DIAGNOSIS — M53.3 DISORDER OF SI (SACROILIAC) JOINT: ICD-10-CM

## 2019-07-26 DIAGNOSIS — Z74.09 MOBILITY IMPAIRED: ICD-10-CM

## 2019-07-26 DIAGNOSIS — R29.6 FREQUENT FALLS: ICD-10-CM

## 2019-07-26 PROCEDURE — 96372 THER/PROPH/DIAG INJ SC/IM: CPT | Performed by: NURSE PRACTITIONER

## 2019-07-26 PROCEDURE — 99214 OFFICE O/P EST MOD 30 MIN: CPT | Performed by: NURSE PRACTITIONER

## 2019-07-26 RX ORDER — TIZANIDINE 4 MG/1
4 TABLET ORAL EVERY 8 HOURS PRN
Qty: 90 TABLET | Refills: 1 | Status: SHIPPED | OUTPATIENT
Start: 2019-07-26 | End: 2019-11-04 | Stop reason: SDUPTHER

## 2019-07-26 RX ORDER — TRIAMCINOLONE ACETONIDE 40 MG/ML
80 INJECTION, SUSPENSION INTRA-ARTICULAR; INTRAMUSCULAR ONCE
Status: COMPLETED | OUTPATIENT
Start: 2019-07-26 | End: 2019-07-26

## 2019-07-26 RX ADMIN — TRIAMCINOLONE ACETONIDE 80 MG: 40 INJECTION, SUSPENSION INTRA-ARTICULAR; INTRAMUSCULAR at 15:20

## 2019-07-26 NOTE — PROGRESS NOTES
"Jyoti Riddle is a 58 y.o. female returns for     Chief Complaint   Patient presents with   • Left Hip - Follow-up, Pain       HISTORY OF PRESENT ILLNESS: Patient presents to office for follow-up of chronic low back and left hip pain.  Patient has completed her course of physical therapy recently.  She states she did have some improvement in her chronic hip/SI joint pain with therapy.  Patient continues to complain of chronic pain in multiple areas, including her low back, bilateral hips, left ankle and bilateral hands.  Patient complains today that her bilateral lower legs \"hurt all the time\".  She states her feet are \"ice cold all the time\".  Patient continues to complain of limited mobility and activity intolerance.      CONCURRENT MEDICAL HISTORY:    The following portions of the patient's history were reviewed and updated as appropriate: allergies, current medications, past family history, past medical history, past social history, past surgical history and problem list.     ROS  No fevers or chills.  No chest pain or shortness of air.  No GI or  disturbances. Back pain. Left hip pain. Bilateral leg pain.     PHYSICAL EXAMINATION:       Ht 167.6 cm (66\")   Wt 69.4 kg (153 lb)   BMI 24.69 kg/m²     Physical Exam   Constitutional: She is oriented to person, place, and time. Vital signs are normal. She appears well-developed and well-nourished. She is active and cooperative. She does not appear ill. No distress.   HENT:   Head: Normocephalic.   Pulmonary/Chest: Effort normal. No respiratory distress.   Abdominal: Soft. She exhibits no distension.   Musculoskeletal: She exhibits tenderness (Left SI joint, low back). She exhibits no edema or deformity.   Neurological: She is alert and oriented to person, place, and time. GCS eye subscore is 4. GCS verbal subscore is 5. GCS motor subscore is 6.   Skin: Skin is warm, dry and intact. Capillary refill takes less than 2 seconds. No erythema.   Psychiatric: She " has a normal mood and affect. Her speech is normal and behavior is normal. Judgment and thought content normal. Cognition and memory are normal.   Vitals reviewed.      GAIT:     []  Normal  []  Antalgic    Assistive device: []  None  []  Walker     []  Crutches  []  Cane     [x]  Wheelchair  []  Stretcher    Right Ankle Exam     Tenderness   The patient is experiencing no tenderness.  Swelling: none    Range of Motion   The patient has normal right ankle ROM.    Muscle Strength   Dorsiflexion:  4/5  Plantar flexion:  4/5    Other   Erythema: absent  Sensation: normal  Pulse: present       Left Ankle Exam     Tenderness   The patient is experiencing tenderness in the lateral malleolus and ATF (Mild).   Swelling: none    Range of Motion   Dorsiflexion: 20   Plantar flexion: 35   Eversion: 10   Inversion: 20     Muscle Strength   Dorsiflexion:  4/5   Plantar flexion:  4/5     Other   Erythema: absent  Sensation: normal  Pulse: present    Comments:  Mild pain and limitations with range of motion. No swelling noted. No deformity.  No ecchymosis.  No erythema.  Stable joint exam.      Right Hip Exam     Tenderness   The patient is experiencing no tenderness.     Range of Motion   The patient has normal right hip ROM.    Muscle Strength   Abduction: 4/5   Flexion: 4/5     Tests   TERI: negative  Fadir:  Negative FADIR test    Other   Erythema: absent  Sensation: normal  Pulse: present      Left Hip Exam     Tenderness   The patient is experiencing tenderness in the posterior.    Range of Motion   The patient has normal left hip ROM.    Muscle Strength   Abduction: 4/5   Flexion: 4/5     Tests   TERI: negative  Fadir:  Negative FADIR test    Other   Erythema: absent  Sensation: normal  Pulse: present    Comments:  Mild pain with range of motion, elicited in the left SI joint.  Overall good motion and rotation of the hip joint without significant pain or limitations.  No swelling.  No deformity.  No ecchymosis.  No leg  length discrepancy noted.      Back Exam     Tenderness   The patient is experiencing tenderness in the sacroiliac and lumbar (Left).    Range of Motion   Extension: abnormal   Flexion: abnormal   Rotation right: abnormal   Rotation left: abnormal     Muscle Strength   Right Quadriceps:  4/5   Left Quadriceps:  4/5     Tests   Straight leg raise right: negative  Straight leg raise left: positive at 90 deg (Mild (pain elicited in left SI joint))    Reflexes   Patellar: normal    Other   Sensation: normal  Gait: antalgic     Comments:  Pain and limitations with range of motion.  Pain is elicited in the left SI joint.  Significant tenderness to palpation noted in the left SI joint and mid low back/sacrum. No focal neurological deficits noted.            Two view left hip with single view pelvis     HISTORY: Left hip pain. Multiple falls.     AP and frog-leg lateral views of the left hip and AP film of the  pelvis obtained.     COMPARISON: November 14, 2017     No fracture or dislocation.  Minimal degenerative change left iliac crest.  Degenerative disc disease lower lumbar spine.  No other osseous or articular abnormality.     Atherosclerotic calcification femoral arteries.  Pelvic phleboliths.     IMPRESSION:  CONCLUSION:  No fracture or dislocation.  Minimal degenerative change left iliac crest.  Degenerative disc disease lower lumbar spine.     Electronically signed by:  Josef Deal MD  1/11/2019 2:11 PM [facility]  Workstation: [employer]     Three view left ankle     HISTORY: Left ankle and foot pain     AP, lateral and oblique views obtained.     COMPARISON: April 11, 2018     Old 4 mm avulsion inferior to the tip the lateral malleolus.  6 mm talar beak.  No acute fracture or dislocation.  No other osseous or articular abnormality.     IMPRESSION:  CONCLUSION:  Old 4 mm avulsion inferior to the tip the lateral malleolus.  6 mm talar beak.     Electronically signed by:  Josef Deal MD  5/3/2019 3:29 PM  "CDT  Workstation: [employer]     Three view left foot     HISTORY: Left foot pain     AP, lateral and oblique views obtained.     COMPARISON: July 11, 2018     6 mm talar beak.  Degenerative change first cuneiform metatarsal joint, similar to  prior study.  No fracture or dislocation.  No other osseous or articular abnormality.     IMPRESSION:  CONCLUSION:  6 mm talar beak.  Degenerative change first cuneiform metatarsal joint, similar to  prior study.     Electronically signed by:  Josef Deal MD  5/3/2019 3:32 PM CDT  Workstation: [employer]     MRI Pelvis done on 6/4/2019 @      Impression:  Unremarkable hips.  Bony edema upper sacrum.       ASSESSMENT:    Diagnoses and all orders for this visit:    Left hip pain  -     triamcinolone acetonide (KENALOG-40) injection 80 mg    Disorder of SI (sacroiliac) joint  -     triamcinolone acetonide (KENALOG-40) injection 80 mg    Chronic pain of both lower extremities  -     Doppler Arterial Multi Level Lower Extremity - Bilateral CAR; Future    Mobility impaired    Frequent falls    Other orders  -     tiZANidine (ZANAFLEX) 4 MG tablet; Take 1 tablet by mouth Every 8 (Eight) Hours As Needed for Muscle Spasms.    PLAN    Patient has completed a recent course of physical therapy for her chronic SI joint dysfunction and pain.  Patient does report some improvement in her low back and left hip pain with physical therapy. However, she continues to complain of chronic pain in multiple areas and activity intolerance.  She states that she has difficulty ambulating due to pain in her legs and back.  Patient also complains of diffuse pain in her lower legs and feet and states her feet feel \"ice cold all the time\".  She is concerned about the blood flow in her legs.  We discussed that we can order a vascular evaluation to check the blood flow.  She has no obvious vascular deficits noted on physical exam.  Recommend IM injection of Kenalog today " to help with pain/inflammation.  Recommend continue Diclofenac as previously prescribed for consistent dosing of oral NSAIDs.  Patient was also previously taking Flexeril to help with her back pain.  She states she is out of this medication.  We discussed trying a different muscle relaxant to see if this improves her low back and SI joint pain better.  Zanaflex is prescribed today.  Patient is cautioned that the medication will likely cause drowsiness and is encouraged to try to bedtime first.  Patient understands she will receive a telephone call to schedule ELOISA studies of the lower extremities.  We will schedule this at [facility] since she lives in [place].  Recommend Tylenol as needed for pain/discomfort.  Patient also has Tramadol at home to take as needed for worse pain.  Follow-up in 6 weeks for recheck.    Return in about 6 weeks (around 9/6/2019) for Recheck.        This document has been electronically signed by WESTON Yuen on July 31, 2019 2:54 PM      WESTON Yuen

## 2019-07-31 PROBLEM — G89.29 CHRONIC PAIN OF BOTH LOWER EXTREMITIES: Status: ACTIVE | Noted: 2019-07-31

## 2019-07-31 PROBLEM — M79.604 CHRONIC PAIN OF BOTH LOWER EXTREMITIES: Status: ACTIVE | Noted: 2019-07-31

## 2019-07-31 PROBLEM — M79.605 CHRONIC PAIN OF BOTH LOWER EXTREMITIES: Status: ACTIVE | Noted: 2019-07-31

## 2019-08-06 DIAGNOSIS — M25.572 PAIN IN JOINT INVOLVING LEFT ANKLE AND FOOT: ICD-10-CM

## 2019-08-06 DIAGNOSIS — G62.9 NEUROPATHY: ICD-10-CM

## 2019-08-07 ENCOUNTER — TELEPHONE (OUTPATIENT)
Dept: FAMILY MEDICINE CLINIC | Facility: CLINIC | Age: 59
End: 2019-08-07

## 2019-08-08 RX ORDER — TRAMADOL HYDROCHLORIDE 50 MG/1
50 TABLET ORAL EVERY 8 HOURS PRN
Qty: 90 TABLET | Refills: 0 | Status: SHIPPED | OUTPATIENT
Start: 2019-08-08 | End: 2019-09-17 | Stop reason: SDUPTHER

## 2019-08-08 RX ORDER — GABAPENTIN 800 MG/1
800 TABLET ORAL 2 TIMES DAILY
Qty: 60 TABLET | Refills: 0 | Status: SHIPPED | OUTPATIENT
Start: 2019-08-08 | End: 2019-09-05 | Stop reason: SDUPTHER

## 2019-08-14 ENCOUNTER — TELEPHONE (OUTPATIENT)
Dept: ORTHOPEDIC SURGERY | Facility: CLINIC | Age: 59
End: 2019-08-14

## 2019-08-14 NOTE — TELEPHONE ENCOUNTER
Maritza,    Patient had a venous doppler @ [facility] on 8/2/19. She wanted to know if you had gotten those results yet and if you could call her?    -grayson

## 2019-08-22 ENCOUNTER — OFFICE VISIT (OUTPATIENT)
Dept: OTOLARYNGOLOGY | Facility: CLINIC | Age: 59
End: 2019-08-22

## 2019-08-22 VITALS — WEIGHT: 148 LBS | OXYGEN SATURATION: 98 % | BODY MASS INDEX: 22.43 KG/M2 | HEIGHT: 68 IN

## 2019-08-22 DIAGNOSIS — H60.63 CHRONIC NON-INFECTIVE OTITIS EXTERNA OF BOTH EARS, UNSPECIFIED TYPE: Primary | ICD-10-CM

## 2019-08-22 PROCEDURE — 99212 OFFICE O/P EST SF 10 MIN: CPT | Performed by: OTOLARYNGOLOGY

## 2019-08-22 PROCEDURE — 92504 EAR MICROSCOPY EXAMINATION: CPT | Performed by: OTOLARYNGOLOGY

## 2019-08-22 NOTE — PROGRESS NOTES
Subjective   Jyoti Guerra Person is a 59 y.o. female.       History of Present Illness   Patient is followed with chronic noninfective otitis externa of both ears.  She states she has not been manipulating her ears.  Is not having any otorrhea.  Says they are itching.      The following portions of the patient's history were reviewed and updated as appropriate: allergies, current medications, past family history, past medical history, past social history, past surgical history and problem list.     reports that she has been smoking cigarettes.  She has a 10.00 pack-year smoking history. She has never used smokeless tobacco. She reports that she drinks alcohol. She reports that she uses drugs. Drug: Cocaine.   Patient is a tobacco user and has been counseled for use of tobacco products      Review of Systems        Objective   Physical Exam  Right greater than left ear show a good bit of uninfected squamous debris that is all cleaned under the microscope using instrumentation.  Beyond this tympanic membranes are intact and clear.      Assessment/Plan   Jyoti was seen today for follow-up.    Diagnoses and all orders for this visit:    Chronic non-infective otitis externa of both ears, unspecified type        Plan: Ears cleaned as described above.  Return 4 months.  Call for problems.

## 2019-08-23 NOTE — TELEPHONE ENCOUNTER
Please let patient know that her vascular studies of her lower extremities showed some mildly diminished arterial flow suggestive of mild peripheral vascular disease. She can follow up regarding this with her primary care provider and they can monitor it. If she is a smoker, she needs to quit as this will worsen peripheral vascular disease. Thanks.

## 2019-08-26 DIAGNOSIS — M79.604 CHRONIC PAIN OF BOTH LOWER EXTREMITIES: ICD-10-CM

## 2019-08-26 DIAGNOSIS — M79.605 CHRONIC PAIN OF BOTH LOWER EXTREMITIES: ICD-10-CM

## 2019-08-26 DIAGNOSIS — G89.29 CHRONIC PAIN OF BOTH LOWER EXTREMITIES: ICD-10-CM

## 2019-08-26 NOTE — TELEPHONE ENCOUNTER
ISRAEL CALLED BACK I INFORMED HIM OF JAMES'S MESSAGE BUT HE ASKED THAT YOU PLEASE GIVE HIM A CALL

## 2019-09-05 ENCOUNTER — OFFICE VISIT (OUTPATIENT)
Dept: FAMILY MEDICINE CLINIC | Facility: CLINIC | Age: 59
End: 2019-09-05

## 2019-09-05 VITALS
HEIGHT: 68 IN | RESPIRATION RATE: 18 BRPM | TEMPERATURE: 98.3 F | BODY MASS INDEX: 21.82 KG/M2 | WEIGHT: 144 LBS | DIASTOLIC BLOOD PRESSURE: 84 MMHG | OXYGEN SATURATION: 97 % | HEART RATE: 121 BPM | SYSTOLIC BLOOD PRESSURE: 126 MMHG

## 2019-09-05 DIAGNOSIS — M25.50 ARTHRALGIA, UNSPECIFIED JOINT: ICD-10-CM

## 2019-09-05 DIAGNOSIS — J06.9 ACUTE URI: ICD-10-CM

## 2019-09-05 DIAGNOSIS — G25.81 RESTLESS LEG SYNDROME: ICD-10-CM

## 2019-09-05 DIAGNOSIS — Z13.0 SCREENING FOR DEFICIENCY ANEMIA: ICD-10-CM

## 2019-09-05 DIAGNOSIS — G62.9 NEUROPATHY: ICD-10-CM

## 2019-09-05 DIAGNOSIS — E78.00 HIGH CHOLESTEROL: ICD-10-CM

## 2019-09-05 DIAGNOSIS — I10 ESSENTIAL HYPERTENSION: ICD-10-CM

## 2019-09-05 DIAGNOSIS — T22.212A PARTIAL THICKNESS BURN OF LEFT FOREARM, INITIAL ENCOUNTER: Primary | ICD-10-CM

## 2019-09-05 PROCEDURE — 99214 OFFICE O/P EST MOD 30 MIN: CPT | Performed by: NURSE PRACTITIONER

## 2019-09-05 RX ORDER — ROPINIROLE 2 MG/1
2 TABLET, FILM COATED ORAL
Qty: 30 TABLET | Refills: 3 | Status: SHIPPED | OUTPATIENT
Start: 2019-09-05 | End: 2020-09-29

## 2019-09-05 RX ORDER — FLUTICASONE PROPIONATE 50 MCG
2 SPRAY, SUSPENSION (ML) NASAL DAILY
Qty: 16 G | Refills: 2 | Status: SHIPPED | OUTPATIENT
Start: 2019-09-05 | End: 2020-09-29

## 2019-09-05 RX ORDER — ATORVASTATIN CALCIUM 40 MG/1
40 TABLET, FILM COATED ORAL DAILY
Qty: 30 TABLET | Refills: 5 | Status: SHIPPED | OUTPATIENT
Start: 2019-09-05 | End: 2020-04-17

## 2019-09-05 RX ORDER — GABAPENTIN 800 MG/1
800 TABLET ORAL 2 TIMES DAILY
Qty: 60 TABLET | Refills: 0 | Status: SHIPPED | OUTPATIENT
Start: 2019-09-05 | End: 2019-09-17 | Stop reason: SDUPTHER
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
## 2019-09-06 ENCOUNTER — OFFICE VISIT (OUTPATIENT)
Dept: ORTHOPEDIC SURGERY | Facility: CLINIC | Age: 59
End: 2019-09-06

## 2019-09-06 VITALS — BODY MASS INDEX: 21.82 KG/M2 | HEIGHT: 68 IN | WEIGHT: 144 LBS

## 2019-09-06 DIAGNOSIS — M53.3 DISORDER OF SI (SACROILIAC) JOINT: ICD-10-CM

## 2019-09-06 DIAGNOSIS — R53.1 DECREASED STRENGTH: ICD-10-CM

## 2019-09-06 DIAGNOSIS — R29.6 FREQUENT FALLS: ICD-10-CM

## 2019-09-06 DIAGNOSIS — Z74.09 MOBILITY IMPAIRED: ICD-10-CM

## 2019-09-06 DIAGNOSIS — M25.552 LEFT HIP PAIN: Primary | ICD-10-CM

## 2019-09-06 PROCEDURE — 99214 OFFICE O/P EST MOD 30 MIN: CPT | Performed by: NURSE PRACTITIONER

## 2019-09-06 NOTE — PROGRESS NOTES
"Jyoti Guerra Person is a 59 y.o. female returns for     Chief Complaint   Patient presents with   • Left Hip - Follow-up, Pain       HISTORY OF PRESENT ILLNESS: Patient presents to office for follow-up of chronic low back and left hip pain.  Patient was given an IM injection of Kenalog at last office visit on 7/26/2019.  She does not recall if it really helped much.  Patient has previously completed a course of physical therapy, which did offer her some improvements, but she states that the pain has returned.  Patient has various complaints of chronic pain in multiple areas, including her low back, bilateral hips, bilateral legs and bilateral ankles/feet.  Patient also had vascular studies performed of her lower extremity since last office visit.  Patient continues to complain of activity intolerance and limited mobility.  Patient also reports she continues to have multiple falls for unknown reason.     CONCURRENT MEDICAL HISTORY:    The following portions of the patient's history were reviewed and updated as appropriate: allergies, current medications, past family history, past medical history, past social history, past surgical history and problem list.     ROS  No fevers or chills.  No chest pain or shortness of air.  No GI or  disturbances. Back pain. Left hip pain.     PHYSICAL EXAMINATION:       Ht 172.7 cm (68\")   Wt 65.3 kg (144 lb)   BMI 21.90 kg/m²     Physical Exam   Constitutional: She is oriented to person, place, and time. Vital signs are normal. She appears well-developed and well-nourished. She is active and cooperative. She does not appear ill. No distress.   HENT:   Head: Normocephalic.   Pulmonary/Chest: Effort normal. No respiratory distress.   Abdominal: Soft. She exhibits no distension.   Musculoskeletal: She exhibits tenderness (Left SI joint, low back). She exhibits no edema or deformity.   Neurological: She is alert and oriented to person, place, and time. GCS eye subscore is 4. GCS " verbal subscore is 5. GCS motor subscore is 6.   Skin: Skin is warm, dry and intact. Capillary refill takes less than 2 seconds. No erythema.   Psychiatric: She has a normal mood and affect. Her speech is normal and behavior is normal. Judgment and thought content normal. Cognition and memory are normal.   Vitals reviewed.      GAIT:     []  Normal  []  Antalgic    Assistive device: []  None  []  Walker     []  Crutches  []  Cane     [x]  Wheelchair  []  Stretcher    Right Hip Exam     Tenderness   The patient is experiencing no tenderness.     Range of Motion   The patient has normal right hip ROM.    Muscle Strength   Abduction: 4/5   Flexion: 4/5     Tests   TERI: negative  Fadir:  Negative FADIR test    Other   Erythema: absent  Sensation: normal  Pulse: present      Left Hip Exam     Tenderness   The patient is experiencing tenderness in the posterior.    Range of Motion   The patient has normal left hip ROM.    Muscle Strength   Abduction: 4/5   Flexion: 4/5     Tests   TERI: negative  Fadir:  Negative FADIR test    Other   Erythema: absent  Sensation: normal  Pulse: present    Comments:  Mild pain with range of motion, elicited in the left SI joint.  Overall good motion and rotation of the hip joint without significant pain or limitations.  No swelling.  No deformity.  No ecchymosis.  No leg length discrepancy noted.      Back Exam     Tenderness   The patient is experiencing tenderness in the sacroiliac and lumbar (Left).    Range of Motion   Extension: abnormal   Flexion: abnormal   Rotation right: abnormal   Rotation left: abnormal     Muscle Strength   Right Quadriceps:  4/5   Left Quadriceps:  4/5     Tests   Straight leg raise right: negative  Straight leg raise left: positive at 90 deg (Mild (pain elicited in left SI joint))    Reflexes   Patellar: normal    Other   Sensation: normal  Gait: antalgic   Erythema: no back redness  Scars: absent    Comments:  Pain and limitations with range of motion.   "Pain is elicited in the left SI joint.  Significant tenderness to palpation noted in the left SI joint and mid low back/sacrum. No focal neurological deficits noted.            MRI Pelvis done on 6/4/2019 @ [facility]     Impression:  Unremarkable hips.  Bony edema upper sacrum.     Two view left hip with single view pelvis     HISTORY: Left hip pain. Multiple falls.     AP and frog-leg lateral views of the left hip and AP film of the  pelvis obtained.     COMPARISON: November 14, 2017     No fracture or dislocation.  Minimal degenerative change left iliac crest.  Degenerative disc disease lower lumbar spine.  No other osseous or articular abnormality.     Atherosclerotic calcification femoral arteries.  Pelvic phleboliths.     IMPRESSION:  CONCLUSION:  No fracture or dislocation.  Minimal degenerative change left iliac crest.  Degenerative disc disease lower lumbar spine.     Electronically signed by:  Josef Deal MD  1/11/2019 2:11 PM [facility]  Workstation: [email or web address]      ASSESSMENT:    Diagnoses and all orders for this visit:    Left hip pain    Disorder of SI (sacroiliac) joint    Mobility impaired    Frequent falls    Decreased strength    PLAN    Patient continues to complain of chronic pain in her low back and left hip.  MRI performed previously of the pelvis on 6/4/2019 was negative for any abnormalities in the hips.  The patient did have some bony edema noted in the upper sacrum.  Last office visit, the patient complained of pain in her lower legs and feet and stated that her feet felt \"ice cold all of the time\".  She was concerned about the blood flow in her legs.  The patient was sent for vascular evaluation of her lower extremities.  We have discussed her results of this today, which indicated some mild outflow disease.  No specific findings for severe vascular disease.  Again, patient complains of difficulty ambulating and progressive weakness.  She falls frequently also.  Patient " "states that she tries to get up and do things and she cannot tolerate activity and has to sit back down.  She has fallen several times since her last office visit and states she \"just fell into the doorway\" for an unknown reason.  She has also fallen in front of her grandchild recently.  At this point, I am unsure that she has a specific orthopedic complaint.  We have tried and failed extensive conservative treatment of her various complaints of pain in multiple areas and activity intolerance.  Patient has been treated with oral NSAIDs, muscle relaxants, IM injections of Kenalog and physical therapy.  Patient did temporarily have some improvements with physical therapy but then her pain and limitations returned.  Patient already sees a neurologist as an established patient.  I have encouraged her to follow-up with her neurologist to evaluate for potential neuromuscular disorders which may be contributing to her frequent falls.  She can continue her Voltaren as previously prescribed for consistent dosing of oral NSAIDs.  She has Zanaflex at home to take as needed for pain/muscle spasms in her back.  Recommend Tylenol as needed for pain also.  Patient has tramadol to take as needed for worse pain, as prescribed by her primary care provider.  Follow-up with orthopedics as needed for any new, worsening or persistent symptoms. Patient may need a lumbar spine MRI to evaluate for spinal stenosis.     Return if symptoms worsen or fail to improve.        This document has been electronically signed by WESTON Yuen on September 11, 2019 2:00 PM      WESTON Yuen  "

## 2019-09-12 ENCOUNTER — LAB (OUTPATIENT)
Dept: LAB | Facility: HOSPITAL | Age: 59
End: 2019-09-12

## 2019-09-12 DIAGNOSIS — I10 ESSENTIAL HYPERTENSION: ICD-10-CM

## 2019-09-12 PROCEDURE — 80053 COMPREHEN METABOLIC PANEL: CPT

## 2019-09-12 PROCEDURE — 85025 COMPLETE CBC W/AUTO DIFF WBC: CPT | Performed by: NURSE PRACTITIONER

## 2019-09-12 PROCEDURE — 80061 LIPID PANEL: CPT | Performed by: NURSE PRACTITIONER

## 2019-09-13 LAB
ALBUMIN SERPL-MCNC: 4.3 G/DL (ref 3.5–5.2)
ALBUMIN/GLOB SERPL: 1.5 G/DL
ALP SERPL-CCNC: 83 U/L (ref 39–117)
ALT SERPL W P-5'-P-CCNC: 22 U/L (ref 1–33)
ANION GAP SERPL CALCULATED.3IONS-SCNC: 13.1 MMOL/L (ref 5–15)
AST SERPL-CCNC: 27 U/L (ref 1–32)
BASOPHILS # BLD AUTO: 0.03 10*3/MM3 (ref 0–0.2)
BASOPHILS NFR BLD AUTO: 0.4 % (ref 0–1.5)
BILIRUB SERPL-MCNC: 0.2 MG/DL (ref 0.2–1.2)
BUN BLD-MCNC: 20 MG/DL (ref 8–23)
BUN/CREAT SERPL: 14.5 (ref 7–25)
CALCIUM SPEC-SCNC: 9.2 MG/DL (ref 8.6–10.5)
CHLORIDE SERPL-SCNC: 104 MMOL/L (ref 98–107)
CHOLEST SERPL-MCNC: 143 MG/DL (ref 0–200)
CO2 SERPL-SCNC: 22.9 MMOL/L (ref 22–29)
CREAT BLD-MCNC: 1.38 MG/DL (ref 0.57–1)
DEPRECATED RDW RBC AUTO: 44.4 FL (ref 37–54)
EOSINOPHIL # BLD AUTO: 0.25 10*3/MM3 (ref 0–0.4)
EOSINOPHIL NFR BLD AUTO: 3.4 % (ref 0.3–6.2)
ERYTHROCYTE [DISTWIDTH] IN BLOOD BY AUTOMATED COUNT: 12.6 % (ref 12.3–15.4)
GFR SERPL CREATININE-BSD FRML MDRD: 47 ML/MIN/1.73
GLOBULIN UR ELPH-MCNC: 2.9 GM/DL
GLUCOSE BLD-MCNC: 102 MG/DL (ref 65–99)
HCT VFR BLD AUTO: 43 % (ref 34–46.6)
HDLC SERPL-MCNC: 41 MG/DL (ref 40–60)
HGB BLD-MCNC: 13.6 G/DL (ref 12–15.9)
IMM GRANULOCYTES # BLD AUTO: 0.04 10*3/MM3 (ref 0–0.05)
IMM GRANULOCYTES NFR BLD AUTO: 0.5 % (ref 0–0.5)
LDLC SERPL CALC-MCNC: 63 MG/DL (ref 0–100)
LDLC/HDLC SERPL: 1.54 {RATIO}
LYMPHOCYTES # BLD AUTO: 2 10*3/MM3 (ref 0.7–3.1)
LYMPHOCYTES NFR BLD AUTO: 27 % (ref 19.6–45.3)
MCH RBC QN AUTO: 30.7 PG (ref 26.6–33)
MCHC RBC AUTO-ENTMCNC: 31.6 G/DL (ref 31.5–35.7)
MCV RBC AUTO: 97.1 FL (ref 79–97)
MONOCYTES # BLD AUTO: 0.41 10*3/MM3 (ref 0.1–0.9)
MONOCYTES NFR BLD AUTO: 5.5 % (ref 5–12)
NEUTROPHILS # BLD AUTO: 4.67 10*3/MM3 (ref 1.7–7)
NEUTROPHILS NFR BLD AUTO: 63.2 % (ref 42.7–76)
NRBC BLD AUTO-RTO: 0 /100 WBC (ref 0–0.2)
PLATELET # BLD AUTO: 188 10*3/MM3 (ref 140–450)
PMV BLD AUTO: 13.1 FL (ref 6–12)
POTASSIUM BLD-SCNC: 5.1 MMOL/L (ref 3.5–5.2)
PROT SERPL-MCNC: 7.2 G/DL (ref 6–8.5)
RBC # BLD AUTO: 4.43 10*6/MM3 (ref 3.77–5.28)
SODIUM BLD-SCNC: 140 MMOL/L (ref 136–145)
TRIGL SERPL-MCNC: 194 MG/DL (ref 0–150)
VLDLC SERPL-MCNC: 38.8 MG/DL (ref 5–40)
WBC NRBC COR # BLD: 7.4 10*3/MM3 (ref 3.4–10.8)

## 2019-09-17 DIAGNOSIS — M25.572 PAIN IN JOINT INVOLVING LEFT ANKLE AND FOOT: ICD-10-CM

## 2019-09-17 DIAGNOSIS — G62.9 NEUROPATHY: ICD-10-CM

## 2019-09-17 RX ORDER — TRAMADOL HYDROCHLORIDE 50 MG/1
50 TABLET ORAL EVERY 8 HOURS PRN
Qty: 90 TABLET | Refills: 0 | Status: SHIPPED | OUTPATIENT
Start: 2019-09-17 | End: 2019-11-04 | Stop reason: SDUPTHER

## 2019-09-17 RX ORDER — GABAPENTIN 800 MG/1
800 TABLET ORAL 2 TIMES DAILY
Qty: 60 TABLET | Refills: 0 | Status: SHIPPED | OUTPATIENT
Start: 2019-09-17 | End: 2019-11-11 | Stop reason: SDUPTHER

## 2019-09-18 DIAGNOSIS — E55.9 VITAMIN D DEFICIENCY: ICD-10-CM

## 2019-09-19 RX ORDER — ERGOCALCIFEROL 1.25 MG/1
50000 CAPSULE ORAL
Qty: 4 CAPSULE | Refills: 5 | Status: SHIPPED | OUTPATIENT
Start: 2019-09-19 | End: 2020-09-29

## 2019-09-20 ENCOUNTER — OFFICE VISIT (OUTPATIENT)
Dept: FAMILY MEDICINE CLINIC | Facility: CLINIC | Age: 59
End: 2019-09-20

## 2019-09-20 VITALS
WEIGHT: 145 LBS | RESPIRATION RATE: 20 BRPM | HEART RATE: 109 BPM | HEIGHT: 68 IN | BODY MASS INDEX: 21.98 KG/M2 | DIASTOLIC BLOOD PRESSURE: 95 MMHG | OXYGEN SATURATION: 98 % | SYSTOLIC BLOOD PRESSURE: 135 MMHG | TEMPERATURE: 97.9 F

## 2019-09-20 DIAGNOSIS — M79.672 LEFT FOOT PAIN: Primary | ICD-10-CM

## 2019-09-20 PROCEDURE — 99213 OFFICE O/P EST LOW 20 MIN: CPT | Performed by: NURSE PRACTITIONER

## 2019-09-20 RX ORDER — BENZONATATE 100 MG/1
100 CAPSULE ORAL 3 TIMES DAILY PRN
Qty: 30 CAPSULE | Refills: 3 | Status: SHIPPED | OUTPATIENT
Start: 2019-09-20 | End: 2020-09-29

## 2019-09-20 RX ORDER — MECLIZINE HYDROCHLORIDE 25 MG/1
25 TABLET ORAL 3 TIMES DAILY PRN
Qty: 30 TABLET | Refills: 3 | Status: SHIPPED | OUTPATIENT
Start: 2019-09-20 | End: 2020-02-24 | Stop reason: SDUPTHER

## 2019-09-24 NOTE — PROGRESS NOTES
Subjective   Jyoti Riddle is a 59 y.o. female.     She fell again today and injured the top of her left foot.  She has had fractures in the past.  Is swollen and painful at this time.      Lower Extremity Issue   This is a recurrent problem. The current episode started today. The problem occurs constantly. The problem has been unchanged. Associated symptoms include arthralgias, vertigo and weakness. Pertinent negatives include no abdominal pain, anorexia, change in bowel habit, chest pain, chills, congestion, coughing, diaphoresis, fatigue, fever, headaches, joint swelling, myalgias, nausea, neck pain, numbness, rash, sore throat, swollen glands, urinary symptoms, visual change or vomiting. The symptoms are aggravated by walking. She has tried nothing for the symptoms. The treatment provided no relief.        The following portions of the patient's history were reviewed and updated as appropriate: allergies, current medications, past family history, past medical history, past social history, past surgical history and problem list.    Review of Systems   Constitutional: Negative for chills, diaphoresis, fatigue and fever.   HENT: Negative.  Negative for congestion, sore throat and swollen glands.    Eyes: Negative.    Respiratory: Negative.  Negative for cough.    Cardiovascular: Negative.  Negative for chest pain.   Gastrointestinal: Negative.  Negative for abdominal pain, anorexia, change in bowel habit, nausea and vomiting.   Endocrine: Negative.    Genitourinary: Negative.    Musculoskeletal: Positive for arthralgias. Negative for joint swelling, myalgias and neck pain.   Skin: Negative.  Negative for rash.   Allergic/Immunologic: Negative.    Neurological: Positive for vertigo and weakness. Negative for numbness.   Hematological: Negative.    Psychiatric/Behavioral: Negative.        Objective   Physical Exam   Constitutional: She is oriented to person, place, and time. She appears well-developed and  well-nourished. No distress.   HENT:   Head: Normocephalic and atraumatic.   Mouth/Throat: No oropharyngeal exudate.   Eyes: Pupils are equal, round, and reactive to light.   Neck: Normal range of motion. Neck supple. No thyromegaly present.   Cardiovascular: Normal rate, regular rhythm and normal heart sounds. Exam reveals no friction rub.   No murmur heard.  Pulmonary/Chest: Effort normal and breath sounds normal. No respiratory distress. She has no wheezes. She has no rales.   Abdominal: Soft.   Musculoskeletal:        Left foot: There is decreased range of motion, tenderness and swelling.   There is mild swelling of the left great toe and the dorsum of the left foot.  xrays do not show a new fracture.   Neurological: She is alert and oriented to person, place, and time.   Skin: Skin is warm and dry.   Psychiatric: She has a normal mood and affect. Thought content normal.   Nursing note and vitals reviewed.        Assessment/Plan   Jyoti was seen today for copd, seizures, hyperlipidemia and peripheral neuropathy.    Diagnoses and all orders for this visit:    Left foot pain  Comments:  elevate and take the pain med she has on hand.  she may use and ice pack sparingly.  Orders:  -     XR Foot 3+ View Left (In Office)    Other orders  -     meclizine (ANTIVERT) 25 MG tablet; Take 1 tablet by mouth 3 (Three) Times a Day As Needed for dizziness.  -     benzonatate (TESSALON) 100 MG capsule; Take 1 capsule by mouth 3 (Three) Times a Day As Needed for Cough.

## 2019-09-30 ENCOUNTER — OFFICE VISIT (OUTPATIENT)
Dept: NEUROLOGY | Facility: CLINIC | Age: 59
End: 2019-09-30

## 2019-09-30 VITALS
BODY MASS INDEX: 22.13 KG/M2 | HEART RATE: 88 BPM | WEIGHT: 146 LBS | DIASTOLIC BLOOD PRESSURE: 70 MMHG | HEIGHT: 68 IN | SYSTOLIC BLOOD PRESSURE: 110 MMHG

## 2019-09-30 DIAGNOSIS — G40.309 GENERALIZED SEIZURE DISORDER (HCC): ICD-10-CM

## 2019-09-30 DIAGNOSIS — I10 ESSENTIAL HYPERTENSION: ICD-10-CM

## 2019-09-30 DIAGNOSIS — I63.032 CEREBROVASCULAR ACCIDENT (CVA) DUE TO THROMBOSIS OF LEFT CAROTID ARTERY (HCC): Primary | ICD-10-CM

## 2019-09-30 PROCEDURE — 99213 OFFICE O/P EST LOW 20 MIN: CPT | Performed by: CLINICAL NURSE SPECIALIST

## 2019-09-30 RX ORDER — ASPIRIN 81 MG/1
81 TABLET, CHEWABLE ORAL DAILY
Qty: 128 TABLET | Refills: 0 | COMMUNITY
Start: 2019-09-30 | End: 2020-09-29

## 2019-09-30 NOTE — PROGRESS NOTES
Subjective     Chief Complaint   Patient presents with   • Stroke     Complains of spells of numbness in her face and pain. She states this happens a lot- when asked specifics she is not able to give a straight forward answer   • Seizures     Frequent sz- 2-3 times per week she believes. She states she thinks she had one last night.        .  Jyoti Riddle is a 59 y.o. female right handed .  She is here today for follow up for stroke and abnormal EEG concerning to be seizuregenic. She is accompanied by a friend. She is in a wheelchair as she states she has fractured ankle but is not wearing a brace and also left hip pain.  She was last seen 1/2019. Patient has hx of prior stroke, abnormal EEG, HLD, illicit drug use with marijuana and cocaine use about 1-3 times weekly, HTN, frequent falls and hx of medication noncompliance.  last visit had recommend she she her orthopedist for possible CTS surgery but tells me today she did not want to have done. .  She has remaining mild right sided weakness and ataxia. At last visit had admitted to not taking ASA 81 mg and states has not taken since her last visit.  She continues with keppra to 500 mg BID and patient denies side effects. She denies seizure, staring spell, incontinence or involuntary tremor.      She continues to use tobacco stating 1 pack last 3 days. She also tells me she has cut back on marijuana and but uses cocaine at least 1- 3 times weekly.   Patient is having multi joint pain and does see orthopedics. She also takes multiple mind altering medications that may be contributing to her imbalance.     Stroke   This is a chronic (image negative stroke in 2013 left hemisphere) problem. The current episode started more than 1 year ago. The problem has been unchanged. Associated symptoms include arthralgias (bilateral hand pain and numbness) and headaches. Pertinent negatives include no chest pain, fatigue, fever, myalgias, nausea, sore throat, vomiting or  weakness. Associated symptoms comments: Right facial weakness, RUE/RLE weakness and decrease sensation. And mild dysarthria this has improved since last visit but imbalanced and walks with single point cane. Exacerbated by: hypertensive emergency, illicit drug use.   Seizures    This is a chronic problem. Associated symptoms include headaches and speech difficulty (mild dysarthria). Pertinent negatives include no confusion, no visual disturbance, no sore throat, no chest pain, no nausea, no vomiting and no diarrhea. Characteristics include eye blinking. Characteristics do not include apnea. body stiffens Possible causes include missed seizure meds. illicit drug use   Neurologic Problem   The patient's primary symptoms include an altered mental status. The patient's pertinent negatives include no weakness. Associated symptoms include headaches. Pertinent negatives include no chest pain, confusion, fatigue, fever, nausea, shortness of breath or vomiting.   Altered Mental Status   This is a chronic (since 2013) problem. The current episode started more than 1 year ago. The problem has been gradually improving (has had 2 episodes since last visit but may be related to illliciit drug use). Associated symptoms include arthralgias (bilateral hand pain and numbness) and headaches. Pertinent negatives include no chest pain, fatigue, fever, myalgias, nausea, sore throat, vomiting or weakness. Exacerbated by: illicit drug use, JAQUELIN. Treatments tried: trileptal 150 mg BID.        Current Outpatient Medications   Medication Sig Dispense Refill   • albuterol (VENTOLIN HFA) 108 (90 Base) MCG/ACT inhaler Inhale 2 puffs 4 (Four) Times a Day. 18 g 3   • ANORO ELLIPTA 62.5-25 MCG/INH aerosol powder  Inhale 62.5 inhalers Daily.  0   • atorvastatin (LIPITOR) 40 MG tablet Take 1 tablet by mouth Daily. 30 tablet 5   • benzonatate (TESSALON) 100 MG capsule Take 1 capsule by mouth 3 (Three) Times a Day As Needed for Cough. 30 capsule 3   •  Blood Pressure Monitoring (B-D ASSURE BPM/AUTO WRIST CUFF) misc 1 kit 2 (Two) Times a Day As Needed (fluctuating blood pressure). 1 each 0   • Calcium-Magnesium-Vitamin D (CALCIUM 500 PO) Take  by mouth.     • diclofenac (VOLTAREN) 50 MG EC tablet Take 1 tablet by mouth 2 (Two) Times a Day. 60 tablet 5   • fluticasone (FLONASE) 50 MCG/ACT nasal spray 2 sprays into the nostril(s) as directed by provider Daily. 16 g 2   • folic acid (FOLVITE) 1 MG tablet Take 1 tablet by mouth Daily. 30 tablet 5   • gabapentin (NEURONTIN) 800 MG tablet Take 1 tablet by mouth 2 (Two) Times a Day. 60 tablet 0   • levETIRAcetam (KEPPRA) 500 MG tablet Take 1 tablet by mouth 2 (Two) Times a Day. 60 tablet 5   • lidocaine (LIDODERM) 5 % apply 1 patch to the affected area daily. Leave on for 12 hours and then off for 12 hours. 6 patch 1   • meclizine (ANTIVERT) 25 MG tablet Take 1 tablet by mouth 3 (Three) Times a Day As Needed for dizziness. 30 tablet 3   • NIFEdipine XL (PROCARDIA XL) 30 MG 24 hr tablet Take 1 tablet by mouth Daily. 90 tablet 1   • ofloxacin (FLOXIN) 0.3 % otic solution 5 drops to right ear BID x 10 days. 10 mL 0   • rOPINIRole (REQUIP) 2 MG tablet Take 1 tablet by mouth every night at bedtime. 30 tablet 3   • silver sulfadiazine (SILVADENE) 1 % cream Apply  topically to the appropriate area as directed Daily. 50 g 0   • tiZANidine (ZANAFLEX) 4 MG tablet Take 1 tablet by mouth Every 8 (Eight) Hours As Needed for Muscle Spasms. 90 tablet 1   • traMADol (ULTRAM) 50 MG tablet Take 1 tablet by mouth Every 8 (Eight) Hours As Needed for Moderate Pain . 90 tablet 0   • vitamin D (ERGOCALCIFEROL) 98303 units capsule capsule Take 1 capsule by mouth Every 14 (Fourteen) Days. 4 capsule 5   • aspirin (ASPIRIN CHILDRENS) 81 MG chewable tablet Chew 1 tablet Daily. 128 tablet 0     No current facility-administered medications for this visit.        Past Medical History:   Diagnosis Date   • Acute bronchitis 07/19/2016   • Acute otitis  externa 09/04/2014   • Acute otitis media 02/10/2015   • Acute sinusitis 07/19/2016   • Ankle injury    • Backache 12/08/2014   • Benign hypertension 05/13/2016   • Cerebrovascular accident (CMS/HCC) 2015   • COPD (chronic obstructive pulmonary disease) (CMS/HCC)    • Depression    • Dizziness 02/10/2015   • Encounter for gynecological examination without abnormal finding 03/10/2016   • Fatigue 08/28/2015   • Foot pain 06/09/2014   • Hyperkeratosis 04/28/2014   • Hypotensive episode 06/12/2015   • Keratoma 08/04/2014    intractable plantar   • Low blood pressure 08/28/2015   • Menopausal and perimenopausal disorder 03/10/2016    other specified   • Numbness of face 05/13/2016    improved   • Otitis media of right ear 07/30/2016    unspecified   • Perforated tympanic membrane 09/04/2014   • Puncture wound 06/17/2014    injury   • Right-sided face pain 05/13/2016   • Seizure (CMS/HCC)    • Sleep apnea    • Thumb pain 08/28/2015   • Upper respiratory infection 08/28/2015   • Wheezing 10/21/2014       Past Surgical History:   Procedure Laterality Date   • BREAST BIOPSY     • OTHER SURGICAL HISTORY  07/07/2014    Destruction of Benign Lesion (1-14)   • TUBAL ABDOMINAL LIGATION         family history includes Alzheimer's disease in her father and other; Bipolar disorder in her other; Depression in her other; Diabetes in her other; Heart attack in her father; Heart disease in her other; Hypertension in her other; Lung cancer in her other; Migraines in her other; No Known Problems in her daughter and mother; Sickle cell anemia in her other; Stroke in her other; Throat cancer in her father.    Social History     Tobacco Use   • Smoking status: Current Every Day Smoker     Packs/day: 0.25     Years: 40.00     Pack years: 10.00     Types: Cigarettes   • Smokeless tobacco: Never Used   Substance Use Topics   • Alcohol use: Yes     Comment: rarely   • Drug use: Yes     Types: Cocaine       Review of Systems   Constitutional:  "Negative for fatigue and fever.   HENT: Negative for sore throat.    Eyes: Negative for visual disturbance.   Respiratory: Negative for apnea and shortness of breath.    Cardiovascular: Negative for chest pain.   Gastrointestinal: Negative for diarrhea, nausea and vomiting.   Endocrine: Negative for cold intolerance and heat intolerance.   Genitourinary: Negative for dysuria and frequency.   Musculoskeletal: Positive for arthralgias (bilateral hand pain and numbness) and gait problem (imbalance and hx of falls). Negative for myalgias.   Neurological: Positive for seizures, speech difficulty (mild dysarthria) and headaches. Negative for weakness.   Psychiatric/Behavioral: Negative for agitation, confusion and hallucinations.       Objective     /70   Pulse 88   Ht 172.7 cm (68\")   Wt 66.2 kg (146 lb)   Breastfeeding? No   BMI 22.20 kg/m² , Body mass index is 22.2 kg/m².    Physical Exam   Constitutional: She is oriented to person, place, and time. Vital signs are normal. She appears well-developed and well-nourished. She is cooperative.   HENT:   Head: Normocephalic and atraumatic.   Right Ear: Hearing and external ear normal. There is tenderness. Tympanic membrane is erythematous.   Left Ear: Hearing and external ear normal.   Nose: Nose normal.   Mouth/Throat: Oropharynx is clear and moist.   Eyes: Conjunctivae, EOM and lids are normal. Pupils are equal, round, and reactive to light. Right eye exhibits normal extraocular motion and no nystagmus. Left eye exhibits normal extraocular motion and no nystagmus. Right pupil is round and reactive. Left pupil is round and reactive. Pupils are equal.   Neck: Normal range of motion. Neck supple. Carotid bruit is not present.   Cardiovascular: Normal rate, regular rhythm, S1 normal, S2 normal and normal heart sounds.   No murmur heard.  Pulmonary/Chest: Effort normal and breath sounds normal.   Abdominal: Soft. Bowel sounds are normal.   Musculoskeletal: Normal " range of motion.   Positive phalen test bilateral.   Decrease  in right compared to left       Neurological Sensory Findings - Unaltered hot/cold right ankle/foot discrimination and unaltered hot/cold left ankle/foot discrimination. Altered sharp/dull right ankle/foot discrimination (decreased to vibration to ankle) and altered sharp/dull left ankle/foot discrimination (decreased to vibration to ankle).  Neurological: She is alert and oriented to person, place, and time. She has normal strength and normal reflexes. She displays no tremor. A cranial nerve deficit (right lower facial weakness) and sensory deficit (decrease sensation of right side) is present. She displays a negative Romberg sign. She displays no seizure activity. Coordination (ataxia bilateral finger to nose) and gait (unsteady gait, negative romberg) abnormal.   Reflex Scores:       Tricep reflexes are 2+ on the right side and 2+ on the left side.       Bicep reflexes are 2+ on the right side and 2+ on the left side.       Brachioradialis reflexes are 2+ on the right side and 2+ on the left side.       Patellar reflexes are 2+ on the right side and 2+ on the left side.       Achilles reflexes are 2+ on the right side and 2+ on the left side.  Awake, alert. No aphasia, mild dysarthria  Completes simple commands    CN II:  Visual fields full.  Pupils equally reactive to light  CN III, IV, VI:  Extraocular Muscles full with no signs of nystagmus  CN V:  Facial sensory is symmetric with no asymetries.  CN VII:  Facial motor symmetric  CN VIII:  Gross hearing intact bilaterally  CN IX:  Palate elevates symmetrically  CN X:  Palate elevates symmetrically  CN XI:  Shoulder shrug symmetric  CN XII:  Tongue is midline on protrusion     symmetric strength bilateral upper extremities.    Proximal LLE weakness with flexion secondary to left hip pain, 4/5 distal and proximal   RLE strength 5/5 proximal and distal    Symmetric plantarl and dorsiflexion      Skin: Skin is warm and dry.   Psychiatric: She has a normal mood and affect. Her speech is normal and behavior is normal. Cognition and memory are normal.   Nursing note and vitals reviewed.      Results for orders placed or performed in visit on 09/12/19   Comprehensive metabolic panel   Result Value Ref Range    Glucose 102 (H) 65 - 99 mg/dL    BUN 20 8 - 23 mg/dL    Creatinine 1.38 (H) 0.57 - 1.00 mg/dL    Sodium 140 136 - 145 mmol/L    Potassium 5.1 3.5 - 5.2 mmol/L    Chloride 104 98 - 107 mmol/L    CO2 22.9 22.0 - 29.0 mmol/L    Calcium 9.2 8.6 - 10.5 mg/dL    Total Protein 7.2 6.0 - 8.5 g/dL    Albumin 4.30 3.50 - 5.20 g/dL    ALT (SGPT) 22 1 - 33 U/L    AST (SGOT) 27 1 - 32 U/L    Alkaline Phosphatase 83 39 - 117 U/L    Total Bilirubin 0.2 0.2 - 1.2 mg/dL    eGFR  African Amer 47 (L) >60 mL/min/1.73    Globulin 2.9 gm/dL    A/G Ratio 1.5 g/dL    BUN/Creatinine Ratio 14.5 7.0 - 25.0    Anion Gap 13.1 5.0 - 15.0 mmol/L        ASSESSMENT/PLAN    Diagnoses and all orders for this visit:    Cerebrovascular accident (CVA) due to thrombosis of left carotid artery (CMS/[facility])    Essential hypertension    Generalized seizure disorder (CMS/[facility])    Other orders  -     aspirin (ASPIRIN CHILDRENS) 81 MG chewable tablet; Chew 1 tablet Daily.      MEDICAL DECISION MAKING:  Patient complaints of imbalance multi factoal as she has remaining weakness from prior stroke, peripheral neuropathy, takes multiple medications that may contributing to imbalance, as well as her history of illicit drug use and medication non compliance.   1. Continue with ASA 81 mg for secondary stroke prevention and counseled on compliance. Patient given 4 months worth of samples and discussed risk of stroke when not taking antiplatelet.  2. Continue with statin per PCP for LDL goal less than 70.  3. Continue with BP management per PCP for systolic less than 140.  4.continue  Keppra 500 mg BID and counseled on compliance.  5. Counseled on cessation of  cocaine, marijuana, and tobacco. I did discuss rehabilitation and patient wishes to think about it.  6. Patient is counseled on stroke signs and symptoms using FAST and Time Saved is Brain Saved.  7. Seizure precautions were discussed to include no tub baths, no swimming, avoiding lack of sleep, and avoiding known triggers. Education given of things that may contribute to a seizure to include, but not limited to: stressful situations, fever, fatigue, lack of sleep, low blood sugar, hyperventilation, flashing lights, and caffeine. Instructions given to take seizure medications as prescribed. Education given to family member on what to do during a seizure and care following the seizure. Education given to contact this office prior to stopping or changing any medications.  8.Jyoti Guerra Person is a current cigarettes user.  She currently smokes 1 pack of cigarettes per day for a duration of 40 years. I have educated her on the risk of diseases from using tobacco products such as cancer, COPD and heart diease.     I advised her to quit and she is not willing to quit.    I spent 3-10 minutes counseling the patient.      During this visit, I spent > 3-10 minutes counseling the patient regarding smoking cessation.  9. counseled on risk of continued illicit drug use.    HPI and ROS reviewed and updated.     allergies and all known medications/prescriptions have been reviewed using resources available on this encounter.    Return in about 9 months (around 6/30/2020).        WESTON Lara

## 2019-09-30 NOTE — PATIENT INSTRUCTIONS
Epilepsy  Epilepsy is a condition in which a person has repeated seizures over time. A seizure is a sudden burst of abnormal electrical and chemical activity in the brain. Seizures can cause a change in attention, behavior, or the ability to remain awake and alert (altered mental status).  Epilepsy increases a person's risk of falls, accidents, and injury. It can also lead to complications, including:  · Depression.  · Poor memory.  · Sudden unexplained death in epilepsy (SUDEP). This complication is rare, and its cause is not known.  Most people with epilepsy lead normal lives.  What are the causes?  This condition may be caused by:  · A head injury.  · An injury that happens at birth.  · A high fever during childhood.  · A stroke.  · Bleeding that goes into or around the brain.  · Certain medicines and drugs.  · Having too little oxygen for a long period of time.  · Abnormal brain development.  · Certain infections, such as meningitis and encephalitis.  · Brain tumors.  · Conditions that are passed along from parent to child (are hereditary).  What are the signs or symptoms?  Symptoms of a seizure vary greatly from person to person. They include:  · Convulsions.  · Stiffening of the body.  · Involuntary movements of the arms or legs.  · Loss of consciousness.  · Breathing problems.  · Falling suddenly.  · Confusion.  · Head nodding.  · Eye blinking or fluttering.  · Lip smacking.  · Drooling.  · Rapid eye movements.  · Grunting.  · Loss of bladder control and bowel control.  · Staring.  · Unresponsiveness.  Some people have symptoms right before a seizure happens (aura) and right after a seizure happens. Symptoms of an aura include:  · Fear or anxiety.  · Nausea.  · Feeling like the room is spinning (vertigo).  · A feeling of having seen or heard something before (tony vu).  · Odd tastes or smells.  · Changes in vision, such as seeing flashing lights or spots.  Symptoms that follow a seizure  include:  · Confusion.  · Sleepiness.  · Headache.  How is this diagnosed?  This condition is diagnosed based on:  · Your symptoms.  · Your medical history.  · A physical exam.  · A neurological exam. A neurological exam is similar to a physical exam. It involves checking your strength, reflexes, coordination, and sensations.  · Tests, such as:  ? An electroencephalogram (EEG). This is a painless test that creates a diagram of your brain waves.  ? An MRI of the brain.  ? A CT scan of the brain.  ? A lumbar puncture, also called a spinal tap.  ? Blood tests to check for signs of infection or abnormal blood chemistry.  How is this treated?  There is no cure for this condition, but treatment can help control seizures. Treatment may involve:  · Taking medicines to control seizures. These include medicines to prevent seizures and medicines to stop seizures as they occur.  · Having a device called a vagus nerve stimulator implanted in the chest. The device sends electrical impulses to the vagus nerve and to the brain to prevent seizures. This treatment may be recommended if medicines do not help.  · Brain surgery. There are several kinds of surgeries that may be done to stop seizures from happening or to reduce how often seizures happen.  · Having regular blood tests. You may need to have blood tests regularly to check that you are getting the right amount of medicine.  Once this condition has been diagnosed, it is important to begin treatment as soon as possible. For some people, epilepsy eventually goes away.  Follow these instructions at home:  Medicines    · Take over-the-counter and prescription medicines only as told by your health care provider.  · Avoid any substances that may prevent your medicine from working properly, such as alcohol.  Activity  · Get enough rest. Lack of sleep can make seizures more likely to occur.  · Follow instructions from your health care provider about driving, swimming, and doing any  other activities that would be dangerous if you had a seizure.  Educating others  Teach friends and family what to do if you have a seizure. They should:  · Lay you on the ground to prevent a fall.  · Cushion your head and body.  · Loosen any tight clothing around your neck.  · Turn you on your side. If vomiting occurs, this helps keep your airway clear.  · Stay with you until you recover.  · Not hold you down. Holding you down will not stop the seizure.  · Not put anything in your mouth.  · Know whether or not you need emergency care.  General instructions  · Avoid anything that has ever triggered a seizure for you.  · Keep a seizure diary. Record what you remember about each seizure, especially anything that might have triggered the seizure.  · Keep all follow-up visits as told by your health care provider. This is important.  Contact a health care provider if:  · Your seizure pattern changes.  · You have symptoms of infection or another illness. This might increase your risk of having a seizure.  Get help right away if:  · You have a seizure that does not stop after 5 minutes.  · You have several seizures in a row without a complete recovery in between seizures.  · You have a seizure that makes it harder to breathe.  · You have a seizure that is different from previous seizures.  · You have a seizure that leaves you unable to speak or use a part of your body.  · You did not wake up immediately after a seizure.  This information is not intended to replace advice given to you by your health care provider. Make sure you discuss any questions you have with your health care provider.  Document Released: 12/18/2006 Document Revised: 07/15/2017 Document Reviewed: 06/27/2017  [employer] Interactive Patient Education © 2019 [employer]  Smoking Tobacco Information, Adult  Smoking tobacco can be harmful to your health. Tobacco contains a poisonous (toxic), colorless chemical called nicotine. Nicotine is addictive. It  changes the brain and can make it hard to stop smoking. Tobacco also has other toxic chemicals that can hurt your body and raise your risk of many cancers.  How can smoking tobacco affect me?  Smoking tobacco puts you at risk for:  · Cancer. Smoking is most commonly associated with lung cancer, but can also lead to cancer in other parts of the body.  · Chronic obstructive pulmonary disease (COPD). This is a long-term lung condition that makes it hard to breathe. It also gets worse over time.  · High blood pressure (hypertension), heart disease, stroke, or heart attack.  · Lung infections, such as pneumonia.  · Cataracts. This is when the lenses in the eyes become clouded.  · Digestive problems. This may include peptic ulcers, heartburn, and gastroesophageal reflux disease (GERD).  · Oral health problems, such as gum disease and tooth loss.  · Loss of taste and smell.  Smoking can affect your appearance by causing:  · Wrinkles.  · Yellow or stained teeth, fingers, and fingernails.  Smoking tobacco can also affect your social life, because:  · It may be challenging to find places to smoke when away from home. Many workplaces, restaurants, hotels, and public places are tobacco-free.  · Smoking is expensive. This is due to the cost of tobacco and the long-term costs of treating health problems from smoking.  · Secondhand smoke may affect those around you. Secondhand smoke can cause lung cancer, breathing problems, and heart disease. Children of smokers have a higher risk for:  ? Sudden infant death syndrome (SIDS).  ? Ear infections.  ? Lung infections.  If you currently smoke tobacco, quitting now can help you:  · Lead a longer and healthier life.  · Look, smell, breathe, and feel better over time.  · Save money.  · Protect others from the harms of secondhand smoke.  What actions can I take to prevent health problems?  Quit smoking    · Do not start smoking. Quit if you already do.  · Make a plan to quit smoking and  commit to it. Look for programs to help you and ask your health care provider for recommendations and ideas.  · Set a date and write down all the reasons you want to quit.  · Let your friends and family know you are quitting so they can help and support you. Consider finding friends who also want to quit. It can be easier to quit with someone else, so that you can support each other.  · Talk with your health care provider about using nicotine replacement medicines to help you quit, such as gum, lozenges, patches, sprays, or pills.  · Do not replace cigarette smoking with electronic cigarettes, which are commonly called e-cigarettes. The safety of e-cigarettes is not known, and some may contain harmful chemicals.  · If you try to quit but return to smoking, stay positive. It is common to slip up when you first quit, so take it one day at a time.  · Be prepared for cravings. When you feel the urge to smoke, chew gum or suck on hard candy.  Lifestyle  · Stay busy and take care of your body.  · Drink enough fluid to keep your urine pale yellow.  · Get plenty of exercise and eat a healthy diet. This can help prevent weight gain after quitting.  · Monitor your eating habits. Quitting smoking can cause you to have a larger appetite than when you smoke.  · Find ways to relax. Go out with friends or family to a movie or a restaurant where people do not smoke.  · Ask your health care provider about having regular tests (screenings) to check for cancer. This may include blood tests, imaging tests, and other tests.  · Find ways to manage your stress, such as meditation, yoga, or exercise.  Where to find support  To get support to quit smoking, consider:  · Asking your health care provider for more information and resources.  · Taking classes to learn more about quitting smoking.  · Looking for local organizations that offer resources about quitting smoking.  · Joining a support group for people who want to quit smoking in your  local community.  · Calling the smokefree.gov counselor helpline: 1-800-Quit-Now (1-897.951.2796)  Where to find more information  You may find more information about quitting smoking from:  · HelpGuide.org: www.helpguide.org  · Smokefree.gov: smokefree.gov  · American Lung Association: www.lung.org  Contact a health care provider if you:  · Have problems breathing.  · Notice that your lips, nose, or fingers turn blue.  · Have chest pain.  · Are coughing up blood.  · Feel faint or you pass out.  · Have other health changes that cause you to worry.  Summary  · Smoking tobacco can negatively affect your health, the health of those around you, your finances, and your social life.  · Do not start smoking. Quit if you already do. If you need help quitting, ask your health care provider.  · Think about joining a support group for people who want to quit smoking in your local community. There are many effective programs that will help you to quit this behavior.  This information is not intended to replace advice given to you by your health care provider. Make sure you discuss any questions you have with your health care provider.  Document Released: 01/02/2018 Document Revised: 02/06/2019 Document Reviewed: 01/02/2018  [employer] Interactive Patient Education © 2019 [employer]  Stroke Prevention  Some medical conditions and behaviors are associated with a higher chance of having a stroke. You can help prevent a stroke by making nutrition, lifestyle, and other changes, including managing any medical conditions you may have.  What nutrition changes can be made?    · Eat healthy foods. You can do this by:  ? Choosing foods high in fiber, such as fresh fruits and vegetables and whole grains.  ? Eating at least 5 or more servings of fruits and vegetables a day. Try to fill half of your plate at each meal with fruits and vegetables.  ? Choosing lean protein foods, such as lean cuts of meat, poultry without skin, fish, tofu,  beans, and nuts.  ? Eating low-fat dairy products.  ? Avoiding foods that are high in salt (sodium). This can help lower blood pressure.  ? Avoiding foods that have saturated fat, trans fat, and cholesterol. This can help prevent high cholesterol.  ? Avoiding processed and premade foods.  · Follow your health care provider's specific guidelines for losing weight, controlling high blood pressure (hypertension), lowering high cholesterol, and managing diabetes. These may include:  ? Reducing your daily calorie intake.  ? Limiting your daily sodium intake to 1,500 milligrams (mg).  ? Using only healthy fats for cooking, such as olive oil, canola oil, or sunflower oil.  ? Counting your daily carbohydrate intake.  What lifestyle changes can be made?  · Maintain a healthy weight. Talk to your health care provider about your ideal weight.  · Get at least 30 minutes of moderate physical activity at least 5 days a week. Moderate activity includes brisk walking, biking, and swimming.  · Do not use any products that contain nicotine or tobacco, such as cigarettes and e-cigarettes. If you need help quitting, ask your health care provider. It may also be helpful to avoid exposure to secondhand smoke.  · Limit alcohol intake to no more than 1 drink a day for nonpregnant women and 2 drinks a day for men. One drink equals 12 oz of beer, 5 oz of wine, or 1½ oz of hard liquor.  · Stop any illegal drug use.  · Avoid taking birth control pills. Talk to your health care provider about the risks of taking birth control pills if:  ? You are over 35 years old.  ? You smoke.  ? You get migraines.  ? You have ever had a blood clot.  What other changes can be made?  · Manage your cholesterol levels.  ? Eating a healthy diet is important for preventing high cholesterol. If cholesterol cannot be managed through diet alone, you may also need to take medicines.  ? Take any prescribed medicines to control your cholesterol as told by your health  care provider.  · Manage your diabetes.  ? Eating a healthy diet and exercising regularly are important parts of managing your blood sugar. If your blood sugar cannot be managed through diet and exercise, you may need to take medicines.  ? Take any prescribed medicines to control your diabetes as told by your health care provider.  · Control your hypertension.  ? To reduce your risk of stroke, try to keep your blood pressure below 130/80.  ? Eating a healthy diet and exercising regularly are an important part of controlling your blood pressure. If your blood pressure cannot be managed through diet and exercise, you may need to take medicines.  ? Take any prescribed medicines to control hypertension as told by your health care provider.  ? Ask your health care provider if you should monitor your blood pressure at home.  ? Have your blood pressure checked every year, even if your blood pressure is normal. Blood pressure increases with age and some medical conditions.  · Get evaluated for sleep disorders (sleep apnea). Talk to your health care provider about getting a sleep evaluation if you snore a lot or have excessive sleepiness.  · Take over-the-counter and prescription medicines only as told by your health care provider. Aspirin or blood thinners (antiplatelets or anticoagulants) may be recommended to reduce your risk of forming blood clots that can lead to stroke.  · Make sure that any other medical conditions you have, such as atrial fibrillation or atherosclerosis, are managed.  What are the warning signs of a stroke?  The warning signs of a stroke can be easily remembered as BEFAST.  · B is for balance. Signs include:  ? Dizziness.  ? Loss of balance or coordination.  ? Sudden trouble walking.  · E is for eyes. Signs include:  ? A sudden change in vision.  ? Trouble seeing.  · F is for face. Signs include:  ? Sudden weakness or numbness of the face.  ? The face or eyelid drooping to one side.  · A is for arms.  Signs include:  ? Sudden weakness or numbness of the arm, usually on one side of the body.  · S is for speech. Signs include:  ? Trouble speaking (aphasia).  ? Trouble understanding.  · T is for time.  ? These symptoms may represent a serious problem that is an emergency. Do not wait to see if the symptoms will go away. Get medical help right away. Call your local emergency services (911 in the [place] Do not drive yourself to the hospital.  · Other signs of stroke may include:  ? A sudden, severe headache with no known cause.  ? Nausea or vomiting.  ? Seizure.  Where to find more information  For more information, visit:  · American Stroke Association: www.strokeassociation.org  · National Stroke Association: www.stroke.org  Summary  · You can prevent a stroke by eating healthy, exercising, not smoking, limiting alcohol intake, and managing any medical conditions you may have.  · Do not use any products that contain nicotine or tobacco, such as cigarettes and e-cigarettes. If you need help quitting, ask your health care provider. It may also be helpful to avoid exposure to secondhand smoke.  · Remember BEFAST for warning signs of stroke. Get help right away if you or a loved one has any of these signs.  This information is not intended to replace advice given to you by your health care provider. Make sure you discuss any questions you have with your health care provider.  Document Released: 01/25/2006 Document Revised: 01/23/2018 Document Reviewed: 01/23/2018  [employer] Interactive Patient Education © 2019 [employer]  BMI for Adults    Body mass index (BMI) is a number that is calculated from a person's weight and height. BMI may help to estimate how much of a person's weight is composed of fat. BMI can help identify those who may be at higher risk for certain medical problems.  How is BMI used with adults?  BMI is used as a screening tool to identify possible weight problems. It is used to check whether a person  "is obese, overweight, healthy weight, or underweight.  How is BMI calculated?  BMI measures your weight and compares it to your height. This can be done either in English ([place] or metric measurements. Note that charts are available to help you find your BMI quickly and easily without having to do these calculations yourself.  To calculate your BMI in English ([place] measurements, your health care provider will:  1. Measure your weight in pounds (lb).  2. Multiply the number of pounds by 703.  ? For example, for a person who weighs 180 lb, multiply that number by 703, which equals 126,540.  3. Measure your height in inches (in). Then multiply that number by itself to get a measurement called \"inches squared.\"  ? For example, for a person who is 70 in tall, the \"inches squared\" measurement is 70 in x 70 in, which equals 4900 inches squared.  4. Divide the total from Step 2 (number of lb x 703) by the total from Step 3 (inches squared): 126,540 ÷ 4900 = 25.8. This is your BMI.  To calculate your BMI in metric measurements, your health care provider will:  1. Measure your weight in kilograms (kg).  2. Measure your height in meters (m). Then multiply that number by itself to get a measurement called \"meters squared.\"  ? For example, for a person who is 1.75 m tall, the \"meters squared\" measurement is 1.75 m x 1.75 m, which is equal to 3.1 meters squared.  3. Divide the number of kilograms (your weight) by the meters squared number. In this example: 70 ÷ 3.1 = 22.6. This is your BMI.  How is BMI interpreted?  To interpret your results, your health care provider will use BMI charts to identify whether you are underweight, normal weight, overweight, or obese. The following guidelines will be used:  · Underweight: BMI less than 18.5.  · Normal weight: BMI between 18.5 and 24.9.  · Overweight: BMI between 25 and 29.9.  · Obese: BMI of 30 and above.  Please note:  · Weight includes both fat and muscle, so someone with a " muscular build, such as an athlete, may have a BMI that is higher than 24.9. In cases like these, BMI is not an accurate measure of body fat.  · To determine if excess body fat is the cause of a BMI of 25 or higher, further assessments may need to be done by a health care provider.  · BMI is usually interpreted in the same way for men and women.  Why is BMI a useful tool?  BMI is useful in two ways:  · Identifying a weight problem that may be related to a medical condition, or that may increase the risk for medical problems.  · Promoting lifestyle and diet changes in order to reach a healthy weight.  Summary  · Body mass index (BMI) is a number that is calculated from a person's weight and height.  · BMI may help to estimate how much of a person's weight is composed of fat. BMI can help identify those who may be at higher risk for certain medical problems.  · BMI can be measured using English measurements or metric measurements.  · To interpret your results, your health care provider will use BMI charts to identify whether you are underweight, normal weight, overweight, or obese.  This information is not intended to replace advice given to you by your health care provider. Make sure you discuss any questions you have with your health care provider.  Document Released: 08/29/2005 Document Revised: 10/31/2018 Document Reviewed: 10/31/2018  [employer] Interactive Patient Education © 2019 [employer]

## 2019-10-02 ENCOUNTER — TELEPHONE (OUTPATIENT)
Dept: FAMILY MEDICINE CLINIC | Facility: CLINIC | Age: 59
End: 2019-10-02

## 2019-10-03 DIAGNOSIS — M79.605 CHRONIC PAIN OF BOTH LOWER EXTREMITIES: Primary | ICD-10-CM

## 2019-10-03 DIAGNOSIS — M79.604 CHRONIC PAIN OF BOTH LOWER EXTREMITIES: Primary | ICD-10-CM

## 2019-10-03 DIAGNOSIS — G89.29 CHRONIC PAIN OF BOTH LOWER EXTREMITIES: Primary | ICD-10-CM

## 2019-10-15 ENCOUNTER — OFFICE VISIT (OUTPATIENT)
Dept: CARDIAC SURGERY | Facility: CLINIC | Age: 59
End: 2019-10-15

## 2019-10-15 VITALS
DIASTOLIC BLOOD PRESSURE: 92 MMHG | WEIGHT: 150 LBS | BODY MASS INDEX: 26.58 KG/M2 | HEIGHT: 63 IN | SYSTOLIC BLOOD PRESSURE: 140 MMHG

## 2019-10-15 DIAGNOSIS — I73.9 PVD (PERIPHERAL VASCULAR DISEASE) (HCC): Primary | ICD-10-CM

## 2019-10-15 DIAGNOSIS — I65.23 BILATERAL CAROTID ARTERY STENOSIS: ICD-10-CM

## 2019-10-15 PROCEDURE — 99214 OFFICE O/P EST MOD 30 MIN: CPT | Performed by: NURSE PRACTITIONER

## 2019-10-15 NOTE — PROGRESS NOTES
Subjective   Patient ID: Jyoti Guerra Person is a 59 y.o. female is being seen for consultation/new patient today at the request of WESTON Sutton  Chief Complaint   Patient presents with   • problems with veins     new patient   Left hand hurts, is cold and numb  VAS 5     History of Present Illness  60 y/o lady with multiple medical problems recent onset of facial and left hand numbness.  LUE:  N Open sores  Y color changes  Y coldness.   Y numbness or paresthesias  Legs give away and she falls, as well.  Concerned about circulation   Is in WC today.  Does have walker at home but does not use.   Risk Factors:  CVA, Smokes tobacco and cocaine, limited mobility, Left wrist history of severe fracture, Compliance with therapy.     2016 Carotid Duplex:  BICA 50%   The following portions of the patient's history were reviewed and updated as appropriate: allergies, current medications, past family history, past medical history, past social history, past surgical history and problem list.  Past Medical History:   Diagnosis Date   • Acute bronchitis 07/19/2016   • Acute otitis externa 09/04/2014   • Acute otitis media 02/10/2015   • Acute sinusitis 07/19/2016   • Ankle injury    • Backache 12/08/2014   • Benign hypertension 05/13/2016   • Cerebrovascular accident (CMS/[facility]) 2015   • COPD (chronic obstructive pulmonary disease) (CMS/[facility])    • Depression    • Dizziness 02/10/2015   • Encounter for gynecological examination without abnormal finding 03/10/2016   • Fatigue 08/28/2015   • Foot pain 06/09/2014   • Hyperkeratosis 04/28/2014   • Hypotensive episode 06/12/2015   • Keratoma 08/04/2014    intractable plantar   • Low blood pressure 08/28/2015   • Menopausal and perimenopausal disorder 03/10/2016    other specified   • Numbness of face 05/13/2016    improved   • Otitis media of right ear 07/30/2016    unspecified   • Perforated tympanic membrane 09/04/2014   • Puncture wound 06/17/2014    injury   • Right-sided  face pain 05/13/2016   • Seizure (CMS/formerly [facility])    • Sleep apnea    • Thumb pain 08/28/2015   • Upper respiratory infection 08/28/2015   • Wheezing 10/21/2014     Past Surgical History:   Procedure Laterality Date   • BREAST BIOPSY     • OTHER SURGICAL HISTORY  07/07/2014    Destruction of Benign Lesion (1-14)   • TUBAL ABDOMINAL LIGATION             Allergies   Allergen Reactions   • Lisinopril Anaphylaxis       Current Outpatient Medications:   •  albuterol (VENTOLIN HFA) 108 (90 Base) MCG/ACT inhaler, Inhale 2 puffs 4 (Four) Times a Day., Disp: 18 g, Rfl: 3  •  ANORO ELLIPTA 62.5-25 MCG/INH aerosol powder , Inhale 62.5 inhalers Daily., Disp: , Rfl: 0  •  aspirin (ASPIRIN CHILDRENS) 81 MG chewable tablet, Chew 1 tablet Daily., Disp: 128 tablet, Rfl: 0  •  atorvastatin (LIPITOR) 40 MG tablet, Take 1 tablet by mouth Daily., Disp: 30 tablet, Rfl: 5  •  benzonatate (TESSALON) 100 MG capsule, Take 1 capsule by mouth 3 (Three) Times a Day As Needed for Cough., Disp: 30 capsule, Rfl: 3  •  Blood Pressure Monitoring (B-D ASSURE BPM/AUTO WRIST CUFF) misc, 1 kit 2 (Two) Times a Day As Needed (fluctuating blood pressure)., Disp: 1 each, Rfl: 0  •  diclofenac (VOLTAREN) 50 MG EC tablet, Take 1 tablet by mouth 2 (Two) Times a Day., Disp: 60 tablet, Rfl: 5  •  fluticasone (FLONASE) 50 MCG/ACT nasal spray, 2 sprays into the nostril(s) as directed by provider Daily., Disp: 16 g, Rfl: 2  •  folic acid (FOLVITE) 1 MG tablet, Take 1 tablet by mouth Daily., Disp: 30 tablet, Rfl: 5  •  gabapentin (NEURONTIN) 800 MG tablet, Take 1 tablet by mouth 2 (Two) Times a Day., Disp: 60 tablet, Rfl: 0  •  levETIRAcetam (KEPPRA) 500 MG tablet, Take 1 tablet by mouth 2 (Two) Times a Day., Disp: 60 tablet, Rfl: 5  •  lidocaine (LIDODERM) 5 %, apply 1 patch to the affected area daily. Leave on for 12 hours and then off for 12 hours., Disp: 6 patch, Rfl: 1  •  meclizine (ANTIVERT) 25 MG tablet, Take 1 tablet by mouth 3 (Three) Times a Day As Needed for  dizziness., Disp: 30 tablet, Rfl: 3  •  NIFEdipine XL (PROCARDIA XL) 30 MG 24 hr tablet, Take 1 tablet by mouth Daily., Disp: 90 tablet, Rfl: 1  •  rOPINIRole (REQUIP) 2 MG tablet, Take 1 tablet by mouth every night at bedtime., Disp: 30 tablet, Rfl: 3  •  tiZANidine (ZANAFLEX) 4 MG tablet, Take 1 tablet by mouth Every 8 (Eight) Hours As Needed for Muscle Spasms., Disp: 90 tablet, Rfl: 1  •  traMADol (ULTRAM) 50 MG tablet, Take 1 tablet by mouth Every 8 (Eight) Hours As Needed for Moderate Pain ., Disp: 90 tablet, Rfl: 0  •  vitamin D (ERGOCALCIFEROL) 99160 units capsule capsule, Take 1 capsule by mouth Every 14 (Fourteen) Days., Disp: 4 capsule, Rfl: 5  •  silver sulfadiazine (SILVADENE) 1 % cream, Apply  topically to the appropriate area as directed Daily., Disp: 50 g, Rfl: 0    Review of Systems   Constitution: Positive for weakness, malaise/fatigue and weight loss. Negative for chills and decreased appetite.   HENT: Negative for hearing loss, hoarse voice, nosebleeds, stridor and tinnitus.         Denies snorting drugs    Eyes: Positive for visual disturbance.        No amaurosis fugax   Cardiovascular: Negative for chest pain, claudication, leg swelling, palpitations, paroxysmal nocturnal dyspnea and syncope.        Legs go weak.  LUE :  N Open sores  N color changes Y coldness.           Y  numbness or paresthesias  Decrease sensation    Respiratory: Positive for cough. Negative for hemoptysis, shortness of breath and wheezing.    Endocrine: Negative for polyuria.   Hematologic/Lymphatic: Negative for bleeding problem. Does not bruise/bleed easily.   Skin: Positive for color change and dry skin. Negative for flushing, poor wound healing and rash.   Musculoskeletal: Positive for arthritis, falls, muscle weakness (legs), myalgias and stiffness.   Gastrointestinal: Negative for anorexia, hematemesis and melena.   Genitourinary: Negative for hematuria.   Neurological: Positive for dizziness (chronic ), loss of  balance, numbness and seizures (history ). Negative for focal weakness.   Psychiatric/Behavioral: Negative for altered mental status.   Allergic/Immunologic: Negative for hives.        Objective   Physical Exam   Constitutional: She is oriented to person, place, and time. She appears well-nourished. No distress.   Body mass index is 26.57 kg/m².     HENT:   Head: Normocephalic.   Right Ear: External ear normal.   Left Ear: External ear normal.   Mouth/Throat: Oropharynx is clear and moist.   Tongue midline Facial movements symmetrical Decrease sensation R face and chin      Eyes: Conjunctivae and EOM are normal. Pupils are equal, round, and reactive to light.   Neck: Neck supple. No JVD present. No tracheal deviation present.   Cardiovascular: Normal rate, regular rhythm, normal heart sounds and intact distal pulses.   Pulses:       Carotid pulses are 1+ on the right side with bruit, and 1+ on the left side with bruit.       Radial pulses are 2+ on the right side, and 2+ on the left side.        Dorsalis pedis pulses are 2+ on the right side, and 2+ on the left side.        Posterior tibial pulses are 2+ on the right side, and 2+ on the left side.   HR 87  Left Ulnar pulse biphasic doppler signal  Displaced pulse   Left em test Normal refill  Left Hand with mild mottling  Decrease sensation to light touch and mobility   mild weakness  RUE:  Normal color refill and sensation Mild  weakness.    Pulmonary/Chest: Effort normal and breath sounds normal. No stridor. No respiratory distress. She has no wheezes.   Abdominal: Soft. Bowel sounds are normal. She exhibits no distension. There is no tenderness.   Musculoskeletal: She exhibits deformity (Left wrist ). She exhibits no edema or tenderness.   Neurological: She is alert and oriented to person, place, and time. A cranial nerve deficit (Decrease facial sensation ) and sensory deficit is present.   Skin: Skin is warm and dry. Capillary refill takes less than  2 seconds. No rash noted. No erythema. No pallor.   Left hand mottling   Psychiatric: Her behavior is normal. Judgment normal.   Nursing note and vitals reviewed.      Vitals:    10/15/19 1015   BP: 140/92   HR 87   Body mass index is 26.57 kg/m².    Lab Results   Component Value Date    WBC 7.40 09/12/2019    HGB 13.6 09/12/2019    HCT 43.0 09/12/2019    MCV 97.1 (H) 09/12/2019     09/12/2019     Lab Results   Component Value Date    CREATININE 1.38 (H) 09/12/2019           Assessment/Plan   Independent Review of Radiographic Studies:    None with today's visit     1. PVD (peripheral vascular disease) (CMS/HCC)  Of UE with symptoms   Gloves for warmth.  Check regarding Raynaud's gloves   Recommend continue medical management with antiplatelet therapy, Vit D, and statin therapy.   ACE contraindicated as angioedema occurred after taking  Recommend life style changes: Do not go barefoot.  Non perfumed cream for feet  For dry skin; Bag Balm  Smoking cessation assistance options offered including behavioral counseling (Smoking Cessation Classes), Nicotine replacement therapy (patches or gum). Understands tobacco increases risk of expanding AAA, MI, CVA, PAD, carcinoma.  Recommend not smoking cocaine.   Discussion and question answer period 5-7 minutes.  Heart healthy diet.  Daily exercise, walking between 3-4 daily in 10 minute intervals with walker    If signs and symptoms of ischemia should occur including but not limited to pale/blue discoloration of limb, increasing pain with ambulation or at rest, or a non-healing wound. Patient is to notify Heart and Vascular center for immediate evaluation.    - Doppler Arterial Single Level Upper Extremity - Bilateral CAR; Future    2. Bilateral carotid artery stenosis  Recommend continue medical management with antiplatelet therapy, Vit D, and statin therapy.   ACE contraindicated as angioedema occurred after taking  Recommend life style changes: Do not go barefoot.  Non  perfumed cream for feet  For dry skin; Bag Balm  Smoking cessation, as above  If you should experience any neurological symptoms including but not limited to visual or speech disturbances confusion, seizures, or weakness of limbs of one side of your body notify Heart and Vascular center immediately for evaluation or if after hours present to the nearest Emergency Department.   - Duplex Carotid Ultrasound CAR; Future

## 2019-10-15 NOTE — PATIENT INSTRUCTIONS
Left arm:  Exam with decrease pulses with symptoms  RBI of both arm to determine blood flow   Consider life style changes Smoking increase vascular disease.   Nicotine lozenges as replacement   Tests in [place] and follow up with Nurse Practitioner there  If signs and symptoms of ischemia should occur including but not limited to pale/blue discoloration of limb, increasing pain with ambulation or at rest, or a non-healing wound. Patient is to notify [facility] V[facility] center for immediate evaluation.

## 2019-10-21 ENCOUNTER — TELEPHONE (OUTPATIENT)
Dept: FAMILY MEDICINE CLINIC | Facility: CLINIC | Age: 59
End: 2019-10-21

## 2019-10-21 DIAGNOSIS — E56.9 VITAMIN DEFICIENCY: ICD-10-CM

## 2019-10-21 RX ORDER — FOLIC ACID 1 MG/1
1000 TABLET ORAL DAILY
Qty: 30 TABLET | Refills: 5 | Status: SHIPPED | OUTPATIENT
Start: 2019-10-21 | End: 2020-09-29

## 2019-10-24 DIAGNOSIS — T22.212A PARTIAL THICKNESS BURN OF LEFT FOREARM, INITIAL ENCOUNTER: ICD-10-CM

## 2019-10-25 ENCOUNTER — OFFICE VISIT (OUTPATIENT)
Dept: ORTHOPEDIC SURGERY | Facility: CLINIC | Age: 59
End: 2019-10-25

## 2019-10-25 VITALS — HEIGHT: 63 IN | WEIGHT: 150 LBS | BODY MASS INDEX: 26.58 KG/M2

## 2019-10-25 DIAGNOSIS — Z74.09 MOBILITY IMPAIRED: ICD-10-CM

## 2019-10-25 DIAGNOSIS — M53.3 DISORDER OF SI (SACROILIAC) JOINT: ICD-10-CM

## 2019-10-25 DIAGNOSIS — M25.552 LEFT HIP PAIN: Primary | ICD-10-CM

## 2019-10-25 DIAGNOSIS — R29.898 ANKLE WEAKNESS: ICD-10-CM

## 2019-10-25 DIAGNOSIS — M25.572 CHRONIC PAIN OF LEFT ANKLE: ICD-10-CM

## 2019-10-25 DIAGNOSIS — M79.605 LEFT LEG PAIN: ICD-10-CM

## 2019-10-25 DIAGNOSIS — R29.6 FREQUENT FALLS: ICD-10-CM

## 2019-10-25 DIAGNOSIS — G89.29 CHRONIC PAIN OF LEFT ANKLE: ICD-10-CM

## 2019-10-25 PROCEDURE — 96372 THER/PROPH/DIAG INJ SC/IM: CPT | Performed by: NURSE PRACTITIONER

## 2019-10-25 PROCEDURE — 99214 OFFICE O/P EST MOD 30 MIN: CPT | Performed by: NURSE PRACTITIONER

## 2019-10-25 RX ORDER — HYDROCODONE BITARTRATE AND ACETAMINOPHEN 5; 325 MG/1; MG/1
1 TABLET ORAL EVERY 8 HOURS PRN
Qty: 30 TABLET | Refills: 0 | Status: SHIPPED | OUTPATIENT
Start: 2019-10-25 | End: 2019-11-04

## 2019-10-25 RX ORDER — KETOROLAC TROMETHAMINE 30 MG/ML
60 INJECTION, SOLUTION INTRAMUSCULAR; INTRAVENOUS ONCE
Status: COMPLETED | OUTPATIENT
Start: 2019-10-25 | End: 2019-10-25

## 2019-10-25 RX ORDER — TRIAMCINOLONE ACETONIDE 40 MG/ML
80 INJECTION, SUSPENSION INTRA-ARTICULAR; INTRAMUSCULAR ONCE
Status: COMPLETED | OUTPATIENT
Start: 2019-10-25 | End: 2019-10-25

## 2019-10-25 RX ADMIN — KETOROLAC TROMETHAMINE 60 MG: 30 INJECTION, SOLUTION INTRAMUSCULAR; INTRAVENOUS at 11:30

## 2019-10-25 RX ADMIN — TRIAMCINOLONE ACETONIDE 80 MG: 40 INJECTION, SUSPENSION INTRA-ARTICULAR; INTRAMUSCULAR at 11:30

## 2019-10-25 NOTE — PROGRESS NOTES
"Jyoti Guerra Person is a 59 y.o. female returns for     Chief Complaint   Patient presents with   • Left Hip - Follow-up   • Left Lower Leg - Follow-up   • Left Ankle - Follow-up       HISTORY OF PRESENT ILLNESS: Patient presents to office for evaluation and follow-up of chronic left ankle pain.  This is been an ongoing issue for over 2 years.  Patient does have a history of multiple falls and injuries, resulting in nondisplaced fractures of the distal fibula and ankle sprains.  She does not recall a specific injury that has worsened her pain but states that she has fallen multiple times since she was last seen in office.  The etiology of her multiple falls is unknown.  She is followed by a neurologist.  Patient actually complains today of pain throughout the entire length of her left leg, including her hip and low back.  She has been evaluated previously for her complaints of chronic low back and left hip pain.  She has had a previous MRI of her pelvis on 6/4/2019, which indicated normal hip joints and some bony edema in the upper sacrum.  She has a history of degenerative disc disease in the lumbar spine also.  Patient is adamant that she has a deformity in her left ankle.  She complains of localized pain in the left ankle with walking.  No swelling.  Patient has been taking Tramadol as prescribed by her PCP, but states this is not helping her pain.  X-rays of the left tibia/fibula are performed in office today.      CONCURRENT MEDICAL HISTORY:    The following portions of the patient's history were reviewed and updated as appropriate: allergies, current medications, past family history, past medical history, past social history, past surgical history and problem list.     ROS  No fevers or chills.  No chest pain or shortness of air.  No GI or  disturbances. Left ankle pain. Left leg pain. Low back pain.     PHYSICAL EXAMINATION:       Ht 160 cm (63\")   Wt 68 kg (150 lb)   BMI 26.57 kg/m²     Physical Exam "   Constitutional: She is oriented to person, place, and time. Vital signs are normal. She appears well-developed and well-nourished. She is active and cooperative. She does not appear ill. No distress.   HENT:   Head: Normocephalic.   Pulmonary/Chest: Effort normal. No respiratory distress.   Abdominal: Soft. She exhibits no distension.   Musculoskeletal: She exhibits tenderness (Left SI joint, low back, left ankle (mild)). She exhibits no edema or deformity.   Neurological: She is alert and oriented to person, place, and time. GCS eye subscore is 4. GCS verbal subscore is 5. GCS motor subscore is 6.   Skin: Skin is warm, dry and intact. Capillary refill takes less than 2 seconds. No erythema.   Psychiatric: She has a normal mood and affect. Her speech is normal and behavior is normal. Judgment and thought content normal. Cognition and memory are normal.   Vitals reviewed.      GAIT:     []  Normal  []  Antalgic    Assistive device: []  None  []  Walker     []  Crutches  []  Cane     [x]  Wheelchair  []  Stretcher    Right Ankle Exam     Tenderness   The patient is experiencing no tenderness.  Swelling: none    Range of Motion   The patient has normal right ankle ROM.    Muscle Strength   The patient has normal right ankle strength.    Other   Erythema: absent  Sensation: normal  Pulse: present       Left Ankle Exam     Tenderness   Left ankle tenderness location: Mild, diffuse.   Swelling: none    Range of Motion   Dorsiflexion: 25   Plantar flexion: 30   Eversion: abnormal   Inversion: abnormal     Muscle Strength   The patient has normal left ankle strength.    Other   Erythema: absent  Sensation: normal  Pulse: present    Comments:  Mild pain and limitations with range of motion.  No swelling noted.  No deformity.  Mild, diffuse tenderness but no localized tenderness.  No erythema.  No double abnormalities noted.  Neurovascularly intact.      Right Hip Exam     Tenderness   The patient is experiencing no  tenderness.     Range of Motion   The patient has normal right hip ROM.    Muscle Strength   Abduction: 4/5   Flexion: 4/5     Tests   TERI: negative  Fadir:  Negative FADIR test    Other   Erythema: absent  Sensation: normal  Pulse: present      Left Hip Exam     Tenderness   The patient is experiencing tenderness in the posterior.    Range of Motion   The patient has normal left hip ROM.    Muscle Strength   Abduction: 4/5   Flexion: 4/5     Tests   ETRI: negative  Fadir:  Negative FADIR test    Other   Erythema: absent  Sensation: normal  Pulse: present    Comments:  Mild pain with range of motion, elicited in the left SI joint.  Overall good motion and rotation of the hip joint without significant pain or limitations.  No swelling.  No deformity.  No ecchymosis.  No leg length discrepancy noted.      Back Exam     Tenderness   The patient is experiencing tenderness in the sacroiliac and lumbar (Left).    Range of Motion   Extension: abnormal   Flexion: abnormal   Rotation right: abnormal   Rotation left: abnormal     Muscle Strength   Right Quadriceps:  4/5   Left Quadriceps:  4/5     Tests   Straight leg raise right: negative  Straight leg raise left: positive at 90 deg (Mild (pain elicited in left SI joint))    Reflexes   Patellar: normal    Other   Sensation: normal  Gait: antalgic   Erythema: no back redness  Scars: absent    Comments:  Pain and limitations with range of motion.  Pain is elicited in the left SI joint.  Significant tenderness to palpation noted in the left SI joint and mid low back/sacrum. No focal neurological deficits noted.            Xr Tibia Fibula 2 View Left    Result Date: 10/25/2019  Narrative: AP and lateral views of the left tibia and fibula reveal no evidence of acute fracture or dislocation.  No significant degenerative changes are noted.  The visualized knee and ankle joints are well-aligned.  The bones appear well-mineralized.  Soft tissues appear unremarkable.  No evidence  of radiopaque foreign body is seen.  No acute bony radiologic abnormalities are noted at this time.  No significant changes are noted when compared with prior images from 11/14/2017.10/25/19 at 4:40 PM by WESTON Yuen         ASSESSMENT:    Diagnoses and all orders for this visit:    Left hip pain  -     triamcinolone acetonide (KENALOG-40) injection 80 mg  -     ketorolac (TORADOL) injection 60 mg    Left leg pain  -     XR Tibia Fibula 2 View Left; Future  -     triamcinolone acetonide (KENALOG-40) injection 80 mg  -     ketorolac (TORADOL) injection 60 mg    Chronic pain of left ankle  -     XR Tibia Fibula 2 View Left; Future  -     triamcinolone acetonide (KENALOG-40) injection 80 mg  -     ketorolac (TORADOL) injection 60 mg  -     MRI Ankle Left Without Contrast; Future    Mobility impaired  -     MRI Ankle Left Without Contrast; Future    Frequent falls  -     MRI Ankle Left Without Contrast; Future    Disorder of SI (sacroiliac) joint    Ankle weakness  -     MRI Ankle Left Without Contrast; Future    Other orders  -     HYDROcodone-acetaminophen (NORCO) 5-325 MG per tablet; Take 1 tablet by mouth Every 8 (Eight) Hours As Needed for Severe Pain  for up to 10 days.    PLAN    X-rays of the left tibia and fibula performed in office today and reviewed with no acute findings noted.  Patient continues to complain of persistent left ankle pain.  This is been an ongoing complaint for over 2 years now.  She has a history of multiple falls and multiple injuries to the left ankle, including nondisplaced fractures and sprains.  No prior surgeries.  She has worn an Orthoboot in the past for treatment with some improvement.  Patient is adamant today that she has a deformity in her left ankle and she indicates the bony prominence of her lateral malleolus.  It does not appear abnormally deformed on physical exam.  She has no swelling in the ankle.  Her subjective complaints are diffuse and multiple at each visit.   Recommend MRI of the left ankle to evaluate for fracture, bone contusion, ligament injury/disruption, osteomyelitis, osteoarthritis and/or tendinitis/tendon injury.  Recommend use of an Orthoboot to the left ankle.  She states she does not know where her Orthoboot that she previously was using.  A new one is placed/fitted in office today.  Recommend rest and activity modification as tolerated and based on her pain.  Recommend modified weightbearing off the left ankle with use of a cane or walker.  She has both of these at home.  She is in a wheelchair today.  I have never seen her ambulate at any office visit.  Recommend elevation and ice therapy as needed to minimize pain/swelling.  There is no swelling on physical exam today.  Patient has been taking tramadol as prescribed by her PCP for chronic pain.  She states this is not helping and she needs something stronger.  Norco is prescribed to take as needed for worse pain.  We discussed that I cannot keep her on Norco as I do not treat chronic pain with opioids.  Patient is instructed to take the Norco very sparingly and only when needed for severe pain.  Otherwise, she should continue with non-opioid pain management methods and use of her tramadol as prescribed.  Patient is cautioned not to take Norco and tramadol concurrently due to excessive drowsiness, respiratory depression and increased risk for fall.  She is already high risk for fall.  I have encouraged her many times to use her walker or cane for balance.  Patient wants to repeat her IM injections of Kenalog and Toradol today to help with her chronic low back and left SI joint pain.  Recommend IM Kenalog and IM Toradol injections today in office for management of pain/inflammation.  Continue Voltaren daily for consistent dosing of oral NSAIDs, as prescribed previously by her PCP.  Patient also takes Neurontin twice daily as prescribed by her PCP.  Follow-up after MRI.  Patient may need a MRI scan of her  lumbar spine if she continues to have pain throughout the left leg.  Currently, her symptoms are inconsistent with radicular pain.  Patient may also need a referral to physical therapy for her chronic left ankle pain and weakness.  She has done a course of physical therapy previously for her low back and SI joint pain.    This patient has tried and failed a course of multiple treatment modalities which include the use of anti-inflammatories, acetaminophen, IM Toradol, IM Kenalog, physical therapy, home exercises, use of an air cast/stirrup splint, modified weightbearing with use of a walker and ice therapy.  She reports her pain is severe and Tramadol does not help the pain.  Therefore, I will recommend a short course of narcotic pain medication for this patient until their pain has been sufficiently reduced to a level that I deem acceptable to be treated with alternative treatment options. [facility] reviewed # [id number].     Return for follow up after MRI.        This document has been electronically signed by WESTON Yuen on October 29, 2019 1:21 PM      WESTON Yuen

## 2019-11-04 ENCOUNTER — OFFICE VISIT (OUTPATIENT)
Dept: CARDIAC SURGERY | Facility: CLINIC | Age: 59
End: 2019-11-04

## 2019-11-04 VITALS
BODY MASS INDEX: 26.58 KG/M2 | HEART RATE: 91 BPM | WEIGHT: 150 LBS | SYSTOLIC BLOOD PRESSURE: 130 MMHG | DIASTOLIC BLOOD PRESSURE: 77 MMHG | OXYGEN SATURATION: 98 % | HEIGHT: 63 IN

## 2019-11-04 DIAGNOSIS — I65.23 CAROTID STENOSIS, ASYMPTOMATIC, BILATERAL: ICD-10-CM

## 2019-11-04 DIAGNOSIS — R20.0 NUMBNESS AND TINGLING IN BOTH HANDS: ICD-10-CM

## 2019-11-04 DIAGNOSIS — R20.2 NUMBNESS AND TINGLING IN BOTH HANDS: ICD-10-CM

## 2019-11-04 DIAGNOSIS — G62.9 NEUROPATHY: Primary | ICD-10-CM

## 2019-11-04 DIAGNOSIS — Z72.0 TOBACCO ABUSE: ICD-10-CM

## 2019-11-04 DIAGNOSIS — M25.572 PAIN IN JOINT INVOLVING LEFT ANKLE AND FOOT: ICD-10-CM

## 2019-11-04 DIAGNOSIS — G56.03 BILATERAL CARPAL TUNNEL SYNDROME: ICD-10-CM

## 2019-11-04 PROCEDURE — 99214 OFFICE O/P EST MOD 30 MIN: CPT | Performed by: NURSE PRACTITIONER

## 2019-11-04 RX ORDER — TIZANIDINE 4 MG/1
4 TABLET ORAL EVERY 8 HOURS PRN
Qty: 90 TABLET | Refills: 1 | Status: SHIPPED | OUTPATIENT
Start: 2019-11-04 | End: 2020-03-09

## 2019-11-04 RX ORDER — TRAMADOL HYDROCHLORIDE 50 MG/1
TABLET ORAL
Qty: 90 TABLET | Refills: 0 | Status: SHIPPED | OUTPATIENT
Start: 2019-11-04 | End: 2019-12-23 | Stop reason: SDUPTHER

## 2019-11-04 NOTE — PATIENT INSTRUCTIONS
Vascular studies normal.  Will contact you regarding numbness in hands, and discuss with your neurologist.  Follow up in one year for repeat carotid ultrasound.  Consider warm gloves for raynauds.

## 2019-11-04 NOTE — PROGRESS NOTES
CVTS Office Progress Note     11/4/2019    Jyoti Guerra Person  1960    Chief Complaint:    Chief Complaint   Patient presents with   • decreased upper ext pulse       HPI:      PCP:  Sharon Post APRN  Ortho: [facility]   Neurology: Dr. Ritchie      59 y.o. female with HTN(uncontrolled, increased risk stroke, rupture), Hyperlipidemia(stable, increased risk cardiovascular events) and Smoker(uncontrolled, increased risk cardiovascular events) prior CVA, hx of cocaine use, depression, seizures, prior CVA/TIA, COPD, non-compliance with medications at times, carotid stenosis, frequent falls, multiple fx, in air cast today.  smokes 1 PPD.  Patient established with [facility] for evaluation of possible bilateral upper extremity claudication potentially contributing to intermittent neuropathy, her bilateral neuropathy is chronic, previously evaluated by neurology with EMG showing median nerve irritation.  Referred to [facility] for vascular testing and carotid surveillance, she is in wheel chair today, in boot.  No other associated signs, symptoms or modifying factors.    11/4/2019 Carotid Duplex: ERICK 0-49% mPSV 70c/s Ratio 1.8, LICA 0-49% mPSV 80c/s Ratio 1.2  11/4/2019 RBI: Right 1.24 triphasic.  Left 1.2 triphasic    The following portions of the patient's history were reviewed and updated as appropriate: allergies, current medications, past family history, past medical history, past social history, past surgical history and problem list.  Recent images independently reviewed.  Available laboratory values reviewed.    PMH:  Past Medical History:   Diagnosis Date   • Acute bronchitis 07/19/2016   • Acute otitis externa 09/04/2014   • Acute otitis media 02/10/2015   • Acute sinusitis 07/19/2016   • Ankle injury    • Backache 12/08/2014   • Benign hypertension 05/13/2016   • Cardiac disease    • Cerebrovascular accident (CMS/[facility]) 2015   • COPD (chronic obstructive pulmonary disease) (CMS/[facility])     • Depression    • Dizziness 02/10/2015   • Encounter for gynecological examination without abnormal finding 03/10/2016   • Fatigue 08/28/2015   • Foot pain 06/09/2014   • Hyperkeratosis 04/28/2014   • Hypotensive episode 06/12/2015   • Keratoma 08/04/2014    intractable plantar   • Low blood pressure 08/28/2015   • Menopausal and perimenopausal disorder 03/10/2016    other specified   • Numbness of face 05/13/2016    improved   • Otitis media of right ear 07/30/2016    unspecified   • Perforated tympanic membrane 09/04/2014   • Puncture wound 06/17/2014    injury   • Right-sided face pain 05/13/2016   • Seizure (CMS/HCC)    • Sleep apnea    • Thumb pain 08/28/2015   • Upper respiratory infection 08/28/2015   • Wheezing 10/21/2014     Past Surgical History:   Procedure Laterality Date   • BREAST BIOPSY     • OTHER SURGICAL HISTORY  07/07/2014    Destruction of Benign Lesion (1-14)   • TUBAL ABDOMINAL LIGATION       Family History   Problem Relation Age of Onset   • Alzheimer's disease Other    • Bipolar disorder Other    • Depression Other    • Diabetes Other    • Heart disease Other    • Hypertension Other    • Lung cancer Other    • Migraines Other    • Sickle cell anemia Other    • Stroke Other    • Lung disease Other    • No Known Problems Mother    • Alzheimer's disease Father    • Heart attack Father    • Throat cancer Father    • No Known Problems Daughter      Social History     Tobacco Use   • Smoking status: Current Every Day Smoker     Packs/day: 0.25     Years: 40.00     Pack years: 10.00     Types: Cigarettes   • Smokeless tobacco: Never Used   Substance Use Topics   • Alcohol use: Yes     Comment: rarely   • Drug use: Yes     Frequency: 2.0 times per week     Types: Cocaine       ALLERGIES:  Allergies   Allergen Reactions   • Lisinopril Anaphylaxis         MEDICATIONS:    Current Outpatient Medications:   •  albuterol (VENTOLIN HFA) 108 (90 Base) MCG/ACT inhaler, Inhale 2 puffs 4 (Four) Times a Day.,  Disp: 18 g, Rfl: 3  •  ANORO ELLIPTA 62.5-25 MCG/INH aerosol powder , Inhale 62.5 inhalers Daily., Disp: , Rfl: 0  •  aspirin (ASPIRIN CHILDRENS) 81 MG chewable tablet, Chew 1 tablet Daily., Disp: 128 tablet, Rfl: 0  •  atorvastatin (LIPITOR) 40 MG tablet, Take 1 tablet by mouth Daily., Disp: 30 tablet, Rfl: 5  •  benzonatate (TESSALON) 100 MG capsule, Take 1 capsule by mouth 3 (Three) Times a Day As Needed for Cough., Disp: 30 capsule, Rfl: 3  •  Blood Pressure Monitoring (B-D ASSURE BPM/AUTO WRIST CUFF) misc, 1 kit 2 (Two) Times a Day As Needed (fluctuating blood pressure)., Disp: 1 each, Rfl: 0  •  diclofenac (VOLTAREN) 50 MG EC tablet, Take 1 tablet by mouth 2 (Two) Times a Day., Disp: 60 tablet, Rfl: 5  •  fluticasone (FLONASE) 50 MCG/ACT nasal spray, 2 sprays into the nostril(s) as directed by provider Daily., Disp: 16 g, Rfl: 2  •  folic acid (FOLVITE) 1 MG tablet, Take 1 tablet by mouth Daily., Disp: 30 tablet, Rfl: 5  •  gabapentin (NEURONTIN) 800 MG tablet, Take 1 tablet by mouth 2 (Two) Times a Day., Disp: 60 tablet, Rfl: 0  •  HYDROcodone-acetaminophen (NORCO) 5-325 MG per tablet, Take 1 tablet by mouth Every 8 (Eight) Hours As Needed for Severe Pain  for up to 10 days., Disp: 30 tablet, Rfl: 0  •  levETIRAcetam (KEPPRA) 500 MG tablet, Take 1 tablet by mouth 2 (Two) Times a Day., Disp: 60 tablet, Rfl: 5  •  lidocaine (LIDODERM) 5 %, apply 1 patch to the affected area daily. Leave on for 12 hours and then off for 12 hours., Disp: 6 patch, Rfl: 1  •  meclizine (ANTIVERT) 25 MG tablet, Take 1 tablet by mouth 3 (Three) Times a Day As Needed for dizziness., Disp: 30 tablet, Rfl: 3  •  NIFEdipine XL (PROCARDIA XL) 30 MG 24 hr tablet, Take 1 tablet by mouth Daily., Disp: 90 tablet, Rfl: 1  •  rOPINIRole (REQUIP) 2 MG tablet, Take 1 tablet by mouth every night at bedtime., Disp: 30 tablet, Rfl: 3  •  silver sulfadiazine (SILVADENE, SSD) 1 % cream, APPLY TOPICALLY TO THE APPROPRIATE AREA AS DIRECTED DAILY., Disp:  50 g, Rfl: 0  •  tiZANidine (ZANAFLEX) 4 MG tablet, Take 1 tablet by mouth Every 8 (Eight) Hours As Needed for Muscle Spasms., Disp: 90 tablet, Rfl: 1  •  traMADol (ULTRAM) 50 MG tablet, Take 1 tablet by mouth Every 8 (Eight) Hours As Needed for Moderate Pain ., Disp: 90 tablet, Rfl: 0  •  vitamin D (ERGOCALCIFEROL) 90167 units capsule capsule, Take 1 capsule by mouth Every 14 (Fourteen) Days., Disp: 4 capsule, Rfl: 5      Review of Systems   Constitution: Negative for chills, decreased appetite, fever, weakness and weight loss.   HENT: Negative for congestion, nosebleeds and sore throat.    Eyes: Negative for blurred vision, visual disturbance and visual halos.   Cardiovascular: Positive for dyspnea on exertion. Negative for chest pain and leg swelling.   Respiratory: Positive for cough. Negative for shortness of breath, sputum production and wheezing.    Endocrine: Negative for cold intolerance and polyuria.   Hematologic/Lymphatic: Negative for bleeding problem. Bruises/bleeds easily.        Does not report S/S of adverse bleeding event including nose bleeds, hematuria, melena, gingival bleeding, or hematemesis.   Skin: Positive for unusual hair distribution. Negative for flushing and nail changes.   Musculoskeletal: Positive for arthritis, back pain and joint pain.   Gastrointestinal: Negative for bloating, abdominal pain, hematemesis, melena, nausea and vomiting.   Genitourinary: Negative for flank pain and hematuria.   Neurological: Positive for focal weakness, loss of balance, numbness and paresthesias. Negative for brief paralysis, difficulty with concentration and light-headedness.   Psychiatric/Behavioral: Positive for depression. Negative for altered mental status, substance abuse and suicidal ideas.   Allergic/Immunologic: Negative for hives and persistent infections.         Vitals:    11/04/19 1352   BP: 130/77   BP Location: Left arm   Patient Position: Sitting   Cuff Size: Adult   Pulse: 91   SpO2:  "98%   Weight: 68 kg (150 lb)   Height: 160 cm (62.99\")     Physical Exam   Constitutional: She is oriented to person, place, and time. She appears well-developed.   In wheel chair   HENT:   Head: Normocephalic and atraumatic.   Mouth/Throat: Oropharynx is clear and moist.   Eyes: EOM are normal. Pupils are equal, round, and reactive to light.   Neck: Normal range of motion. Neck supple.   Cardiovascular: Normal rate and intact distal pulses.   Pulses:       Radial pulses are 2+ on the right side, and 2+ on the left side.   Pulmonary/Chest: Effort normal and breath sounds normal. No respiratory distress.   Abdominal: Soft. Bowel sounds are normal. She exhibits no distension.   Musculoskeletal:   ROM decreased LEft ankle in boot   Neurological: She is alert and oriented to person, place, and time.   Skin: Skin is warm and dry. Capillary refill takes less than 2 seconds.   There is no evidence of skin breakdown of the bilateral lower extremities.  BLE pink, no evidence of ischemia.  Feet are absent of dependent rubor.   Psychiatric: She has a normal mood and affect. Her behavior is normal. Judgment normal.   Vitals reviewed.      Assessment & Plan     Independent Review of Studies  11/4/2019 Carotid Duplex: ERICK 0-49% mPSV 70c/s Ratio 1.8, LICA 0-49% mPSV 80c/s Ratio 1.2  11/4/2019 RBI: Right 1.24 triphasic.  Left 1.2 triphasic    1. Neuropathy    2. Numbness and tingling in both hands  RBI with normal triphasic flow.  Possible raynauds, recommend warm gloves/mittens during episodes, on CCB already  Prior EMG with neurology in [place], median nerve irritation, follow up with ortho and neurology, as there is no apparent vascular etiology.    3. Bilateral carpal tunnel syndrome  Possible carpal tunnel, follow up with ortho as scheduled.    4. Carotid stenosis, asymptomatic, bilateral  Mild bilateral disease on today's exam.  Repeat in one year.    ASA, Statin, Vit D  - Duplex Carotid Ultrasound CAR; Future    5. Tobacco " abuse  Smoking cessation assistance options offered including behavioral counseling (Smoking Cessation Classes), Nicotine replacement therapy (patches or gum), pharmacologic therapy (Chantix, Wellbutrin). Understands tobacco increases risk of expanding AAA, MI, CVA, PAD, carcinoma. Discussion and question answer period 5-7 minutes.    Detailed discussion regarding risks, benefits, and treatment plan. Images independently reviewed. Patient understands, agrees, and wishes to proceed with plan.       This document has been electronically signed by Nguyễn Sanchez AGACNP-BC @  On November 4, 2019 3:41 PM      EMR Dragon/Transcription disclaimer:   Much of this encounter note is an electronic transcription/translation of spoken language to printed text. The electronic translation of spoken language may permit erroneous, or at times, nonsensical words or phrases to be inadvertently transcribed; Although I have reviewed the note for such errors, some may still exist.

## 2019-11-11 DIAGNOSIS — G62.9 NEUROPATHY: ICD-10-CM

## 2019-11-11 RX ORDER — GABAPENTIN 800 MG/1
800 TABLET ORAL 2 TIMES DAILY
Qty: 60 TABLET | Refills: 0 | Status: SHIPPED | OUTPATIENT
Start: 2019-11-11 | End: 2019-12-23 | Stop reason: SDUPTHER

## 2019-11-14 ENCOUNTER — OFFICE VISIT (OUTPATIENT)
Dept: ORTHOPEDIC SURGERY | Facility: CLINIC | Age: 59
End: 2019-11-14

## 2019-11-14 VITALS — WEIGHT: 150 LBS | BODY MASS INDEX: 27.6 KG/M2 | HEIGHT: 62 IN

## 2019-11-14 DIAGNOSIS — R29.898 ANKLE WEAKNESS: ICD-10-CM

## 2019-11-14 DIAGNOSIS — R29.6 FREQUENT FALLS: ICD-10-CM

## 2019-11-14 DIAGNOSIS — G89.29 CHRONIC PAIN OF LEFT ANKLE: Primary | ICD-10-CM

## 2019-11-14 DIAGNOSIS — Z74.09 MOBILITY IMPAIRED: ICD-10-CM

## 2019-11-14 DIAGNOSIS — M25.572 CHRONIC PAIN OF LEFT ANKLE: Primary | ICD-10-CM

## 2019-11-14 PROCEDURE — 99214 OFFICE O/P EST MOD 30 MIN: CPT | Performed by: NURSE PRACTITIONER

## 2019-11-14 NOTE — PROGRESS NOTES
"Jyoti Guerra Person is a 59 y.o. female returns for     Chief Complaint   Patient presents with   • Left Ankle - Follow-up, Pain       HISTORY OF PRESENT ILLNESS: Patient presents to office today for follow-up of chronic left ankle pain.  Patient is scheduled for an MRI tomorrow.  She had her days mixed up and missed the initial MRI appointment.  She wanted to be seen today regardless for her chronic left ankle pain.  Patient has a history of ongoing left ankle pain for over 2 years now.  Patient does have a history of multiple falls and injuries, resulting in some occasional minor nondisplaced fractures of the distal fibula and ankle sprains.  Each time she is seen in office, she reports multiple falls in between her appointments.  She reports that she is becoming progressively weaker and having difficulty ambulating. She has continued with use of the Orthoboot to the left foot/ankle.  She requests some assistance today with the boot and states it does not seem to be fitting right.  No new complaints or concerns noted.     CONCURRENT MEDICAL HISTORY:    The following portions of the patient's history were reviewed and updated as appropriate: allergies, current medications, past family history, past medical history, past social history, past surgical history and problem list.     ROS  No fevers or chills.  No chest pain or shortness of air.  No GI or  disturbances. Left ankle pain.     PHYSICAL EXAMINATION:       Ht 157.5 cm (62\")   Wt 68 kg (150 lb)   BMI 27.44 kg/m²     Physical Exam   Constitutional: She is oriented to person, place, and time. Vital signs are normal. She appears well-developed and well-nourished. She is active and cooperative. She does not appear ill. No distress.   HENT:   Head: Normocephalic.   Pulmonary/Chest: Effort normal. No respiratory distress.   Abdominal: Soft. She exhibits no distension.   Musculoskeletal: She exhibits tenderness (Left SI joint, low back, left ankle (mild)). She " exhibits no edema or deformity.   Neurological: She is alert and oriented to person, place, and time. GCS eye subscore is 4. GCS verbal subscore is 5. GCS motor subscore is 6.   Skin: Skin is warm, dry and intact. Capillary refill takes less than 2 seconds. No erythema.   Psychiatric: She has a normal mood and affect. Her speech is normal and behavior is normal. Judgment and thought content normal. Cognition and memory are normal.   Vitals reviewed.      GAIT:     []  Normal  []  Antalgic    Assistive device: []  None  []  Walker     []  Crutches  []  Cane     [x]  Wheelchair  []  Stretcher    Right Ankle Exam     Tenderness   The patient is experiencing no tenderness.  Swelling: none    Range of Motion   The patient has normal right ankle ROM.    Muscle Strength   The patient has normal right ankle strength.    Other   Erythema: absent  Sensation: normal  Pulse: present       Left Ankle Exam     Tenderness   Left ankle tenderness location: Mild, diffuse.   Swelling: none    Range of Motion   Dorsiflexion: 25   Plantar flexion: 30   Eversion: abnormal   Inversion: abnormal     Muscle Strength   The patient has normal left ankle strength.    Other   Erythema: absent  Sensation: normal  Pulse: present    Comments:  Mild pain and limitations with range of motion.  No swelling noted.  No deformity.  Mild, diffuse tenderness but no localized tenderness.  No erythema.  No double abnormalities noted.  Neurovascularly intact.            Xr Tibia Fibula 2 View Left    Result Date: 10/25/2019  Narrative: AP and lateral views of the left tibia and fibula reveal no evidence of acute fracture or dislocation.  No significant degenerative changes are noted.  The visualized knee and ankle joints are well-aligned.  The bones appear well-mineralized.  Soft tissues appear unremarkable.  No evidence of radiopaque foreign body is seen.  No acute bony radiologic abnormalities are noted at this time.  No significant changes are noted when  compared with prior images from 11/14/2017.10/25/19 at 4:40 PM by WESTON Yuen         ASSESSMENT:    Diagnoses and all orders for this visit:    Chronic pain of left ankle    Frequent falls    Mobility impaired    Ankle weakness    PLAN    Patient continues to complain of persistent left ankle pain.  She has continued with use of the Orthoboot as instructed.  It does not appear to be fitting appropriately today as she has the air pockets blown completely up.  Her foot is simply sitting on top of the air pockets and not seated back into the boot.  The boot is adjusted today and the patient is educated in regards on how to use it.  It is adjusted in office today and appears to be fitting appropriately when she leaves the office.  Patient is scheduled to have an MRI of the left ankle tomorrow.  She presented to office today anyway and wanted to be seen.  She had missed her initial MRI appointment and states that she got her days mixed up.  She complains today of overall progressively worsening weakness and limitations in mobility.  She states that she is getting progressively weaker and with less ability to walk.  I have encouraged her to ambulate with use of her Orthoboot.  She is also encouraged to ambulate with use of her walker for balance and modified weightbearing as needed.  Overall, I have encouraged her to mobilize and get out of the wheelchair.  As previously noted, I have never seen her walk despite multiple previous office visits, even when she was here for carpal tunnel symptoms.  There is no swelling or visible abnormalities in the left ankle on physical exam again today.  Continue with all previous orders.  Follow-up again in office following her MRI to discuss the results.  Anticipate referral back to physical therapy.  She was treated previously with physical therapy for conditioning and strengthening and efforts to increase her mobility with no lasting improvements.    Return for follow up  after MRI.        This document has been electronically signed by WESTON Yuen on November 16, 2019 12:07 PM      WESTON Yuen

## 2019-12-02 ENCOUNTER — OFFICE VISIT (OUTPATIENT)
Dept: ORTHOPEDIC SURGERY | Facility: CLINIC | Age: 59
End: 2019-12-02

## 2019-12-02 VITALS — HEIGHT: 62 IN | BODY MASS INDEX: 27.6 KG/M2 | WEIGHT: 150 LBS

## 2019-12-02 DIAGNOSIS — R93.7 BONE MARROW EDEMA: ICD-10-CM

## 2019-12-02 DIAGNOSIS — M25.572 CHRONIC PAIN OF LEFT ANKLE: Primary | ICD-10-CM

## 2019-12-02 DIAGNOSIS — G89.29 CHRONIC PAIN OF LEFT ANKLE: Primary | ICD-10-CM

## 2019-12-02 DIAGNOSIS — R29.6 FREQUENT FALLS: ICD-10-CM

## 2019-12-02 PROCEDURE — 99214 OFFICE O/P EST MOD 30 MIN: CPT | Performed by: NURSE PRACTITIONER

## 2019-12-02 PROCEDURE — 96372 THER/PROPH/DIAG INJ SC/IM: CPT | Performed by: NURSE PRACTITIONER

## 2019-12-02 RX ORDER — KETOROLAC TROMETHAMINE 30 MG/ML
60 INJECTION, SOLUTION INTRAMUSCULAR; INTRAVENOUS ONCE
Status: COMPLETED | OUTPATIENT
Start: 2019-12-02 | End: 2019-12-02

## 2019-12-02 RX ADMIN — KETOROLAC TROMETHAMINE 60 MG: 30 INJECTION, SOLUTION INTRAMUSCULAR; INTRAVENOUS at 15:49

## 2019-12-02 NOTE — PROGRESS NOTES
"Jyoti Guerra Person is a 59 y.o. female returns for     Chief Complaint   Patient presents with   • Left Ankle - Follow-up, Pain   • Results     mri done on 11/15/2019 @ [facility].        HISTORY OF PRESENT ILLNESS: Patient presents to office for follow-up of chronic left ankle pain and MRI results of left ankle.  Patient has a history of ongoing left ankle pain for over 2 years now.  Patient does have a history of multiple falls and injuries, resulting in previous nondisplaced fractures of the distal fibula and ankle sprains.  Patient continues to have frequent falls and states she has fallen multiple times since her last office visit.  Patient has continue with use of the Orthoboot as instructed.  She states she has been utilizing a walker at home also.  No new complaints or concerns noted.     CONCURRENT MEDICAL HISTORY:    The following portions of the patient's history were reviewed and updated as appropriate: allergies, current medications, past family history, past medical history, past social history, past surgical history and problem list.     ROS  No fevers or chills.  No chest pain or shortness of air.  No GI or  disturbances. Left ankle pain.     PHYSICAL EXAMINATION:       Ht 157.5 cm (62\")   Wt 68 kg (150 lb)   BMI 27.44 kg/m²     Physical Exam   Constitutional: She is oriented to person, place, and time. Vital signs are normal. She appears well-developed and well-nourished. She is active and cooperative. She does not appear ill. No distress.   HENT:   Head: Normocephalic.   Pulmonary/Chest: Effort normal. No respiratory distress.   Abdominal: Soft. She exhibits no distension.   Musculoskeletal: She exhibits tenderness (Left SI joint, low back, left ankle (mild)). She exhibits no edema or deformity.   Neurological: She is alert and oriented to person, place, and time. GCS eye subscore is 4. GCS verbal subscore is 5. GCS motor subscore is 6.   Skin: Skin is warm, dry and intact. Capillary " refill takes less than 2 seconds. No erythema.   Psychiatric: She has a normal mood and affect. Her speech is normal and behavior is normal. Judgment and thought content normal. Cognition and memory are normal.   Vitals reviewed.      GAIT:     []  Normal  []  Antalgic    Assistive device: []  None  []  Walker     []  Crutches  []  Cane     [x]  Wheelchair  []  Stretcher    Right Ankle Exam     Tenderness   The patient is experiencing no tenderness.  Swelling: none    Range of Motion   The patient has normal right ankle ROM.    Muscle Strength   The patient has normal right ankle strength.    Other   Erythema: absent  Sensation: normal  Pulse: present       Left Ankle Exam     Tenderness   Left ankle tenderness location: Mild, diffuse.   Swelling: none    Range of Motion   Dorsiflexion: 25   Plantar flexion: 30   Eversion: abnormal   Inversion: abnormal     Muscle Strength   The patient has normal left ankle strength.    Other   Erythema: absent  Sensation: normal  Pulse: present    Comments:  Mild pain and limitations with range of motion.  No swelling noted.  No deformity.  Mild, diffuse tenderness but no localized tenderness.  No erythema.  No double abnormalities noted.  Neurovascularly intact.              MRI of Left Ankle done @ [facility] on 11/15/2019    Findings: There is moderate marrow edema in the neck of the talus as well as within the cuboid.  There is also edema in the inferior aspect of the fibula.  The findings are suggestive of occult trauma.  The periarticular soft tissues are within normal limits.  There are mild degenerative changes involving the posterior aspect of the mortise joint.  The gastrocnemius tendon is intact.  The medial and lateral tendons are within normal limits.  The talofibular and tibiofibular ligaments are intact.    Impression: Areas of marrow edema as described above, possibly secondary to occult trauma.  Correlation is  recommended.      ASSESSMENT:    Diagnoses and all orders for this visit:    Chronic pain of left ankle  -     ketorolac (TORADOL) injection 60 mg    Bone marrow edema    Frequent falls    PLAN    MRI of left ankle reviewed and results discussed with patient.  We discussed evidence of bone edema in the neck of the talus and also in the cuboid bone.  No fractures are noted.  No evidence of ligament or tendon injury is noted.  We discussed possible bone contusions, likely due to one of her frequent falls.  The patient continues to have frequent falls for unknown reasons.  She already sees a neurologist.  According to progress notes, the patient has recent history of drug use including cocaine and marijuana.  We had also recently discussed the possibility of a neuromuscular disorder.  We discussed that continued conservative treatment is appropriate with a focus on modified weightbearing.  Recommend continue with use of her Orthoboot but we also discussed transitioning out of the boot and into her ankle support brace over the next 2 to 4 weeks.  Continue with modified weightbearing with use of her walker.  Patient can transition to a cane when ready.  Continue with elevation and ice therapy as needed to minimize pain/inflammation.  Continue Voltaren as previously prescribed for consistent dosing of oral NSAIDs.  Patient can also take Tylenol as needed for pain.  Patient also has Tramadol at home to take as needed for worse pain.  Patient requests repeat IM injections today to help with her pain.  It is too soon for repeat steroid injection.  IM Toradol is given today for management of pain/inflammation.  Follow-up in 4 weeks for recheck.  Plan for referral back to physical therapy if not improving.    Return in about 4 weeks (around 12/30/2019) for Recheck.        This document has been electronically signed by WESTON Yuen on December 3, 2019 9:08 PM      WESTON Yuen

## 2019-12-03 DIAGNOSIS — M25.572 CHRONIC PAIN OF LEFT ANKLE: ICD-10-CM

## 2019-12-03 DIAGNOSIS — Z74.09 MOBILITY IMPAIRED: ICD-10-CM

## 2019-12-03 DIAGNOSIS — R29.898 ANKLE WEAKNESS: ICD-10-CM

## 2019-12-03 DIAGNOSIS — R29.6 FREQUENT FALLS: ICD-10-CM

## 2019-12-03 DIAGNOSIS — G89.29 CHRONIC PAIN OF LEFT ANKLE: ICD-10-CM

## 2019-12-05 ENCOUNTER — OFFICE VISIT (OUTPATIENT)
Dept: FAMILY MEDICINE CLINIC | Facility: CLINIC | Age: 59
End: 2019-12-05

## 2019-12-05 VITALS
TEMPERATURE: 98.1 F | SYSTOLIC BLOOD PRESSURE: 140 MMHG | BODY MASS INDEX: 25.95 KG/M2 | DIASTOLIC BLOOD PRESSURE: 87 MMHG | RESPIRATION RATE: 18 BRPM | HEIGHT: 62 IN | WEIGHT: 141 LBS | OXYGEN SATURATION: 98 % | HEART RATE: 76 BPM

## 2019-12-05 DIAGNOSIS — G89.29 CHRONIC PAIN OF LEFT ANKLE: ICD-10-CM

## 2019-12-05 DIAGNOSIS — Z87.81 FREQUENT FRACTURES OF BONE: Primary | ICD-10-CM

## 2019-12-05 DIAGNOSIS — G40.309 GENERALIZED SEIZURE DISORDER (HCC): ICD-10-CM

## 2019-12-05 DIAGNOSIS — M25.50 CHRONIC JOINT PAIN: ICD-10-CM

## 2019-12-05 DIAGNOSIS — M25.50 ARTHRALGIA, UNSPECIFIED JOINT: ICD-10-CM

## 2019-12-05 DIAGNOSIS — G89.29 CHRONIC JOINT PAIN: ICD-10-CM

## 2019-12-05 DIAGNOSIS — I10 ESSENTIAL HYPERTENSION: ICD-10-CM

## 2019-12-05 DIAGNOSIS — M25.572 CHRONIC PAIN OF LEFT ANKLE: ICD-10-CM

## 2019-12-05 DIAGNOSIS — Z12.31 ENCOUNTER FOR SCREENING MAMMOGRAM FOR BREAST CANCER: ICD-10-CM

## 2019-12-05 PROCEDURE — 99214 OFFICE O/P EST MOD 30 MIN: CPT | Performed by: NURSE PRACTITIONER

## 2019-12-05 RX ORDER — NIFEDIPINE 30 MG/1
30 TABLET, EXTENDED RELEASE ORAL DAILY
Qty: 90 TABLET | Refills: 1 | Status: SHIPPED | OUTPATIENT
Start: 2019-12-05 | End: 2020-09-29 | Stop reason: DRUGHIGH

## 2019-12-05 RX ORDER — BETAMETHASONE SODIUM PHOSPHATE AND BETAMETHASONE ACETATE 3; 3 MG/ML; MG/ML
12 INJECTION, SUSPENSION INTRA-ARTICULAR; INTRALESIONAL; INTRAMUSCULAR; SOFT TISSUE EVERY 24 HOURS
Status: SHIPPED | OUTPATIENT
Start: 2019-12-05 | End: 2019-12-07

## 2019-12-05 RX ORDER — LEVETIRACETAM 500 MG/1
500 TABLET ORAL 2 TIMES DAILY
Qty: 60 TABLET | Refills: 5 | Status: SHIPPED | OUTPATIENT
Start: 2019-12-05 | End: 2020-09-29 | Stop reason: SDUPTHER
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
## 2019-12-11 ENCOUNTER — TELEPHONE (OUTPATIENT)
Dept: FAMILY MEDICINE CLINIC | Facility: CLINIC | Age: 59
End: 2019-12-11

## 2019-12-11 DIAGNOSIS — G62.9 NEUROPATHY: ICD-10-CM

## 2019-12-11 DIAGNOSIS — M25.572 PAIN IN JOINT INVOLVING LEFT ANKLE AND FOOT: ICD-10-CM

## 2019-12-11 RX ORDER — GABAPENTIN 800 MG/1
800 TABLET ORAL 2 TIMES DAILY
Qty: 60 TABLET | Refills: 0 | Status: CANCELLED | OUTPATIENT
Start: 2019-12-11

## 2019-12-11 RX ORDER — TRAMADOL HYDROCHLORIDE 50 MG/1
50 TABLET ORAL EVERY 8 HOURS PRN
Qty: 90 TABLET | Refills: 0 | Status: CANCELLED | OUTPATIENT
Start: 2019-12-11

## 2019-12-19 DIAGNOSIS — M25.50 ARTHRALGIA, UNSPECIFIED JOINT: ICD-10-CM

## 2019-12-23 DIAGNOSIS — M25.572 PAIN IN JOINT INVOLVING LEFT ANKLE AND FOOT: ICD-10-CM

## 2019-12-23 DIAGNOSIS — G62.9 NEUROPATHY: ICD-10-CM

## 2019-12-23 RX ORDER — GABAPENTIN 800 MG/1
800 TABLET ORAL 2 TIMES DAILY
Qty: 60 TABLET | Refills: 0 | Status: SHIPPED | OUTPATIENT
Start: 2019-12-23 | End: 2020-01-30

## 2019-12-23 RX ORDER — TRAMADOL HYDROCHLORIDE 50 MG/1
50 TABLET ORAL EVERY 8 HOURS PRN
Qty: 90 TABLET | Refills: 0 | Status: SHIPPED | OUTPATIENT
Start: 2019-12-23 | End: 2020-02-03

## 2019-12-30 ENCOUNTER — OFFICE VISIT (OUTPATIENT)
Dept: ORTHOPEDIC SURGERY | Facility: CLINIC | Age: 59
End: 2019-12-30

## 2019-12-30 VITALS — WEIGHT: 141 LBS | HEIGHT: 62 IN | BODY MASS INDEX: 25.95 KG/M2

## 2019-12-30 DIAGNOSIS — R29.6 FREQUENT FALLS: ICD-10-CM

## 2019-12-30 DIAGNOSIS — G89.29 CHRONIC PAIN OF LEFT ANKLE: Primary | ICD-10-CM

## 2019-12-30 DIAGNOSIS — M25.572 CHRONIC PAIN OF LEFT ANKLE: Primary | ICD-10-CM

## 2019-12-30 DIAGNOSIS — M79.605 LEFT LEG PAIN: ICD-10-CM

## 2019-12-30 DIAGNOSIS — R29.898 ANKLE WEAKNESS: ICD-10-CM

## 2019-12-30 DIAGNOSIS — G89.29 CHRONIC MIDLINE LOW BACK PAIN WITH LEFT-SIDED SCIATICA: ICD-10-CM

## 2019-12-30 DIAGNOSIS — M54.10 RADICULAR PAIN OF LEFT LOWER EXTREMITY: ICD-10-CM

## 2019-12-30 DIAGNOSIS — M54.42 CHRONIC MIDLINE LOW BACK PAIN WITH LEFT-SIDED SCIATICA: ICD-10-CM

## 2019-12-30 PROCEDURE — 99214 OFFICE O/P EST MOD 30 MIN: CPT | Performed by: NURSE PRACTITIONER

## 2019-12-30 PROCEDURE — 96372 THER/PROPH/DIAG INJ SC/IM: CPT | Performed by: NURSE PRACTITIONER

## 2019-12-30 RX ORDER — TRIAMCINOLONE ACETONIDE 40 MG/ML
80 INJECTION, SUSPENSION INTRA-ARTICULAR; INTRAMUSCULAR ONCE
Status: COMPLETED | OUTPATIENT
Start: 2019-12-30 | End: 2019-12-30

## 2019-12-30 RX ADMIN — TRIAMCINOLONE ACETONIDE 80 MG: 40 INJECTION, SUSPENSION INTRA-ARTICULAR; INTRAMUSCULAR at 16:45

## 2020-01-02 PROBLEM — M54.10 RADICULAR PAIN OF LEFT LOWER EXTREMITY: Status: ACTIVE | Noted: 2020-01-02

## 2020-01-17 ENCOUNTER — OFFICE VISIT (OUTPATIENT)
Dept: ORTHOPEDIC SURGERY | Facility: CLINIC | Age: 60
End: 2020-01-17

## 2020-01-17 VITALS — WEIGHT: 141 LBS | HEIGHT: 62 IN | BODY MASS INDEX: 25.95 KG/M2

## 2020-01-17 DIAGNOSIS — R29.6 FREQUENT FALLS: ICD-10-CM

## 2020-01-17 DIAGNOSIS — Z74.09 MOBILITY IMPAIRED: ICD-10-CM

## 2020-01-17 DIAGNOSIS — R29.898 BILATERAL LEG WEAKNESS: ICD-10-CM

## 2020-01-17 DIAGNOSIS — M54.42 CHRONIC MIDLINE LOW BACK PAIN WITH LEFT-SIDED SCIATICA: ICD-10-CM

## 2020-01-17 DIAGNOSIS — M48.062 SPINAL STENOSIS OF LUMBAR REGION WITH NEUROGENIC CLAUDICATION: ICD-10-CM

## 2020-01-17 DIAGNOSIS — M51.36 DEGENERATIVE DISC DISEASE, LUMBAR: ICD-10-CM

## 2020-01-17 DIAGNOSIS — M54.10 RADICULAR PAIN OF LEFT LOWER EXTREMITY: Primary | ICD-10-CM

## 2020-01-17 DIAGNOSIS — G89.29 CHRONIC MIDLINE LOW BACK PAIN WITH LEFT-SIDED SCIATICA: ICD-10-CM

## 2020-01-17 PROCEDURE — 99214 OFFICE O/P EST MOD 30 MIN: CPT | Performed by: NURSE PRACTITIONER

## 2020-01-17 RX ORDER — HYDROCODONE BITARTRATE AND ACETAMINOPHEN 5; 325 MG/1; MG/1
1 TABLET ORAL EVERY 6 HOURS PRN
Qty: 40 TABLET | Refills: 0 | Status: SHIPPED | OUTPATIENT
Start: 2020-01-17 | End: 2020-01-27

## 2020-01-17 NOTE — PROGRESS NOTES
"Jyoti Guerra Person is a 59 y.o. female returns for     Chief Complaint   Patient presents with   • Lumbar Spine - Follow-up, Pain   • Results     mri done on 1/14/2020 at [facility]        HISTORY OF PRESENT ILLNESS: Patient presents to office for follow-up of chronic low back pain with pain that radiates down her left leg and MRI results of  lumbar spine.  Patient has a long history of chronic back pain with pain that radiates into both her lower extremities, but primarily on the left side.  She has complained of progressively worsening lower extremity weakness and difficulty walking for several months, even years.  Patient has frequent falls at home.  Patient has been treated previously for chronic left SI joint pain with a course of physical therapy, multiple previous IM injections of Toradol and Kenalog, oral NSAIDs, muscle relaxants and pain medication.  She had some mild relief temporarily with these interventions.  Patient continues to take tramadol as needed for pain, but states it is not helping much.  Patient also takes Neurontin 800 mg 3 times daily as a regular medication.  Patient reports that her legs \"feel cold all the time\".  She has had a prior vascular evaluation with no definitive diagnosis or significant vascular issues identified.  Patient also complains of intermittent numbness and tingling in her bilateral legs, worse in the left leg.  No new complaints or concerns noted today. Patient is tearful when she talks about her increasing difficulty walking.      CONCURRENT MEDICAL HISTORY:    The following portions of the patient's history were reviewed and updated as appropriate: allergies, current medications, past family history, past medical history, past social history, past surgical history and problem list.     ROS  No fevers or chills.  No chest pain or shortness of air.  No GI or  disturbances. Right leg pain. Left leg pain. Leg weakness.     PHYSICAL EXAMINATION:       Ht 157.5 cm " "(62\")   Wt 64 kg (141 lb)   BMI 25.79 kg/m²     Physical Exam   Constitutional: She is oriented to person, place, and time. Vital signs are normal. She appears well-developed and well-nourished. She is active and cooperative. She does not appear ill. No distress.   HENT:   Head: Normocephalic.   Pulmonary/Chest: Effort normal. No respiratory distress.   Abdominal: Soft. She exhibits no distension.   Musculoskeletal: She exhibits tenderness (Lumbar spine, SI joints). She exhibits no edema or deformity.   Neurological: She is alert and oriented to person, place, and time. GCS eye subscore is 4. GCS verbal subscore is 5. GCS motor subscore is 6.   Skin: Skin is warm, dry and intact. Capillary refill takes less than 2 seconds. No erythema.   Psychiatric: She has a normal mood and affect. Her speech is normal and behavior is normal. Judgment and thought content normal. Cognition and memory are normal.   Vitals reviewed.      GAIT:     []  Normal  []  Antalgic    Assistive device: []  None  []  Walker     []  Crutches  []  Cane     [x]  Wheelchair  []  Stretcher    Back Exam     Tenderness   The patient is experiencing tenderness in the lumbar and sacroiliac.    Range of Motion   Extension: abnormal   Rotation right: abnormal   Rotation left: abnormal     Muscle Strength   Right Quadriceps:  4/5   Left Quadriceps:  3/5     Tests   Straight leg raise right: negative  Straight leg raise left: negative    Reflexes   Patellar: normal    Other   Sensation: decreased  Gait: abnormal   Erythema: no back redness    Comments:  Pain and limitations with range of motion.  Tenderness to palpation at the lumbar spine and bilateral SI joints, worse in the left SI joint.  Weakness is present in the bilateral quadricep muscles, worse on the left side.  Patellar reflexes are normal and symmetric.  Quadricep weakness/atrophy is noted bilaterally.          MRI of Lumbar Spine done @ [facility] on " 1/14/2020    Findings:    Multiple sequences from an MRI of the lumbar spine reveal severe degenerative disc disease changes from L3-L4 through L5-S1 with disc space narrowing, posterior disc osteophyte complexes, sclerotic changes and fatty marrow replacement change within the endplates consistent with chronic change.  The alignment is normal.  The vertebral body heights are negative.  The spinal cord is normal in signal and position.    Axial imaging at L2-L3 reveals a broad-based disc bulge and mild to moderate canal stenosis.  Neural foramina reveal inferior narrowing without significant encroachment.    The L3-L4 level reveals chronic changes, a broad-based disc bulge and severe canal stenosis.  There is moderate facet hypertrophy and severe ligamentum flavum hypertrophy.  The neural foramina reveal narrowing and mild nerve root encroachment with right greater than left.    The L4-L5 level reveals disc space narrowing, a broad-based disc bulge and severe canal stenosis.  There is severe ligamentum flavum and mild facet hypertrophy.  There is neuroforaminal narrowing and nerve root encroachment inferiorly on the left.    At L5-S1, there is a broad-based disc bulge and mild canal stenosis.  The thecal sac is small at this level.  There is bilateral neuroforaminal narrowing and nerve root encroachment.  There is moderate facet hypertrophy.    Impression:  Multilevel degenerative change with severe canal stenosis at L3-L4 and L4-L5.  There are also elements of neural foraminal narrowing and nerve root encroachment, which is greatest at L5-S1.      ASSESSMENT:    Diagnoses and all orders for this visit:    Radicular pain of left lower extremity  -     HYDROcodone-acetaminophen (NORCO) 5-325 MG per tablet; Take 1 tablet by mouth Every 6 (Six) Hours As Needed for Moderate Pain  or Severe Pain  for up to 10 days.  -     Ambulatory Referral to Neurosurgery    Chronic midline low back pain with left-sided sciatica  -      "HYDROcodone-acetaminophen (NORCO) 5-325 MG per tablet; Take 1 tablet by mouth Every 6 (Six) Hours As Needed for Moderate Pain  or Severe Pain  for up to 10 days.  -     Ambulatory Referral to Neurosurgery    Spinal stenosis of lumbar region with neurogenic claudication  -     HYDROcodone-acetaminophen (NORCO) 5-325 MG per tablet; Take 1 tablet by mouth Every 6 (Six) Hours As Needed for Moderate Pain  or Severe Pain  for up to 10 days.  -     Ambulatory Referral to Neurosurgery    Mobility impaired  -     Ambulatory Referral to Neurosurgery    Frequent falls  -     Ambulatory Referral to Neurosurgery    Degenerative disc disease, lumbar  -     HYDROcodone-acetaminophen (NORCO) 5-325 MG per tablet; Take 1 tablet by mouth Every 6 (Six) Hours As Needed for Moderate Pain  or Severe Pain  for up to 10 days.  -     Ambulatory Referral to Neurosurgery    Bilateral leg weakness  -     Ambulatory Referral to Neurosurgery    PLAN    MRI of lumbar spine reviewed and results discussed with patient.  We discussed evidence of multilevel degenerative disc disease and degenerative changes with severe spinal stenosis noted at L3-L4 and L4-L5.  Patient has complained of ongoing chronic low back pain, primarily on the left side, for several months and even years.  She reports her pain has continued to progressively worsen.  She complains of difficulty ambulating, weakness in her lower extremities and frequent falls.  We discussed that spinal stenosis can certainly be a cause of all of these issues.  I have recommended a referral to neurosurgery today for further evaluation and to discuss further treatment recommendations.  Patient wants to see neurosurgery in [place].  She also complains of her lower extremities \"feeling cold all the time\".  She has had a previous vascular evaluation which was positive for some mild outflow disease but essentially nonspecific.  She has previously done a course of physical therapy for her chronic left " SI joint pain with some mild improvements at that time while doing therapy.  She has tried oral NSAIDs, steroids, muscle relaxants, IM Toradol, IM Kenalog and Tramadol for treatment of her chronic back and leg pain with minimal improvement.  She continues to take Neurontin 800 mg 3 times daily.  Norco is prescribed today to take as needed for worse pain as she states the Tramadol does not help her much.  We discussed that I cannot manage her chronic pain on a long-term basis with opioids and she would need to be referred to pain management for that.  Patient verbalized understanding.  Patient is instructed to take the pain medication sparingly and only when needed.  Patient is instructed to take the least amount of pain medication needed to control her pain.  Patient is cautioned that opioids can be addictive.  Continue with use of her walker for modified weightbearing and improved balance for fall prevention.  Continue Voltaren as previously prescribed for consistent dosing of oral NSAIDs.  Patient was recently given an IM injection of Kenalog at last office visit on 12/30/2019, which did not seem to improve her pain much.  Continue with use of ice and/or heat therapy to the low back/SI joints as needed to minimize pain.  Patient also has muscle relaxants at home to take as needed.  Follow-up with orthopedics as needed, otherwise follow-up with neurosurgery for further treatment recommendations of her back pain, leg weakness and spinal stenosis.    This patient has tried and failed a course of multiple treatment modalities which include the use of anti-inflammatories, acetaminophen, oral steroids, IM Toradol injections, IM Kenalog injections, muscle relaxants, Tramadol, physical therapy, rest/activity modification and use of a walker. Therefore, I will recommend a course of narcotic pain medication for this patient until their pain has been sufficiently reduced to a level that I deem acceptable to be treated with  alternative treatment options. [facility] reviewed # [id number].     Return if symptoms worsen or fail to improve, for Recheck.        This document has been electronically signed by WESTON Yuen on January 20, 2020 12:12 PM      WESTON Yuen

## 2020-01-20 PROBLEM — G40.309 GENERALIZED SEIZURE DISORDER: Status: RESOLVED | Noted: 2017-04-10 | Resolved: 2020-01-20

## 2020-01-21 DIAGNOSIS — M54.10 RADICULAR PAIN OF LEFT LOWER EXTREMITY: ICD-10-CM

## 2020-01-21 DIAGNOSIS — M79.605 LEFT LEG PAIN: ICD-10-CM

## 2020-01-21 DIAGNOSIS — R29.6 FREQUENT FALLS: ICD-10-CM

## 2020-01-21 DIAGNOSIS — M54.42 CHRONIC MIDLINE LOW BACK PAIN WITH LEFT-SIDED SCIATICA: ICD-10-CM

## 2020-01-21 DIAGNOSIS — G89.29 CHRONIC MIDLINE LOW BACK PAIN WITH LEFT-SIDED SCIATICA: ICD-10-CM

## 2020-01-24 ENCOUNTER — OFFICE VISIT (OUTPATIENT)
Dept: OTOLARYNGOLOGY | Facility: CLINIC | Age: 60
End: 2020-01-24

## 2020-01-24 VITALS — BODY MASS INDEX: 24.8 KG/M2 | WEIGHT: 140 LBS | OXYGEN SATURATION: 99 % | HEIGHT: 63 IN

## 2020-01-24 DIAGNOSIS — H60.311 CHRONIC DIFFUSE OTITIS EXTERNA OF RIGHT EAR: Primary | ICD-10-CM

## 2020-01-24 PROCEDURE — 99213 OFFICE O/P EST LOW 20 MIN: CPT | Performed by: OTOLARYNGOLOGY

## 2020-01-24 RX ORDER — NEOMYCIN SULFATE, POLYMYXIN B SULFATE, HYDROCORTISONE 3.5; 10000; 1 MG/ML; [USP'U]/ML; MG/ML
SOLUTION/ DROPS AURICULAR (OTIC)
Qty: 10 ML | Refills: 0 | Status: SHIPPED | OUTPATIENT
Start: 2020-01-24 | End: 2020-02-24

## 2020-01-28 NOTE — PROGRESS NOTES
Subjective   Jyoti Guerra Person is a 59 y.o. female.       History of Present Illness   Patient has been followed with chronic noninfective otitis externa of both ears and previously has had trouble with acute otitis externa sometimes due to manipulation.  Patient reports she has had bloody drainage from her right ear for several days.  States she has been using tweezers and removing crusts of blood from her ear.  Nothing in particular brought this on.      The following portions of the patient's history were reviewed and updated as appropriate: allergies, current medications, past family history, past medical history, past social history, past surgical history and problem list.     reports that she has been smoking cigarettes. She has a 10.00 pack-year smoking history. She has never used smokeless tobacco. She reports that she drinks alcohol. She reports that she has current or past drug history. Drug: Cocaine. Frequency: 2.00 times per week.   Patient is a tobacco user and has been counseled for use of tobacco products      Review of Systems   Constitutional: Negative for fever.   HENT: Positive for ear discharge.            Objective   Physical Exam  Ears: External ears no deformity.  Left ear canal shows no discharge.  Tympanic membrane intact no infection or effusion.  Right ear shows some diffuse granular changes of the posterior ear canal.  This does not involve the tympanic membrane which is intact with no infection or effusion.  There is no fresh or old blood or active bleeding but the tissue certainly looks friable enough to have recently bled.      Assessment/Plan   Jyoti was seen today for follow-up.    Diagnoses and all orders for this visit:    Chronic diffuse otitis externa of right ear    Other orders  -     neomycin-polymyxin-hydrocortisone (CORTISPORIN) 1 % solution otic solution; 3 drops to the right ear twice a day for 10 days, then at bedtime for 10 days      Plan: Told the patient she  absolutely must not manipulate her ears.  Will prescribe Cortisporin to be used 3 drops twice a day to the right ear for 10 days then at bedtime only for 10 days and will reevaluate in 3 weeks.

## 2020-01-29 DIAGNOSIS — G62.9 NEUROPATHY: ICD-10-CM

## 2020-01-29 DIAGNOSIS — M25.572 PAIN IN JOINT INVOLVING LEFT ANKLE AND FOOT: ICD-10-CM

## 2020-02-03 RX ORDER — GABAPENTIN 800 MG/1
TABLET ORAL
Qty: 60 TABLET | Refills: 0
Start: 2020-02-03 | End: 2020-03-09 | Stop reason: SDUPTHER

## 2020-02-03 RX ORDER — TRAMADOL HYDROCHLORIDE 50 MG/1
TABLET ORAL
Qty: 90 TABLET | Refills: 0 | Status: SHIPPED | OUTPATIENT
Start: 2020-02-03 | End: 2020-04-14 | Stop reason: SDUPTHER

## 2020-02-14 ENCOUNTER — OFFICE VISIT (OUTPATIENT)
Dept: OTOLARYNGOLOGY | Facility: CLINIC | Age: 60
End: 2020-02-14

## 2020-02-14 VITALS — HEART RATE: 102 BPM | BODY MASS INDEX: 24.8 KG/M2 | WEIGHT: 140 LBS | HEIGHT: 63 IN | OXYGEN SATURATION: 95 %

## 2020-02-14 DIAGNOSIS — H60.63 CHRONIC NON-INFECTIVE OTITIS EXTERNA OF BOTH EARS, UNSPECIFIED TYPE: Primary | ICD-10-CM

## 2020-02-14 PROCEDURE — 99212 OFFICE O/P EST SF 10 MIN: CPT | Performed by: OTOLARYNGOLOGY

## 2020-02-17 ENCOUNTER — APPOINTMENT (OUTPATIENT)
Dept: LAB | Facility: HOSPITAL | Age: 60
End: 2020-02-17

## 2020-02-17 ENCOUNTER — OFFICE VISIT (OUTPATIENT)
Dept: FAMILY MEDICINE CLINIC | Facility: CLINIC | Age: 60
End: 2020-02-17

## 2020-02-17 VITALS
BODY MASS INDEX: 23.74 KG/M2 | DIASTOLIC BLOOD PRESSURE: 88 MMHG | HEIGHT: 63 IN | TEMPERATURE: 97.9 F | SYSTOLIC BLOOD PRESSURE: 146 MMHG | HEART RATE: 106 BPM | OXYGEN SATURATION: 95 % | WEIGHT: 134 LBS | RESPIRATION RATE: 20 BRPM

## 2020-02-17 DIAGNOSIS — L08.9 SKIN INFECTION: Primary | ICD-10-CM

## 2020-02-17 PROCEDURE — 87070 CULTURE OTHR SPECIMN AEROBIC: CPT | Performed by: NURSE PRACTITIONER

## 2020-02-17 PROCEDURE — 87205 SMEAR GRAM STAIN: CPT | Performed by: NURSE PRACTITIONER

## 2020-02-17 PROCEDURE — 99214 OFFICE O/P EST MOD 30 MIN: CPT | Performed by: NURSE PRACTITIONER

## 2020-02-17 PROCEDURE — 85025 COMPLETE CBC W/AUTO DIFF WBC: CPT | Performed by: NURSE PRACTITIONER

## 2020-02-17 PROCEDURE — 80053 COMPREHEN METABOLIC PANEL: CPT | Performed by: NURSE PRACTITIONER

## 2020-02-17 RX ORDER — MUPIROCIN CALCIUM 20 MG/G
CREAM TOPICAL 3 TIMES DAILY
Qty: 30 G | Refills: 1 | Status: SHIPPED | OUTPATIENT
Start: 2020-02-17 | End: 2021-12-03

## 2020-02-17 RX ORDER — MECLIZINE HYDROCHLORIDE 25 MG/1
25 TABLET ORAL 3 TIMES DAILY PRN
Qty: 30 TABLET | Refills: 3 | Status: SHIPPED | OUTPATIENT
Start: 2020-02-17 | End: 2020-03-11 | Stop reason: SDUPTHER

## 2020-02-17 RX ORDER — SULFAMETHOXAZOLE AND TRIMETHOPRIM 800; 160 MG/1; MG/1
1 TABLET ORAL 2 TIMES DAILY
Qty: 20 TABLET | Refills: 0 | Status: SHIPPED | OUTPATIENT
Start: 2020-02-17 | End: 2020-09-29

## 2020-02-17 NOTE — PROGRESS NOTES
Hattie Guerra Person is a 59 y.o. female.     Here today with blisters and open sores on the fingers of both hands.  Has been going on for the past few weeks. She has used no topicals or other meds.    Hand Pain    The incident occurred more than 1 week ago. The incident occurred at home. There was no injury mechanism. The pain is present in the right fingers, left hand and left fingers. The quality of the pain is described as burning. The pain does not radiate. The pain is at a severity of 5/10. The pain has been constant since the incident. Pertinent negatives include no chest pain, muscle weakness, numbness or tingling. The symptoms are aggravated by movement. She has tried nothing for the symptoms. The treatment provided no relief.        The following portions of the patient's history were reviewed and updated as appropriate: allergies, current medications, past family history, past medical history, past social history, past surgical history and problem list.    Review of Systems   Constitutional: Negative.    HENT: Negative.    Eyes: Negative.    Respiratory: Negative.    Cardiovascular: Negative.  Negative for chest pain.   Gastrointestinal: Negative.    Endocrine: Negative.    Genitourinary: Negative.    Musculoskeletal: Negative.    Skin: Positive for skin lesions.   Allergic/Immunologic: Negative.    Neurological: Negative.  Negative for tingling and numbness.   Hematological: Negative.    Psychiatric/Behavioral: Negative.        Objective   Physical Exam   Constitutional: She is oriented to person, place, and time. She appears well-developed and well-nourished. No distress.   HENT:   Head: Normocephalic and atraumatic.   Mouth/Throat: No oropharyngeal exudate.   Eyes: Pupils are equal, round, and reactive to light.   Neck: Normal range of motion. Neck supple. No thyromegaly present.   Cardiovascular: Normal rate, regular rhythm and normal heart sounds. Exam reveals no friction rub.   No  murmur heard.  Pulmonary/Chest: Effort normal and breath sounds normal. No respiratory distress. She has no wheezes. She has no rales.   Abdominal: Soft.   Musculoskeletal: Normal range of motion.   Blisters noted on the fingers of both hands along with several open lesions.   Neurological: She is alert and oriented to person, place, and time.   Skin: Skin is warm and dry.   Psychiatric: She has a normal mood and affect. Thought content normal.   Nursing note and vitals reviewed.        Assessment/Plan   Jyoti was seen today for hand pain.    Diagnoses and all orders for this visit:    Skin infection  -     sulfamethoxazole-trimethoprim (BACTRIM DS,SEPTRA DS) 800-160 MG per tablet; Take 1 tablet by mouth 2 (Two) Times a Day.  -     mupirocin (BACTROBAN) 2 % cream; Apply  topically to the appropriate area as directed 3 (Three) Times a Day.  -     Wound Culture - Wound, Hand, Right; Future  -     Wound Culture - Wound, Hand, Right  -     meclizine (ANTIVERT) 25 MG tablet; Take 1 tablet by mouth 3 (Three) Times a Day As Needed for Dizziness.  -     CBC & Differential  -     Comprehensive metabolic panel  -     CBC Auto Differential  -     Manual Differential; Future  -     Cancel: Manual Differential

## 2020-02-17 NOTE — PROGRESS NOTES
Subjective   Jyoti Guerra Person is a 59 y.o. female.       History of Present Illness     Patient is followed with chronic noninfective otitis externa of both ears.  At last visit had a traumatic otitis externa of the right ear due to manipulating her ears with tweezers.  She use the Cortisporin as directed and says she is doing better and having no further bleeding    The following portions of the patient's history were reviewed and updated as appropriate: allergies, current medications, past family history, past medical history, past social history, past surgical history and problem list.     reports that she has been smoking cigarettes. She has a 10.00 pack-year smoking history. She has never used smokeless tobacco. She reports that she drinks alcohol. She reports that she has current or past drug history. Drug: Cocaine. Frequency: 2.00 times per week.   Patient is a tobacco user and has been counseled for use of tobacco products      Review of Systems        Objective   Physical Exam  Ears: External ears no deformity.  Right ear canal shows resolution of all the previously noted granular inflammation and there is now just some minimal dry skin.  Tympanic membrane intact and clear.  Left ear also just shows some minimal dry skin.  Tympanic membrane intact and clear      Assessment/Plan   Jyoti was seen today for follow-up.    Diagnoses and all orders for this visit:    Chronic non-infective otitis externa of both ears, unspecified type        Plan: Discontinue the eardrops.  Avoid manipulation.  Return 3 months.  Call for problems.

## 2020-02-18 LAB
ALBUMIN SERPL-MCNC: 3.9 G/DL (ref 3.5–5.2)
ALBUMIN/GLOB SERPL: 1.3 G/DL
ALP SERPL-CCNC: 66 U/L (ref 39–117)
ALT SERPL W P-5'-P-CCNC: 21 U/L (ref 1–33)
ANION GAP SERPL CALCULATED.3IONS-SCNC: 12.1 MMOL/L (ref 5–15)
AST SERPL-CCNC: 25 U/L (ref 1–32)
BASOPHILS # BLD AUTO: 0.04 10*3/MM3 (ref 0–0.2)
BASOPHILS NFR BLD AUTO: 0.7 % (ref 0–1.5)
BILIRUB SERPL-MCNC: 0.2 MG/DL (ref 0.2–1.2)
BUN BLD-MCNC: 17 MG/DL (ref 8–23)
BUN/CREAT SERPL: 10.6 (ref 7–25)
CALCIUM SPEC-SCNC: 9.6 MG/DL (ref 8.6–10.5)
CHLORIDE SERPL-SCNC: 105 MMOL/L (ref 98–107)
CO2 SERPL-SCNC: 22.9 MMOL/L (ref 22–29)
CREAT BLD-MCNC: 1.6 MG/DL (ref 0.57–1)
DEPRECATED RDW RBC AUTO: 39.9 FL (ref 37–54)
EOSINOPHIL # BLD AUTO: 0.16 10*3/MM3 (ref 0–0.4)
EOSINOPHIL NFR BLD AUTO: 2.6 % (ref 0.3–6.2)
ERYTHROCYTE [DISTWIDTH] IN BLOOD BY AUTOMATED COUNT: 12.5 % (ref 12.3–15.4)
GFR SERPL CREATININE-BSD FRML MDRD: 40 ML/MIN/1.73
GLOBULIN UR ELPH-MCNC: 3.1 GM/DL
GLUCOSE BLD-MCNC: 84 MG/DL (ref 65–99)
HCT VFR BLD AUTO: 39.8 % (ref 34–46.6)
HGB BLD-MCNC: 13.8 G/DL (ref 12–15.9)
LYMPHOCYTES # BLD AUTO: 1.91 10*3/MM3 (ref 0.7–3.1)
LYMPHOCYTES NFR BLD AUTO: 31.3 % (ref 19.6–45.3)
MCH RBC QN AUTO: 31.1 PG (ref 26.6–33)
MCHC RBC AUTO-ENTMCNC: 34.7 G/DL (ref 31.5–35.7)
MCV RBC AUTO: 89.6 FL (ref 79–97)
MONOCYTES # BLD AUTO: 0.41 10*3/MM3 (ref 0.1–0.9)
MONOCYTES NFR BLD AUTO: 6.7 % (ref 5–12)
NEUTROPHILS # BLD AUTO: 3.52 10*3/MM3 (ref 1.7–7)
NEUTROPHILS NFR BLD AUTO: 57.7 % (ref 42.7–76)
PLATELET # BLD AUTO: 183 10*3/MM3 (ref 140–450)
PMV BLD AUTO: 13.8 FL (ref 6–12)
POTASSIUM BLD-SCNC: 5 MMOL/L (ref 3.5–5.2)
PROT SERPL-MCNC: 7 G/DL (ref 6–8.5)
RBC # BLD AUTO: 4.44 10*6/MM3 (ref 3.77–5.28)
SODIUM BLD-SCNC: 140 MMOL/L (ref 136–145)
WBC NRBC COR # BLD: 6.1 10*3/MM3 (ref 3.4–10.8)

## 2020-02-19 ENCOUNTER — TELEPHONE (OUTPATIENT)
Dept: ORTHOPEDIC SURGERY | Facility: CLINIC | Age: 60
End: 2020-02-19

## 2020-02-19 NOTE — TELEPHONE ENCOUNTER
"----- Message from WESTON Yuen sent at 2/19/2020 10:53 AM CST -----  Regarding: appt with neurosurgery  Patient is on my schedule tomorrow for follow up of her low back pain and the schedule states she \"can't see the neurosurgeon until July\". She has an appointment with WESTON Lyons with neurosurgery in 6 days on 2/25/2020 @ 1pm in [place]. That is the reason they scheduled her with him is so she can establish care and not have to wait to see the surgeon. I'm happy to see her but she needs to see them first and see what their recommendations for treatment may be. I'm not going to do anything tomorrow except tell her that. Please encourage her to see neurosurgery first and certainly keep her scheduled appointment with the nurse practitioner on the 25th. She is welcome to see me anytime after that appointment as needed. Thanks.   "

## 2020-02-20 ENCOUNTER — TELEPHONE (OUTPATIENT)
Dept: FAMILY MEDICINE CLINIC | Facility: CLINIC | Age: 60
End: 2020-02-20

## 2020-02-20 LAB
BACTERIA SPEC AEROBE CULT: NORMAL
GRAM STN SPEC: NORMAL

## 2020-02-21 ENCOUNTER — TELEPHONE (OUTPATIENT)
Dept: FAMILY MEDICINE CLINIC | Facility: CLINIC | Age: 60
End: 2020-02-21

## 2020-02-21 NOTE — TELEPHONE ENCOUNTER
----- Message from WESTON Sutton sent at 2/21/2020  8:00 AM CST -----  Looks like she needs to see a nephrologist and she needs to stop nsaids.

## 2020-03-03 DIAGNOSIS — G62.9 NEUROPATHY: ICD-10-CM

## 2020-03-03 RX ORDER — GABAPENTIN 800 MG/1
800 TABLET ORAL 2 TIMES DAILY
Qty: 60 TABLET | Refills: 0 | Status: CANCELLED
Start: 2020-03-03

## 2020-03-03 NOTE — TELEPHONE ENCOUNTER
Patients friend Reilly called in requesting a refill on the patients gabapentin (NEURONTIN) 800 MG tablet medication. Please send to the pharmacy at the earliest convenience.     Preferred pharmacy: [facility] [address] [place] [address]     For any questions or issues, please call patients friend back at [phone]

## 2020-03-04 NOTE — TELEPHONE ENCOUNTER
Patient's friend Reilly called back to check on Rx  gabapentin (NEURONTIN) 800 MG tablet to see if it had been filled or not;  verbal on file. Advised that there is a 24 to 48 hour waiting period on refills.     Pharmacy: [facility] - [facility] [address] [facility] - [phone]  - [phone]   [phone]    Reilly Alfonso: [phone]

## 2020-03-09 DIAGNOSIS — G62.9 NEUROPATHY: ICD-10-CM

## 2020-03-09 RX ORDER — GABAPENTIN 800 MG/1
800 TABLET ORAL 2 TIMES DAILY
Qty: 60 TABLET | Refills: 0 | Status: SHIPPED | OUTPATIENT
Start: 2020-03-09 | End: 2020-09-29 | Stop reason: DRUGHIGH

## 2020-03-09 RX ORDER — TIZANIDINE 4 MG/1
4 TABLET ORAL EVERY 8 HOURS PRN
Qty: 90 TABLET | Refills: 0 | Status: SHIPPED | OUTPATIENT
Start: 2020-03-09 | End: 2020-03-11 | Stop reason: SDUPTHER

## 2020-03-11 DIAGNOSIS — L08.9 SKIN INFECTION: ICD-10-CM

## 2020-03-11 RX ORDER — TIZANIDINE 4 MG/1
4 TABLET ORAL EVERY 8 HOURS PRN
Qty: 90 TABLET | Refills: 5 | Status: SHIPPED | OUTPATIENT
Start: 2020-03-11 | End: 2020-09-29

## 2020-03-11 RX ORDER — MECLIZINE HYDROCHLORIDE 25 MG/1
25 TABLET ORAL 3 TIMES DAILY PRN
Qty: 30 TABLET | Refills: 3 | Status: SHIPPED | OUTPATIENT
Start: 2020-03-11 | End: 2020-09-29

## 2020-03-13 DIAGNOSIS — T22.212A PARTIAL THICKNESS BURN OF LEFT FOREARM, INITIAL ENCOUNTER: ICD-10-CM

## 2020-03-23 ENCOUNTER — TELEPHONE (OUTPATIENT)
Dept: NEUROSURGERY | Facility: CLINIC | Age: 60
End: 2020-03-23

## 2020-03-23 NOTE — TELEPHONE ENCOUNTER
Tried to reach patient to confirm if she was going to keep her appointment for 03/24/2020, no answer, I have left a message requesting a call back.

## 2020-04-14 DIAGNOSIS — M25.572 PAIN IN JOINT INVOLVING LEFT ANKLE AND FOOT: ICD-10-CM

## 2020-04-14 RX ORDER — TRAMADOL HYDROCHLORIDE 50 MG/1
50 TABLET ORAL EVERY 8 HOURS PRN
Qty: 90 TABLET | Refills: 0 | Status: SHIPPED | OUTPATIENT
Start: 2020-04-14 | End: 2020-09-29 | Stop reason: SDUPTHER

## 2020-04-17 ENCOUNTER — TELEPHONE (OUTPATIENT)
Dept: FAMILY MEDICINE CLINIC | Facility: CLINIC | Age: 60
End: 2020-04-17

## 2020-04-17 DIAGNOSIS — E78.00 HIGH CHOLESTEROL: ICD-10-CM

## 2020-04-17 RX ORDER — ATORVASTATIN CALCIUM 40 MG/1
40 TABLET, FILM COATED ORAL DAILY
Qty: 30 TABLET | Refills: 4 | Status: SHIPPED | OUTPATIENT
Start: 2020-04-17 | End: 2020-09-29 | Stop reason: SDUPTHER

## 2020-04-17 NOTE — TELEPHONE ENCOUNTER
Reilly called patient is not drinking no strength in legs to walk acting crazy.  Thinks she may be dehydrated,    Advised she needs to go to the ER.  He gave verbal understanding over the phone

## 2020-05-01 ENCOUNTER — TELEPHONE (OUTPATIENT)
Dept: FAMILY MEDICINE CLINIC | Facility: CLINIC | Age: 60
End: 2020-05-01
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
## 2020-05-01 NOTE — TELEPHONE ENCOUNTER
[facility] home health called and would like a call back regarding Jyoti and if you will follow her for home health

## 2020-05-01 NOTE — TELEPHONE ENCOUNTER
Spoke with Ayana at [facility] and provided her with the new phone number and address of patient.  Ayana stated she had tried calling that phone number this morning and was told she had the wrong number.  Also provided her with Mr Yrn's number,which is her Significant Other and her emergency contact.  yAana stated it will probably be Monday now before they can get out there is they get in contact with her.

## 2020-05-01 NOTE — TELEPHONE ENCOUNTER
Patient had back surgery discharged from [facility]. Home [facility] wanted to let you know they were going out for consult on nursing, PT and OT.  Neuro at [facility] said they will follow for the most of what she needs,but [facility] wanted to let you know just in case there were things she needed that they want take care of to make sure you will follow her.

## 2020-09-28 ENCOUNTER — OFFICE VISIT (OUTPATIENT)
Dept: FAMILY MEDICINE CLINIC | Facility: CLINIC | Age: 60
End: 2020-09-28

## 2020-09-28 VITALS
SYSTOLIC BLOOD PRESSURE: 109 MMHG | HEART RATE: 98 BPM | HEIGHT: 63 IN | DIASTOLIC BLOOD PRESSURE: 73 MMHG | OXYGEN SATURATION: 97 % | TEMPERATURE: 96.6 F | RESPIRATION RATE: 21 BRPM | BODY MASS INDEX: 23.74 KG/M2

## 2020-09-28 DIAGNOSIS — K59.00 CONSTIPATION, UNSPECIFIED CONSTIPATION TYPE: ICD-10-CM

## 2020-09-28 DIAGNOSIS — G40.309 GENERALIZED SEIZURE DISORDER (HCC): ICD-10-CM

## 2020-09-28 DIAGNOSIS — M54.2 NECK PAIN: ICD-10-CM

## 2020-09-28 DIAGNOSIS — I10 ESSENTIAL HYPERTENSION: ICD-10-CM

## 2020-09-28 DIAGNOSIS — E03.9 HYPOTHYROIDISM, UNSPECIFIED TYPE: ICD-10-CM

## 2020-09-28 DIAGNOSIS — M25.572 PAIN IN JOINT INVOLVING LEFT ANKLE AND FOOT: ICD-10-CM

## 2020-09-28 DIAGNOSIS — G47.00 INSOMNIA, UNSPECIFIED TYPE: ICD-10-CM

## 2020-09-28 DIAGNOSIS — E78.00 HIGH CHOLESTEROL: ICD-10-CM

## 2020-09-28 DIAGNOSIS — N39.0 URINARY TRACT INFECTION WITHOUT HEMATURIA, SITE UNSPECIFIED: Primary | ICD-10-CM

## 2020-09-28 PROCEDURE — 99214 OFFICE O/P EST MOD 30 MIN: CPT | Performed by: NURSE PRACTITIONER

## 2020-09-28 RX ORDER — LEVETIRACETAM 500 MG/1
500 TABLET ORAL 2 TIMES DAILY
Qty: 60 TABLET | Refills: 5 | Status: SHIPPED | OUTPATIENT
Start: 2020-09-28 | End: 2021-03-08

## 2020-09-28 RX ORDER — DULOXETIN HYDROCHLORIDE 20 MG/1
20 CAPSULE, DELAYED RELEASE ORAL 2 TIMES DAILY
Qty: 60 CAPSULE | Refills: 3 | Status: SHIPPED | OUTPATIENT
Start: 2020-09-28 | End: 2021-12-03 | Stop reason: SDUPTHER

## 2020-09-28 RX ORDER — BISACODYL 10 MG
10 SUPPOSITORY, RECTAL RECTAL DAILY PRN
Qty: 28 SUPPOSITORY | Refills: 1 | Status: SHIPPED | OUTPATIENT
Start: 2020-09-28 | End: 2021-12-03

## 2020-09-28 RX ORDER — SULFAMETHOXAZOLE AND TRIMETHOPRIM 800; 160 MG/1; MG/1
1 TABLET ORAL 2 TIMES DAILY
Qty: 20 TABLET | Refills: 0 | Status: SHIPPED | OUTPATIENT
Start: 2020-09-28 | End: 2020-12-15

## 2020-09-28 RX ORDER — TRAZODONE HYDROCHLORIDE 50 MG/1
50 TABLET ORAL NIGHTLY
Qty: 30 TABLET | Refills: 3 | Status: SHIPPED | OUTPATIENT
Start: 2020-09-28 | End: 2021-01-07

## 2020-09-28 RX ORDER — TRAMADOL HYDROCHLORIDE 50 MG/1
50 TABLET ORAL EVERY 8 HOURS PRN
Qty: 90 TABLET | Refills: 3 | Status: SHIPPED | OUTPATIENT
Start: 2020-09-28 | End: 2020-10-08 | Stop reason: SDUPTHER

## 2020-09-28 RX ORDER — GABAPENTIN 600 MG/1
600 TABLET ORAL 3 TIMES DAILY
Qty: 90 TABLET | Refills: 3 | Status: SHIPPED | OUTPATIENT
Start: 2020-09-28 | End: 2021-01-07

## 2020-09-28 RX ORDER — BACLOFEN 10 MG/1
10 TABLET ORAL 3 TIMES DAILY
Qty: 30 TABLET | Refills: 3 | Status: SHIPPED | OUTPATIENT
Start: 2020-09-28 | End: 2020-12-15 | Stop reason: SDUPTHER

## 2020-09-28 RX ORDER — NIFEDIPINE 60 MG/1
60 TABLET, EXTENDED RELEASE ORAL DAILY
Qty: 30 TABLET | Refills: 3 | Status: SHIPPED | OUTPATIENT
Start: 2020-09-28 | End: 2021-01-07

## 2020-09-28 RX ORDER — LEVOTHYROXINE SODIUM 0.05 MG/1
50 TABLET ORAL DAILY
Qty: 30 TABLET | Refills: 3 | Status: SHIPPED | OUTPATIENT
Start: 2020-09-28 | End: 2021-01-07

## 2020-09-28 RX ORDER — ATORVASTATIN CALCIUM 40 MG/1
40 TABLET, FILM COATED ORAL DAILY
Qty: 30 TABLET | Refills: 4 | Status: SHIPPED | OUTPATIENT
Start: 2020-09-28 | End: 2021-02-05 | Stop reason: SDUPTHER

## 2020-09-29 NOTE — PROGRESS NOTES
Subjective   Jyoti Riddle is a 60 y.o. female.     Jyoti Riddle age 60 comes in today for face-to-face evaluation for home health.  She has had 2 surgeries on her cervical spine one from an injury that happened 2 days after the initial surgery she thinks that she might have a UTI her urine is strong and she has some burning with urination she is unable to give me a urine specimen at this time.  She needs home health services for physical therapy occupational therapy and aide services.  Medications have not changed since the last time she was seen baclofen however has been added and along with Cymbalta.  Has been in the nursing home for rehab and is recently been released to come home.    Neck Pain   This is a new problem. The current episode started more than 1 month ago. The problem occurs constantly. The problem has been unchanged. Associated with: Surgery, recent trauma. The pain is present in the midline. The quality of the pain is described as aching. The pain is at a severity of 6/10. The pain is severe. The symptoms are aggravated by twisting and position. The pain is same all the time. Stiffness is present in the morning, all day and at night. Associated symptoms include leg pain, tingling, weakness and weight loss. Pertinent negatives include no chest pain, fever, headaches, numbness, pain with swallowing, paresis, photophobia, syncope, trouble swallowing or visual change. She has tried NSAIDs, muscle relaxants and home exercises (Surgery) for the symptoms. The treatment provided mild relief.        The following portions of the patient's history were reviewed and updated as appropriate: allergies, current medications, past family history, past medical history, past social history, past surgical history and problem list.    Review of Systems   Constitutional: Positive for unexpected weight loss. Negative for fever.   HENT: Negative.  Negative for trouble swallowing.    Eyes: Negative.  Negative for  photophobia.   Respiratory: Negative.    Cardiovascular: Negative.  Negative for chest pain and syncope.   Gastrointestinal: Negative.    Endocrine: Negative.    Genitourinary: Negative.    Musculoskeletal: Positive for arthralgias and neck pain.   Skin: Negative.    Allergic/Immunologic: Negative.    Neurological: Positive for tingling and weakness. Negative for numbness.   Hematological: Negative.    Psychiatric/Behavioral: Negative.        Objective   Physical Exam  Vitals signs and nursing note reviewed.   Constitutional:       General: She is not in acute distress.     Appearance: She is well-developed.   HENT:      Head: Normocephalic and atraumatic.      Right Ear: External ear normal.      Left Ear: External ear normal.      Nose: Nose normal.      Mouth/Throat:      Pharynx: No oropharyngeal exudate.   Eyes:      Pupils: Pupils are equal, round, and reactive to light.   Neck:      Musculoskeletal: Normal range of motion and neck supple.      Thyroid: No thyromegaly.   Cardiovascular:      Rate and Rhythm: Normal rate and regular rhythm.      Heart sounds: Normal heart sounds. No murmur. No friction rub.   Pulmonary:      Effort: Pulmonary effort is normal. No respiratory distress.      Breath sounds: Normal breath sounds. No wheezing or rales.   Abdominal:      Palpations: Abdomen is soft.   Musculoskeletal:      Cervical back: She exhibits decreased range of motion, tenderness, pain and spasm.      Comments: She is wheelchair-bound at this time and having difficulty with ambulation due to weakness.   Skin:     General: Skin is warm and dry.   Neurological:      Mental Status: She is alert and oriented to person, place, and time.   Psychiatric:         Thought Content: Thought content normal.           Assessment/Plan   Jyoti was seen today for back surgery and face to face for home health.    Diagnoses and all orders for this visit:    Urinary tract infection without hematuria, site unspecified  -      sulfamethoxazole-trimethoprim (BACTRIM DS,SEPTRA DS) 800-160 MG per tablet; Take 1 tablet by mouth 2 (Two) Times a Day.    High cholesterol  -     atorvastatin (LIPITOR) 40 MG tablet; Take 1 tablet by mouth Daily.    Neck pain  -     baclofen (LIORESAL) 10 MG tablet; Take 1 tablet by mouth 3 (Three) Times a Day.  -     DULoxetine (Cymbalta) 20 MG capsule; Take 1 capsule by mouth 2 (Two) Times a Day.  -     gabapentin (NEURONTIN) 600 MG tablet; Take 1 tablet by mouth 3 (Three) Times a Day.  -     traMADol (ULTRAM) 50 MG tablet; Take 1 tablet by mouth Every 8 (Eight) Hours As Needed for Moderate Pain .    Constipation, unspecified constipation type  -     bisacodyl (DULCOLAX) 10 MG suppository; Insert 1 suppository into the rectum Daily As Needed for Constipation.    Generalized seizure disorder (CMS/HCC)  -     levETIRAcetam (Keppra) 500 MG tablet; Take 1 tablet by mouth 2 (Two) Times a Day.    Hypothyroidism, unspecified type  -     levothyroxine (Synthroid) 50 MCG tablet; Take 1 tablet by mouth Daily.    Essential hypertension  -     NIFEdipine XL (PROCARDIA XL) 60 MG 24 hr tablet; Take 1 tablet by mouth Daily.    Pain in joint involving left ankle and foot    Insomnia, unspecified type  -     traZODone (DESYREL) 50 MG tablet; Take 1 tablet by mouth Every Night.      [facility] has started seeing her for PT OT and home health aide services.    Ranjan #[id number] is reviewed

## 2020-10-07 ENCOUNTER — TELEPHONE (OUTPATIENT)
Dept: FAMILY MEDICINE CLINIC | Facility: CLINIC | Age: 60
End: 2020-10-07

## 2020-10-08 ENCOUNTER — TELEPHONE (OUTPATIENT)
Dept: FAMILY MEDICINE CLINIC | Facility: CLINIC | Age: 60
End: 2020-10-08

## 2020-10-08 DIAGNOSIS — M54.2 NECK PAIN: ICD-10-CM

## 2020-10-08 RX ORDER — TRAMADOL HYDROCHLORIDE 50 MG/1
50 TABLET ORAL EVERY 8 HOURS PRN
Qty: 90 TABLET | Refills: 3 | Status: SHIPPED | OUTPATIENT
Start: 2020-10-08 | End: 2020-12-21

## 2020-10-08 NOTE — TELEPHONE ENCOUNTER
They as in the physical therapist needs call Adult Protective Services and report what they are seeing.

## 2020-10-08 NOTE — TELEPHONE ENCOUNTER
PATIENTS  CALLED STATING THEY WOULD LIKE TO HAVE HER VITAMIN D REFILLED.     PLEASE CALL BACK AND ADVISE:  [phone]

## 2020-10-08 NOTE — TELEPHONE ENCOUNTER
Caller: Reilly Peña    Relationship: Emergency Contact    Best call back number: [phone]     Medication needed:   Requested Prescriptions     Pending Prescriptions Disp Refills   • traMADol (ULTRAM) 50 MG tablet 90 tablet 3     Sig: Take 1 tablet by mouth Every 8 (Eight) Hours As Needed for Moderate Pain .       When do you need the refill by: 10/09/2020    What details did the patient provide when requesting the medication:     Does the patient have less than a 3 day supply:  [] Yes  [x] No    What is the patient's preferred pharmacy: [facility] - [place], [place] - [address] [place] [address] [phone] [facility] [phone] FX

## 2020-10-09 RX ORDER — ERGOCALCIFEROL 1.25 MG/1
50000 CAPSULE ORAL
Qty: 4 CAPSULE | Refills: 5 | Status: SHIPPED | OUTPATIENT
Start: 2020-10-09 | End: 2022-06-06

## 2020-10-09 NOTE — TELEPHONE ENCOUNTER
CC: fever blister, HTN    Quinn Avilez is a 50 yr old male here for follow up for htn and fever blisters.  His bp is running mildly elevated bp at 142/84. He denies chest pain shortness of breath or palpitations.  Had to go to eye doctor because he had a herpes lesion on his lower lid this past week, he has had continuous fever blisters on lips, and had one in the nose 2 weeks ago.   He did see dermatologist who told he needed to take the medication daily (valtrex).  He wants to get on it daily.  He says he can not continue having these fever blisters every week or two.  He wants to try the medication daily for a month and see if that will get rid of them.       Hypertension   This is a chronic problem. The current episode started more than 1 year ago. The problem has been rapidly worsening since onset. The problem is uncontrolled. Pertinent negatives include no blurred vision, chest pain, headaches, neck pain, orthopnea, palpitations, shortness of breath or sweats. There are no associated agents to hypertension. Risk factors for coronary artery disease include male gender, obesity and stress. Past treatments include ACE inhibitors. Current antihypertension treatment includes ACE inhibitors. The current treatment provides no improvement. There are no compliance problems.  There is no history of kidney disease, CAD/MI, heart failure, left ventricular hypertrophy, PVD or retinopathy. There is no history of coarctation of the aorta, hyperaldosteronism, hypercortisolism, pheochromocytoma, renovascular disease or sleep apnea.        The following portions of the patient's history were reviewed and updated as appropriate: allergies, current medications, past family history, past medical history, past social history, past surgical history and problem list.    Review of Systems   Eyes: Negative for blurred vision.   Respiratory: Negative for cough, choking, chest tightness and shortness of breath.    Cardiovascular:  She has been in the nursing home so they probably added to her list awhile she was there and it just didn't  get added to our list.  I sent it. Negative for chest pain, palpitations and orthopnea.   Musculoskeletal: Negative for neck pain.   Neurological: Negative for light-headedness, headache and memory problem.   Psychiatric/Behavioral: Negative for decreased concentration, dysphoric mood and depressed mood.   All other systems reviewed and are negative.      Objective   Physical Exam   Constitutional: He appears well-developed and well-nourished. He is cooperative.   HENT:   Head: Normocephalic and atraumatic.   Right Ear: Hearing normal.   Left Ear: Hearing normal.   Eyes: Conjunctivae are normal. Pupils are equal, round, and reactive to light.   Fundoscopic exam:       The right eye shows no AV nicking, no exudate and no hemorrhage.        The left eye shows no AV nicking, no exudate and no hemorrhage.   Cardiovascular: Regular rhythm and normal heart sounds.   Pulmonary/Chest: Effort normal and breath sounds normal.   Abdominal: Soft. Normal appearance and bowel sounds are normal.   Neurological: He is alert.   Skin: Skin is warm and dry.        Psychiatric: He has a normal mood and affect. His behavior is normal. Judgment and thought content normal. Cognition and memory are normal.   Nursing note and vitals reviewed.        Assessment/Plan   Quinn was seen today for hypertension.    Diagnoses and all orders for this visit:    Flu vaccine need  -     Flucelvax Quad=>4Years (Vial)    Essential hypertension        -     Increased lisinopril to 20mg   -     lisinopril (PRINIVIL,ZESTRIL) 20 MG tablet; Take 1 tablet by mouth Daily.  -     Monitor bp and if elevated follow up    Primary hypothyroidism  -     levothyroxine (SYNTHROID, LEVOTHROID) 125 MCG tablet; Take 1 tablet by mouth Daily.  -     levothyroxine (SYNTHROID) 125 MCG tablet; Take 1 tablet by mouth Daily.    Hypogonadism in male  -     Testosterone (ANDROGEL PUMP) 20.25 MG/ACT (1.62%) gel; Place 1 Pump on the skin as directed by provider Daily.    Fever blister  -     valACYclovir (VALTREX)  1000 MG tablet; Take 1 tablet by mouth Daily.          Return in about 1 month (around 10/27/2019) for Recheck htn and fever blisters.  Electronically signed by Sarya Geronimo, IVONNE, APRN, 09/27/19, 8:28 AM.
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
## 2020-10-16 DIAGNOSIS — N39.0 URINARY TRACT INFECTION WITHOUT HEMATURIA, SITE UNSPECIFIED: ICD-10-CM

## 2020-10-16 PROBLEM — Z87.828 HISTORY OF SPINAL CORD INJURY: Status: ACTIVE | Noted: 2020-10-16

## 2020-10-16 PROBLEM — R32 INCONTINENCE: Status: ACTIVE | Noted: 2020-10-16

## 2020-10-16 RX ORDER — SULFAMETHOXAZOLE AND TRIMETHOPRIM 800; 160 MG/1; MG/1
TABLET ORAL
Qty: 20 TABLET | Refills: 0 | OUTPATIENT
Start: 2020-10-16

## 2020-11-10 ENCOUNTER — TELEPHONE (OUTPATIENT)
Dept: FAMILY MEDICINE CLINIC | Facility: CLINIC | Age: 60
End: 2020-11-10

## 2020-11-10 NOTE — TELEPHONE ENCOUNTER
SUSHMA FROM HOME CARE DELIVERED CALLED AND STATED SHE FAXED OVER A REQUEST FOR MEDICAL NECESSITIES FOR INCONTINENCE SUPPLY OVER ON 10/29 AND HAS NOT HEARD ANYTHING BACK. SHE WOULD LIKE TO GET THIS WORKED ON AS SOON AS POSSIBLE. PROVIDER HAS TO TYPE A LETTER UNDER LETTERHEAD AND FAX IT BACK:    FAX NUMBER: [phone]    PLEASE ADVISE.  [phone]

## 2020-11-10 NOTE — TELEPHONE ENCOUNTER
Medical supply is aware that they are asking for excess supplies for patient.  Provider is aware they want a typed letter.  Provider is out of office till the first of next week

## 2020-12-04 ENCOUNTER — TELEPHONE (OUTPATIENT)
Dept: FAMILY MEDICINE CLINIC | Facility: CLINIC | Age: 60
End: 2020-12-04

## 2020-12-15 ENCOUNTER — OFFICE VISIT (OUTPATIENT)
Dept: FAMILY MEDICINE CLINIC | Facility: CLINIC | Age: 60
End: 2020-12-15

## 2020-12-15 VITALS
HEART RATE: 106 BPM | HEIGHT: 63 IN | BODY MASS INDEX: 23.74 KG/M2 | RESPIRATION RATE: 21 BRPM | OXYGEN SATURATION: 98 % | TEMPERATURE: 97.9 F | SYSTOLIC BLOOD PRESSURE: 118 MMHG | DIASTOLIC BLOOD PRESSURE: 71 MMHG

## 2020-12-15 DIAGNOSIS — Z91.81 PERSONAL HISTORY OF FALL: ICD-10-CM

## 2020-12-15 DIAGNOSIS — R29.898 WEAKNESS OF BOTH LEGS: ICD-10-CM

## 2020-12-15 DIAGNOSIS — Z87.828 HISTORY OF SPINAL CORD INJURY: ICD-10-CM

## 2020-12-15 DIAGNOSIS — M54.50 CHRONIC BILATERAL LOW BACK PAIN, UNSPECIFIED WHETHER SCIATICA PRESENT: ICD-10-CM

## 2020-12-15 DIAGNOSIS — K59.00 CONSTIPATION, UNSPECIFIED CONSTIPATION TYPE: Primary | ICD-10-CM

## 2020-12-15 DIAGNOSIS — G89.29 CHRONIC BILATERAL LOW BACK PAIN, UNSPECIFIED WHETHER SCIATICA PRESENT: ICD-10-CM

## 2020-12-15 DIAGNOSIS — M54.2 NECK PAIN: ICD-10-CM

## 2020-12-15 PROCEDURE — 99213 OFFICE O/P EST LOW 20 MIN: CPT | Performed by: NURSE PRACTITIONER

## 2020-12-15 RX ORDER — POLYETHYLENE GLYCOL 3350 17 G/17G
17 POWDER, FOR SOLUTION ORAL DAILY
Qty: 30 PACKET | Refills: 5 | Status: SHIPPED | OUTPATIENT
Start: 2020-12-15 | End: 2021-12-03

## 2020-12-15 RX ORDER — BACLOFEN 10 MG/1
10 TABLET ORAL 3 TIMES DAILY
Qty: 30 TABLET | Refills: 3 | Status: SHIPPED | OUTPATIENT
Start: 2020-12-15 | End: 2021-01-07

## 2020-12-21 RX ORDER — TRAMADOL HYDROCHLORIDE 100 MG/1
100 TABLET, COATED ORAL EVERY 8 HOURS PRN
Qty: 45 TABLET | Refills: 1 | Status: SHIPPED | OUTPATIENT
Start: 2020-12-21 | End: 2021-02-04

## 2020-12-21 NOTE — PROGRESS NOTES
Subjective   Jyoti Riddle is a 60 y.o. female.     Jyoti Riddle age 60 comes in today chief complaint of frequent falls and fractures.  Her last was a spinal cord injury that required back surgery.  She has been in the nursing home and has just recently come out she still continues to have quite a bit of pain in her lumbar spine and she has difficulty ambulating.  She still receiving physical therapy to help her ambulate but she does need a wheelchair because of the weakness in her lower extremities and the frequent falls.    Back Pain  This is a chronic problem. The current episode started more than 1 month ago. The problem occurs constantly. The problem is unchanged. The pain is present in the lumbar spine. The pain radiates to the left thigh and right thigh. The pain is at a severity of 0/10 (presently, can be up to a 10). The pain is worse during the day. The symptoms are aggravated by lying down, standing, twisting, stress and sitting. Stiffness is present in the morning. Associated symptoms include leg pain, paresthesias, weakness and weight loss. Pertinent negatives include no abdominal pain, bladder incontinence, bowel incontinence, chest pain, dysuria, fever, headaches, numbness, paresis, pelvic pain, perianal numbness or tingling. Risk factors include menopause, poor posture, sedentary lifestyle and recent trauma. She has tried NSAIDs (physical therapy) for the symptoms. The treatment provided moderate relief.   Fatigue  This is a recurrent problem. The current episode started more than 1 month ago. The problem occurs constantly. The problem has been unchanged. Associated symptoms include arthralgias, fatigue and weakness. Pertinent negatives include no abdominal pain, anorexia, change in bowel habit, chest pain, chills, congestion, coughing, diaphoresis, fever, headaches, joint swelling, myalgias, nausea, neck pain, numbness, rash, sore throat, swollen glands, urinary symptoms, vertigo, visual  change or vomiting. The symptoms are aggravated by walking and exertion. Treatments tried: pt. The treatment provided moderate relief.        The following portions of the patient's history were reviewed and updated as appropriate: allergies, current medications, past family history, past medical history, past social history, past surgical history and problem list.    Review of Systems   Constitutional: Positive for fatigue and unexpected weight loss. Negative for chills, diaphoresis and fever.   HENT: Negative for congestion, sore throat and swollen glands.    Respiratory: Negative for cough.    Cardiovascular: Negative for chest pain.   Gastrointestinal: Negative for abdominal pain, anorexia, bowel incontinence, change in bowel habit, nausea and vomiting.   Genitourinary: Negative for dysuria, pelvic pain and urinary incontinence.   Musculoskeletal: Positive for arthralgias and back pain. Negative for joint swelling, myalgias and neck pain.   Skin: Negative for rash.   Neurological: Positive for weakness and paresthesias. Negative for vertigo, tingling and numbness.       Objective   Physical Exam      Assessment/Plan   Diagnoses and all orders for this visit:    1. Constipation, unspecified constipation type (Primary)  -     polyethylene glycol (MIRALAX) 17 g packet; Take 17 g by mouth Daily.  Dispense: 30 packet; Refill: 5    2. Neck pain  -     baclofen (LIORESAL) 10 MG tablet; Take 1 tablet by mouth 3 (Three) Times a Day.  Dispense: 30 tablet; Refill: 3    3. History of spinal cord injury    4. Personal history of fall  -     Wheelchair    5. Weakness of both legs  -     Wheelchair

## 2021-01-06 DIAGNOSIS — E03.9 HYPOTHYROIDISM, UNSPECIFIED TYPE: ICD-10-CM

## 2021-01-06 DIAGNOSIS — G47.00 INSOMNIA, UNSPECIFIED TYPE: ICD-10-CM

## 2021-01-06 DIAGNOSIS — M54.2 NECK PAIN: ICD-10-CM

## 2021-01-06 DIAGNOSIS — I10 ESSENTIAL HYPERTENSION: ICD-10-CM

## 2021-01-07 RX ORDER — GABAPENTIN 600 MG/1
TABLET ORAL
Qty: 90 TABLET | Refills: 3 | Status: SHIPPED | OUTPATIENT
Start: 2021-01-07 | End: 2021-12-03 | Stop reason: SDUPTHER

## 2021-01-07 RX ORDER — NIFEDIPINE 60 MG/1
TABLET, FILM COATED, EXTENDED RELEASE ORAL
Qty: 30 TABLET | Refills: 3 | Status: SHIPPED | OUTPATIENT
Start: 2021-01-07 | End: 2021-12-03 | Stop reason: SDUPTHER

## 2021-01-07 RX ORDER — LEVOTHYROXINE SODIUM 0.05 MG/1
TABLET ORAL
Qty: 30 TABLET | Refills: 3 | Status: SHIPPED | OUTPATIENT
Start: 2021-01-07 | End: 2021-12-03 | Stop reason: SDUPTHER

## 2021-01-07 RX ORDER — TRAZODONE HYDROCHLORIDE 50 MG/1
TABLET ORAL
Qty: 30 TABLET | Refills: 3 | Status: SHIPPED | OUTPATIENT
Start: 2021-01-07 | End: 2021-12-03 | Stop reason: SDUPTHER

## 2021-01-07 RX ORDER — BACLOFEN 10 MG/1
TABLET ORAL
Qty: 90 TABLET | Refills: 3 | Status: SHIPPED | OUTPATIENT
Start: 2021-01-07 | End: 2021-12-03 | Stop reason: SDUPTHER

## 2021-01-15 ENCOUNTER — TELEPHONE (OUTPATIENT)
Dept: FAMILY MEDICINE CLINIC | Facility: CLINIC | Age: 61
End: 2021-01-15

## 2021-01-15 NOTE — TELEPHONE ENCOUNTER
CALLED @ 8:47 TO MARCI PHONE AND LVM TO R/S CHELSI OUT SICK ALSO CALLED HER S/O AND LVM MESSSAGE AS WELL TO CALL BACK HUB TO READ

## 2021-01-26 ENCOUNTER — OFFICE VISIT (OUTPATIENT)
Dept: FAMILY MEDICINE CLINIC | Facility: CLINIC | Age: 61
End: 2021-01-26

## 2021-01-26 VITALS
HEIGHT: 63 IN | DIASTOLIC BLOOD PRESSURE: 79 MMHG | SYSTOLIC BLOOD PRESSURE: 122 MMHG | OXYGEN SATURATION: 98 % | HEART RATE: 108 BPM | TEMPERATURE: 97.9 F | RESPIRATION RATE: 20 BRPM | BODY MASS INDEX: 23.74 KG/M2

## 2021-01-26 DIAGNOSIS — G89.29 CHRONIC BILATERAL LOW BACK PAIN, UNSPECIFIED WHETHER SCIATICA PRESENT: ICD-10-CM

## 2021-01-26 DIAGNOSIS — R07.81 RIB PAIN ON LEFT SIDE: Primary | ICD-10-CM

## 2021-01-26 DIAGNOSIS — M54.50 CHRONIC BILATERAL LOW BACK PAIN, UNSPECIFIED WHETHER SCIATICA PRESENT: ICD-10-CM

## 2021-01-26 PROCEDURE — 99213 OFFICE O/P EST LOW 20 MIN: CPT | Performed by: NURSE PRACTITIONER

## 2021-01-26 RX ORDER — TRAMADOL HYDROCHLORIDE 100 MG/1
100 TABLET, COATED ORAL 2 TIMES DAILY PRN
Qty: 60 TABLET | Refills: 1 | Status: CANCELLED | OUTPATIENT
Start: 2021-01-26

## 2021-01-26 RX ORDER — DULOXETIN HYDROCHLORIDE 60 MG/1
CAPSULE, DELAYED RELEASE ORAL
COMMUNITY
Start: 2021-01-13 | End: 2021-02-05 | Stop reason: SDUPTHER

## 2021-02-04 DIAGNOSIS — M54.50 CHRONIC BILATERAL LOW BACK PAIN, UNSPECIFIED WHETHER SCIATICA PRESENT: ICD-10-CM

## 2021-02-04 DIAGNOSIS — G89.29 CHRONIC BILATERAL LOW BACK PAIN, UNSPECIFIED WHETHER SCIATICA PRESENT: ICD-10-CM

## 2021-02-04 RX ORDER — TRAMADOL HYDROCHLORIDE 50 MG/1
TABLET ORAL
Qty: 90 TABLET | Refills: 1 | Status: SHIPPED | OUTPATIENT
Start: 2021-02-04 | End: 2021-12-03

## 2021-02-05 DIAGNOSIS — E78.00 HIGH CHOLESTEROL: ICD-10-CM

## 2021-02-07 RX ORDER — ATORVASTATIN CALCIUM 40 MG/1
TABLET, FILM COATED ORAL
Qty: 30 TABLET | Refills: 4 | Status: SHIPPED | OUTPATIENT
Start: 2021-02-07 | End: 2021-04-16 | Stop reason: SDUPTHER

## 2021-02-07 RX ORDER — DULOXETIN HYDROCHLORIDE 60 MG/1
CAPSULE, DELAYED RELEASE ORAL
Qty: 30 CAPSULE | Refills: 3 | Status: SHIPPED | OUTPATIENT
Start: 2021-02-07 | End: 2021-04-16 | Stop reason: SDUPTHER

## 2021-02-07 RX ORDER — ATORVASTATIN CALCIUM 40 MG/1
40 TABLET, FILM COATED ORAL DAILY
Qty: 30 TABLET | Refills: 4 | Status: SHIPPED | OUTPATIENT
Start: 2021-02-07 | End: 2021-12-03 | Stop reason: SDUPTHER

## 2021-02-07 RX ORDER — DULOXETIN HYDROCHLORIDE 60 MG/1
60 CAPSULE, DELAYED RELEASE ORAL DAILY
Qty: 30 CAPSULE | Refills: 4 | Status: SHIPPED | OUTPATIENT
Start: 2021-02-07 | End: 2021-04-16 | Stop reason: SDUPTHER

## 2021-02-09 NOTE — PROGRESS NOTES
Subjective   Jyoti Riddle is a 60 y.o. female.     Jyoti Riddle age 60 comes in today stating that 2 weeks ago she fell and injured her left ribs.  There are still sore, she is not having any difficulty with deep breaths.  She does have a history of chronic pain due to multiple fractures from falling.    Rib Injury  This is a new problem. The current episode started 1 to 4 weeks ago. The problem occurs constantly. The problem has been waxing and waning. Associated symptoms include arthralgias (left rib area). Pertinent negatives include no abdominal pain, anorexia, change in bowel habit, chest pain, chills, congestion, coughing, diaphoresis, fatigue, fever, headaches, joint swelling, myalgias, nausea, neck pain, numbness, rash, sore throat, swollen glands, urinary symptoms, vertigo, visual change, vomiting or weakness. The symptoms are aggravated by bending and twisting. Treatments tried: tramadol. The treatment provided moderate relief.        The following portions of the patient's history were reviewed and updated as appropriate: allergies, current medications, past family history, past medical history, past social history, past surgical history and problem list.    Review of Systems   Constitutional: Negative.  Negative for chills, diaphoresis, fatigue and fever.   HENT: Negative.  Negative for congestion and sore throat.    Eyes: Negative.    Respiratory: Negative.  Negative for cough.    Cardiovascular: Negative.  Negative for chest pain.   Gastrointestinal: Negative.  Negative for abdominal pain, anorexia, change in bowel habit, nausea and vomiting.   Genitourinary: Negative.    Musculoskeletal: Positive for arthralgias (left rib area). Negative for joint swelling, myalgias and neck pain.   Skin: Negative.  Negative for rash.   Neurological: Negative.  Negative for vertigo, weakness, numbness and headaches.   Psychiatric/Behavioral: Negative.        Objective   Physical Exam  Vitals signs and nursing  note reviewed.   Constitutional:       General: She is not in acute distress.     Appearance: She is well-developed.   HENT:      Head: Normocephalic and atraumatic.      Right Ear: External ear normal.      Left Ear: External ear normal.      Nose: Nose normal.      Mouth/Throat:      Pharynx: No oropharyngeal exudate.   Eyes:      Pupils: Pupils are equal, round, and reactive to light.   Neck:      Musculoskeletal: Normal range of motion and neck supple.      Thyroid: No thyromegaly.   Cardiovascular:      Rate and Rhythm: Normal rate and regular rhythm.      Heart sounds: Normal heart sounds. No murmur. No friction rub.   Pulmonary:      Effort: Pulmonary effort is normal. No respiratory distress.      Breath sounds: Normal breath sounds. No wheezing or rales.   Abdominal:      Palpations: Abdomen is soft.   Musculoskeletal:      Comments: Slight discomfort with palpation of the lower left rib area and the x-rays do show a fracture of  the ninth rib.   Skin:     General: Skin is warm and dry.   Neurological:      Mental Status: She is alert and oriented to person, place, and time.   Psychiatric:         Thought Content: Thought content normal.         Assessment/Plan   Diagnoses and all orders for this visit:    1. Rib pain on left side (Primary)  -     XR Ribs Left With PA Chest    2. Chronic bilateral low back pain, unspecified whether sciatica present    Other orders  -     Cancel: traMADol HCl (ULTRAM) 100 MG tablet; Take 1 tablet by mouth 2 (Two) Times a Day As Needed (back pain).  Dispense: 60 tablet; Refill: 1

## 2021-03-07 DIAGNOSIS — G40.309 GENERALIZED SEIZURE DISORDER (HCC): ICD-10-CM

## 2021-03-08 RX ORDER — LEVETIRACETAM 500 MG/1
TABLET ORAL
Qty: 60 TABLET | Refills: 5 | Status: SHIPPED | OUTPATIENT
Start: 2021-03-08 | End: 2022-10-17

## 2021-04-08 RX ORDER — MECLIZINE HYDROCHLORIDE 25 MG/1
TABLET ORAL
Qty: 30 TABLET | Refills: 3 | OUTPATIENT
Start: 2021-04-08

## 2021-04-08 RX ORDER — ERGOCALCIFEROL 1.25 MG/1
CAPSULE ORAL
Qty: 4 CAPSULE | Refills: 5 | OUTPATIENT
Start: 2021-04-08

## 2021-04-12 ENCOUNTER — TELEPHONE (OUTPATIENT)
Dept: FAMILY MEDICINE CLINIC | Facility: CLINIC | Age: 61
End: 2021-04-12

## 2021-04-13 ENCOUNTER — TELEPHONE (OUTPATIENT)
Dept: FAMILY MEDICINE CLINIC | Facility: CLINIC | Age: 61
End: 2021-04-13

## 2021-04-13 NOTE — TELEPHONE ENCOUNTER
Markell with [facility] called and needs to talk to someone regarding patient's prescription for Vit D.  Please call [phone]

## 2021-04-15 NOTE — TELEPHONE ENCOUNTER
They wanted a refill on Vitamin D, I told them that she would need to wait until she has an appointment with us due to not having recent labs on file.

## 2021-04-16 ENCOUNTER — OFFICE VISIT (OUTPATIENT)
Dept: OTOLARYNGOLOGY | Facility: CLINIC | Age: 61
End: 2021-04-16

## 2021-04-16 VITALS — BODY MASS INDEX: 23.74 KG/M2 | HEART RATE: 96 BPM | HEIGHT: 63 IN | TEMPERATURE: 98.2 F

## 2021-04-16 DIAGNOSIS — H60.391 ACUTE INFECTIVE OTITIS EXTERNA OF RIGHT EAR: Primary | ICD-10-CM

## 2021-04-16 PROCEDURE — 99214 OFFICE O/P EST MOD 30 MIN: CPT | Performed by: OTOLARYNGOLOGY

## 2021-04-16 RX ORDER — NEOMYCIN SULFATE, POLYMYXIN B SULFATE, HYDROCORTISONE 3.5; 10000; 1 MG/ML; [USP'U]/ML; MG/ML
3 SOLUTION/ DROPS AURICULAR (OTIC) 2 TIMES DAILY
Qty: 10 ML | Refills: 0 | Status: SHIPPED | OUTPATIENT
Start: 2021-04-16 | End: 2021-12-03
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
## 2021-05-07 DIAGNOSIS — E03.9 HYPOTHYROIDISM, UNSPECIFIED TYPE: ICD-10-CM

## 2021-05-07 DIAGNOSIS — I10 ESSENTIAL HYPERTENSION: ICD-10-CM

## 2021-05-07 DIAGNOSIS — M54.2 NECK PAIN: ICD-10-CM

## 2021-05-07 DIAGNOSIS — G47.00 INSOMNIA, UNSPECIFIED TYPE: ICD-10-CM

## 2021-05-08 RX ORDER — ERGOCALCIFEROL 1.25 MG/1
CAPSULE ORAL
Qty: 4 CAPSULE | Refills: 5 | OUTPATIENT
Start: 2021-05-08

## 2021-05-08 RX ORDER — GABAPENTIN 600 MG/1
TABLET ORAL
Qty: 90 TABLET | OUTPATIENT
Start: 2021-05-08

## 2021-05-08 RX ORDER — TRAZODONE HYDROCHLORIDE 50 MG/1
TABLET ORAL
Qty: 30 TABLET | Refills: 3 | OUTPATIENT
Start: 2021-05-08

## 2021-05-08 RX ORDER — NIFEDIPINE 60 MG/1
TABLET, FILM COATED, EXTENDED RELEASE ORAL
Qty: 30 TABLET | Refills: 3 | OUTPATIENT
Start: 2021-05-08

## 2021-05-08 RX ORDER — LEVOTHYROXINE SODIUM 0.05 MG/1
TABLET ORAL
Qty: 30 TABLET | Refills: 3 | OUTPATIENT
Start: 2021-05-08

## 2021-05-08 RX ORDER — BACLOFEN 10 MG/1
TABLET ORAL
Qty: 90 TABLET | Refills: 3 | OUTPATIENT
Start: 2021-05-08

## 2021-12-03 ENCOUNTER — OFFICE VISIT (OUTPATIENT)
Dept: FAMILY MEDICINE CLINIC | Facility: CLINIC | Age: 61
End: 2021-12-03

## 2021-12-03 VITALS
BODY MASS INDEX: 19.89 KG/M2 | HEART RATE: 108 BPM | TEMPERATURE: 97.5 F | RESPIRATION RATE: 20 BRPM | DIASTOLIC BLOOD PRESSURE: 116 MMHG | WEIGHT: 112.25 LBS | HEIGHT: 63 IN | OXYGEN SATURATION: 99 % | SYSTOLIC BLOOD PRESSURE: 190 MMHG

## 2021-12-03 DIAGNOSIS — I10 ESSENTIAL HYPERTENSION: Primary | Chronic | ICD-10-CM

## 2021-12-03 DIAGNOSIS — Z79.899 HIGH RISK MEDICATION USE: ICD-10-CM

## 2021-12-03 DIAGNOSIS — Z13.1 DIABETES MELLITUS SCREENING: ICD-10-CM

## 2021-12-03 DIAGNOSIS — M54.2 NECK PAIN: Chronic | ICD-10-CM

## 2021-12-03 DIAGNOSIS — E78.5 HYPERLIPIDEMIA, UNSPECIFIED HYPERLIPIDEMIA TYPE: Chronic | ICD-10-CM

## 2021-12-03 DIAGNOSIS — G47.00 INSOMNIA, UNSPECIFIED TYPE: Chronic | ICD-10-CM

## 2021-12-03 DIAGNOSIS — E03.9 HYPOTHYROIDISM, UNSPECIFIED TYPE: Chronic | ICD-10-CM

## 2021-12-03 PROCEDURE — 99215 OFFICE O/P EST HI 40 MIN: CPT | Performed by: NURSE PRACTITIONER

## 2021-12-03 RX ORDER — DULOXETIN HYDROCHLORIDE 60 MG/1
CAPSULE, DELAYED RELEASE ORAL
COMMUNITY
Start: 2021-12-02 | End: 2021-12-03

## 2021-12-03 RX ORDER — TRAZODONE HYDROCHLORIDE 50 MG/1
50 TABLET ORAL
Qty: 30 TABLET | Refills: 1 | Status: SHIPPED | OUTPATIENT
Start: 2021-12-03 | End: 2022-03-28 | Stop reason: SDUPTHER

## 2021-12-03 RX ORDER — BACLOFEN 10 MG/1
10 TABLET ORAL 3 TIMES DAILY
Qty: 90 TABLET | Refills: 1 | Status: SHIPPED | OUTPATIENT
Start: 2021-12-03 | End: 2022-06-06 | Stop reason: SDUPTHER

## 2021-12-03 RX ORDER — GABAPENTIN 600 MG/1
600 TABLET ORAL 3 TIMES DAILY
Qty: 90 TABLET | Refills: 1 | Status: SHIPPED | OUTPATIENT
Start: 2021-12-03 | End: 2022-03-28

## 2021-12-03 RX ORDER — LEVOTHYROXINE SODIUM 0.05 MG/1
50 TABLET ORAL DAILY
Qty: 30 TABLET | Refills: 1 | Status: SHIPPED | OUTPATIENT
Start: 2021-12-03 | End: 2022-03-28

## 2021-12-03 RX ORDER — NIFEDIPINE 60 MG/1
60 TABLET, FILM COATED, EXTENDED RELEASE ORAL DAILY
Qty: 30 TABLET | Refills: 1 | Status: SHIPPED | OUTPATIENT
Start: 2021-12-03 | End: 2022-03-28 | Stop reason: SDUPTHER

## 2021-12-03 RX ORDER — DULOXETIN HYDROCHLORIDE 20 MG/1
20 CAPSULE, DELAYED RELEASE ORAL 2 TIMES DAILY
Qty: 60 CAPSULE | Refills: 1 | Status: SHIPPED | OUTPATIENT
Start: 2021-12-03 | End: 2021-12-27 | Stop reason: SDUPTHER

## 2021-12-03 RX ORDER — ATORVASTATIN CALCIUM 40 MG/1
40 TABLET, FILM COATED ORAL NIGHTLY
Qty: 30 TABLET | Refills: 1 | Status: SHIPPED | OUTPATIENT
Start: 2021-12-03 | End: 2022-03-28 | Stop reason: SDUPTHER

## 2021-12-03 NOTE — PROGRESS NOTES
"Chief Complaint  medication refil- lior pt (out of all medications)    Subjective          Jyoti Guerra Person presents to [facility]    PCP: Sincere (retired)--has cancelled two appointments to establish with new PCP, hasn't scheduled another appointment    CC: \"Med refills\"    Presents reporting she has been out of her meds since June.  She apparently has been taking some of her significant others medications to get her by.  Has had two appointments scheduled to establish with new PCP, but has cancelled both those appointments and has yet to schedule another appointment.  Hx of multiple chronic problems and medications.  Last seen by previous PCP in Jan 2021.  Has not had any recent labs--not fasting today, does not wish to have labs done today.  She has multiple chronic health problems:     Insomnia:  Previously on Trazodone and this seemed to work for her.  Has been out of for several months.  Reports she is not sleeping well at night.  Requesting refill. Having some daytime sleepiness.      HTN/hyperlipidemia: BP not well controlled in office.  Reports she was on two BP medications, but can only find Nifedipine in chart and from pharmacy medication list.  She states she has been taking a bp med, but hasn't had anything filled at [facility] since May.  It is unclear whether she is taking an old medication or someone elses.  She did bring any medication bottle with her to appt today.  Will restart back on Nifedipine as BP was well controlled when she was taking and following up with her previous PCP.  There is hx of CVA in her chart that was noted in 2016.  She reports she at one time was seeing Dr. Lewis and had also seen neurology in [place], but hasn't seen in years.  She is unable to drive, depends on someone else to bring her to appointments and they are requesting to see someone locally--would like referral to Dr. Lewis. "  She was also previously on on Keppra, but hasn't had since June.  Denies seizure activity, unclear why she was taking this.  Hx of hyperlipidemia, out of meds (Lipitor) since June.      Hypothyroidism:  Most recent dose of Levothyroxine was 50 mcg daily, out since June.  Has not had recent thyroid studies.  Has had increasing fatigue.      Chronic neck pain/muscle spasms:  Previously on Gabapentin 600 mg TID, last fill in May 2021, Ranjan reviewed.  Reports she has continued to take this, again, unclear if this is old medication or taking someone else's, does not have bottles with her.  Was also previously on Baclofen for spasms, has been out of this since June.  Reports she has been taking significant other's muscle relaxer, unsure of name.  Also admits to taking Tramadol from significant other--she does have noted Rx for this in her chart in the past.  Previously on cymbalta for chronic pain as well.  Reports broken neck in 2020 and had surgery in [place] x 2 and had rehab in [place] and then transferred to skilled nursing care facility here for additional rehab.  Has been home for over a year.  At one point, wasn't able to use her legs/walk.  She still has generalized weakness.  No previous weights listed from last visits with previous PCP, but last recorded weight was 137 pounds, now 112.  She is in a wheel chair today, but is able to stand and get on scale.                Review of Systems   Constitutional: Positive for fatigue and unexpected weight change (reports this has been since her neck surgery). Negative for appetite change.   Eyes: Positive for visual disturbance (at times, blurred).   Respiratory: Negative.    Cardiovascular: Negative.    Gastrointestinal: Negative.    Genitourinary: Negative.    Musculoskeletal: Positive for arthralgias, myalgias, neck pain and neck stiffness.   Skin: Negative.    Neurological: Positive for dizziness ( at times) and headaches.        Objective   Vital Signs:  "  BP (!) 190/116 (BP Location: Left arm, Patient Position: Sitting, Cuff Size: Adult)   Pulse 108   Temp 97.5 °F (36.4 °C) (Temporal)   Resp 20   Ht 160 cm (63\")   Wt 50.9 kg (112 lb 4 oz)   SpO2 99%   BMI 19.88 kg/m²       Physical Exam  Vitals and nursing note reviewed.   Constitutional:       General: She is not in acute distress.     Appearance: She is ill-appearing (chronically ill).   HENT:      Head: Normocephalic and atraumatic.   Eyes:      General:         Right eye: No discharge.         Left eye: No discharge.      Extraocular Movements: Extraocular movements intact.      Conjunctiva/sclera: Conjunctivae normal.      Pupils: Pupils are equal, round, and reactive to light.   Cardiovascular:      Rate and Rhythm: Regular rhythm. Tachycardia present.   Pulmonary:      Effort: Pulmonary effort is normal. No respiratory distress.      Breath sounds: Normal breath sounds. No wheezing or rhonchi.   Abdominal:      General: Bowel sounds are normal.      Tenderness: There is no abdominal tenderness. There is no guarding or rebound.   Musculoskeletal:      Cervical back: Neck supple. Tenderness ( posterior) present.   Lymphadenopathy:      Cervical: No cervical adenopathy.   Skin:     General: Skin is warm and dry.   Neurological:      Mental Status: She is alert and oriented to person, place, and time.      Motor: Weakness (generalized weakness, but equal bilaterally) present.   Psychiatric:         Mood and Affect: Mood normal.         Thought Content: Thought content normal.          Result Review :                 Assessment and Plan    Diagnoses and all orders for this visit:    1. Essential hypertension (Primary)  -     NIFEdipine CC (ADALAT CC) 60 MG 24 hr tablet; Take 1 tablet by mouth Daily.  Dispense: 30 tablet; Refill: 1  -     CBC No Differential; Future  -     Comprehensive metabolic panel; Future    2. Insomnia, unspecified type  -     traZODone (DESYREL) 50 MG tablet; Take 1 tablet by mouth " every night at bedtime.  Dispense: 30 tablet; Refill: 1    3. Hypothyroidism, unspecified type  -     levothyroxine (SYNTHROID, LEVOTHROID) 50 MCG tablet; Take 1 tablet by mouth Daily.  Dispense: 30 tablet; Refill: 1  -     TSH; Future  -     T3, free; Future  -     T4, free; Future    4. Neck pain  -     gabapentin (NEURONTIN) 600 MG tablet; Take 1 tablet by mouth 3 (Three) Times a Day.  Dispense: 90 tablet; Refill: 1  -     DULoxetine (Cymbalta) 20 MG capsule; Take 1 capsule by mouth 2 (Two) Times a Day.  Dispense: 60 capsule; Refill: 1  -     baclofen (LIORESAL) 10 MG tablet; Take 1 tablet by mouth 3 (Three) Times a Day.  Dispense: 90 tablet; Refill: 1    5. Hyperlipidemia, unspecified hyperlipidemia type  -     atorvastatin (LIPITOR) 40 MG tablet; Take 1 tablet by mouth Every Night.  Dispense: 30 tablet; Refill: 1  -     Lipid panel; Future    6. High risk medication use  -     Urine Drug Screen - Urine, Clean Catch; Future    7. Diabetes mellitus screening  -     Hemoglobin A1c; Future      Restart above medications based on previous PCP med list and med list from [facility] with the exception of Keppra--she is unsure why she was taking, will get her back into neurology for an evaluation--referral placed    Ranjan reviewed--last activity in May 2021    Stressed the importance of not taking any medications that are not prescribed to her--she has no way of knowing how they may interact with each other or how they may affect her    Routine labs ordered--stressed importance of having these done in the next week or so to see where she stands in regard to her chronic medical conditions.      ED instructions if severe headache, slurred speech, one sided weakness    Appt scheduled to establish with WESTON Zapien in February--stressed importance of keeping appointment and continuing to see PCP for ongoing chronic disease management/monitoring      This document has been electronically signed by WESTON Khan  on December 3, 2021 17:27 CST,.    I spent 55 minutes caring for Jyoti on this date of service. This time includes time spent by me in the following activities:preparing for the visit, reviewing tests, performing a medically appropriate examination and/or evaluation , counseling and educating the patient/family/caregiver, ordering medications, tests, or procedures, referring and communicating with other health care professionals  and documenting information in the medical record

## 2021-12-27 DIAGNOSIS — M54.2 NECK PAIN: Chronic | ICD-10-CM

## 2022-01-03 RX ORDER — DULOXETIN HYDROCHLORIDE 60 MG/1
60 CAPSULE, DELAYED RELEASE ORAL DAILY
Qty: 30 CAPSULE | Refills: 0 | Status: SHIPPED | OUTPATIENT
Start: 2022-01-03 | End: 2022-03-28

## 2022-01-21 NOTE — THERAPY PROGRESS REPORT/RE-CERT
Outpatient Physical Therapy Ortho Re-Assessment  [facility]     Patient Name: Jyoti Riddle  : 1960  MRN: [id number]  Today's Date: 2017      Visit Date: 2017     Pt attended  scheduled visits. .  Re-cert date 8/3/17  Pt will RTD 17  Pt has 9 approved visits, through 17    Patient Active Problem List   Diagnosis   • Vitamin D deficiency   • Vertigo   • Neuropathy   • Restless leg syndrome   • Pain in joint   • Allergic rhinitis   • Essential hypertension   • Abnormal EEG   • Cerebrovascular accident (CVA) due to thrombosis of left carotid artery   • Illicit drug use   • Tobacco abuse   • Sleep apnea   • Neck pain   • Vitamin deficiency   • Acute otitis externa of right ear   • Generalized seizure disorder   • Rotator cuff strain   • Acute pain of right shoulder   • Closed fracture of distal end of right fibula   • Right ankle pain        Past Medical History:   Diagnosis Date   • Acute bronchitis 2016   • Acute otitis externa 2014   • Acute otitis media 02/10/2015   • Acute sinusitis 2016   • Ankle injury    • Backache 2014   • Benign hypertension 2016   • Cerebrovascular accident    • Dizziness 02/10/2015   • Encounter for gynecological examination without abnormal finding 03/10/2016   • Fatigue 2015   • Foot pain 2014   • Hyperkeratosis 2014   • Hypotensive episode 2015   • Keratoma 2014    intractable plantar   • Low blood pressure 2015   • Menopausal and perimenopausal disorder 03/10/2016    other specified   • Numbness of face 2016    improved   • Otitis media of right ear 2016    unspecified   • Perforated tympanic membrane 2014   • Puncture wound 2014    injury   • Right-sided face pain 2016   • Seizure    • Thumb pain 2015   • Upper respiratory infection 2015   • Wheezing 10/21/2014        Past Surgical History:   Procedure Laterality Date   • BREAST BIOPSY    "  • OTHER SURGICAL HISTORY  07/07/2014    Destruction of Benign Lesion (1-14)   • TUBAL ABDOMINAL LIGATION         Visit Dx:     ICD-10-CM ICD-9-CM   1. Rotator cuff strain, right, subsequent encounter S46.011D V58.89     840.4                 PT Ortho       07/18/17 1400    Subjective Comments    Subjective Comments Pt relates is having \"spasms\" in lateral R shoulder. relates feels the arm has improved since starting therapy. States HEP going well.  -LF    Subjective Pain    Able to rate subjective pain? yes  -LF    Pre-Treatment Pain Level 5  -LF    Posture/Observations    Posture/Observations Comments Pt walking with rolling walker , boot in place R ankle.   -LF    ROM (Range of Motion)    General ROM Detail AROM R shoulder flexion 80, abd 70, IR to reach beltline. Pt demonstrates using L arm to raise R arm.   -LF      User Key  (r) = Recorded By, (t) = Taken By, (c) = Cosigned By    Initials Name Provider Type    SHAY Orozco PT Physical Therapist                            Therapy Education       07/18/17 1500          Therapy Education    Given HEP  -LF      Program New  -LF      How Provided Verbal;Demonstration;Written  -LF      Provided to Patient  -LF      Level of Understanding Teach back education performed;Verbalized;Demonstrated  -LF        User Key  (r) = Recorded By, (t) = Taken By, (c) = Cosigned By    Initials Name Provider Type    SHAY Orozco PT Physical Therapist                PT OP Goals       07/18/17 1400       PT Short Term Goals    STG Date to Achieve 08/01/17  -LF     STG 1 Pt sarai demonstrate AROM R shoulder flexion 100, abd 75  -LF     STG 1 Progress Partially Met;Progressing  -LF     STG 2 Pt will perform MMT R shoulder girdle 3+/4  -LF     STG 2 Progress Ongoing  -LF     STG 3 Pt will reach  top of head R UE  -LF     STG 3 Progress Progressing  -LF     STG 4 I HEP each session  -LF     STG 4 Progress Ongoing;Progressing  -LF     Long Term Goals    LTG Date to Achieve 08/15/17 "  -LF     LTG 1 Pt will report min R shoulder pain, 2-3/10.   -LF     LTG 1 Progress Ongoing  -LF     LTG 2 Pt will resume brushing hair with R hand.   -LF     LTG 2 Progress Ongoing;Progressing  -LF     LTG 3 Pt will resume putting on make up with R hand  -LF     LTG 3 Progress Ongoing;Progressing  -LF     LTG 4 Pt will demonstrate functional AROM R shoulder/ UE  -LF     LTG 4 Progress Ongoing  -LF     LTG 5 I in final HEP   -LF     LTG 5 Progress Ongoing  -LF     Time Calculation    PT Goal Re-Cert Due Date 08/08/17  -LF       User Key  (r) = Recorded By, (t) = Taken By, (c) = Cosigned By    Initials Name Provider Type     Taya Orozco, PT Physical Therapist                PT Assessment/Plan       07/18/17 1500       PT Assessment    Functional Limitations Limitation in home management;Performance in self-care ADL  -LF     Impairments Pain;Muscle strength;Range of motion  -LF     Assessment Comments Increased R shoulder AROM, continues to shrug (scapular elevation rather than scapular depression/ stabilization), to elevate the arm. Pt would benefit from continued skilled interventionto address shoulder girdle strenthening, improve shoulder mechanics, progress HEP   -LF     Please refer to paper survey for additional self-reported information Yes  -LF     Rehab Potential Good  -LF     Patient/caregiver participated in establishment of treatment plan and goals Yes  -LF     Patient would benefit from skilled therapy intervention Yes  -LF     PT Plan    PT Frequency 2x/week  -LF     Predicted Duration of Therapy Intervention (days/wks) 4 weeks  -LF     Planned CPT's? PT RE-EVAL: 89280;PT THER PROC EA 15 MIN: 46205;PT THER ACT EA 15 MIN: 95739;PT MANUAL THERAPY EA 15 MIN: 56180;PT NEUROMUSC RE-EDUCATION EA 15 MIN: 24201;PT ELECTRICAL STIM UNATTEND:   -LF     Physical Therapy Interventions (Optional Details) home exercise program;manual therapy techniques;neuromuscular re-education;patient/family  "education;postural re-education;ROM (Range of Motion);strengthening  -LF     PT Plan Comments Continue therapy, R sholder girdle strengthening, neuro-muscular facilitation to improve scapular recruitment/ sbatilization, strengthening scapular depressors, facilitation of deltoid recruitment rather than upper traps (no shrugging). Progress HEP   -LF       User Key  (r) = Recorded By, (t) = Taken By, (c) = Cosigned By    Initials Name Provider Type     Taya Orozco, PT Physical Therapist                  Exercises       07/18/17 1400          Subjective Comments    Subjective Comments Pt relates is having \"spasms\" in lateral R shoulder. relates feels the arm has improved since starting therapy. States HEP going well.  -LF      Subjective Pain    Able to rate subjective pain? yes  -LF      Pre-Treatment Pain Level 5  -LF      Exercise 1    Exercise Name 1 Pro II:  UE forward/ back  -LF      Time (Minutes) 1 10  -LF      Exercise 2    Exercise Name 2 pulley flexion<> abd  -LF      Cueing 2 Demo  -LF      Time (Minutes) 2 5  -LF      Exercise 3    Exercise Name 3 supine : shoulder at 90 (gravity neutral)  elbow extended, flexion to overhead and back to neutral - active   -LF      Cueing 3 Verbal;Tactile;Demo  -LF      Reps 3 20   instruct, demo, perform, educate for HEP   -LF      Exercise 4    Exercise Name 4 supine: shoulder at 90, (gravity neutral)  arm circles CW/ CCW  -LF      Cueing 4 Verbal;Tactile;Demo  -LF      Sets 4 2  -LF      Reps 4 20  -LF      Exercise 5    Exercise Name 5 supine: shoulder at 90, (gravity neutral) : shoulder hoz adduction/ abduction  -LF      Cueing 5 Tactile  -LF      Sets 5 2  -LF      Reps 5 20   instruct, demo, perform, educate for HEP   -LF      Exercise 6    Exercise Name 6 supine: arm at 90 abd, elbow bent 90 : active IR/ ER   -LF      Cueing 6 Tactile  -LF      Sets 6 2  -LF      Reps 6 20   instruct, demo, perform, educate for HEP   -LF      Exercise 7    Exercise Name 7 supine " : punch up   -LF      Cueing 7 Demo  -LF      Sets 7 2  -LF      Reps 7 20   instruct, demo, perform, educate for HEP   -LF        User Key  (r) = Recorded By, (t) = Taken By, (c) = Cosigned By    Initials Name Provider Type     Taya Orozco, PT Physical Therapist                              Outcome Measures       07/18/17 1400          Quick DASH    Open a tight or new jar. 4  -LF      Do heavy household chores (e.g., wash walls, wash floors) 3  -LF      Carry a shopping bag or briefcase 3  -LF      Wash your back 4  -LF      Use a knife to cut food 2  -LF      Recreational activities in which you take some force or impact through your arm, should or hand (e.g. golf, hammering, tennis, etc.) 5  -LF      During the past week, to what extent has your arm, shoulder, or hand problem interfered with your normal social activites with family, friends, neighbors or groups? 4  -LF      During the past week, were you limited in your work or other regular daily activities as a result of your arm, shoulder or hand problem? 3  -LF      Arm, Shoulder, or hand pain 4  -LF      Tingling (pins and needles) in your arm, shoulder, or hand 5  -LF      During the past week, how much difficulty have you had sleeping because of the pain in your arm, shoulder or hand? 1  -LF      Number of Questions Answered 11  -LF      Quick DASH Score 61.36  -LF      Functional Assessment    Outcome Measure Options Quick DASH  -LF        User Key  (r) = Recorded By, (t) = Taken By, (c) = Cosigned By    Initials Name Provider Type     Taya Orozco, OFELIA Physical Therapist            Time Calculation:   Start Time: 1404  Stop Time: 1455  Time Calculation (min): 51 min  Total Timed Code Minutes- PT: 51 minute(s)     Therapy Charges for Today     Code Description Service Date Service Provider Modifiers Qty    89547221806 HC PT THER PROC EA 15 MIN 7/18/2017 Taya Orozco, PT GP 3          PT G-Codes  Outcome Measure Options: Quick DASH         Taya ECHEVERRIA  Ernesto, PT  7/18/2017       COVID pandemic

## 2022-02-03 ENCOUNTER — TELEPHONE (OUTPATIENT)
Dept: FAMILY MEDICINE CLINIC | Facility: CLINIC | Age: 62
End: 2022-02-03

## 2022-02-03 NOTE — TELEPHONE ENCOUNTER
Tried calling to see if patient still wants to come in if the office is open on February 4 no answer left voice message

## 2022-03-01 ENCOUNTER — TELEPHONE (OUTPATIENT)
Dept: FAMILY MEDICINE CLINIC | Facility: CLINIC | Age: 62
End: 2022-03-01

## 2022-03-01 NOTE — TELEPHONE ENCOUNTER
Left detailed message to remind appointment and to reschedule if necessary according to covid screening.

## 2022-03-03 DIAGNOSIS — I10 ESSENTIAL HYPERTENSION: Chronic | ICD-10-CM

## 2022-03-04 DIAGNOSIS — E78.5 HYPERLIPIDEMIA, UNSPECIFIED HYPERLIPIDEMIA TYPE: Chronic | ICD-10-CM

## 2022-03-04 RX ORDER — NIFEDIPINE 60 MG/1
TABLET, FILM COATED, EXTENDED RELEASE ORAL
Qty: 30 TABLET | Refills: 0 | OUTPATIENT
Start: 2022-03-04

## 2022-03-04 RX ORDER — ATORVASTATIN CALCIUM 40 MG/1
TABLET, FILM COATED ORAL
Qty: 30 TABLET | Refills: 1 | OUTPATIENT
Start: 2022-03-04

## 2022-03-04 NOTE — TELEPHONE ENCOUNTER
Rx Refill Note  Requested Prescriptions     Pending Prescriptions Disp Refills   • NIFEdipine CC (ADALAT CC) 60 MG 24 hr tablet [Pharmacy Med Name: nifedipine ER 60 mg tablet,extended release] 30 tablet 0     Sig: TAKE ONE TABLET BY MOUTH EVERY DAY (BLOOD PRESSURE)      Last office visit with prescribing clinician: 12/3/2021      Next office visit with prescribing clinician: 3/4/2022   {TIP  Encounters:    Calcium-Channel Blockers Protocol Failed 03/03/2022 04:38 PM   Protocol Details  Normal serum potassium in past 12 months    BP under 140/90 in past year    GFR> 30 ml/min in past 12 months         {TIP  Please add Last Relevant Lab Date if appropriate  {TIP  Is Refill Pharmacy correct? yes  Deborah Fatima, LPN  03/04/22, 15:07 CST

## 2022-03-07 ENCOUNTER — TELEPHONE (OUTPATIENT)
Dept: FAMILY MEDICINE CLINIC | Facility: CLINIC | Age: 62
End: 2022-03-07

## 2022-03-07 NOTE — TELEPHONE ENCOUNTER
Left message to remind of appointment, to bring insurance and medications, and to reschedule if necessary according to covid screening.

## 2022-03-25 ENCOUNTER — TELEPHONE (OUTPATIENT)
Dept: FAMILY MEDICINE CLINIC | Facility: CLINIC | Age: 62
End: 2022-03-25

## 2022-03-25 ENCOUNTER — LAB (OUTPATIENT)
Dept: LAB | Facility: HOSPITAL | Age: 62
End: 2022-03-25

## 2022-03-25 DIAGNOSIS — E78.5 HYPERLIPIDEMIA, UNSPECIFIED HYPERLIPIDEMIA TYPE: Chronic | ICD-10-CM

## 2022-03-25 DIAGNOSIS — Z79.899 HIGH RISK MEDICATION USE: ICD-10-CM

## 2022-03-25 DIAGNOSIS — I10 ESSENTIAL HYPERTENSION: Chronic | ICD-10-CM

## 2022-03-25 DIAGNOSIS — E03.9 HYPOTHYROIDISM, UNSPECIFIED TYPE: Chronic | ICD-10-CM

## 2022-03-25 DIAGNOSIS — Z13.1 DIABETES MELLITUS SCREENING: ICD-10-CM

## 2022-03-25 PROCEDURE — 84439 ASSAY OF FREE THYROXINE: CPT

## 2022-03-25 PROCEDURE — 80061 LIPID PANEL: CPT

## 2022-03-25 PROCEDURE — 83036 HEMOGLOBIN GLYCOSYLATED A1C: CPT

## 2022-03-25 PROCEDURE — 84481 FREE ASSAY (FT-3): CPT

## 2022-03-25 PROCEDURE — 80053 COMPREHEN METABOLIC PANEL: CPT

## 2022-03-25 PROCEDURE — 84443 ASSAY THYROID STIM HORMONE: CPT

## 2022-03-25 PROCEDURE — 85027 COMPLETE CBC AUTOMATED: CPT

## 2022-03-26 LAB
ALBUMIN SERPL-MCNC: 3.9 G/DL (ref 3.5–5.2)
ALBUMIN/GLOB SERPL: 1.3 G/DL
ALP SERPL-CCNC: 86 U/L (ref 39–117)
ALT SERPL W P-5'-P-CCNC: 17 U/L (ref 1–33)
ANION GAP SERPL CALCULATED.3IONS-SCNC: 8 MMOL/L (ref 5–15)
AST SERPL-CCNC: 27 U/L (ref 1–32)
BILIRUB SERPL-MCNC: 0.2 MG/DL (ref 0–1.2)
BUN SERPL-MCNC: 26 MG/DL (ref 8–23)
BUN/CREAT SERPL: 17.4 (ref 7–25)
CALCIUM SPEC-SCNC: 9.6 MG/DL (ref 8.6–10.5)
CHLORIDE SERPL-SCNC: 108 MMOL/L (ref 98–107)
CHOLEST SERPL-MCNC: 156 MG/DL (ref 0–200)
CO2 SERPL-SCNC: 28 MMOL/L (ref 22–29)
CREAT SERPL-MCNC: 1.49 MG/DL (ref 0.57–1)
DEPRECATED RDW RBC AUTO: 39.6 FL (ref 37–54)
EGFRCR SERPLBLD CKD-EPI 2021: 39.6 ML/MIN/1.73
ERYTHROCYTE [DISTWIDTH] IN BLOOD BY AUTOMATED COUNT: 12.8 % (ref 12.3–15.4)
GLOBULIN UR ELPH-MCNC: 2.9 GM/DL
GLUCOSE SERPL-MCNC: 133 MG/DL (ref 65–99)
HBA1C MFR BLD: 5.3 % (ref 4.8–5.6)
HCT VFR BLD AUTO: 38.4 % (ref 34–46.6)
HDLC SERPL-MCNC: 52 MG/DL (ref 40–60)
HGB BLD-MCNC: 13.2 G/DL (ref 12–15.9)
LDLC SERPL CALC-MCNC: 86 MG/DL (ref 0–100)
LDLC/HDLC SERPL: 1.63 {RATIO}
MCH RBC QN AUTO: 29.5 PG (ref 26.6–33)
MCHC RBC AUTO-ENTMCNC: 34.4 G/DL (ref 31.5–35.7)
MCV RBC AUTO: 85.7 FL (ref 79–97)
PLATELET # BLD AUTO: 181 10*3/MM3 (ref 140–450)
PMV BLD AUTO: 12.9 FL (ref 6–12)
POTASSIUM SERPL-SCNC: 5.7 MMOL/L (ref 3.5–5.2)
PROT SERPL-MCNC: 6.8 G/DL (ref 6–8.5)
RBC # BLD AUTO: 4.48 10*6/MM3 (ref 3.77–5.28)
SODIUM SERPL-SCNC: 144 MMOL/L (ref 136–145)
T3FREE SERPL-MCNC: 3.37 PG/ML (ref 2–4.4)
T4 FREE SERPL-MCNC: 1.69 NG/DL (ref 0.93–1.7)
TRIGL SERPL-MCNC: 97 MG/DL (ref 0–150)
TSH SERPL DL<=0.05 MIU/L-ACNC: 2.2 UIU/ML (ref 0.27–4.2)
VLDLC SERPL-MCNC: 18 MG/DL (ref 5–40)
WBC NRBC COR # BLD: 5.03 10*3/MM3 (ref 3.4–10.8)

## 2022-03-28 ENCOUNTER — OFFICE VISIT (OUTPATIENT)
Dept: FAMILY MEDICINE CLINIC | Facility: CLINIC | Age: 62
End: 2022-03-28

## 2022-03-28 VITALS
WEIGHT: 103 LBS | BODY MASS INDEX: 18.25 KG/M2 | TEMPERATURE: 96.9 F | HEART RATE: 94 BPM | SYSTOLIC BLOOD PRESSURE: 140 MMHG | HEIGHT: 63 IN | DIASTOLIC BLOOD PRESSURE: 86 MMHG | OXYGEN SATURATION: 96 %

## 2022-03-28 DIAGNOSIS — N18.4 CKD (CHRONIC KIDNEY DISEASE) STAGE 4, GFR 15-29 ML/MIN: Primary | ICD-10-CM

## 2022-03-28 DIAGNOSIS — E78.5 HYPERLIPIDEMIA, UNSPECIFIED HYPERLIPIDEMIA TYPE: Chronic | ICD-10-CM

## 2022-03-28 DIAGNOSIS — R32 URINARY INCONTINENCE, UNSPECIFIED TYPE: ICD-10-CM

## 2022-03-28 DIAGNOSIS — Z91.81 AT HIGH RISK FOR FALLS: ICD-10-CM

## 2022-03-28 DIAGNOSIS — G47.00 INSOMNIA, UNSPECIFIED TYPE: Chronic | ICD-10-CM

## 2022-03-28 DIAGNOSIS — I10 ESSENTIAL HYPERTENSION: Chronic | ICD-10-CM

## 2022-03-28 DIAGNOSIS — M54.2 NECK PAIN: Chronic | ICD-10-CM

## 2022-03-28 PROBLEM — E87.5 HYPERKALEMIA: Status: ACTIVE | Noted: 2020-04-28

## 2022-03-28 PROBLEM — T38.0X5A STEROID-INDUCED HYPERGLYCEMIA: Status: ACTIVE | Noted: 2020-04-28

## 2022-03-28 PROBLEM — R06.2 WHEEZING: Status: ACTIVE | Noted: 2020-05-06

## 2022-03-28 PROBLEM — Z01.818 PREOP TESTING: Status: ACTIVE | Noted: 2020-04-24

## 2022-03-28 PROBLEM — I63.9 CVA (CEREBRAL VASCULAR ACCIDENT) (HCC): Status: ACTIVE | Noted: 2020-04-24

## 2022-03-28 PROBLEM — D50.0 NORMOCYTIC ANEMIA DUE TO BLOOD LOSS: Status: ACTIVE | Noted: 2020-04-28

## 2022-03-28 PROBLEM — R73.9 STEROID-INDUCED HYPERGLYCEMIA: Status: ACTIVE | Noted: 2020-04-28

## 2022-03-28 PROBLEM — R40.0 DROWSINESS: Status: ACTIVE | Noted: 2020-04-24

## 2022-03-28 PROBLEM — G95.19 EDEMA OF SPINAL CORD (HCC): Status: ACTIVE | Noted: 2019-12-02

## 2022-03-28 PROBLEM — F14.10 COCAINE ABUSE (HCC): Status: ACTIVE | Noted: 2020-04-24

## 2022-03-28 PROCEDURE — 80306 DRUG TEST PRSMV INSTRMNT: CPT

## 2022-03-28 PROCEDURE — 99214 OFFICE O/P EST MOD 30 MIN: CPT | Performed by: STUDENT IN AN ORGANIZED HEALTH CARE EDUCATION/TRAINING PROGRAM

## 2022-03-28 RX ORDER — CHOLECALCIFEROL (VITAMIN D3) 10(400)/ML
1 DROPS ORAL 4 TIMES DAILY PRN
Qty: 120 EACH | Refills: 2 | Status: SHIPPED | OUTPATIENT
Start: 2022-03-28

## 2022-03-28 RX ORDER — GABAPENTIN 100 MG/1
200 CAPSULE ORAL 3 TIMES DAILY
Qty: 180 CAPSULE | Refills: 0 | Status: SHIPPED | OUTPATIENT
Start: 2022-03-28 | End: 2022-06-06 | Stop reason: SDUPTHER

## 2022-03-28 RX ORDER — NIFEDIPINE 60 MG/1
60 TABLET, FILM COATED, EXTENDED RELEASE ORAL DAILY
Qty: 90 TABLET | Refills: 1 | Status: SHIPPED | OUTPATIENT
Start: 2022-03-28 | End: 2022-09-22

## 2022-03-28 RX ORDER — TRAZODONE HYDROCHLORIDE 50 MG/1
50 TABLET ORAL
Qty: 90 TABLET | Refills: 0 | Status: SHIPPED | OUTPATIENT
Start: 2022-03-28 | End: 2023-01-23 | Stop reason: SDUPTHER

## 2022-03-28 RX ORDER — ATORVASTATIN CALCIUM 40 MG/1
40 TABLET, FILM COATED ORAL NIGHTLY
Qty: 90 TABLET | Refills: 1 | Status: SHIPPED | OUTPATIENT
Start: 2022-03-28 | End: 2022-09-22

## 2022-03-29 LAB
AMPHET+METHAMPHET UR QL: NEGATIVE
AMPHETAMINES UR QL: NEGATIVE
BARBITURATES UR QL SCN: NEGATIVE
BENZODIAZ UR QL SCN: NEGATIVE
BUPRENORPHINE SERPL-MCNC: NEGATIVE NG/ML
CANNABINOIDS SERPL QL: NEGATIVE
COCAINE UR QL: POSITIVE
METHADONE UR QL SCN: NEGATIVE
OPIATES UR QL: NEGATIVE
OXYCODONE UR QL SCN: NEGATIVE
PCP UR QL SCN: NEGATIVE
PROPOXYPH UR QL: NEGATIVE
TRICYCLICS UR QL SCN: NEGATIVE

## 2022-04-19 ENCOUNTER — TELEPHONE (OUTPATIENT)
Dept: FAMILY MEDICINE CLINIC | Facility: CLINIC | Age: 62
End: 2022-04-19

## 2022-04-19 NOTE — TELEPHONE ENCOUNTER
roni  From [facility] is needing a  letter of medical necessity for the patient and faxed back to fax [phone]  Any questions call  [phone]

## 2022-04-22 ENCOUNTER — TELEPHONE (OUTPATIENT)
Dept: FAMILY MEDICINE CLINIC | Facility: CLINIC | Age: 62
End: 2022-04-22

## 2022-04-25 ENCOUNTER — TELEPHONE (OUTPATIENT)
Dept: FAMILY MEDICINE CLINIC | Facility: CLINIC | Age: 62
End: 2022-04-25

## 2022-04-25 DIAGNOSIS — R32 URINARY INCONTINENCE, UNSPECIFIED TYPE: ICD-10-CM

## 2022-04-27 RX ORDER — DIAPER,BRIEF,ADULT, DISPOSABLE
1 EACH MISCELLANEOUS 4 TIMES DAILY PRN
Qty: 32 EACH | Refills: 11 | Status: CANCELLED | OUTPATIENT
Start: 2022-04-27

## 2022-04-27 RX ORDER — PEN NEEDLE, DIABETIC 32GX 5/32"
1 NEEDLE, DISPOSABLE MISCELLANEOUS AS NEEDED
Qty: 150 EACH | Refills: 11 | Status: CANCELLED | OUTPATIENT
Start: 2022-04-27

## 2022-05-20 ENCOUNTER — TELEPHONE (OUTPATIENT)
Dept: FAMILY MEDICINE CLINIC | Facility: CLINIC | Age: 62
End: 2022-05-20

## 2022-05-20 NOTE — TELEPHONE ENCOUNTER
This order has been filled out and was faxed on 5/3/22. Confirmation was received and scanned in chart.  I refaxed it and confirmation received.

## 2022-05-20 NOTE — TELEPHONE ENCOUNTER
[facility] AND WAS ASKING ABOUT THE ORDER THAT WAS SENT FOR THE SUPPLIES. I SEE THAT IT HAS BEEN SCANNED INTO HER CHART. IT NEEDS TO BE FILLED OUT AND SENT BACK PLEASE

## 2022-05-25 ENCOUNTER — TELEPHONE (OUTPATIENT)
Dept: FAMILY MEDICINE CLINIC | Facility: CLINIC | Age: 62
End: 2022-05-25

## 2022-05-25 NOTE — TELEPHONE ENCOUNTER
Called to ask about this. The most recent form was for additional supplies, different from the first order. Filled out and faxed back.

## 2022-05-25 NOTE — TELEPHONE ENCOUNTER
[facility] called stated they are needing the fax they sent over for the patients incontinent suppllies. Fax [phone]     If you have any questions please call [phone]

## 2022-06-06 ENCOUNTER — OFFICE VISIT (OUTPATIENT)
Dept: FAMILY MEDICINE CLINIC | Facility: CLINIC | Age: 62
End: 2022-06-06

## 2022-06-06 VITALS
TEMPERATURE: 96.6 F | DIASTOLIC BLOOD PRESSURE: 86 MMHG | OXYGEN SATURATION: 95 % | HEART RATE: 120 BPM | BODY MASS INDEX: 18.78 KG/M2 | HEIGHT: 63 IN | SYSTOLIC BLOOD PRESSURE: 130 MMHG | WEIGHT: 106 LBS

## 2022-06-06 DIAGNOSIS — M54.2 NECK PAIN: Chronic | ICD-10-CM

## 2022-06-06 DIAGNOSIS — Z12.31 ENCOUNTER FOR SCREENING MAMMOGRAM FOR MALIGNANT NEOPLASM OF BREAST: ICD-10-CM

## 2022-06-06 DIAGNOSIS — R00.0 TACHYCARDIA: Primary | ICD-10-CM

## 2022-06-06 PROCEDURE — 99214 OFFICE O/P EST MOD 30 MIN: CPT | Performed by: STUDENT IN AN ORGANIZED HEALTH CARE EDUCATION/TRAINING PROGRAM

## 2022-06-06 PROCEDURE — 93005 ELECTROCARDIOGRAM TRACING: CPT | Performed by: STUDENT IN AN ORGANIZED HEALTH CARE EDUCATION/TRAINING PROGRAM

## 2022-06-06 PROCEDURE — 93010 ELECTROCARDIOGRAM REPORT: CPT | Performed by: INTERNAL MEDICINE

## 2022-06-06 RX ORDER — GABAPENTIN 100 MG/1
200 CAPSULE ORAL 3 TIMES DAILY
Qty: 180 CAPSULE | Refills: 0 | Status: SHIPPED | OUTPATIENT
Start: 2022-06-06 | End: 2022-10-21 | Stop reason: SDUPTHER

## 2022-06-06 RX ORDER — BACLOFEN 10 MG/1
10 TABLET ORAL 3 TIMES DAILY
Qty: 90 TABLET | Refills: 1 | Status: SHIPPED | OUTPATIENT
Start: 2022-06-06 | End: 2022-07-25

## 2022-06-06 NOTE — PROGRESS NOTES
Subjective:  Jyoti Guerra Person is a 61 y.o. female who presents for     Chronic neck pain; currently gabapentin 200 mg 3 times daily, no issues medication, states providing good relief.  Denies any trauma, increased numbness, tingling.  Denies any weakness.  No acute complaints today, does admit to recent crack use, last smoked 2 days ago.    Patient Active Problem List   Diagnosis   • Vitamin D deficiency   • Vertigo   • Neuropathy   • Restless leg syndrome   • Pain in joint   • Allergic rhinitis   • Essential hypertension   • Abnormal EEG   • Cerebrovascular accident (CVA) due to thrombosis of left carotid artery (HCC)   • Illicit drug use   • Tobacco abuse   • Sleep apnea   • Neck pain   • Vitamin deficiency   • Acute otitis externa of right ear   • Rotator cuff strain   • Acute pain of right shoulder   • Closed fracture of distal end of right fibula   • Right ankle pain   • Allergic reaction   • Chronic pain of left ankle   • Closed fracture of talus   • Hypotension   • Left leg pain   • Acute pain of left shoulder   • Closed nondisplaced fracture of lateral malleolus of left fibula   • Closed nondisplaced fracture of lateral malleolus of left fibula with routine healing   • Renal insufficiency   • ESR raised   • Pain   • Left foot pain   • Closed nondisplaced fracture of fifth left metatarsal bone   • Cause of injury, fall   • Ankle weakness   • Joint pain in fingers of left hand   • Joint pain in fingers of right hand   • Bilateral thumb pain   • Bilateral hand pain   • Numbness and tingling in both hands   • Bilateral carpal tunnel syndrome   • Bilateral arm pain   • Bilateral leg pain   • Decreased strength   • Acute URI   • Cough   • Left hip pain   • BMI 27.0-27.9,adult   • Disorder of SI (sacroiliac) joint   • Mobility impaired   • Frequent falls   • Injury of sacrum   • Chronic pain of both lower extremities   • Edema of spinal cord (HCC)   • Radicular pain of left lower extremity   • Chronic midline  "low back pain with left-sided sciatica   • Spinal stenosis of lumbar region with neurogenic claudication   • Degenerative disc disease, lumbar   • Bilateral leg weakness   • History of spinal cord injury   • Incontinence   • Hyperlipidemia   • Hypothyroidism   • Cocaine abuse (HCC)   • CVA (cerebral vascular accident) ([facility])   • Drowsiness   • Hyperkalemia   • Normocytic anemia due to blood loss   • Preop testing   • Steroid-induced hyperglycemia   • Wheezing     Vitals:    Vitals:    06/06/22 1611   BP: 130/86   BP Location: Right arm   Patient Position: Sitting   Cuff Size: Adult   Pulse: 120   Temp: 96.6 °F (35.9 °C)   SpO2: 95%   Weight: 48.1 kg (106 lb)   Height: 160 cm (63\")     Body mass index is 18.78 kg/m².      Current Outpatient Medications:   •  atorvastatin (LIPITOR) 40 MG tablet, Take 1 tablet by mouth Every Night., Disp: 90 tablet, Rfl: 1  •  baclofen (LIORESAL) 10 MG tablet, Take 1 tablet by mouth 3 (Three) Times a Day., Disp: 90 tablet, Rfl: 1  •  gabapentin (NEURONTIN) 100 MG capsule, Take 2 capsules by mouth 3 (Three) Times a Day., Disp: 180 capsule, Rfl: 0  •  Incontinence Supply Disposable (Comfort Protect Adult Diaper/M) misc, 1 each 4 (Four) Times a Day As Needed (urination)., Disp: 120 each, Rfl: 2  •  levETIRAcetam (KEPPRA) 500 MG tablet, TAKE ONE TABLET BY MOUTH TWICE DAILY (Patient taking differently: Take 500 mg by mouth 2 (Two) Times a Day. Pt didn't know she was supposed to take this twice a day. She has only been taking 1), Disp: 60 tablet, Rfl: 5  •  mupirocin (BACTROBAN) 2 % ointment, apply TO right outer ear twice daily, Disp: 22 g, Rfl: 1  •  NIFEdipine CC (ADALAT CC) 60 MG 24 hr tablet, Take 1 tablet by mouth Daily., Disp: 90 tablet, Rfl: 1  •  traZODone (DESYREL) 50 MG tablet, Take 1 tablet by mouth every night at bedtime., Disp: 90 tablet, Rfl: 0    Patient Active Problem List   Diagnosis   • Vitamin D deficiency   • Vertigo   • Neuropathy   • Restless leg syndrome   • Pain in " joint   • Allergic rhinitis   • Essential hypertension   • Abnormal EEG   • Cerebrovascular accident (CVA) due to thrombosis of left carotid artery ([facility])   • Illicit drug use   • Tobacco abuse   • Sleep apnea   • Neck pain   • Vitamin deficiency   • Acute otitis externa of right ear   • Rotator cuff strain   • Acute pain of right shoulder   • Closed fracture of distal end of right fibula   • Right ankle pain   • Allergic reaction   • Chronic pain of left ankle   • Closed fracture of talus   • Hypotension   • Left leg pain   • Acute pain of left shoulder   • Closed nondisplaced fracture of lateral malleolus of left fibula   • Closed nondisplaced fracture of lateral malleolus of left fibula with routine healing   • Renal insufficiency   • ESR raised   • Pain   • Left foot pain   • Closed nondisplaced fracture of fifth left metatarsal bone   • Cause of injury, fall   • Ankle weakness   • Joint pain in fingers of left hand   • Joint pain in fingers of right hand   • Bilateral thumb pain   • Bilateral hand pain   • Numbness and tingling in both hands   • Bilateral carpal tunnel syndrome   • Bilateral arm pain   • Bilateral leg pain   • Decreased strength   • Acute URI   • Cough   • Left hip pain   • BMI 27.0-27.9,adult   • Disorder of SI (sacroiliac) joint   • Mobility impaired   • Frequent falls   • Injury of sacrum   • Chronic pain of both lower extremities   • Edema of spinal cord ([facility])   • Radicular pain of left lower extremity   • Chronic midline low back pain with left-sided sciatica   • Spinal stenosis of lumbar region with neurogenic claudication   • Degenerative disc disease, lumbar   • Bilateral leg weakness   • History of spinal cord injury   • Incontinence   • Hyperlipidemia   • Hypothyroidism   • Cocaine abuse ([facility])   • CVA (cerebral vascular accident) ([facility])   • Drowsiness   • Hyperkalemia   • Normocytic anemia due to blood loss   • Preop testing   • Steroid-induced hyperglycemia   • Wheezing     Past  "Surgical History:   Procedure Laterality Date   • BREAST BIOPSY     • NECK SURGERY      x2 \"put rods in her neck\"   • OTHER SURGICAL HISTORY  07/07/2014    Destruction of Benign Lesion (1-14)   • TUBAL ABDOMINAL LIGATION       Social History     Socioeconomic History   • Marital status:    Tobacco Use   • Smoking status: Current Every Day Smoker     Packs/day: 0.25     Years: 40.00     Pack years: 10.00     Types: Cigarettes   • Smokeless tobacco: Never Used   • Tobacco comment: 4-5 cigarettes daily   Vaping Use   • Vaping Use: Former   Substance and Sexual Activity   • Alcohol use: Not Currently     Comment: rarely   • Drug use: Yes     Frequency: 4.0 times per week     Types: Cocaine(coke)     Comment: 4 days a week   • Sexual activity: Defer     Family History   Problem Relation Age of Onset   • Alzheimer's disease Other    • Bipolar disorder Other    • Depression Other    • Diabetes Other    • Heart disease Other    • Hypertension Other    • Lung cancer Other    • Migraines Other    • Sickle cell anemia Other    • Stroke Other    • Lung disease Other    • No Known Problems Mother    • Alzheimer's disease Father    • Heart attack Father    • Throat cancer Father    • No Known Problems Daughter      Lab on 03/25/2022   Component Date Value Ref Range Status   • WBC 03/25/2022 5.03  3.40 - 10.80 10*3/mm3 Final   • RBC 03/25/2022 4.48  3.77 - 5.28 10*6/mm3 Final   • Hemoglobin 03/25/2022 13.2  12.0 - 15.9 g/dL Final   • Hematocrit 03/25/2022 38.4  34.0 - 46.6 % Final   • MCV 03/25/2022 85.7  79.0 - 97.0 fL Final   • MCH 03/25/2022 29.5  26.6 - 33.0 pg Final   • MCHC 03/25/2022 34.4  31.5 - 35.7 g/dL Final   • RDW 03/25/2022 12.8  12.3 - 15.4 % Final   • RDW-SD 03/25/2022 39.6  37.0 - 54.0 fl Final   • MPV 03/25/2022 12.9 (A) 6.0 - 12.0 fL Final   • Platelets 03/25/2022 181  140 - 450 10*3/mm3 Final   • Glucose 03/25/2022 133 (A) 65 - 99 mg/dL Final   • BUN 03/25/2022 26 (A) 8 - 23 mg/dL Final   • Creatinine " 03/25/2022 1.49 (A) 0.57 - 1.00 mg/dL Final   • Sodium 03/25/2022 144  136 - 145 mmol/L Final   • Potassium 03/25/2022 5.7 (A) 3.5 - 5.2 mmol/L Final   • Chloride 03/25/2022 108 (A) 98 - 107 mmol/L Final   • CO2 03/25/2022 28.0  22.0 - 29.0 mmol/L Final   • Calcium 03/25/2022 9.6  8.6 - 10.5 mg/dL Final   • Total Protein 03/25/2022 6.8  6.0 - 8.5 g/dL Final   • Albumin 03/25/2022 3.90  3.50 - 5.20 g/dL Final   • ALT (SGPT) 03/25/2022 17  1 - 33 U/L Final   • AST (SGOT) 03/25/2022 27  1 - 32 U/L Final   • Alkaline Phosphatase 03/25/2022 86  39 - 117 U/L Final   • Total Bilirubin 03/25/2022 0.2  0.0 - 1.2 mg/dL Final   • Globulin 03/25/2022 2.9  gm/dL Final   • A/G Ratio 03/25/2022 1.3  g/dL Final   • BUN/Creatinine Ratio 03/25/2022 17.4  7.0 - 25.0 Final   • Anion Gap 03/25/2022 8.0  5.0 - 15.0 mmol/L Final   • eGFR 03/25/2022 39.6 (A) >60.0 mL/min/1.73 Final    National Kidney Foundation and American Society of Nephrology (ASN) Task Force recommended calculation based on the Chronic Kidney Disease Epidemiology Collaboration (CKD-EPI) equation refit without adjustment for race.   • TSH 03/25/2022 2.200  0.270 - 4.200 uIU/mL Final   • Hemoglobin A1C 03/25/2022 5.30  4.80 - 5.60 % Final   • T3, Free 03/25/2022 3.37  2.00 - 4.40 pg/mL Final   • Free T4 03/25/2022 1.69  0.93 - 1.70 ng/dL Final   • Total Cholesterol 03/25/2022 156  0 - 200 mg/dL Final   • Triglycerides 03/25/2022 97  0 - 150 mg/dL Final   • HDL Cholesterol 03/25/2022 52  40 - 60 mg/dL Final   • LDL Cholesterol  03/25/2022 86  0 - 100 mg/dL Final   • VLDL Cholesterol 03/25/2022 18  5 - 40 mg/dL Final   • LDL/HDL Ratio 03/25/2022 1.63   Final   • THC, Screen, Urine 03/28/2022 Negative  Negative Final   • Phencyclidine (PCP), Urine 03/28/2022 Negative  Negative Final   • Cocaine Screen, Urine 03/28/2022 Positive (A) Negative Final   • Methamphetamine, Ur 03/28/2022 Negative  Negative Final   • Opiate Screen 03/28/2022 Negative  Negative Final   • Amphetamine  Screen, Urine 2022 Negative  Negative Final   • Benzodiazepine Screen, Urine 2022 Negative  Negative Final   • Tricyclic Antidepressants Screen 2022 Negative  Negative Final   • Methadone Screen, Urine 2022 Negative  Negative Final   • Barbiturates Screen, Urine 2022 Negative  Negative Final   • Oxycodone Screen, Urine 2022 Negative  Negative Final   • Propoxyphene Screen 2022 Negative  Negative Final   • Buprenorphine, Screen, Urine 2022 Negative  Negative Final      Neuro Intra-Operative Monitoring (IOM)  Narrative: This result has an attachment that is not available.  [facility]      Intraoperative Neuromonitoring Report    Patient Name: Jyoti Riddle  MR#: [id number]  Date of Service: 2020  : 1960  Age: 59 Years, 8 Months, 20 Days  Gender: Female  Diagnosis: Cervical myelopathy [G95.9]  Procedure: ACDF  Surgeon: Josef Jorgensen M.D.  Hospital: [facility]  IONM Technologist: RAVINDER Donato T, EP T  Start Time: 8:02:00 AM  End Time: 10:41:00 AM  Total Billing Time:  11 Units of  (15 min/per)    IONM modality 1: ( 04218 )  SSEP Upper and Lower  IONM modality 2: ( 05877 ) TCeMEP Upper and Lower  IONM modality 3: ( 54917 ) Train of Four (TOF)    ANESTHETICS: Refer to anesthesia report.     RECORDING PARAMETERS AND CLINICAL INTERPRETATION:    Real time Remote Viewing of the study data was performed. Real time   communication was maintained with the technologist, surgeon and   anesthesiologist throughout the procedure.     IONM modality 1:  Somatosensory Evoked Potentials (SSEP)  Supramaximal interleaved electrical stimulation was delivered to the right   and left ulnar and posterior tibial nerves through bipolar surface or   needle electrodes placed at the wrists and the medial ankles respectively   in order to monitor the functional integrity of the somatosensory   pathways. Subdermal recording  needle electrodes were placed at C3', C4',   Cz', Fpz, the approximate site of the cervical spine, and Erb's point   bilaterally.    SSEP Interpretation: The baseline recordings revealed well-formed and   reliable cortical and subcortical responses in RIGHT upper and bilateral   lower extremities. No reliable SSEPs were recorded at baseline from the   left upper extremity. After accounting for any influences due to   anesthesia or temperature, there was no significant change in latency or   amplitude of potentials bilaterally throughout the study.    IONM modality 2:  Transcranial Electrical Motor EP (TCeMEP)  An electrical stimulus was applied transcranially and independently at the   site of the right and left primary motor cortex. Recording electrodes were   applied to designated upper (abductor digiti minimi, abductor pollicis   brevis) and lower (adductor hallucis, tibialis anterior, flexor digitorum   brevis, gastrocnemius) extremity muscles bilaterally and the corresponding   myogenic responses were evaluated in order to monitor the integrity of the   corticospinal pathways.    TCeMEP Interpretation: Upon transcranial electrical stimulation at   baseline, reproducible motor potentials were produced in the LEFT upper   and lower extremity only. Motor evoked potentials in the left LOWER   extremity appeared prolonged during a portion of the procedure but   remained intact and returned to normal latency prior to end of the   recording. Repetitive stimulation to evaluate the left lower extremity was   associated with a transient abolishment of left UPPER extremity motor   evoked potentials; these returned after a period of rest. Accounting for   physiological changes due to repetitive stimulation, there were no   significant changes in motor potentials throughout the study and at   closure. The level of neuromuscular blockade was appropriate during   stimulation and other critical portions of the  procedure.    IONM modality 3:  Train of Four (TOF)  The level of neuromuscular blockade was appropriate during stimulation and   other critical portions of the procedure.     SUMMARY   Impression: This is a negative neurophysiologic intraoperative study demonstrating no   significant events, or changes from baseline.    I personally monitored this case remotely on one-on-one basis without any   additional simultaneous IOM cases or billable medical   procedures/activities.     I have personally reviewed the tracings and edited and approved the report   above.    Attending Neurologist  Mayra Liu M.D.  Neuro Intra-Operative Monitoring (IOM)  Narrative: This result has an attachment that is not available.    [facility]? ? ?    Intraoperative Neuromonitoring Report    Patient Name: Jyoti Riddle  MR#: [id number]  Date of Service: 2020  : 1960  Age: 59 Years, 8 Months, 29 Days  Gender: Female  Diagnosis: G95.9  Procedure: PSF  Surgeon: Josef Jorgensen M.D.  Hospital: [facility]  IONM Technologist: Ketan Carvalho  Start Time: 8:47:00 AM  End Time: 11:49:00 AM  Total Billing Time:?  12 Units of  (15 min/per)    IONM modality 1: ( 54698 )?  SSEP Upper and Lower  IONM modality 2: ( 85067 ) TCeMEP Upper and Lower  IONM modality 3: ( 47676 ) Train of Four (TOF)  IONM modality 4:   IONM modality 5:     ANESTHETICS: Refer to anesthesia report.     RECORDING PARAMETERS AND CLINICAL INTERPRETATION:    Real time Remote Viewing of the study data was performed. Real time   communication was maintained with the technologist, surgeon and   anesthesiologist throughout the procedure.     59 y.o. female presents with C2-T2 PSF. Pre position SSEPs and TcMEPs were   absent from all extremities. Post position data was consistent with pre   position data. All data remained absent throughout the case. Surgeon was   informed of all data and verbally  acknowledged.     IONM modality 1:?  SSEP upper and Lower   Supramaximal interleaved electrical stimulation was delivered to the right   and left ulnar and posterior tibial nerves through bipolar surface or   needle electrodes placed at the wrists and the medial ankles respectively   in order to monitor the functional integrity of the somatosensory   pathways. Subdermal recording needle electrodes were placed at C3', C4',   Cz', Fpz, the approximate site of the cervical spine, and Erb's point   bilaterally.    SSEP Interpretation: The baseline recordings did not reveal any reliable   cortical and subcortical responses bilaterally. This was relayed to the   surgeon who requested discontinuation of monitoring. No changes recorded.    IONM modality 2:?  TCeMEP upper and lower   An electrical stimulus was applied transcranially and independently at the   site of the right and left primary motor cortex. Recording electrodes were   applied to designated upper and lower extremity muscles bilaterally and   the corresponding myogenic responses were evaluated in order to monitor   the integrity of the corticospinal pathways.    TCeMEP Interpretation: There were no reliable or reproducible motor   potentials at baseline. This was relayed to the surgeon who requested   discontinuation of monitoring.  No changes occurred from baseline.    IONM modality 3:?  Train of Four  The level was neuromuscular blockage was appropriate during stimulation   and other critical portions of the procedure.     SUMMARY   Impression:  This is a negative neurophysiologic intraoperative   study. No reliable SSEP and TCeMEP response were obtained at baseline and   throughout the case.     ____________________________  Jaki Mcgowan DO  Pediatric Epilepsy Fellow    I have personally reviewed the tracings and edited and approved the report   above.    Attending Neurologist  Kofi Rene M.D, PhD  CT interpretation of outside films  Narrative:  INTERPRETATION OF OUTSIDE HOSPITAL CT OF THE HEAD AND CTA OF THE HEAD   AND NECK    HISTORY: Muscle weakness (generalized)    COMPARISON: Outside MRI 4/18/2020     TECHNIQUE:  CT was performed of the HEAD and CTA was performed of the HEAD AND   NECK at outside institution and scanned into PACS for interpretation.   Coronal and sagittal reformatted images were provided.    INTRAVENOUS CONTRAST ADMINISTRATION: No information provided.    DOSE REPORT: (Total DLP mGy*cm): 1255    FINDINGS:    CT HEAD:  Old right cerebellar hemisphere infarct. There is no intracranial   hemorrhage, hydrocephalus, mass effect, or evidence of acute infarct   or edema.    Paranasal sinuses and mastoid air cells are clear. Calvarium is   intact.    CTA NECK:  There is obscuration of the proximal right brachiocephalic artery   secondary to streak artifact from injection bolus. The origins of the   vessels are patent.    There is calcific atherosclerosis at the carotid bifurcations   bilaterally. Using NASCET criteria there is 40-50% narrowing of the   left internal carotid and and 30-40% narrowing on the right.    There are extensive calcifications within the carotid siphons   bilaterally resulting in moderate stenosis on the right and moderate   to severe stenosis on the left.    There is calcification within the left vertebral artery at the level   of C6 resulting in mild stenosis. The vertebral otherwise are   otherwise unremarkable.    There is a tiny subcentimeter hypodensity within the left thyroid   lobe. There are cystic changes and groundglass opacities within the   lung apices. There is thickening of the esophageal wall.    There are multilevel degenerative changes throughout the cervical   spine, better evaluated on the outside MRI 4/18/2020.    CTA HEAD:  The anterior and posterior circulations are without appreciable   stenosis or occlusion. There is no intracranial aneurysm.    The major dural venous sinuses are  patent.  Impression: 1.  No acute intracranial findings.  2.  Atherosclerosis at the carotid bifurcations and within the   carotid siphons bilaterally as detailed.  3.  No significant stenosis of the intracranial arterial vasculature.  4.  Thickening of the upper esophageal wall. Clinical correlation for   esophagitis.    Electronically signed by Jenny Redd on 4/24/2020 8:40 AM    I, Faizan Desai MD, have reviewed the images and verify the above   interpretation on 4/24/2020 10:27 AM.    Electronically signed by  Faizan Desai MD on 4/24/2020 10:27 AM  MRI interpretation of outside films  Narrative: MRI of the brain, cervical spine, thoracic spine and lumbar spine   were performed at an outside institution on 4/18/2020 without IV   contrast. The images were uploaded into our PACS for an official   second interpretation.    HISTORY: Neck trauma, myelopathy.    COMPARISON: CTA head and neck, also at outside institution, 4/17/2020.    TECHNIQUE: Brain and total spine MRI without IV contrast.    FINDINGS and   Impression: Images of the brain show an old right inferior cerebellar hemisphere   infarct as well as moderate chronic ischemic changes. There is no   evidence of recent infarct.    Images of the spine show severe canal stenosis at C3-4 and C4-5   related to disc herniations and ligamentum flavum thickening. There   is cord compression at both these levels with associated cord edema   extending above and below these levels.    There is no significant canal stenosis in the thoracic spine.    There are very extensive degenerative changes lumbar spine. There is   severe canal stenosis at L3-4, L4-5 and L5-S1.    Electronically signed by  RAMESH Sanchez MD on 4/24/2020 7:39 AM  X-ray cervical spine ap and lateral vw  Narrative: CERVICAL SPINE AP,LAT    HISTORY: C-spine canal stenosis    VIEWS: AP and lateral views of the cervical spine    COMPARISON:  None    Cervical vertebral body height is preserved.   There is disc space   narrowing at cervical interspaces C3 through C7.  Disc space   narrowing is moderate to severe with endplate sclerosis.  There are   moderate anterior osteophytes at lower cervical levels.  There are   also findings of facet arthritis with sclerosis present.  Grade I   anterolisthesis is present at C4-C5.  Air is no fracture evident.    There is atherosclerotic calcification in the regional arterial   structures.  Impression: 1.  Multilevel moderate to severe mid and lower cervical degenerative   disc change.  Facet arthropathy with degenerative anterolisthesis   C4-C5.    END OF IMPRESSION    Electronically signed by  Del Sadler MD on 4/24/2020 1:15 PM  X-ray chest 2 views  Narrative: STUDY: CHEST-PA AND LATERAL VIEWS; 4/24/2020 3:09 PM    INDICATION: Routine CXR pre-op, high-risk surgery    COMPARISON: 4/17/2020    FINDINGS:  Low lung volumes bilaterally. Given technique, lungs appear clear.   There is no pneumothorax or pleural effusion.. Heart size is normal.  Impression: Low lung volumes. No acute process.    Electronically signed by Dottie Orlando on 4/24/2020 3:14 PM    ILiborio MD, have reviewed the images and verify the above   interpretation on 4/24/2020 3:43 PM.    Electronically signed by  Liborio Santos MD on 4/24/2020 3:43 PM  X-ray cervical spine ap and lateral vw  Narrative: CERVICAL SPINE AP,LAT    HISTORY: Pre-Surgery or Post-Surgery Health Examination    TECHNIQUE:Upright without use of a c-collar AP and lateral cervical   spine views were acquired.    COMPARISON: April 17, 2020    FINDINGS:  Anterior cervical discectomy plate and screw fixation postoperative   changes are identified from C3-C6 with disc interspacer devices.    There is no hardware loosening or fracturing.  There is no radiopaque   foreign body.  There is moderate C2-C3 and moderate to marked C6-C7   disc joint space narrowing, bony sclerosis, osteophyte bony spur   formation, and subchondral  cyst formation.  There is degenerative   narrowing of the atlantoaxial interval. There is straightening of the   normal cervical lordosis.  There is no acute fracture.  Impression: Uncomplicated anterior cervical spine fixation without radiopaque   foreign body.    Electronically signed by  Jesse Perez MD on 4/28/2020 2:55 PM  MRI cervical spine without contrast  Narrative: MRI CERVICAL SPINE WITHOUT CONTRAST    HISTORY: Additional Information :->new leg weakness, recent spine   surgery; Cervical radiculopathy. The patient was recovering from   surgery and fell, heard a pop and experienced immediate neurological   symptoms.    COMPARISON: MRI 4/18/2020    TECHNIQUE:Multiplanar multisequence MR imaging was performed of the   cervical spine without intravenous contrast.    FINDINGS:    Interval ACDF of C3-6 C4 (hardware artifact significantly limits   evaluation). There is a ventral epidural hematoma extending from   C3-C6 with the largest component immediately posterior to C3   measuring 3 mm in thickness and exhibiting mass effect and   compression on the spinal cord (slightly more prominent than on the   preop study primarily from C3-C5) with persistent cord edema. The   superior extent of the cord edema extends to the C1-C2 level, mildly   increased from 4/18/2020.    There is also a moderate amount of T2/STIR signal abnormality in the   prevertebral soft tissues and in the left deep neck, likely surgical   tract in the left neck. The prevertebral component of the edema   measures approximately 13 mm in maximal thickness.    Mild marrow signal abnormality within the left C4 and C5 articular   pillars may reflect sclerosis seen on CT. There is pronounced edema   within the left C3-C4 and C4-C5 facet joints. Small T5 vertebral body   hemangioma. Remote right cerebellar hemisphere infarct and   encephalomalacia again partially visualized.    C2-C3: Mild canal stenosis with a small component of epidural   hematoma  anteriorly at the C3 level. No significant foraminal   stenosis.    C3-C4: Severe central canal stenosis and bilateral foraminal stenosis   with large epidural hematoma component anteriorly. Compression of the   cord asymmetrically on the right and midline with abnormal cord   signal. Thickening of the ligamentum flavum.    C4-C5: Ventral epidural hematoma with compression of the cord, severe   central canal stenosis, and cord signal abnormality. Severe bilateral   neuroforaminal stenosis which may be related in part to uncovertebral   and facet hypertrophy. Thickening of the ligamentum flavum.    C5-C6: Ventral epidural hematoma with compression of the cord and   severe central canal stenosis. Moderate to severe bilateral foraminal   stenosis.    C6-C7: Small component of ventral epidural hematoma component. Mild   to moderate central canal stenosis. Moderate central cord signal   abnormality, which is new at this level compared to 4/18/2020.   Posterior disc osteophyte complex. Moderate bilateral foraminal   stenosis.    C7-T1: No central canal or neuroforaminal stenosis.  Impression: 1.  Ventral epidural hematoma with multifocal severe canal stenosis   and cord compression spanning C3-C6, which has increased at multiple   levels when compared to 4/18/2020. Abnormal cord signal has mildly   increased extending superiorly to the C1-C2 level and inferiorly at   the C6 level. Critical alert was sent regarding this finding.  2.  Edema within the left C3-C4 and C4-C5 facet joints. This could be   traumatic versus degenerative. This area was not included on sagittal   STIR images from the outside study 4/18/2020.  3.  Small susceptibility foci at the C3-C4 level on FFE images are   unable to be localized within or external to the cord given adjacent   artifact from hardware.  4.  Extensive prevertebral/retropharyngeal and left deep neck edema,   which may be within the surgical bed. This area is not adequately    assessed for defined fluid collection without intravenous contrast.    Alondra VARGAS MD, have reviewed the images and verify the   above interpretation on 5/6/2020 6:48 AM.    Electronically signed by  Alondra Camacho MD on 5/6/2020 6:48 AM  CT cervical spine without contrast  Narrative: CT CERVICAL SPINE    HISTORY: Cervical radiculopathy    COMPARISON: MRI cervical spine dated 5/6/2020 and outside CT head/CTA   head and neck dated 4/16/2020    TECHNIQUE: CT of the cervical spine was performed with axial,   coronal, and sagittal reformatted images provided.    INTRAVENOUS CONTRAST ADMINISTRATION (milliliters of Omnipaque 350):    No IV contrast administered    DOSE REPORT:  Total DLP (survey+Helical):  1057 mGy*cm    FINDINGS:  Status post ACDF of C3-C6 with intervertebral body spacers. Hardware   is intact and engaged. No evidence of hardware loosening, migration   or fracture. There is no malalignment. There is no acute fracture.    There are moderate degenerative changes at C6-C7, with disc joint   space narrowing, endplates sclerosis, spur formation and subchondral   cysts. There are mild degenerative changes at the atlantoaxial   articulation with narrowing and spurring.    There are emphysematous changes in the lung apices.    There is a rounded 4 mm hypodensity in right thyroid lobe.  Impression: 1.  Uncomplicated ACDF at C3-C6 with intervertebral body spacers.   There is no malalignment.  2.  No acute cervical spine fracture.  3.  Degenerative changes in the cervical spine with moderate findings   at C6-C7.    Electronically signed by Ashley Whitfield on 5/6/2020 8:37 AM    Jerald VARGAS MD, have reviewed the images and verify the above   interpretation on 5/6/2020 8:40 AM.    Electronically signed by  Jerald Lundberg MD on 5/6/2020 8:40 AM  X-ray thoracic spine 2 views  Narrative: CERVICAL SPINE AND THORACIC SPINE, 1:14 AM, 5/8/2020    REASON FOR EXAMINATION: Clinical question to be answered  :->please   get entire construct in one image please C2-T2; T-spine fracture,   traumatic    FINDINGS:     AP, lateral, and swimmer cervical spine images and AP and lateral   thoracic spine images made with the patient upright with neither   brace nor collar show newly completed C3-C6 laminectomies and C2-T2   posterior fusion with rods and screws. The new construct and the   existing C3-C6 anterior fusion plate and discovertebral joint cage   spacers all appear normal and fully engaged. There is no alignment   abnormality or other new abnormality.  Impression: Normal appearance soon after cervical laminectomies and   cervicothoracic posterior fusion.    Electronically signed by  Evan Gilbert MD on 2020 7:19 AM  X-ray cervical spine ap and lateral vw  Narrative: CERVICAL SPINE AND THORACIC SPINE, 1:14 AM, 2020    REASON FOR EXAMINATION: Clinical question to be answered :->please   get entire construct in one image please C2-T2; T-spine fracture,   traumatic    FINDINGS:     AP, lateral, and swimmer cervical spine images and AP and lateral   thoracic spine images made with the patient upright with neither   brace nor collar show newly completed C3-C6 laminectomies and C2-T2   posterior fusion with rods and screws. The new construct and the   existing C3-C6 anterior fusion plate and discovertebral joint cage   spacers all appear normal and fully engaged. There is no alignment   abnormality or other new abnormality.  Impression: Normal appearance soon after cervical laminectomies and   cervicothoracic posterior fusion.    Electronically signed by  Evan Gilbert MD on 2020 7:19 AM  CT angiogram chest with contrast  Narrative: EXAM: CT ANGIOGRAM CHEST W CONTRAST  ORDER DATE: 2020 6:35 PM  ATTENDING: IOANA VELARDE MD  COMPARISON STUDY: None  PATIENT INFORMATION: Age: 59 years, : 1960, Gender:Female,   I26.99; Other pulmonary embolism without acute cor pulmonale [id number]    TECHNIQUE:  A helical  dataset of the thorax was obtained with   injection of iodinated contrast media optimized for detection of   pulmonary emboli.   Images are reconstructed into axial series.    Sagittal and coronal reformations, including maximum intensity   projection (MIP) images were created. Dual-energy perfusion sequences   also provided.  -INTRAVENOUS CONTRAST ADMINISTRATION (milliliters of Omnipaque 350):   70 mL  -DOSE REPORT (Total DLP mGy*cm): CTDIvol:1.1 - 7.3 mGy; Total DLP:189   mGy-cm.    FINDINGS:  -Pulmonary Arteries:  There is no evidence of pulmonary arterial   embolism. The main pulmonary artery is normal in caliber.      -Lung parenchyma, Airways \T\ Pleural Space: Imaging during   expiration, as evidence by inward protrusion posterior wall trachea   and main bronchi. Emphysema and diffuse bronchial wall thickening.  Intralobular septal thickening (most noticeable in the bases and   bases), and suggestion of ground glass opacities. No consolidation.  No pleural effusion or pneumothorax .  -Mediastinum/Pamella: No lymphadenopathy by size criteria.  -Cardiovascular:  The heart size is within normal limits. There is no   pericardial effusion. Mild atherosclerotic calcifications of the   coronary arteries.   Calcific/noncalcific atheromatous plaque within the aorta and branch   vessels. No aneurysmal dilatation, or acute vascular abnormalities   (dissection, etc.).    -Thoracic Inlet/Chest Wall: No significant abnormality    UPPER ABDOMEN: No acute abnormality is identified within the limited   visualized portions of the upper abdomen.  BONE WINDOWS: T1-T2 posterior fusion hardware without complication.   Partially imaged ACDF. No acute abnormality identified.   Impression: 1.  No evidence of pulmonary embolism.    2.  Diffuse bronchial wall thickening, which can be seen in the   setting of bronchitis (smoking-related, asthma related, infectious,   etc.).    3.  Question early or mild pulmonary edema, given intralobular  septal   thickening and suggestion of groundglass opacities.    Electronically signed by  Jay Hackett MD on 5/22/2020 9:04 AM  X-ray wrist left 2 views  Narrative: EXAM: Left wrist radiographs    HISTORY: R52-Pain, unspecified    COMPARISON: None    TECHNIQUE: PA and lateral radiographs were obtained of the left wrist.    FINDINGS:   Old, ununited fractures are seen at the scaphoid waist and ulnar   styloid process.  Sclerotic changes are identified along the proximal   pole of the scaphoid bone.    Old, healed distal radial fracture also is evident.    No acute fractures are evident.  Impression: 1.  No acute fracture.  2.  Old, ununited fractures of the scaphoid waist and ulnar styloid   process.  3.  Old, healed distal radial fracture.    Electronically signed by Solis Sam MD on 5/26/2020 2:01 PM    IJesse MD, have reviewed the images and verify the above   interpretation on 5/26/2020 2:14 PM.    Electronically signed by  Jesse Perez MD on 5/26/2020 2:14 PM  X-ray abdomen 1 view  Narrative: EXAM: ABDOMEN KUB AP     HISTORY: R14.0; Abdominal distension (gaseous)    COMPARISON: CTA chest 5/21/2020    FINDINGS:   Two AP radiographs of the abdomen.    Mild stool burden throughout the colon. The bowel gas pattern is   nonobstructive. There is no evidence of pneumoperitoneum.    There are no acute bony abnormalities. Limited views of the lung   bases appear clear.  Impression: No acute radiographic abnormality in the abdomen.    Electronically signed by Luisito Fitzpatrick MD on 5/28/2020 1:49 PM    Shoaib VARGAS MD, have reviewed the images and verify the   above interpretation on 5/28/2020 2:34 PM.    Electronically signed by  Shoaib Roth MD on 5/28/2020 2:34 PM  X-ray thoracic spine 2 views  Narrative: CERVICAL SPINE AP,LAT,OBLIQUE, THORACIC SPINE AP,LATERAL    HISTORY: Z98.1-Arthrodesis status  Z98.1; Arthrodesis status    TECHNIQUE:AP, lateral, open-mouth odontoid view, bilateral  oblique   cervical spine views were acquired.  AP, lateral, swimmer's views of   the thoracic spine were acquired.    COMPARISON: May 8, 2020.    FINDINGS:  Anterior cervical discectomy plate and screw fixation postoperative   changes are identified from C3-C6.  Posterior intervertebral body   rods and screw construct post operative changes are identified from   C2-T2.  There is no evidence of hardware loosening, fracturing, or   migration.  The bilateral neural foramina patent.  There is mild disc   space narrowing.  There is no acute fracture or dislocation.  Impression: 1.  Uncomplicated anterior cervical and posterior cervical thoracic   spine fixation.  2.  Mild multilevel disc space narrowing.    Electronically signed by  Jesse Perez MD on 6/11/2020 1:14 PM  X-ray cervical spine 2 or 3 views  Narrative: CERVICAL SPINE AP,LAT,OBLIQUE, THORACIC SPINE AP,LATERAL    HISTORY: Z98.1-Arthrodesis status  Z98.1; Arthrodesis status    TECHNIQUE:AP, lateral, open-mouth odontoid view, bilateral oblique   cervical spine views were acquired.  AP, lateral, swimmer's views of   the thoracic spine were acquired.    COMPARISON: May 8, 2020.    FINDINGS:  Anterior cervical discectomy plate and screw fixation postoperative   changes are identified from C3-C6.  Posterior intervertebral body   rods and screw construct post operative changes are identified from   C2-T2.  There is no evidence of hardware loosening, fracturing, or   migration.  The bilateral neural foramina patent.  There is mild disc   space narrowing.  There is no acute fracture or dislocation.  Impression: 1.  Uncomplicated anterior cervical and posterior cervical thoracic   spine fixation.  2.  Mild multilevel disc space narrowing.    Electronically signed by  Jesse Perez MD on 6/11/2020 1:14 PM  X-ray wrist left 2 views  Narrative: LEFT WRIST, 1:42 PM, 6/16/2020    REASON FOR EXAMINATION: R60.9; Edema, unspecified    FINDINGS:     PA and lateral left wrist  images show circumferential development of   the reported soft tissue swelling compared with 5/26/2020.   Additionally, the lateral image appears clearly to show new dorsal   tilt of the distal radial articular surface, or new impaction of part   of its surface, as if there is a new distal radial fracture   superimposed on the old, healed one. However, there is no other   finding on either image to confirm this. Based on the overall   appearances of the lateral images today and in May, I believe the   distal radial appearance is an artifact of differences in projection   or position.    Nonunited scaphoid and ulnar styloid fractures and the radial styloid   tip ossicle due to old trauma are all unchanged.    There are no other abnormalities or features of note.  Impression: Circumferential soft tissue swelling, without other new abnormality.    Electronically signed by  Evan Gilbert MD on 6/16/2020 1:59 PM  CT head cervical spine without contrast  Narrative: CT HEAD AND CERVICAL SPINE 6/30/2020    HISTORY: Physician ordered. Fell out of bed. Right head pain.    COMPARISON: Cervical spine radiographs 6/11/2020, CT of the cervical   spine 5/6/2020, MRI of the brain 4/18/2020    TECHNIQUE: CT of the head and cervical spine was performed.  Axial,   coronal, and sagittal reformatted images of the cervical spine were   obtained.    INTRAVENOUS CONTRAST ADMINISTRATION (milliliters of Omnipaque 350):    No IV contrast administered    DOSE REPORT:  Total DLP (survey+Helical):  979 mGy*cm    FINDINGS:  Head:    Redemonstrated remote infarct in the inferior right cerebellar   hemisphere. There is no finding of infarct, hemorrhage,   hydrocephalus, contusion or abnormal extra-axial fluid collection.   The calvarium appears intact. The visualized temporal bone airspaces   and paranasal sinuses are well aerated.    Cervical spine:    Mildly motion degraded examination.    Status post C3-C6 anterior spinal fusion and  intervertebral disc   spacer placement. Additionally, compared to 5/6/2020 interval C2-T2   posterior spinal fusion with C3-C6 laminectomies. Streak artifact   limits evaluation of the adjacent regions.    The hardware appears grossly intact. The left T1 screws extends   mildly beyond the T1 vertebral body. The visualized cervical   alignment appears grossly maintained. No acute cervical spine   fracture is identified. The visualized lung apices appear   unremarkable.  Impression: 1.  No acute intracranial abnormality. Remote right cerebellar   infarct.    2.  Motion degraded cervical spine examination. Status post C3-C6   ACDF and C2-T2 posterior spinal fusion with cervical laminectomies.   The hardware appears grossly intact. No acute cervical spine fracture   is identified.     Electronically signed by  Abel Love MD on 6/30/2020 9:44 AM  CT pelvis without contrast  Narrative: PELVIS CT WITHOUT CONTRAST    HISTORY: Pelvic trauma, initial exam eGFR >60 (06/29 0542); eGFRAA   >60 (06/29 0542);    TECHNIQUE: A helical dataset was obtained through the pelvis.  Images   are reconstructed into axial series.  Sagittal and coronal   reformations are provided.    INTRAVENOUS CONTRAST ADMINISTRATION (mL Omnipaque 350): No IV   contrast administered.    DOSE REPORT, (Total DLP mGy*cm): CTDIvol:12.1 mGy; Total DLP:334   mGy-cm.    COMPARISON:  None.    FINDINGS:    With regards to specific clinical concern, there is no evident pelvic   or hip fracture.  Alignment at the sacroiliac joints, hips, and pubic   symphysis appears normal.  There is no evident joint effusion or   hematoma.    There are chronic endplate irregularities at the L4-L5 and L5-S1   level in keeping with degenerative disc change.  There is increased   irregularity of the endplates particularly at the L5-S1 level, with   advanced endplate erosive change and loss of the intervening disc.    This appears progressed from prior examination of 4/18/20 one.   There   is no significant adjacent soft tissue mass or abscess.    There is atherosclerotic calcification in the abdominal aorta and   iliac vasculature.  There is a Miller catheter in the bladder.    The bony structures appear somewhat demineralized.  There are no   findings of adenopathy.   Impression: 1.  With regards to the specific clinical concern, there is no   evident pelvic or hip fracture.  It should be noted that in elderly   are demineralized patients, nondisplaced pelvic and hip fracture may   be occult on radiographs and CT.  If there is sufficient clinical   concern to warrant further evaluation for occult fragility fracture,   consider MRI.  2.  Lower lumbar degenerative disc change.  In addition, there is   significant endplate erosion and irregularity with fading sclerosis   at the L5-S1 level.  Endplate irregularities are present to some   degree on prior MRI of 4/18/20.  Allowing for differences in imaging   modality, this appears more severe on the current examination.  In   the absence of adjacent soft tissue inflammatory change or abscess,   this is favored to represent progressive degenerative change and   possible metabolic bone disease in the setting of renal   insufficiency.  If there is clinical concern for infectious   spondylitis at the L5-S1 level, MRI is recommended.  3.  Please see above comments for further description.    END OF IMPRESSION    Electronically signed by  Del Sadler MD on 6/30/2020 9:26 AM  Right Forearm X-ray AP, Lateral  Narrative: RIGHT HUMERUS AND RIGHT FOREARM, 9:57 AM, 6/30/2020    REASON FOR EXAMINATION: Elbow trauma, initial exam after falling out   of bed.    FINDINGS:     AP and lateral right humerus images and AP and lateral right forearm   images show no fracture or other abnormality. No comparisons are   available.  Impression: Normal.    Electronically signed by  Evan Gilbert MD on 6/30/2020 10:23 AM  Right Humerus X-ray  Narrative: RIGHT HUMERUS AND  RIGHT FOREARM, 9:57 AM, 6/30/2020    REASON FOR EXAMINATION: Elbow trauma, initial exam after falling out   of bed.    FINDINGS:     AP and lateral right humerus images and AP and lateral right forearm   images show no fracture or other abnormality. No comparisons are   available.  Impression: Normal.    Electronically signed by  Evan Gilbert MD on 6/30/2020 10:23 AM  X-ray cervical spine ap and lateral vw  Narrative: HISTORY: G95.9-Cervical myelopathy (CMS/HCC) (accession [id number]), Z98.1-S/P cervical spinal fusion (accession [id number])  G95.9Cervical myelopathy (CMS/HCC)    VIEWS: AP, lateral, and odontoid views of the cervical spine. AP, lateral, and swimmer's views of the thoracic spine.    COMPARISON:  Radiographs from 6/11/2020    There is redemonstration of anterior cervical fusion hardware from C3 through C6 and posterior fusion hardware from C2 through T2. The hardware is securely engaged and intact. The overall alignment of the cervical spine is unchanged. There are again   extensive multilevel degenerative changes. The prevertebral soft tissues are unremarkable.  Impression: 1.  No hardware complication or detrimental change in alignment of the cervical and thoracic spine.    END OF IMPRESSION    Electronically signed by  Ramses Peoples on 11/2/2020 2:36 PM  X-ray thoracic spine 2 views  Narrative: HISTORY: G95.9-Cervical myelopathy (CMS/HCC) (accession [id number]), Z98.1-S/P cervical spinal fusion (accession [id number])  G95.9Cervical myelopathy (CMS/HCC)    VIEWS: AP, lateral, and odontoid views of the cervical spine. AP, lateral, and swimmer's views of the thoracic spine.    COMPARISON:  Radiographs from 6/11/2020    There is redemonstration of anterior cervical fusion hardware from C3 through C6 and posterior fusion hardware from C2 through T2. The hardware is securely engaged and intact. The overall alignment of the cervical spine is unchanged. There are again   extensive multilevel degenerative changes.  The prevertebral soft tissues are unremarkable.  Impression: 1.  No hardware complication or detrimental change in alignment of the cervical and thoracic spine.    END OF IMPRESSION    Electronically signed by  Ramses Peoples on 11/2/2020 2:36 PM      [unfilled]  Immunization History   Administered Date(s) Administered   • COVID-19 (MODERNA) 1st, 2nd, 3rd Dose Only 12/02/2021   • Flu Vaccine Quad PF >36MO 10/08/2014, 11/23/2015, 10/26/2016, 09/13/2018, 09/18/2019   • PPD Test 07/01/2020   • Pneumococcal Conjugate 13-Valent (PCV13) 10/26/2016   • Td 04/23/2007   • Tetanus 06/17/2014, 06/17/2014   • Zostavax 03/14/2017     The following portions of the patient's history were reviewed and updated as appropriate: allergies, current medications, past family history, past medical history, past social history, past surgical history and problem list.    PHQ-9 Total Score:           Physical Exam  Vitals and nursing note reviewed.   Constitutional:       General: She is not in acute distress.     Appearance: Normal appearance. She is ill-appearing (chronically ill).   HENT:      Head: Normocephalic and atraumatic.      Right Ear: External ear normal.      Left Ear: External ear normal.   Eyes:      General:         Right eye: No discharge.         Left eye: No discharge.      Extraocular Movements: Extraocular movements intact.      Conjunctiva/sclera: Conjunctivae normal.      Pupils: Pupils are equal, round, and reactive to light.   Cardiovascular:      Rate and Rhythm: Normal rate and regular rhythm.      Pulses: Normal pulses.      Heart sounds: Normal heart sounds. No murmur heard.  Pulmonary:      Effort: Pulmonary effort is normal. No respiratory distress.      Breath sounds: Normal breath sounds. No wheezing or rhonchi.   Abdominal:      General: Bowel sounds are normal. There is no distension.      Palpations: Abdomen is soft.      Tenderness: There is no abdominal tenderness. There is no guarding or rebound.    Musculoskeletal:      Cervical back: Normal range of motion and neck supple. Tenderness ( posterior) present.      Right lower leg: No edema.      Left lower leg: No edema.   Lymphadenopathy:      Cervical: No cervical adenopathy.   Skin:     General: Skin is warm and dry.   Neurological:      Mental Status: She is oriented to person, place, and time. Mental status is at baseline. She is lethargic.      Motor: Weakness (generalized weakness, but equal bilaterally) present.   Psychiatric:         Mood and Affect: Mood normal.         Behavior: Behavior normal.         Thought Content: Thought content normal.         Assessment & Plan    Diagnosis Plan   1. Tachycardia  ECG 12 Lead   2. Neck pain  gabapentin (NEURONTIN) 100 MG capsule    baclofen (LIORESAL) 10 MG tablet   3. Encounter for screening mammogram for malignant neoplasm of breast  Mammo Screening Digital Tomosynthesis Bilateral With CAD      Orders Placed This Encounter   Procedures   • Mammo Screening Digital Tomosynthesis Bilateral With CAD     Standing Status:   Future     Standing Expiration Date:   6/6/2023     Scheduling Instructions:      [place]     Order Specific Question:   Reason for Exam:     Answer:   breast cancer screening     Order Specific Question:   Release to patient     Answer:   Immediate   • ECG 12 Lead     Order Specific Question:   Reason for Exam:     Answer:   tachycardia     Order Specific Question:   Release to patient     Answer:   Immediate     Tachycardia; EKG reassuring when compared to previous, denies any cardiac symptoms, reassuring.  Vital stable.  Patient declined ER evaluation, is decisional on exam, no signs of acute altered state.  Concern for illicit drug use due to history, however benefits of gabapentin and continued care outweighs risk of dismissing patient at this time.  We will continue to encourage cessation from illicit drugs, improvement of overall health.  Follow-up in 1 month or sooner if  needed.          This document has been electronically signed by Richar Gonzalez MD on June 6, 2022 21:25 CDT    EMR Dragon/Transciption Disclaimer: Some of this note may be an electronic transcription/translation of spoken language to printed text.  The electronic translation of spoken language may permit erroneous, or at times, nonsensical words or phrases to be inadvertently transcribed. Although I have reviewed the note for such errors, some may still exist.

## 2022-06-09 LAB
QT INTERVAL: 346 MS
QTC INTERVAL: 478 MS

## 2022-07-24 DIAGNOSIS — M54.2 NECK PAIN: Chronic | ICD-10-CM

## 2022-07-25 RX ORDER — BACLOFEN 10 MG/1
TABLET ORAL
Qty: 90 TABLET | Refills: 1 | Status: SHIPPED | OUTPATIENT
Start: 2022-07-25 | End: 2022-11-22 | Stop reason: SDUPTHER

## 2022-07-25 NOTE — TELEPHONE ENCOUNTER
Rx Refill Note  Requested Prescriptions     Pending Prescriptions Disp Refills   • baclofen (LIORESAL) 10 MG tablet [Pharmacy Med Name: baclofen 10 mg tablet] 90 tablet 1     Sig: TAKE ONE TABLET BY MOUTH THREE TIMES DAILY      Last office visit with prescribing clinician: 6/6/2022      Next office visit with prescribing clinician: 9/6/2022            Priyanka Fagan MA  07/25/22, 10:34 CDT

## 2022-09-16 ENCOUNTER — TELEPHONE (OUTPATIENT)
Dept: FAMILY MEDICINE CLINIC | Facility: CLINIC | Age: 62
End: 2022-09-16

## 2022-09-22 DIAGNOSIS — I10 ESSENTIAL HYPERTENSION: Chronic | ICD-10-CM

## 2022-09-22 DIAGNOSIS — E78.5 HYPERLIPIDEMIA, UNSPECIFIED HYPERLIPIDEMIA TYPE: Chronic | ICD-10-CM

## 2022-09-22 RX ORDER — ATORVASTATIN CALCIUM 40 MG/1
TABLET, FILM COATED ORAL
Qty: 90 TABLET | Refills: 1 | Status: SHIPPED | OUTPATIENT
Start: 2022-09-22

## 2022-09-22 RX ORDER — NIFEDIPINE 60 MG/1
TABLET, FILM COATED, EXTENDED RELEASE ORAL
Qty: 90 TABLET | Refills: 1 | Status: SHIPPED | OUTPATIENT
Start: 2022-09-22 | End: 2022-11-22 | Stop reason: SDUPTHER

## 2022-09-22 NOTE — TELEPHONE ENCOUNTER
Rx Refill Note  Requested Prescriptions     Pending Prescriptions Disp Refills   • atorvastatin (LIPITOR) 40 MG tablet [Pharmacy Med Name: atorvastatin 40 mg tablet] 90 tablet 1     Sig: TAKE ONE TABLET BY MOUTH AT BEDTIME   • NIFEdipine CC (ADALAT CC) 60 MG 24 hr tablet [Pharmacy Med Name: nifedipine ER 60 mg tablet,extended release] 90 tablet 1     Sig: TAKE ONE TABLET BY MOUTH EVERY DAY      Last office visit with prescribing clinician: 6/6/2022      Next office visit with prescribing clinician: 10/17/2022            Priyanka Fagan MA  09/22/22, 08:12 CDT

## 2022-10-17 ENCOUNTER — OFFICE VISIT (OUTPATIENT)
Dept: FAMILY MEDICINE CLINIC | Facility: CLINIC | Age: 62
End: 2022-10-17

## 2022-10-17 VITALS
TEMPERATURE: 96 F | HEIGHT: 63 IN | DIASTOLIC BLOOD PRESSURE: 58 MMHG | SYSTOLIC BLOOD PRESSURE: 90 MMHG | WEIGHT: 102 LBS | HEART RATE: 107 BPM | BODY MASS INDEX: 18.07 KG/M2 | OXYGEN SATURATION: 96 %

## 2022-10-17 DIAGNOSIS — R63.4 UNINTENTIONAL WEIGHT LOSS: Primary | ICD-10-CM

## 2022-10-17 DIAGNOSIS — R26.9 GAIT DISTURBANCE: ICD-10-CM

## 2022-10-17 DIAGNOSIS — Z12.4 CERVICAL CANCER SCREENING: ICD-10-CM

## 2022-10-17 DIAGNOSIS — Z23 FLU VACCINE NEED: ICD-10-CM

## 2022-10-17 DIAGNOSIS — G40.309 GENERALIZED SEIZURE DISORDER: ICD-10-CM

## 2022-10-17 DIAGNOSIS — M54.2 CHRONIC NECK PAIN: ICD-10-CM

## 2022-10-17 DIAGNOSIS — Z12.11 COLON CANCER SCREENING: ICD-10-CM

## 2022-10-17 DIAGNOSIS — M54.2 NECK PAIN: Chronic | ICD-10-CM

## 2022-10-17 DIAGNOSIS — E87.5 HYPERKALEMIA: ICD-10-CM

## 2022-10-17 DIAGNOSIS — G89.29 CHRONIC NECK PAIN: ICD-10-CM

## 2022-10-17 PROCEDURE — 90471 IMMUNIZATION ADMIN: CPT | Performed by: STUDENT IN AN ORGANIZED HEALTH CARE EDUCATION/TRAINING PROGRAM

## 2022-10-17 PROCEDURE — 90686 IIV4 VACC NO PRSV 0.5 ML IM: CPT | Performed by: STUDENT IN AN ORGANIZED HEALTH CARE EDUCATION/TRAINING PROGRAM

## 2022-10-17 PROCEDURE — 99214 OFFICE O/P EST MOD 30 MIN: CPT | Performed by: STUDENT IN AN ORGANIZED HEALTH CARE EDUCATION/TRAINING PROGRAM

## 2022-10-19 DIAGNOSIS — M54.2 NECK PAIN: Chronic | ICD-10-CM

## 2022-10-19 DIAGNOSIS — G47.00 INSOMNIA, UNSPECIFIED TYPE: Chronic | ICD-10-CM

## 2022-10-19 NOTE — TELEPHONE ENCOUNTER
Patient called stating that at her appointment on the 17th requested refills but they have not been sent in.  Patient would like a return call

## 2022-10-20 RX ORDER — TRAZODONE HYDROCHLORIDE 50 MG/1
50 TABLET ORAL
Qty: 90 TABLET | Refills: 0 | Status: CANCELLED | OUTPATIENT
Start: 2022-10-20

## 2022-10-20 RX ORDER — GABAPENTIN 100 MG/1
200 CAPSULE ORAL 3 TIMES DAILY
Qty: 180 CAPSULE | Refills: 0 | Status: CANCELLED | OUTPATIENT
Start: 2022-10-20

## 2022-10-21 RX ORDER — LIDOCAINE HYDROCHLORIDE 10 MG/ML
5 INJECTION, SOLUTION INFILTRATION; PERINEURAL ONCE
Status: DISCONTINUED | OUTPATIENT
Start: 2022-10-21 | End: 2023-01-23

## 2022-10-21 RX ORDER — GABAPENTIN 100 MG/1
200 CAPSULE ORAL 3 TIMES DAILY
Qty: 180 CAPSULE | Refills: 0 | Status: SHIPPED | OUTPATIENT
Start: 2022-10-21 | End: 2023-01-23 | Stop reason: SDUPTHER

## 2022-10-25 ENCOUNTER — APPOINTMENT (OUTPATIENT)
Dept: PHYSICAL THERAPY | Facility: HOSPITAL | Age: 62
End: 2022-10-25

## 2022-10-27 ENCOUNTER — HOSPITAL ENCOUNTER (OUTPATIENT)
Dept: PHYSICAL THERAPY | Facility: HOSPITAL | Age: 62
Setting detail: THERAPIES SERIES
Discharge: HOME | End: 2022-10-27

## 2022-10-27 DIAGNOSIS — G89.29 CHRONIC NECK PAIN: Primary | ICD-10-CM

## 2022-10-27 DIAGNOSIS — M54.2 CHRONIC NECK PAIN: Primary | ICD-10-CM

## 2022-10-27 DIAGNOSIS — R26.9 GAIT DISTURBANCE: ICD-10-CM

## 2022-10-27 PROCEDURE — 97162 PT EVAL MOD COMPLEX 30 MIN: CPT

## 2022-10-27 NOTE — THERAPY EVALUATION
Outpatient Physical Therapy Ortho Initial Evaluation  [facility]     Patient Name: Jyoti Guerra Person  : 1960  MRN: [id number]  Today's Date: 10/27/2022      Visit Date: 10/27/2022  Patient seen for 1 PT sessions.  Patient reports N/A% of improvement.  Next MD appt: 2022   Recertification: 2022      Therapy Diagnosis: Neck pain with generalized deconditioning and unsteadiness on feet     Barriers to Rehab: Include significant or possible arthritic/degenerative changes that have occurred within the spine, complexity of medical condition, The chronicity of this issue, history of cervical fusion.      Safety Issues: Moderate fall risk.       Patient Active Problem List   Diagnosis   • Vitamin D deficiency   • Vertigo   • Neuropathy   • Restless leg syndrome   • Pain in joint   • Allergic rhinitis   • Essential hypertension   • Abnormal EEG   • Cerebrovascular accident (CVA) due to thrombosis of left carotid artery (HCC)   • Illicit drug use   • Tobacco abuse   • Sleep apnea   • Neck pain   • Vitamin deficiency   • Acute otitis externa of right ear   • Rotator cuff strain   • Acute pain of right shoulder   • Closed fracture of distal end of right fibula   • Right ankle pain   • Allergic reaction   • Chronic pain of left ankle   • Closed fracture of talus   • Hypotension   • Left leg pain   • Acute pain of left shoulder   • Closed nondisplaced fracture of lateral malleolus of left fibula   • Closed nondisplaced fracture of lateral malleolus of left fibula with routine healing   • Renal insufficiency   • ESR raised   • Pain   • Left foot pain   • Closed nondisplaced fracture of fifth left metatarsal bone   • Cause of injury, fall   • Ankle weakness   • Joint pain in fingers of left hand   • Joint pain in fingers of right hand   • Bilateral thumb pain   • Bilateral hand pain   • Numbness and tingling in both hands   • Bilateral carpal tunnel syndrome   • Bilateral arm pain   • Bilateral leg  pain   • Decreased strength   • Acute URI   • Cough   • Left hip pain   • BMI 27.0-27.9,adult   • Disorder of SI (sacroiliac) joint   • Mobility impaired   • Frequent falls   • Injury of sacrum   • Chronic pain of both lower extremities   • Edema of spinal cord ([facility])   • Radicular pain of left lower extremity   • Chronic midline low back pain with left-sided sciatica   • Spinal stenosis of lumbar region with neurogenic claudication   • Degenerative disc disease, lumbar   • Bilateral leg weakness   • History of spinal cord injury   • Incontinence   • Hyperlipidemia   • Hypothyroidism   • Cocaine abuse ([facility])   • CVA (cerebral vascular accident) ([facility])   • Drowsiness   • Hyperkalemia   • Normocytic anemia due to blood loss   • Preop testing   • Steroid-induced hyperglycemia   • Wheezing        Past Medical History:   Diagnosis Date   • Acute bronchitis 07/19/2016   • Acute otitis externa 09/04/2014   • Acute otitis media 02/10/2015   • Acute sinusitis 07/19/2016   • Ankle injury    • Anxiety    • Arthritis    • Backache 12/08/2014   • Benign hypertension 05/13/2016   • Cardiac disease    • Cerebrovascular accident ([facility]) 2015   • COPD (chronic obstructive pulmonary disease) ([facility])    • Depression    • Depression    • Dizziness 02/10/2015   • Encounter for gynecological examination without abnormal finding 03/10/2016   • Fatigue 08/28/2015   • Foot pain 06/09/2014   • Hyperkeratosis 04/28/2014   • Hypotensive episode 06/12/2015   • Keratoma 08/04/2014    intractable plantar   • Low blood pressure 08/28/2015   • Menopausal and perimenopausal disorder 03/10/2016    other specified   • Numbness of face 05/13/2016    improved   • Otitis media of right ear 07/30/2016    unspecified   • Perforated tympanic membrane 09/04/2014   • Puncture wound 06/17/2014    injury   • Right-sided face pain 05/13/2016   • Seizure ([facility])    • Sleep apnea    • Thumb pain 08/28/2015   • Upper respiratory infection 08/28/2015   • Wheezing 10/21/2014  "       Past Surgical History:   Procedure Laterality Date   • BREAST BIOPSY     • NECK SURGERY      x2 \"put rods in her neck\"   • OTHER SURGICAL HISTORY  07/07/2014    Destruction of Benign Lesion (1-14)   • TUBAL ABDOMINAL LIGATION         Visit Dx:     ICD-10-CM ICD-9-CM   1. Chronic neck pain  M54.2 723.1    G89.29 338.29   2. Gait disturbance  R26.9 781.2          Patient History     Row Name 10/27/22 1500             History    Chief Complaint Pain;Balance Problems  -      Type of Pain Neck pain  -      Date Current Problem(s) Began --  April 2020  -      Brief Description of Current Complaint pt reports all of her pain started after she fractured her neck in April of 2020. Pt reports neck surgery. Pt reports balance issues. Pt reports pain and no circulation in B UEs and hands. Pt reports radicular symptoms. N&T into B hands. Pt reports she will squueze B wrists but it does not seem to help.  -      Patient/Caregiver Goals Relieve pain;Know what to do to help the symptoms  -      Current Tobacco Use ~1/4ppd  -      Smoking Status Daily smoker  -      Patient's Rating of General Health Fair  -      Hand Dominance right-handed  -      Occupation/sports/leisure activities Occupation: unemployed; Hobbies: outdoor activities.  -      Patient seeing anyone else for problem(s)? PCP  -AC      How has patient tried to help current problem? Seeing Ortho, taking medication  -      What clinical tests have you had for this problem? X-ray  -      Results of Clinical Tests see EMR  -AC      Related/Recent Hospitalizations No  -AC      Are you or can you be pregnant No  -AC         Pain     Pain Location Neck  -AC      Pain at Present 5  -AC      Pain Frequency Constant/continuous  -      Pain Description Spasm;Sharp;Shooting;Numbness;Tingling  -      What Performance Factors Make the Current Problem(s) WORSE? movement of cervical spine  -      What Performance Factors Make the Current " "Problem(s) BETTER? Not moving head, medication  -AC      Tolerance Time- Standing 5 mins  -AC      Tolerance Time- Sitting no issues  -AC      Tolerance Time- Walking 5 mins  -AC      Tolerance Time- Lying no issues  -AC      Is your sleep disturbed? Yes  -AC      Is medication used to assist with sleep? Yes  -AC      What position do you sleep in? Supine;Left sidelying  -AC         Fall Risk Assessment    Any falls in the past year: Yes  -AC      Number of falls reported in the last 12 months --  \"I cant count how many\"  -AC      Factors that contributed to the fall: Lost balance;Tripped;Other (comment)  \"My legs give out on me\"  -AC         Services    Prior Rehab/Home Health Experiences Yes  -AC      Are you currently receiving Home Health services No  -AC         Daily Activities    Primary Language English  -AC         Safety    Are you being hurt, hit, or frightened by anyone at home or in your life? No  -AC      Are you being neglected by a caregiver No  -AC      Have you had any of the following issues with Depression;Anxiety  -AC            User Key  (r) = Recorded By, (t) = Taken By, (c) = Cosigned By    Initials Name Provider Type    AC Manju Xie PT Physical Therapist                 PT Ortho     Row Name 10/27/22 1500       Subjective Comments    Subjective Comments see patient history  -AC       Precautions and Contraindications    Precautions anterior cervical fusion hardware from C3 through C6 and posterior fusion hardware from C2 through T2.   -AC       Subjective Pain    Able to rate subjective pain? yes  -AC    Pre-Treatment Pain Level 5  -AC    Post-Treatment Pain Level --  did not assess  -AC       Posture/Observations    Forward Head Bilateral:;Moderate  -AC    Thoracic Kyphosis Bilateral:;Moderate  -AC    Rounded Shoulders Bilateral:;Moderate  -AC    Posture/Observations Comments No acute distress; significant guarding of cervical musculature present; significant sacral sitting  -AC    "    Sensory Screen for Light Touch- Upper Quarter Clearing    C4 (posterior shoulder) Bilateral:;Intact  -AC    C5 (lateral upper arm) Bilateral:;Intact  -AC    C6 (tip of thumb) Bilateral:;Intact  -AC    C7 (tip of 3rd finger) Bilateral:;Intact  -AC    C8 (tip of 5th finger) Bilateral:;Intact  -AC    T1 (medial lower arm) Bilateral:;Intact  -AC       Myotomal Screen- Upper Quarter Clearing    Shoulder flexion (C5) Bilateral:;3+ (Fair +)  -AC    Elbow flexion/wrist extension (C6) Bilateral:;3+ (Fair +)  -AC    Elbow extension/wrist flexion (C7) Bilateral:;4- (Good -)  -AC    Finger flexion/ (C8) Bilateral:;WNL  -AC    Finger abduction (T1) Bilateral:;WNL  -AC     Bilateral:;WNL  -AC       Cervical/Shoulder ROM Screen    Cervical flexion Impaired  -AC    Cervical extension Impaired  -AC    Cervical lateral flexion Impaired  -AC    Cervical rotation Impaired  -AC    Cervical quadrant (Spurling's) Unable to perform  -AC    Shoulder elevation  Impaired  -AC       Cervical Accessory Motions    Cervical Accessory Motions Tested? Yes  -AC    Sideglide- C1 Hypomobile  -AC    Sideglide- C2 Hypomobile  -AC    Sideglide- C3 Hypomobile  -AC    Sideglide- C4 Hypomobile  -AC    Sideglide- C5 Hypomobile  -AC    Sideglide- C6 Hypomobile  -AC    Sideglide- C7 Hypomobile  -AC    PA glide- C1 Unable to assess  -AC    PA glide- C2 Unable to assess  -AC    PA glide- C3 Unable to assess  -AC    PA glide- C4 Unable to assess  -AC    PA glide- C5 Unable to assess  -AC    PA glide- C6 Unable to assess  -AC    PA glide- C7 Unable to assess  -AC       Cervical/Thoracic Special Tests    Spurlings (Foraminal Compression) Unable to test  -AC    Cervical Compression (Forarminal Compression vs. Facet Pain) Unable to test  -AC    Cervical Distraction (Foraminal Compression vs. Facet Pain) Unable to test  -AC    Sharp-Urszula (AA instability) Unable to test  -AC    Transverse Ligament (Instability) Unable to test  -AC       Upper Level Neural  Tension Tests    Median Neural Tension Test Unable to test  -AC    Radial Neural Tension Test Unable to test  -AC    Ulnar Neural Tension Test Unable to test  -AC       General ROM    GENERAL ROM COMMENTS B UE and B LE AROM WFL. Cogwheeling present throughout cervical AROM.  -AC       Head/Neck/Trunk    Neck Extension AROM 13°  -AC    Neck Flexion AROM 30°  -AC    Neck Lt Lateral Flexion AROM 10°  -AC    Neck Rt Lateral Flexion AROM 5°  -AC    Neck Lt Rotation AROM 40°  -AC    Neck Rt Rotation AROM 45°  -AC       MMT (Manual Muscle Testing)    General MMT Comments All B UE MMT 3+/5. All B LE 4/5.  -AC       Sensation    Sensation WNL? WNL  -AC       Upper Extremity Flexibility    Upper Trapezius Bilateral:;Moderately limited  -AC    Levator Scapula Bilateral:;Moderately limited  -AC    Pect Minor Bilateral:;Moderately limited  -AC       Lower Extremity Flexibility    Hamstrings Bilateral:;Moderately limited  -AC    Hip Flexors Bilateral:;Moderately limited  -AC    Hip Internal Rotators Bilateral:;Moderately limited  -AC       Balance Skills Training    Balance Comments Good seated balance. Unable to stand without use of RW. Unable to safely assess SLS due to safety.  -AC       Transfers    Comment, (Transfers) I with all transfers.  -AC       Gait/Stairs (Locomotion)    Comment, (Gait/Stairs) Pt ambulates into clinic with use of RW. Pt demo significant forward flexion, decreased heel strike and hip extension bilaterally. Decreased josiah and step length. Slight intoeing present.  -AC          User Key  (r) = Recorded By, (t) = Taken By, (c) = Cosigned By    Initials Name Provider Type    Manju Haq PT Physical Therapist                            Therapy Education  Given: HEP, Symptoms/condition management, Fall prevention and home safety, Posture/body mechanics, Pain management  Program: New  How Provided: Verbal, Demonstration, Written  Provided to: Patient  Level of Understanding: Teach back education  performed, Verbalized, Demonstrated      PT OP Goals     Row Name 10/27/22 1700          PT Short Term Goals    STG Date to Achieve 11/17/22  -     STG 1 Pt to be I with HEP with additions prior to next recertification.  -     STG 2 Pt to perform 10 sit to/from STS with 1 UE support with no assist from PT.  -     STG 3 Pt to demo improved postural awareness with all exercises without cues from PT.  -     STG 4 Pt to tolerate >/= 15 mins of standing activities without increase in pain.  -     STG 5 Pt to demo 10 chin tucks with good form and no cueing from PT.  -        Long Term Goals    LTG 1 independent with self care management/final HEP to prevent the reoccurence of symptoms in the future.  -     LTG 2 Pt to subjectively report >/= 75% improvement since initial eval.  -     LTG 3 Pt to improve B LE and UE strength to 5/5.  -     LTG 4 Pt to ambulate >/= 350 feet without rest breaks with good form and no unsteadiness or LOB present.  -        Time Calculation    PT Goal Re-Cert Due Date 11/17/22  -           User Key  (r) = Recorded By, (t) = Taken By, (c) = Cosigned By    Initials Name Provider Type     Manju Xie PT Physical Therapist                 PT Assessment/Plan     Row Name 10/27/22 2080          PT Assessment    Functional Limitations Decreased safety during functional activities;Impaired gait;Impaired locomotion;Limitation in home management;Limitations in community activities;Limitations in functional capacity and performance;Performance in leisure activities;Performance in self-care ADL  -     Impairments Gait;Balance;Pain;Range of motion;Poor body mechanics;Endurance;Posture;Joint mobility;Impaired flexibility;Impaired muscle length;Impaired muscle endurance;Impaired muscle power  -     Assessment Comments Patient is a 62 y.o. female who presents to PT with complaints of neck pain, unsteadiness, and gait deficits. Pt has a complex medical history which complicates  rehab potential. Pt also has a signficant surgical history of an anterior cervical fusion from C3-C6 and a posterior fusion from C2-T2. Pt reports years of immobility. Pt demo excessive guarding of cervical spine this date. Pt demo deficits in cervical AROM, B UE and B LE strength, gait, flexibility, and balance. Pt scored 40 seconds on 5x STS indicating moderate fall risk. Pt also scored 36 on NDI indicating moderate impairements. Pt would benefit from skilled PT to address dysfunction and help improve function and QOL. Pt insurance does not allow treatment to begin on intial eval. A small HEP was established. Pt verbalized understanding of HEP and POC.  -AC     Please refer to paper survey for additional self-reported information No  -AC     Rehab Potential Poor  -AC     Patient/caregiver participated in establishment of treatment plan and goals Yes  -AC     Patient would benefit from skilled therapy intervention Yes  -AC        PT Plan    PT Frequency 2x/week  -AC     Predicted Duration of Therapy Intervention (PT) 6-8 visits  -AC     Planned CPT's? PT EVAL MOD COMPLELITY: 67992;PT RE-EVAL: 84061;PT THER PROC EA 15 MIN: 84739;PT THER ACT EA 15 MIN: 80218;PT MANUAL THERAPY EA 15 MIN: 71870;PT GAIT TRAINING EA 15 MIN: 10927;PT SELF CARE/HOME MGMT/TRAIN EA 15: 41415;PT HOT OR COLD PACK TREAT MCARE;PT NEUROMUSC RE-EDUCATION EA 15 MIN: 06544  -AC     PT Plan Comments Progress overal B UE strength, B LE strength, cervical AROM, postural correction, gait, balance, and flexibility. Formerly assess  strength next visit.  -AC           User Key  (r) = Recorded By, (t) = Taken By, (c) = Cosigned By    Initials Name Provider Type    Manju Haq, PT Physical Therapist                   OP Exercises     Row Name 10/27/22 1500             Subjective Comments    Subjective Comments see patient history  -AC         Subjective Pain    Able to rate subjective pain? yes  -AC      Pre-Treatment Pain Level 5  -AC       "Post-Treatment Pain Level --  did not assess  -AC         Exercise 1    Exercise Name 1 chin tucks  -AC      Sets 1 1  -AC      Reps 1 10  -AC      Time 1 3\" hold  -AC         Exercise 2    Exercise Name 2 UT S  -AC      Sets 2 1  -AC      Time 2 30 sec hold  -AC         Exercise 3    Exercise Name 3 LS S  -AC      Sets 3 1  -AC      Time 3 30 sec hold  -AC         Exercise 4    Exercise Name 4 LTR  -AC      Sets 4 1  -AC      Reps 4 10  -AC      Time 4 10\" hold  -AC         Exercise 5    Exercise Name 5 Pt ed on POC, HEP, and realistic expectations for PT.  -AC      Time 5 8 mins  -AC            User Key  (r) = Recorded By, (t) = Taken By, (c) = Cosigned By    Initials Name Provider Type    AC Manju Xie PT Physical Therapist                              Outcome Measure Options: Neck Disability Index (NDI), 5x Sit to Stand  5 Times Sit to Stand  5 Times Sit to Stand (seconds): 40 seconds  Neck Disability Index  Section 1 - Pain Intensity: The pain is moderate and does not vary much.  Section 2 - Personal Care: I need help every day in most aspects of self-care.  Section 3 - Lifting: I cannot lift or carry anything at all.  Section 4 - Work: I cannot do my usual work.  Section 5 - Headaches: I have moderate headaches that come frequently  Section 6 - Concentration: I have a lot of difficulty concentrating when I want to.  Section 7 - Sleeping: My sleep is greatly disturbed (3-5 hours sleepless).  Section 8 - Driving: I can't drive my car at all.  Section 9 - Reading: I can read as much as I want with slight neck pain.  Section 10 - Recreation: I cannot do any recreational activities at all.  Neck Disability Index Score: 36      Time Calculation:     Start Time: 1516  Stop Time: 1604  Time Calculation (min): 48 min     Therapy Charges for Today     Code Description Service Date Service Provider Modifiers Qty    20716362482 HC PT THER SUPP EA 15 MIN 10/27/2022 Manju Xie PT GP 3    03840547898 HC PT EVAL " MOD COMPLEXITY 3 10/27/2022 Manuj Xie, PT GP 1          PT G-Codes  Outcome Measure Options: Neck Disability Index (NDI), 5x Sit to Stand  Neck Disability Index Score: 36         Manju Xie PT , DPT 10/27/2022

## 2022-10-28 DIAGNOSIS — M54.2 CERVICAL SPINE PAIN: Primary | ICD-10-CM

## 2022-11-01 ENCOUNTER — TELEPHONE (OUTPATIENT)
Dept: PHYSICAL THERAPY | Facility: HOSPITAL | Age: 62
End: 2022-11-01

## 2022-11-01 DIAGNOSIS — R26.9 GAIT DISTURBANCE: ICD-10-CM

## 2022-11-01 DIAGNOSIS — M54.2 CHRONIC NECK PAIN: Primary | ICD-10-CM

## 2022-11-01 DIAGNOSIS — G89.29 CHRONIC NECK PAIN: Primary | ICD-10-CM

## 2022-11-03 ENCOUNTER — APPOINTMENT (OUTPATIENT)
Dept: PHYSICAL THERAPY | Facility: HOSPITAL | Age: 62
End: 2022-11-03

## 2022-11-08 ENCOUNTER — HOSPITAL ENCOUNTER (OUTPATIENT)
Dept: PHYSICAL THERAPY | Facility: HOSPITAL | Age: 62
Setting detail: THERAPIES SERIES
Discharge: HOME | End: 2022-11-08

## 2022-11-08 DIAGNOSIS — G89.29 CHRONIC NECK PAIN: Primary | ICD-10-CM

## 2022-11-08 DIAGNOSIS — M54.2 CHRONIC NECK PAIN: Primary | ICD-10-CM

## 2022-11-08 PROCEDURE — 97110 THERAPEUTIC EXERCISES: CPT

## 2022-11-08 NOTE — THERAPY TREATMENT NOTE
Outpatient Physical Therapy Ortho Treatment Note  [facility]     Patient Name: Jyoti Guerra Person  : 1960  MRN: [id number]  Today's Date: 2022     Pt seen for 2 PT sessions  Reported Improvement:  N/A %  MD Visit: 2022  Recheck Date: 2022    Therapy Diagnosis:  Neck pain with generalized deconditioning and unsteadiness on feet         Visit Date: 2022    Visit Dx:    ICD-10-CM ICD-9-CM   1. Chronic neck pain  M54.2 723.1    G89.29 338.29       Patient Active Problem List   Diagnosis   • Vitamin D deficiency   • Vertigo   • Neuropathy   • Restless leg syndrome   • Pain in joint   • Allergic rhinitis   • Essential hypertension   • Abnormal EEG   • Cerebrovascular accident (CVA) due to thrombosis of left carotid artery (HCC)   • Illicit drug use   • Tobacco abuse   • Sleep apnea   • Neck pain   • Vitamin deficiency   • Acute otitis externa of right ear   • Rotator cuff strain   • Acute pain of right shoulder   • Closed fracture of distal end of right fibula   • Right ankle pain   • Allergic reaction   • Chronic pain of left ankle   • Closed fracture of talus   • Hypotension   • Left leg pain   • Acute pain of left shoulder   • Closed nondisplaced fracture of lateral malleolus of left fibula   • Closed nondisplaced fracture of lateral malleolus of left fibula with routine healing   • Renal insufficiency   • ESR raised   • Pain   • Left foot pain   • Closed nondisplaced fracture of fifth left metatarsal bone   • Cause of injury, fall   • Ankle weakness   • Joint pain in fingers of left hand   • Joint pain in fingers of right hand   • Bilateral thumb pain   • Bilateral hand pain   • Numbness and tingling in both hands   • Bilateral carpal tunnel syndrome   • Bilateral arm pain   • Bilateral leg pain   • Decreased strength   • Acute URI   • Cough   • Left hip pain   • BMI 27.0-27.9,adult   • Disorder of SI (sacroiliac) joint   • Mobility impaired   • Frequent falls   • Injury of  "sacrum   • Chronic pain of both lower extremities   • Edema of spinal cord ([facility])   • Radicular pain of left lower extremity   • Chronic midline low back pain with left-sided sciatica   • Spinal stenosis of lumbar region with neurogenic claudication   • Degenerative disc disease, lumbar   • Bilateral leg weakness   • History of spinal cord injury   • Incontinence   • Hyperlipidemia   • Hypothyroidism   • Cocaine abuse ([facility])   • CVA (cerebral vascular accident) ([facility])   • Drowsiness   • Hyperkalemia   • Normocytic anemia due to blood loss   • Preop testing   • Steroid-induced hyperglycemia   • Wheezing        Past Medical History:   Diagnosis Date   • Acute bronchitis 07/19/2016   • Acute otitis externa 09/04/2014   • Acute otitis media 02/10/2015   • Acute sinusitis 07/19/2016   • Ankle injury    • Anxiety    • Arthritis    • Backache 12/08/2014   • Benign hypertension 05/13/2016   • Cardiac disease    • Cerebrovascular accident ([facility]) 2015   • COPD (chronic obstructive pulmonary disease) ([facility])    • Depression    • Depression    • Dizziness 02/10/2015   • Encounter for gynecological examination without abnormal finding 03/10/2016   • Fatigue 08/28/2015   • Foot pain 06/09/2014   • Hyperkeratosis 04/28/2014   • Hypotensive episode 06/12/2015   • Keratoma 08/04/2014    intractable plantar   • Low blood pressure 08/28/2015   • Menopausal and perimenopausal disorder 03/10/2016    other specified   • Numbness of face 05/13/2016    improved   • Otitis media of right ear 07/30/2016    unspecified   • Perforated tympanic membrane 09/04/2014   • Puncture wound 06/17/2014    injury   • Right-sided face pain 05/13/2016   • Seizure ([facility])    • Sleep apnea    • Thumb pain 08/28/2015   • Upper respiratory infection 08/28/2015   • Wheezing 10/21/2014        Past Surgical History:   Procedure Laterality Date   • BREAST BIOPSY     • NECK SURGERY      x2 \"put rods in her neck\"   • OTHER SURGICAL HISTORY  07/07/2014    Destruction of " "Benign Lesion (1-14)   • TUBAL ABDOMINAL LIGATION          PT Ortho     Row Name 11/08/22 1500       Precautions and Contraindications    Precautions anterior cervical fusion hardware from C3 through C6 and posterior fusion hardware from C2 through T2.  -       Subjective Pain    Able to rate subjective pain? yes  -    Pre-Treatment Pain Level --  good and bad  -          User Key  (r) = Recorded By, (t) = Taken By, (c) = Cosigned By    Initials Name Provider Type    Anita Coronado PTA Physical Therapist Assistant                             PT Assessment/Plan     Row Name 11/08/22 1500          PT Assessment    Assessment Comments Pt enters clinic 18 minutes late, using FWW and hurried.  Pt appears unsteady with ataxic movements in UE/LE's.  Good effort with UE stretches,  requires mod cues for minimized compensation and proper form.  Reports since doing the stretches she has noticed more \" popping\" rpeorts no increased pian with popping and pt was educated on normal joint sounds with increased ROM.  Pt unsteady whhen exiting clinic.  -        PT Plan    PT Frequency 2x/week  -     PT Plan Comments Next visit add sit to stands  -           User Key  (r) = Recorded By, (t) = Taken By, (c) = Cosigned By    Initials Name Provider Type    Anita Coronado PTA Physical Therapist Assistant                   OP Exercises     Row Name 11/08/22 1500             Subjective Pain    Able to rate subjective pain? yes  -      Pre-Treatment Pain Level --  good and bad  -      Post-Treatment Pain Level --  did not assess  -         Exercise 1    Exercise Name 1 Pro II UE bike for ROM, posture re-ed and endurance  -      Time 1 10 min  -      Additional Comments L 4.0  -         Exercise 2    Exercise Name 2 UT S  -JW      Reps 2 2  -JW      Time 2 30  -JW         Exercise 3    Exercise Name 3 LS S  -JW      Reps 3 2  -JW      Time 3 30  -JW         Exercise 4    Exercise Name 4 Shoulder Shrugs  -JW   " "   Reps 4 10  -JW         Exercise 5    Exercise Name 5 Chin tucks  -      Reps 5 10  -      Time 5 5\" hold  -         Exercise 6    Exercise Name 6 Posterior shoulder rolls  -      Reps 6 10  -         Exercise 7    Exercise Name 7 Cspine AROM 3 way  -      Reps 7 10 ea direction  -            User Key  (r) = Recorded By, (t) = Taken By, (c) = Cosigned By    Initials Name Provider Type    Anita Coronado PTA Physical Therapist Assistant                              PT OP Goals     Row Name 11/08/22 1500          PT Short Term Goals    STG Date to Achieve 11/17/22  -     STG 1 Pt to be I with HEP with additions prior to next recertification.  -     STG 1 Progress Ongoing  -     STG 2 Pt to perform 10 sit to/from STS with 1 UE support with no assist from PT.  -     STG 3 Pt to demo improved postural awareness with all exercises without cues from PT.  -     STG 4 Pt to tolerate >/= 15 mins of standing activities without increase in pain.  -     STG 5 Pt to demo 10 chin tucks with good form and no cueing from PT.  -        Long Term Goals    LTG 1 independent with self care management/final HEP to prevent the reoccurence of symptoms in the future.  -     LTG 2 Pt to subjectively report >/= 75% improvement since initial eval.  -     LTG 3 Pt to improve B LE and UE strength to 5/5.  -     LTG 4 Pt to ambulate >/= 350 feet without rest breaks with good form and no unsteadiness or LOB present.  -        Time Calculation    PT Goal Re-Cert Due Date 11/17/22  -           User Key  (r) = Recorded By, (t) = Taken By, (c) = Cosigned By    Initials Name Provider Type    Anita Coronado PTA Physical Therapist Assistant                Therapy Education  Education Details: added AROM cspine  Given: HEP, Symptoms/condition management, Fall prevention and home safety  Program: Reinforced  How Provided: Verbal, Demonstration  Provided to: Patient  Level of Understanding: Verbalized, " Demonstrated              Time Calculation:   Start Time: 1532  Stop Time: 1615  Time Calculation (min): 43 min  Therapy Charges for Today     Code Description Service Date Service Provider Modifiers Qty    [id number]  PT THER PROC EA 15 MIN 11/8/2022 Anita Coulter PTA GP, CQ 3    [id number]  PT THER SUPP EA 15 MIN 11/8/2022 Anita Coulter PTA GP, CQ 1                    Anita Coulter PTA  11/8/2022

## 2022-11-13 PROCEDURE — 20553 NJX 1/MLT TRIGGER POINTS 3/>: CPT | Performed by: STUDENT IN AN ORGANIZED HEALTH CARE EDUCATION/TRAINING PROGRAM

## 2022-11-22 ENCOUNTER — OFFICE VISIT (OUTPATIENT)
Dept: FAMILY MEDICINE CLINIC | Facility: CLINIC | Age: 62
End: 2022-11-22

## 2022-11-22 ENCOUNTER — TELEPHONE (OUTPATIENT)
Dept: FAMILY MEDICINE CLINIC | Facility: CLINIC | Age: 62
End: 2022-11-22

## 2022-11-22 VITALS
HEART RATE: 97 BPM | WEIGHT: 109 LBS | OXYGEN SATURATION: 100 % | HEIGHT: 63 IN | BODY MASS INDEX: 19.31 KG/M2 | DIASTOLIC BLOOD PRESSURE: 92 MMHG | SYSTOLIC BLOOD PRESSURE: 150 MMHG | TEMPERATURE: 96.8 F

## 2022-11-22 DIAGNOSIS — I10 ESSENTIAL HYPERTENSION: Chronic | ICD-10-CM

## 2022-11-22 DIAGNOSIS — M54.2 NECK PAIN: Chronic | ICD-10-CM

## 2022-11-22 DIAGNOSIS — R63.4 UNINTENTIONAL WEIGHT LOSS: ICD-10-CM

## 2022-11-22 DIAGNOSIS — R20.2 NUMBNESS AND TINGLING IN BOTH HANDS: Primary | ICD-10-CM

## 2022-11-22 DIAGNOSIS — R20.0 NUMBNESS AND TINGLING IN BOTH HANDS: Primary | ICD-10-CM

## 2022-11-22 PROCEDURE — 99214 OFFICE O/P EST MOD 30 MIN: CPT | Performed by: STUDENT IN AN ORGANIZED HEALTH CARE EDUCATION/TRAINING PROGRAM

## 2022-11-22 RX ORDER — BACLOFEN 10 MG/1
10 TABLET ORAL 3 TIMES DAILY
Qty: 90 TABLET | Refills: 1 | Status: SHIPPED | OUTPATIENT
Start: 2022-11-22 | End: 2023-01-23 | Stop reason: SDUPTHER

## 2022-11-22 RX ORDER — NIFEDIPINE 60 MG/1
60 TABLET, FILM COATED, EXTENDED RELEASE ORAL DAILY
Qty: 90 TABLET | Refills: 1 | Status: SHIPPED | OUTPATIENT
Start: 2022-11-22

## 2022-11-22 NOTE — PROGRESS NOTES
Subjective:  Jyoti Guerra Person is a 62 y.o. female who presents for     Unintentional weight loss; patient denies any new symptoms, states was having issue with diarrhea, pale stools after every meal that has since resolved.  Was referred to gastroenterology, states that is still waiting for an appointment. Denies any night sweats, lymphadenopathy, fever, chills.  States has been able to gain weight since previous visit.    Chronic neck pain; appears well, had trigger point injections with immediate relief, additionally, on gabapentin 200 mg 3 times daily.  States that medication providing good relief, no adverse effects. States that has had bilateral hand numbness, tingling from wrist to tips of fingers. States fingers feel cold to touch. Improved with movement, putting arms over her head. Does admit to finger tips changing color. Did run out of her nifedipine 2 days ago.    HTN; states ran out of medication 2 days ago, blood pressure has been slightly elevated since.  Denies any adverse effects of medication, chest pain, shortness of breath, headaches, vision changes, orthopnea, dyspnea on exertion, leg swelling.  States with medication blood pressure is usually well controlled.    Patient Active Problem List   Diagnosis   • Vitamin D deficiency   • Vertigo   • Neuropathy   • Restless leg syndrome   • Pain in joint   • Allergic rhinitis   • Essential hypertension   • Abnormal EEG   • Cerebrovascular accident (CVA) due to thrombosis of left carotid artery (HCC)   • Illicit drug use   • Tobacco abuse   • Sleep apnea   • Neck pain   • Vitamin deficiency   • Acute otitis externa of right ear   • Rotator cuff strain   • Acute pain of right shoulder   • Closed fracture of distal end of right fibula   • Right ankle pain   • Allergic reaction   • Chronic pain of left ankle   • Closed fracture of talus   • Hypotension   • Left leg pain   • Acute pain of left shoulder   • Closed nondisplaced fracture of lateral malleolus  "of left fibula   • Closed nondisplaced fracture of lateral malleolus of left fibula with routine healing   • Renal insufficiency   • ESR raised   • Pain   • Left foot pain   • Closed nondisplaced fracture of fifth left metatarsal bone   • Cause of injury, fall   • Ankle weakness   • Joint pain in fingers of left hand   • Joint pain in fingers of right hand   • Bilateral thumb pain   • Bilateral hand pain   • Numbness and tingling in both hands   • Bilateral carpal tunnel syndrome   • Bilateral arm pain   • Bilateral leg pain   • Decreased strength   • Acute URI   • Cough   • Left hip pain   • BMI 27.0-27.9,adult   • Disorder of SI (sacroiliac) joint   • Mobility impaired   • Frequent falls   • Injury of sacrum   • Chronic pain of both lower extremities   • Edema of spinal cord (HCC)   • Radicular pain of left lower extremity   • Chronic midline low back pain with left-sided sciatica   • Spinal stenosis of lumbar region with neurogenic claudication   • Degenerative disc disease, lumbar   • Bilateral leg weakness   • History of spinal cord injury   • Incontinence   • Hyperlipidemia   • Hypothyroidism   • Cocaine abuse ([facility])   • CVA (cerebral vascular accident) ([facility])   • Drowsiness   • Hyperkalemia   • Normocytic anemia due to blood loss   • Preop testing   • Steroid-induced hyperglycemia   • Wheezing     Vitals:    Vitals:    11/22/22 1643   BP: 150/92   BP Location: Right arm   Patient Position: Sitting   Cuff Size: Small Adult   Pulse: 97   Temp: 96.8 °F (36 °C)   SpO2: 100%   Weight: 49.4 kg (109 lb)   Height: 160 cm (63\")     Body mass index is 19.31 kg/m².      Current Outpatient Medications:   •  atorvastatin (LIPITOR) 40 MG tablet, TAKE ONE TABLET BY MOUTH AT BEDTIME, Disp: 90 tablet, Rfl: 1  •  baclofen (LIORESAL) 10 MG tablet, Take 1 tablet by mouth 3 (Three) Times a Day., Disp: 90 tablet, Rfl: 1  •  gabapentin (NEURONTIN) 100 MG capsule, Take 2 capsules by mouth 3 (Three) Times a Day., Disp: 180 capsule, " Rfl: 0  •  Incontinence Supply Disposable (Comfort Protect Adult Diaper/M) misc, 1 each 4 (Four) Times a Day As Needed (urination)., Disp: 120 each, Rfl: 2  •  mupirocin (BACTROBAN) 2 % ointment, apply TO right outer ear twice daily, Disp: 22 g, Rfl: 1  •  NIFEdipine CC (ADALAT CC) 60 MG 24 hr tablet, Take 1 tablet by mouth Daily., Disp: 90 tablet, Rfl: 1  •  traZODone (DESYREL) 50 MG tablet, Take 1 tablet by mouth every night at bedtime., Disp: 90 tablet, Rfl: 0    Current Facility-Administered Medications:   •  lidocaine (XYLOCAINE) 1 % injection 5 mL, 5 mL, Infiltration, Once, Richar Gonzalez MD    Patient Active Problem List   Diagnosis   • Vitamin D deficiency   • Vertigo   • Neuropathy   • Restless leg syndrome   • Pain in joint   • Allergic rhinitis   • Essential hypertension   • Abnormal EEG   • Cerebrovascular accident (CVA) due to thrombosis of left carotid artery (HCC)   • Illicit drug use   • Tobacco abuse   • Sleep apnea   • Neck pain   • Vitamin deficiency   • Acute otitis externa of right ear   • Rotator cuff strain   • Acute pain of right shoulder   • Closed fracture of distal end of right fibula   • Right ankle pain   • Allergic reaction   • Chronic pain of left ankle   • Closed fracture of talus   • Hypotension   • Left leg pain   • Acute pain of left shoulder   • Closed nondisplaced fracture of lateral malleolus of left fibula   • Closed nondisplaced fracture of lateral malleolus of left fibula with routine healing   • Renal insufficiency   • ESR raised   • Pain   • Left foot pain   • Closed nondisplaced fracture of fifth left metatarsal bone   • Cause of injury, fall   • Ankle weakness   • Joint pain in fingers of left hand   • Joint pain in fingers of right hand   • Bilateral thumb pain   • Bilateral hand pain   • Numbness and tingling in both hands   • Bilateral carpal tunnel syndrome   • Bilateral arm pain   • Bilateral leg pain   • Decreased strength   • Acute URI   • Cough   • Left hip  "pain   • BMI 27.0-27.9,adult   • Disorder of SI (sacroiliac) joint   • Mobility impaired   • Frequent falls   • Injury of sacrum   • Chronic pain of both lower extremities   • Edema of spinal cord (HCC)   • Radicular pain of left lower extremity   • Chronic midline low back pain with left-sided sciatica   • Spinal stenosis of lumbar region with neurogenic claudication   • Degenerative disc disease, lumbar   • Bilateral leg weakness   • History of spinal cord injury   • Incontinence   • Hyperlipidemia   • Hypothyroidism   • Cocaine abuse ([facility])   • CVA (cerebral vascular accident) ([facility])   • Drowsiness   • Hyperkalemia   • Normocytic anemia due to blood loss   • Preop testing   • Steroid-induced hyperglycemia   • Wheezing     Past Surgical History:   Procedure Laterality Date   • BREAST BIOPSY     • NECK SURGERY      x2 \"put rods in her neck\"   • OTHER SURGICAL HISTORY  07/07/2014    Destruction of Benign Lesion (1-14)   • TUBAL ABDOMINAL LIGATION       Social History     Socioeconomic History   • Marital status:    Tobacco Use   • Smoking status: Every Day     Packs/day: 0.25     Years: 40.00     Pack years: 10.00     Types: Cigarettes   • Smokeless tobacco: Never   • Tobacco comments:     4-5 cigarettes daily   Vaping Use   • Vaping Use: Former   Substance and Sexual Activity   • Alcohol use: Not Currently     Comment: rarely   • Drug use: Yes     Frequency: 4.0 times per week     Types: Cocaine(coke)     Comment: 4 days a week   • Sexual activity: Defer     Family History   Problem Relation Age of Onset   • Alzheimer's disease Other    • Bipolar disorder Other    • Depression Other    • Diabetes Other    • Heart disease Other    • Hypertension Other    • Lung cancer Other    • Migraines Other    • Sickle cell anemia Other    • Stroke Other    • Lung disease Other    • No Known Problems Mother    • Alzheimer's disease Father    • Heart attack Father    • Throat cancer Father    • No Known Problems Daughter  "     Office Visit on 06/06/2022   Component Date Value Ref Range Status   • QT Interval 06/06/2022 346  ms Final   • QTC Interval 06/06/2022 478  ms Final      XR Ribs Left With PA Chest  Narrative: Procedure: Left    ribs    Indication: Left rib pain. Pleurodynia     Technique: Four    views    Prior Relevant Exam: Chest x-ray May, 2015.        FINDINGS: Minimally displaced fracture anterolateral aspect left  ninth rib. Left ribs are otherwise unremarkable.    The lung fields are clear. There is no consolidation or  pneumothorax.    Multilevel anterior and posterior cervical fusion stabilized by  surgical hardware.  Impression: Minimally displaced fracture anterolateral aspect  left ninth rib.    Electronically signed by:  Jake Whitaker MD  1/27/2021 9:16 AM [facility]  Workstation: [id number]      @[employer]@  Immunization History   Administered Date(s) Administered   • COVID-19 (MODERNA) 1st, 2nd, 3rd Dose Only 12/02/2021   • Flu Vaccine Quad PF >36MO 10/08/2014, 11/23/2015, 10/26/2016, 09/13/2018, 09/18/2019   • FluLaval/Fluzone >6mos 10/17/2022   • PPD Test 07/01/2020   • Pneumococcal Conjugate 13-Valent (PCV13) 10/26/2016   • Td 04/23/2007   • Tetanus 06/17/2014, 06/17/2014   • Zostavax 03/14/2017     The following portions of the patient's history were reviewed and updated as appropriate: allergies, current medications, past family history, past medical history, past social history, past surgical history and problem list.    PHQ-9 Total Score: 0           Physical Exam  Constitutional:       Appearance: Normal appearance.   HENT:      Head: Normocephalic and atraumatic.      Right Ear: External ear normal.      Left Ear: External ear normal.   Eyes:      General:         Right eye: No discharge.         Left eye: No discharge.      Conjunctiva/sclera: Conjunctivae normal.   Cardiovascular:      Rate and Rhythm: Normal rate and regular rhythm.      Pulses: Normal pulses.      Heart sounds: Normal heart sounds. No murmur  heard.  Pulmonary:      Effort: Pulmonary effort is normal. No respiratory distress.      Breath sounds: Normal breath sounds.   Abdominal:      General: There is no distension.      Palpations: Abdomen is soft.      Tenderness: There is no abdominal tenderness.   Musculoskeletal:      Cervical back: Decreased range of motion.      Right lower leg: No edema.      Left lower leg: No edema.   Lymphadenopathy:      Cervical: No cervical adenopathy.   Neurological:      Mental Status: She is alert. Mental status is at baseline.   Psychiatric:         Mood and Affect: Mood normal.         Behavior: Behavior normal.         Assessment & Plan    Diagnosis Plan   1. Numbness and tingling in both hands  EMG & Nerve Conduction Test      2. Essential hypertension  NIFEdipine CC (ADALAT CC) 60 MG 24 hr tablet      3. Neck pain  baclofen (LIORESAL) 10 MG tablet      4. Unintentional weight loss           Orders Placed This Encounter   Procedures   • EMG & Nerve Conduction Test     Standing Status:   Future     Standing Expiration Date:   11/22/2023     Order Specific Question:   Extremity     Answer:   bilateral upper     Order Specific Question:   Please specify number of extremities:     Answer:   2 Extremities     Order Specific Question:   Reason for Exam:     Answer:   hand numbness and tingling.     Order Specific Question:   Release to patient     Answer:   Immediate     Numbness and tingling both hands; not along dermatomes, atypical presentation, unclear etiology.  Consider Raynaud's phenomenon since issue has become slightly worse without nifedipine, however will obtain EMG/nerve conduction study as above due to chronicity of symptoms.  Exam reassuring.    Hypertension; needs refills as above, states blood pressure has been well controlled with medication, will continue.  Discussed importance of labs, patient was understanding, agreeable to plan.    Weight loss; seemingly has resolved, denies any symptoms of  malnutrition, reassuring.  Continue to monitor.          This document has been electronically signed by Richar Gonzalez MD on November 22, 2022 17:11 CST    EMR Dragon/Transciption Disclaimer: Some of this note may be an electronic transcription/translation of spoken language to printed text.  The electronic translation of spoken language may permit erroneous, or at times, nonsensical words or phrases to be inadvertently transcribed. Although I have reviewed the note for such errors, some may still exist.

## 2022-12-13 ENCOUNTER — OFFICE VISIT (OUTPATIENT)
Dept: GASTROENTEROLOGY | Facility: CLINIC | Age: 62
End: 2022-12-13

## 2022-12-13 VITALS
BODY MASS INDEX: 17.81 KG/M2 | SYSTOLIC BLOOD PRESSURE: 141 MMHG | DIASTOLIC BLOOD PRESSURE: 82 MMHG | HEIGHT: 63 IN | WEIGHT: 100.5 LBS | HEART RATE: 94 BPM

## 2022-12-13 DIAGNOSIS — R10.10 PAIN OF UPPER ABDOMEN: ICD-10-CM

## 2022-12-13 DIAGNOSIS — K59.00 CONSTIPATION, UNSPECIFIED CONSTIPATION TYPE: ICD-10-CM

## 2022-12-13 DIAGNOSIS — R11.0 NAUSEA: ICD-10-CM

## 2022-12-13 DIAGNOSIS — R63.4 WEIGHT LOSS, ABNORMAL: Primary | ICD-10-CM

## 2022-12-13 DIAGNOSIS — N18.31 STAGE 3A CHRONIC KIDNEY DISEASE: ICD-10-CM

## 2022-12-13 DIAGNOSIS — R01.1 HEART MURMUR: ICD-10-CM

## 2022-12-13 PROCEDURE — 99204 OFFICE O/P NEW MOD 45 MIN: CPT | Performed by: PHYSICIAN ASSISTANT

## 2022-12-13 NOTE — PROGRESS NOTES
Chief Complaint   Patient presents with   • Weight Loss       ENDO PROCEDURE ORDERED:    Hattie Guerra Person is a 62 y.o. female. she is being seen for consultation today at the request of Richar Gonzalez MD    History of Present Illness    This ill-appearing 62-year-old  female who lists no occupation is disabled was sent for consultation for significant weight loss by Dr. Gonzalez who saw the patient with diarrhea, weight loss on . Her weight was 109 at that time. She was down to 100.5 today. In  of this year she was 106. In December of last year she was 112.4 but she states she has lost over 50 pounds recently. She claims to be eating but states she cannot keep her weight up. She does have frequent nausea and feels queasy in her stomach, sometimes she has dysphagia often times with liquid. She denied heartburn. She states when she eats she has urgent bowel movements that are fragmentary stools but more frequent. She had a negative Cologuard done 2022 but has never had a colonoscopy.     Most recent laboratory 2022 urine drug screen was positive for Cocaine. Normal cholesterol, TSH, T3, T4, A1c 5.3%. CMP showed a glucose 133, BUN 26, creatinine 1.49, potassium 5.7, chloride 108,  GFR 39.6, otherwise fairly normal. CBC was normal. Dr. Gonzalez had ordered labs on her. She has not yet had them drawn.      Patient does use tobacco, denied alcohol. She does use crack Cocaine at times. Denied past surgical history.     Family history of diabetes, cancer, stroke, hypertension, ulcers. Father and mother both  of heart issues, father at age 83, mother at age 65. Spouse in good health,  17 years. One brother, 5 sisters in good health. One sister  of unknown cause, one child in good health.     A/P: Patient with profound weight loss, nausea, abdominal pain, change in bowel habits. I am very concerned about her weight loss and her debilitated condition. I  have recommended CT abdomen and pelvis. I have encouraged her to get her laboratories drawn as ordered by Dr. Gonzalez. She states she will get those today. On physical exam she was noted to have a loud systolic blowing murmur at the right sternal border which she is not aware of in the past.  Last echo done in 2016 showed some mild abnormalities but no significant abnormality at that time and I have recommended repeating an echo which may be contributing to some of her issues, given her Cocaine use certainly cardiac issue is of concern as well. Will plan follow-up after the above, further pending clinical course and the results of the above.     Thank you very much Dr. Gonzalez for this consultation, and for allowing us to participate in the care of your patient. Will keep you informed.         The following portions of the patient's history were reviewed and updated as appropriate:   Past Medical History:   Diagnosis Date   • Acute bronchitis 07/19/2016   • Acute otitis externa 09/04/2014   • Acute otitis media 02/10/2015   • Acute sinusitis 07/19/2016   • Ankle injury    • Anxiety    • Arthritis    • Backache 12/08/2014   • Benign hypertension 05/13/2016   • Cardiac disease    • Cerebrovascular accident ([facility]) 2015   • COPD (chronic obstructive pulmonary disease) ([facility])    • Depression    • Depression    • Dizziness 02/10/2015   • Encounter for gynecological examination without abnormal finding 03/10/2016   • Fatigue 08/28/2015   • Foot pain 06/09/2014   • Hyperkeratosis 04/28/2014   • Hypotensive episode 06/12/2015   • Keratoma 08/04/2014    intractable plantar   • Low blood pressure 08/28/2015   • Menopausal and perimenopausal disorder 03/10/2016    other specified   • Numbness of face 05/13/2016    improved   • Otitis media of right ear 07/30/2016    unspecified   • Perforated tympanic membrane 09/04/2014   • Puncture wound 06/17/2014    injury   • Right-sided face pain 05/13/2016   • Seizure ([facility])    • Sleep apnea   "  • Thumb pain 08/28/2015   • Upper respiratory infection 08/28/2015   • Wheezing 10/21/2014     Past Surgical History:   Procedure Laterality Date   • BREAST BIOPSY     • NECK SURGERY      x2 \"put rods in her neck\"   • OTHER SURGICAL HISTORY  07/07/2014    Destruction of Benign Lesion (1-14)   • TUBAL ABDOMINAL LIGATION       Family History   Problem Relation Age of Onset   • Alzheimer's disease Other    • Bipolar disorder Other    • Depression Other    • Diabetes Other    • Heart disease Other    • Hypertension Other    • Lung cancer Other    • Migraines Other    • Sickle cell anemia Other    • Stroke Other    • Lung disease Other    • No Known Problems Mother    • Alzheimer's disease Father    • Heart attack Father    • Throat cancer Father    • No Known Problems Daughter      OB History    No obstetric history on file.       Allergies   Allergen Reactions   • Lisinopril Anaphylaxis     Social History     Socioeconomic History   • Marital status:    Tobacco Use   • Smoking status: Every Day     Packs/day: 0.25     Years: 40.00     Pack years: 10.00     Types: Cigarettes   • Smokeless tobacco: Never   • Tobacco comments:     4-5 cigarettes daily   Vaping Use   • Vaping Use: Former   Substance and Sexual Activity   • Alcohol use: Not Currently     Comment: rarely   • Drug use: Yes     Frequency: 4.0 times per week     Types: Cocaine(coke)     Comment: 4 days a week   • Sexual activity: Defer     Current Medications:  Prior to Admission medications    Medication Sig Start Date End Date Taking? Authorizing Provider   atorvastatin (LIPITOR) 40 MG tablet TAKE ONE TABLET BY MOUTH AT BEDTIME 9/22/22  Yes Richar Gonzalez MD   baclofen (LIORESAL) 10 MG tablet Take 1 tablet by mouth 3 (Three) Times a Day. 11/22/22  Yes Richar Gonzalez MD   gabapentin (NEURONTIN) 100 MG capsule Take 2 capsules by mouth 3 (Three) Times a Day. 10/21/22  Yes Richar Gonzalez MD   Incontinence Supply Disposable (Comfort Protect " "Adult Diaper/M) misc 1 each 4 (Four) Times a Day As Needed (urination). 3/28/22  Yes Richar Gonzalez MD   mupirocin (BACTROBAN) 2 % ointment apply TO right outer ear twice daily 12/10/21  Yes Kofi Reno MD   NIFEdipine CC (ADALAT CC) 60 MG 24 hr tablet Take 1 tablet by mouth Daily. 11/22/22  Yes Richar Gonzalez MD   traZODone (DESYREL) 50 MG tablet Take 1 tablet by mouth every night at bedtime. 3/28/22  Yes Richar Gonzalez MD     Review of Systems  Review of Systems   Constitutional: Positive for unexpected weight change.   HENT: Negative for trouble swallowing.    Gastrointestinal: Positive for abdominal pain and nausea. Negative for anal bleeding, blood in stool, constipation, diarrhea, rectal pain and vomiting.          Objective    /82 (BP Location: Left arm)   Pulse 94   Ht 160 cm (63\")   Wt 45.6 kg (100 lb 8 oz)   LMP  (LMP Unknown)   BMI 17.80 kg/m²   Physical Exam  Vitals and nursing note reviewed.   Constitutional:       General: She is not in acute distress.     Appearance: She is well-developed. She is ill-appearing.      Comments: AA   HENT:      Head: Normocephalic and atraumatic.   Eyes:      Pupils: Pupils are equal, round, and reactive to light.   Cardiovascular:      Rate and Rhythm: Normal rate and regular rhythm.      Heart sounds: Murmur heard.      Comments: Blowing systolic murmur right sternal border  Pulmonary:      Effort: Pulmonary effort is normal.      Breath sounds: Normal breath sounds.   Abdominal:      General: Bowel sounds are normal. There is no distension or abdominal bruit.      Palpations: Abdomen is soft. Abdomen is not rigid. There is no shifting dullness or mass.      Tenderness: There is abdominal tenderness. There is no guarding or rebound.      Hernia: No hernia is present. There is no hernia in the ventral area.   Musculoskeletal:         General: Normal range of motion.      Cervical back: Normal range of motion.   Skin:     General: Skin is " warm and dry.   Neurological:      Mental Status: She is alert and oriented to person, place, and time.   Psychiatric:         Behavior: Behavior normal.         Thought Content: Thought content normal.         Judgment: Judgment normal.       Assessment & Plan      1. Weight loss, abnormal    2. Pain of upper abdomen    3. Nausea    4. Constipation, unspecified constipation type    5. Stage 3a chronic kidney disease (HCC)    6. Heart murmur    .   Diagnoses and all orders for this visit:    1. Weight loss, abnormal (Primary)  -     CBC & Differential  -     Comprehensive Metabolic Panel  -     Amylase  -     Lipase  -     C-reactive Protein  -     Sedimentation Rate  -     CT Abdomen Pelvis With Contrast    2. Pain of upper abdomen  -     CBC & Differential  -     Comprehensive Metabolic Panel  -     Amylase  -     Lipase  -     C-reactive Protein  -     Sedimentation Rate  -     CT Abdomen Pelvis With Contrast    3. Nausea  -     CBC & Differential  -     Comprehensive Metabolic Panel  -     Amylase  -     Lipase  -     C-reactive Protein  -     Sedimentation Rate  -     CT Abdomen Pelvis With Contrast    4. Constipation, unspecified constipation type  -     CBC & Differential  -     Comprehensive Metabolic Panel  -     Amylase  -     Lipase  -     C-reactive Protein  -     Sedimentation Rate  -     CT Abdomen Pelvis With Contrast    5. Stage 3a chronic kidney disease (HCC)  -     CBC & Differential  -     Comprehensive Metabolic Panel  -     Amylase  -     Lipase  -     C-reactive Protein  -     Sedimentation Rate  -     CT Abdomen Pelvis With Contrast    6. Heart murmur  -     Adult Transthoracic Echo Limited W/ Cont if Necessary Per Protocol        Orders placed during this encounter include:  Orders Placed This Encounter   Procedures   • CT Abdomen Pelvis With Contrast     Order Specific Question:   Will Oral Contrast be needed for this procedure?     Answer:   Yes     Order Specific Question:   Will Oral  Contrast be needed for this procedure?     Answer:   Yes   • Comprehensive Metabolic Panel     Order Specific Question:   Release to patient     Answer:   Routine Release   • Amylase     Order Specific Question:   Release to patient     Answer:   Routine Release   • Lipase     Order Specific Question:   Release to patient     Answer:   Routine Release   • C-reactive Protein     Order Specific Question:   Release to patient     Answer:   Routine Release   • Sedimentation Rate     Order Specific Question:   Release to patient     Answer:   Routine Release   • Adult Transthoracic Echo Limited W/ Cont if Necessary Per Protocol     Order Specific Question:   Reason for exam?     Answer:   Murmur or Click     Order Specific Question:   Murmur or Click specification?     Answer:   Suspicion of Valvular Heart Disease     Order Specific Question:   Release to patient     Answer:   Routine Release   • CBC & Differential     Order Specific Question:   Manual Differential     Answer:   No       Medications prescribed:  No orders of the defined types were placed in this encounter.      Requested Prescriptions      No prescriptions requested or ordered in this encounter       Review and/or summary of lab tests, radiology, procedures, medications. Review and summary of old records and obtaining of history. The risks and benefits of my recommendations, as well as other treatment options were discussed with the patient today. Questions were answered.    Follow-up: Return in about 2 weeks (around 12/27/2022), or if symptoms worsen or fail to improve, for lab TODAY.     * Surgery not found *      This document has been electronically signed by Evan Connelly PA-C on December 14, 2022 17:53 CST      Results for orders placed or performed in visit on 10/17/22   Cologuard - Stool, Per Rectum    Specimen: Per Rectum; Stool   Result Value Ref Range    Cologuard Negative Negative   Results for orders placed or performed in visit on  06/06/22   ECG 12 Lead   Result Value Ref Range    QT Interval 346 ms    QTC Interval 478 ms   Results for orders placed or performed in visit on 03/25/22   Urine Drug Screen - Urine, Clean Catch    Specimen: Urine, Clean Catch   Result Value Ref Range    THC, Screen, Urine Negative Negative    Phencyclidine (PCP), Urine Negative Negative    Cocaine Screen, Urine Positive (A) Negative    Methamphetamine, Ur Negative Negative    Opiate Screen Negative Negative    Amphetamine Screen, Urine Negative Negative    Benzodiazepine Screen, Urine Negative Negative    Tricyclic Antidepressants Screen Negative Negative    Methadone Screen, Urine Negative Negative    Barbiturates Screen, Urine Negative Negative    Oxycodone Screen, Urine Negative Negative    Propoxyphene Screen Negative Negative    Buprenorphine, Screen, Urine Negative Negative   CBC No Differential    Specimen: Blood   Result Value Ref Range    WBC 5.03 3.40 - 10.80 10*3/mm3    RBC 4.48 3.77 - 5.28 10*6/mm3    Hemoglobin 13.2 12.0 - 15.9 g/dL    Hematocrit 38.4 34.0 - 46.6 %    MCV 85.7 79.0 - 97.0 fL    MCH 29.5 26.6 - 33.0 pg    MCHC 34.4 31.5 - 35.7 g/dL    RDW 12.8 12.3 - 15.4 %    RDW-SD 39.6 37.0 - 54.0 fl    MPV 12.9 (H) 6.0 - 12.0 fL    Platelets 181 140 - 450 10*3/mm3   T3, free    Specimen: Blood   Result Value Ref Range    T3, Free 3.37 2.00 - 4.40 pg/mL   TSH    Specimen: Blood   Result Value Ref Range    TSH 2.200 0.270 - 4.200 uIU/mL   T4, free    Specimen: Blood   Result Value Ref Range    Free T4 1.69 0.93 - 1.70 ng/dL   Hemoglobin A1c    Specimen: Blood   Result Value Ref Range    Hemoglobin A1C 5.30 4.80 - 5.60 %   Lipid panel    Specimen: Blood   Result Value Ref Range    Total Cholesterol 156 0 - 200 mg/dL    Triglycerides 97 0 - 150 mg/dL    HDL Cholesterol 52 40 - 60 mg/dL    LDL Cholesterol  86 0 - 100 mg/dL    VLDL Cholesterol 18 5 - 40 mg/dL    LDL/HDL Ratio 1.63    Comprehensive metabolic panel    Specimen: Blood   Result Value Ref  Range    Glucose 133 (H) 65 - 99 mg/dL    BUN 26 (H) 8 - 23 mg/dL    Creatinine 1.49 (H) 0.57 - 1.00 mg/dL    Sodium 144 136 - 145 mmol/L    Potassium 5.7 (H) 3.5 - 5.2 mmol/L    Chloride 108 (H) 98 - 107 mmol/L    CO2 28.0 22.0 - 29.0 mmol/L    Calcium 9.6 8.6 - 10.5 mg/dL    Total Protein 6.8 6.0 - 8.5 g/dL    Albumin 3.90 3.50 - 5.20 g/dL    ALT (SGPT) 17 1 - 33 U/L    AST (SGOT) 27 1 - 32 U/L    Alkaline Phosphatase 86 39 - 117 U/L    Total Bilirubin 0.2 0.0 - 1.2 mg/dL    Globulin 2.9 gm/dL    A/G Ratio 1.3 g/dL    BUN/Creatinine Ratio 17.4 7.0 - 25.0    Anion Gap 8.0 5.0 - 15.0 mmol/L    eGFR 39.6 (L) >60.0 mL/min/1.73   Results for orders placed or performed in visit on 02/17/20   CBC Auto Differential    Specimen: Blood   Result Value Ref Range    WBC 6.10 3.40 - 10.80 10*3/mm3    RBC 4.44 3.77 - 5.28 10*6/mm3    Hemoglobin 13.8 12.0 - 15.9 g/dL    Hematocrit 39.8 34.0 - 46.6 %    MCV 89.6 79.0 - 97.0 fL    MCH 31.1 26.6 - 33.0 pg    MCHC 34.7 31.5 - 35.7 g/dL    RDW 12.5 12.3 - 15.4 %    RDW-SD 39.9 37.0 - 54.0 fl    MPV 13.8 (H) 6.0 - 12.0 fL    Platelets 183 140 - 450 10*3/mm3    Neutrophil % 57.7 42.7 - 76.0 %    Lymphocyte % 31.3 19.6 - 45.3 %    Monocyte % 6.7 5.0 - 12.0 %    Eosinophil % 2.6 0.3 - 6.2 %    Basophil % 0.7 0.0 - 1.5 %    Neutrophils, Absolute 3.52 1.70 - 7.00 10*3/mm3    Lymphocytes, Absolute 1.91 0.70 - 3.10 10*3/mm3    Monocytes, Absolute 0.41 0.10 - 0.90 10*3/mm3    Eosinophils, Absolute 0.16 0.00 - 0.40 10*3/mm3    Basophils, Absolute 0.04 0.00 - 0.20 10*3/mm3   Wound Culture - Wound, Hand, Right    Specimen: Hand, Right; Wound   Result Value Ref Range    Wound Culture Rare Normal Skin Sydnee     Gram Stain No WBCs or organisms seen    Comprehensive metabolic panel    Specimen: Blood   Result Value Ref Range    Glucose 84 65 - 99 mg/dL    BUN 17 8 - 23 mg/dL    Creatinine 1.60 (H) 0.57 - 1.00 mg/dL    Sodium 140 136 - 145 mmol/L    Potassium 5.0 3.5 - 5.2 mmol/L    Chloride 105 98 -  107 mmol/L    CO2 22.9 22.0 - 29.0 mmol/L    Calcium 9.6 8.6 - 10.5 mg/dL    Total Protein 7.0 6.0 - 8.5 g/dL    Albumin 3.90 3.50 - 5.20 g/dL    ALT (SGPT) 21 1 - 33 U/L    AST (SGOT) 25 1 - 32 U/L    Alkaline Phosphatase 66 39 - 117 U/L    Total Bilirubin 0.2 0.2 - 1.2 mg/dL    eGFR  African Amer 40 (L) >60 mL/min/1.73    Globulin 3.1 gm/dL    A/G Ratio 1.3 g/dL    BUN/Creatinine Ratio 10.6 7.0 - 25.0    Anion Gap 12.1 5.0 - 15.0 mmol/L   Results for orders placed or performed during the hospital encounter of 11/04/19   Duplex Carotid Ultrasound CAR   Result Value Ref Range    Prox CCA .0 cm/sec    Prox CCA .0 cm/sec    Prox CCA EDV 23.7 cm/sec    Prox CCA EDV 23.3 cm/sec    Dist CCA PSV 80.2 cm/sec    Dist CCA PSV 75.1 cm/sec    Dist CCA EDV 24.5 cm/sec    Dist CCA EDV 18.4 cm/sec    Prox ECA .0 cm/sec    Prox ECA PSV 81.0 cm/sec    Prox ECA EDV 6.1 cm/sec    Prox ECA EDV 9.6 cm/sec    Prox ICA PSV 65.4 cm/sec    Prox ICA PSV 63.6 cm/sec    Prox ICA EDV 21.0 cm/sec    Prox ICA EDV 21.6 cm/sec    Mid ICA PSV 80.9 cm/sec    Mid ICA PSV 70.2 cm/sec    Mid ICA EDV 29.9 cm/sec    Mid ICA EDV 25.2 cm/sec    Dist ICA PSV 79.6 cm/sec    Dist ICA PSV 70.4 cm/sec    Dist ICA EDV 29.6 cm/sec    Dist ICA EDV 32.4 cm/sec    Vertebral A PSV 46.2 cm/sec    Vertebral A PSV 69.0 cm/sec    Vertebral A EDV 17.2 cm/sec    Vertebral A EDV 20.4 cm/sec    ICA/CCA ratio 0.9     ICA/CCA ratio 1     Diastolic ICA/CCA Ratio 1.8     ICA/CCA diastolic ratio 1.2      *Note: Due to a large number of results and/or encounters for the requested time period, some results have not been displayed. A complete set of results can be found in Results Review.

## 2022-12-15 DIAGNOSIS — R01.1 HEART MURMUR: Primary | ICD-10-CM

## 2022-12-20 ENCOUNTER — APPOINTMENT (OUTPATIENT)
Dept: NEUROLOGY | Facility: HOSPITAL | Age: 62
End: 2022-12-20

## 2023-01-03 ENCOUNTER — APPOINTMENT (OUTPATIENT)
Dept: NEUROLOGY | Facility: HOSPITAL | Age: 63
End: 2023-01-03
Payer: COMMERCIAL

## 2023-01-06 ENCOUNTER — TELEPHONE (OUTPATIENT)
Dept: FAMILY MEDICINE CLINIC | Facility: CLINIC | Age: 63
End: 2023-01-06

## 2023-01-23 ENCOUNTER — OFFICE VISIT (OUTPATIENT)
Dept: FAMILY MEDICINE CLINIC | Facility: CLINIC | Age: 63
End: 2023-01-23
Payer: COMMERCIAL

## 2023-01-23 VITALS
DIASTOLIC BLOOD PRESSURE: 70 MMHG | HEIGHT: 63 IN | SYSTOLIC BLOOD PRESSURE: 120 MMHG | WEIGHT: 106.8 LBS | BODY MASS INDEX: 18.92 KG/M2 | OXYGEN SATURATION: 100 % | HEART RATE: 118 BPM | TEMPERATURE: 97.7 F

## 2023-01-23 DIAGNOSIS — G47.00 INSOMNIA, UNSPECIFIED TYPE: Chronic | ICD-10-CM

## 2023-01-23 DIAGNOSIS — M54.2 NECK PAIN: Chronic | ICD-10-CM

## 2023-01-23 PROCEDURE — 20552 NJX 1/MLT TRIGGER POINT 1/2: CPT | Performed by: STUDENT IN AN ORGANIZED HEALTH CARE EDUCATION/TRAINING PROGRAM

## 2023-01-23 PROCEDURE — 99214 OFFICE O/P EST MOD 30 MIN: CPT | Performed by: STUDENT IN AN ORGANIZED HEALTH CARE EDUCATION/TRAINING PROGRAM

## 2023-01-23 RX ORDER — BACLOFEN 10 MG/1
10 TABLET ORAL 3 TIMES DAILY
Qty: 90 TABLET | Refills: 1 | Status: SHIPPED | OUTPATIENT
Start: 2023-01-23 | End: 2023-03-29

## 2023-01-23 RX ORDER — GABAPENTIN 100 MG/1
200 CAPSULE ORAL 3 TIMES DAILY
Qty: 180 CAPSULE | Refills: 0 | Status: SHIPPED | OUTPATIENT
Start: 2023-01-23

## 2023-01-23 RX ORDER — TRAZODONE HYDROCHLORIDE 50 MG/1
50 TABLET ORAL
Qty: 90 TABLET | Refills: 0 | Status: SHIPPED | OUTPATIENT
Start: 2023-01-23

## 2023-01-23 RX ORDER — LIDOCAINE HYDROCHLORIDE 10 MG/ML
3 INJECTION, SOLUTION INFILTRATION; PERINEURAL ONCE
Status: COMPLETED | OUTPATIENT
Start: 2023-01-23 | End: 2023-01-23

## 2023-01-23 RX ADMIN — LIDOCAINE HYDROCHLORIDE 3 ML: 10 INJECTION, SOLUTION INFILTRATION; PERINEURAL at 16:39

## 2023-03-29 DIAGNOSIS — M54.2 NECK PAIN: Chronic | ICD-10-CM

## 2023-03-29 RX ORDER — BACLOFEN 10 MG/1
TABLET ORAL
Qty: 90 TABLET | Refills: 0 | Status: SHIPPED | OUTPATIENT
Start: 2023-03-29

## 2023-04-24 ENCOUNTER — OFFICE VISIT (OUTPATIENT)
Dept: FAMILY MEDICINE CLINIC | Facility: CLINIC | Age: 63
End: 2023-04-24
Payer: COMMERCIAL

## 2023-04-24 VITALS
HEIGHT: 63 IN | WEIGHT: 102.5 LBS | OXYGEN SATURATION: 97 % | DIASTOLIC BLOOD PRESSURE: 72 MMHG | BODY MASS INDEX: 18.16 KG/M2 | SYSTOLIC BLOOD PRESSURE: 118 MMHG | HEART RATE: 118 BPM

## 2023-04-24 DIAGNOSIS — M54.2 NECK PAIN: Chronic | ICD-10-CM

## 2023-04-24 DIAGNOSIS — Z91.81 AT HIGH RISK FOR INJURY RELATED TO FALL: Primary | ICD-10-CM

## 2023-04-24 RX ORDER — GABAPENTIN 100 MG/1
200 CAPSULE ORAL 3 TIMES DAILY
Qty: 180 CAPSULE | Refills: 0 | Status: SHIPPED | OUTPATIENT
Start: 2023-04-24

## 2023-04-24 RX ORDER — LIDOCAINE HYDROCHLORIDE 10 MG/ML
4 INJECTION, SOLUTION INFILTRATION; PERINEURAL ONCE
Status: COMPLETED | OUTPATIENT
Start: 2023-04-24 | End: 2023-04-24

## 2023-04-24 RX ADMIN — LIDOCAINE HYDROCHLORIDE 4 ML: 10 INJECTION, SOLUTION INFILTRATION; PERINEURAL at 17:54

## 2023-04-24 NOTE — PROGRESS NOTES
Subjective:  Jyoti Guerra Person is a 62 y.o. female who presents for     Neck pain; at previous appointment, had trigger point injection that provided partial relief.  Patient states has been working on exercises, stretching.  In addition, is on baclofen and gabapentin 200 mg 3 times daily.  Denied any adverse effects of medication, increased numbness, tingling.  Denies any weakness, confusion, fatigue, falls.    Patient Active Problem List   Diagnosis   • Vitamin D deficiency   • Vertigo   • Neuropathy   • Restless leg syndrome   • Pain in joint   • Allergic rhinitis   • Essential hypertension   • Abnormal EEG   • Cerebrovascular accident (CVA) due to thrombosis of left carotid artery   • Illicit drug use   • Tobacco abuse   • Sleep apnea   • Neck pain   • Vitamin deficiency   • Acute otitis externa of right ear   • Rotator cuff strain   • Acute pain of right shoulder   • Closed fracture of distal end of right fibula   • Right ankle pain   • Allergic reaction   • Chronic pain of left ankle   • Closed fracture of talus   • Hypotension   • Left leg pain   • Acute pain of left shoulder   • Closed nondisplaced fracture of lateral malleolus of left fibula   • Closed nondisplaced fracture of lateral malleolus of left fibula with routine healing   • Renal insufficiency   • ESR raised   • Pain   • Left foot pain   • Closed nondisplaced fracture of fifth left metatarsal bone   • Cause of injury, fall   • Ankle weakness   • Joint pain in fingers of left hand   • Joint pain in fingers of right hand   • Bilateral thumb pain   • Bilateral hand pain   • Numbness and tingling in both hands   • Bilateral carpal tunnel syndrome   • Bilateral arm pain   • Bilateral leg pain   • Decreased strength   • Acute URI   • Cough   • Left hip pain   • BMI 27.0-27.9,adult   • Disorder of SI (sacroiliac) joint   • Mobility impaired   • Frequent falls   • Injury of sacrum   • Chronic pain of both lower extremities   • Edema of spinal cord  "([facility])   • Radicular pain of left lower extremity   • Chronic midline low back pain with left-sided sciatica   • Spinal stenosis of lumbar region with neurogenic claudication   • Degenerative disc disease, lumbar   • Bilateral leg weakness   • History of spinal cord injury   • Incontinence   • Hyperlipidemia   • Hypothyroidism   • Cocaine abuse   • CVA (cerebral vascular accident)   • Drowsiness   • Hyperkalemia   • Normocytic anemia due to blood loss   • Preop testing   • Steroid-induced hyperglycemia   • Wheezing     Vitals:    Vitals:    04/24/23 1624   BP: 118/72   Pulse: 118   SpO2: 97%   Weight: 46.5 kg (102 lb 8 oz)   Height: 160 cm (63\")     Body mass index is 18.16 kg/m².      Current Outpatient Medications:   •  atorvastatin (LIPITOR) 40 MG tablet, TAKE ONE TABLET BY MOUTH AT BEDTIME, Disp: 90 tablet, Rfl: 1  •  baclofen (LIORESAL) 10 MG tablet, TAKE ONE TABLET BY MOUTH THREE TIMES DAILY, Disp: 90 tablet, Rfl: 0  •  gabapentin (NEURONTIN) 100 MG capsule, Take 2 capsules by mouth 3 (Three) Times a Day., Disp: 180 capsule, Rfl: 0  •  Incontinence Supply Disposable (Comfort Protect Adult Diaper/M) misc, 1 each 4 (Four) Times a Day As Needed (urination)., Disp: 120 each, Rfl: 2  •  mupirocin (BACTROBAN) 2 % ointment, apply TO right outer ear twice daily, Disp: 22 g, Rfl: 1  •  NIFEdipine CC (ADALAT CC) 60 MG 24 hr tablet, Take 1 tablet by mouth Daily., Disp: 90 tablet, Rfl: 1  •  traZODone (DESYREL) 50 MG tablet, Take 1 tablet by mouth every night at bedtime., Disp: 90 tablet, Rfl: 0    Patient Active Problem List   Diagnosis   • Vitamin D deficiency   • Vertigo   • Neuropathy   • Restless leg syndrome   • Pain in joint   • Allergic rhinitis   • Essential hypertension   • Abnormal EEG   • Cerebrovascular accident (CVA) due to thrombosis of left carotid artery   • Illicit drug use   • Tobacco abuse   • Sleep apnea   • Neck pain   • Vitamin deficiency   • Acute otitis externa of right ear   • Rotator cuff strain " "  • Acute pain of right shoulder   • Closed fracture of distal end of right fibula   • Right ankle pain   • Allergic reaction   • Chronic pain of left ankle   • Closed fracture of talus   • Hypotension   • Left leg pain   • Acute pain of left shoulder   • Closed nondisplaced fracture of lateral malleolus of left fibula   • Closed nondisplaced fracture of lateral malleolus of left fibula with routine healing   • Renal insufficiency   • ESR raised   • Pain   • Left foot pain   • Closed nondisplaced fracture of fifth left metatarsal bone   • Cause of injury, fall   • Ankle weakness   • Joint pain in fingers of left hand   • Joint pain in fingers of right hand   • Bilateral thumb pain   • Bilateral hand pain   • Numbness and tingling in both hands   • Bilateral carpal tunnel syndrome   • Bilateral arm pain   • Bilateral leg pain   • Decreased strength   • Acute URI   • Cough   • Left hip pain   • BMI 27.0-27.9,adult   • Disorder of SI (sacroiliac) joint   • Mobility impaired   • Frequent falls   • Injury of sacrum   • Chronic pain of both lower extremities   • Edema of spinal cord (HCC)   • Radicular pain of left lower extremity   • Chronic midline low back pain with left-sided sciatica   • Spinal stenosis of lumbar region with neurogenic claudication   • Degenerative disc disease, lumbar   • Bilateral leg weakness   • History of spinal cord injury   • Incontinence   • Hyperlipidemia   • Hypothyroidism   • Cocaine abuse   • CVA (cerebral vascular accident)   • Drowsiness   • Hyperkalemia   • Normocytic anemia due to blood loss   • Preop testing   • Steroid-induced hyperglycemia   • Wheezing     Past Surgical History:   Procedure Laterality Date   • BREAST BIOPSY     • NECK SURGERY      x2 \"put rods in her neck\"   • OTHER SURGICAL HISTORY  07/07/2014    Destruction of Benign Lesion (1-14)   • TUBAL ABDOMINAL LIGATION       Social History     Socioeconomic History   • Marital status:    Tobacco Use   • Smoking " status: Every Day     Packs/day: 0.25     Years: 40.00     Pack years: 10.00     Types: Cigarettes   • Smokeless tobacco: Never   • Tobacco comments:     4-5 cigarettes daily   Vaping Use   • Vaping Use: Former   Substance and Sexual Activity   • Alcohol use: Not Currently     Comment: rarely   • Drug use: Yes     Frequency: 4.0 times per week     Types: Cocaine(coke)     Comment: 4 days a week   • Sexual activity: Defer     Family History   Problem Relation Age of Onset   • Alzheimer's disease Other    • Bipolar disorder Other    • Depression Other    • Diabetes Other    • Heart disease Other    • Hypertension Other    • Lung cancer Other    • Migraines Other    • Sickle cell anemia Other    • Stroke Other    • Lung disease Other    • No Known Problems Mother    • Alzheimer's disease Father    • Heart attack Father    • Throat cancer Father    • No Known Problems Daughter      No visits with results within 6 Month(s) from this visit.   Latest known visit with results is:   Results Encounter on 10/17/2022   Component Date Value Ref Range Status   • Cologuard 11/22/2022 Negative  Negative Final    Comment:   NEGATIVE TEST RESULT. A negative Cologuard result indicates a low likelihood that a colorectal cancer (CRC) or advanced adenoma (adenomatous polyps with more advanced pre-malignant features)  is present. The chance that a person with a negative Cologuard test has a colorectal cancer is less than 1 in 1500 (negative predictive value >99.9%) or has an  advanced adenoma is less than  5.3% (negative predictive value 94.7%). These data are based on a prospective cross-sectional study of 10,000 individuals at average risk for colorectal cancer who were screened with both Cologuard and colonoscopy. (Melchor Vick al, N Engl J Med 2014;370(14):6138-9296) The normal value (reference range) for this assay is negative.    COLOGUARD RE-SCREENING RECOMMENDATION: Periodic colorectal cancer screening is an important part of  preventive healthcare for asymptomatic individuals at average risk for colorectal cancer.  Following a negative Cologuard result, the American Cancer Society and U.S.                            Multi-Society Task Force screening guidelines recommend a Cologuard re-screening interval of 3 years.   References: American Cancer Society Guideline for Colorectal Cancer Screening: https://www.cancer.org/cancer/colon-rectal-cancer/ewhayqtpo-beylnrerx-zebaton/acs-recommendations.html.; Frankie DK, James STEPHENS, Choco LYONK, Colorectal Cancer Screening: Recommendations for Physicians and Patients from the U.S. Multi-Society Task Force on Colorectal Cancer Screening , Am J Gastroenterology 2017; 112:8484-5170.    TEST DESCRIPTION: Composite algorithmic analysis of stool DNA-biomarkers with hemoglobin immunoassay.   Quantitative values of individual biomarkers are not reportable and are not associated with individual biomarker result reference ranges. Cologuard is intended for colorectal cancer screening of adults of either sex, 45 years or older, who are at average-risk for colorectal cancer (CRC). Cologuard has been approved for use by the U.S. FDA. The performance of Cologuard was                            established in a cross sectional study of average-risk adults aged 50-84. Cologuard performance in patients ages 45 to 49 years was estimated by sub-group analysis of near-age groups. Colonoscopies performed for a positive result may find as the most clinically significant lesion: colorectal cancer [4.0%], advanced adenoma (including sessile serrated polyps greater than or equal to 1cm diameter) [20%] or non- advanced adenoma [31%]; or no colorectal neoplasia [45%]. These estimates are derived from a prospective cross-sectional screening study of 10,000 individuals at average risk for colorectal cancer who were screened with both Cologuard and colonoscopy. (Melchor Vick al, N Engl J Med 2014;370(14):4735-0914.) Cologuard may  produce a false negative or false positive result (no colorectal cancer or precancerous polyp present at colonoscopy follow up). A negative Cologuard test result does not guarantee the absence of CRC or advanced adenoma (pre-cancer). The current Cologuard                            screening interval is every 3 years. (American Cancer Society and U.S. Multi-Society Task Force). Cologuard performance data in a 10,000 patient pivotal study using colonoscopy as the reference method can be accessed at the following location: www.[employer]/results. Additional description of the Cologuard test process, warnings and precautions can be found at www.cologuard.com.        XR Ribs Left With PA Chest  Narrative: Procedure: Left    ribs    Indication: Left rib pain. Pleurodynia     Technique: Four    views    Prior Relevant Exam: Chest x-ray May, 2015.        FINDINGS: Minimally displaced fracture anterolateral aspect left  ninth rib. Left ribs are otherwise unremarkable.    The lung fields are clear. There is no consolidation or  pneumothorax.    Multilevel anterior and posterior cervical fusion stabilized by  surgical hardware.  Impression: Minimally displaced fracture anterolateral aspect  left ninth rib.    Electronically signed by:  Jake Whitaker MD  1/27/2021 9:16 AM [facility]  Workstation: [id number]      @[email or web address]@  Immunization History   Administered Date(s) Administered   • COVID-19 (MODERNA) 1st,2nd,3rd Dose Monovalent 12/02/2021   • Flu Vaccine Quad PF >36MO 10/08/2014, 11/23/2015, 10/26/2016, 09/13/2018, 09/18/2019   • FluLaval/Fluzone >6mos 10/17/2022   • Influenza, Unspecified 10/17/2022   • PPD Test 07/01/2020   • Pneumococcal Conjugate 13-Valent (PCV13) 10/26/2016   • Td 04/23/2007   • Tetanus 06/17/2014, 06/17/2014   • Zostavax 03/14/2017     The following portions of the patient's history were reviewed and updated as appropriate: allergies, current medications, past family history, past medical history, past  "social history, past surgical history and problem list.    PHQ-9 Total Score:             Physical Exam    Inject Trigger Points, > 3    Date/Time: 4/24/2023 4:55 PM  Performed by: Richar Gonzalez MD  Authorized by: Richar Gonzalez MD   Consent: Verbal consent obtained.  Risks and benefits: risks, benefits and alternatives were discussed  Consent given by: patient  Patient understanding: patient states understanding of the procedure being performed  Patient consent: the patient's understanding of the procedure matches consent given  Time out: Immediately prior to procedure a \"time out\" was called to verify the correct patient, procedure, equipment, support staff and site/side marked as required.  Preparation: Patient was prepped and draped in the usual sterile fashion.  Local anesthesia used: yes  Anesthesia: local infiltration    Anesthesia:  Local anesthesia used: yes  Local Anesthetic: lidocaine 1% without epinephrine        Assessment & Plan    Diagnosis Plan   1. At high risk for injury related to fall  Ambulatory Referral to Physical Therapy Evaluate and treat      2. Neck pain  gabapentin (NEURONTIN) 100 MG capsule    Ambulatory Referral to Physical Therapy Evaluate and treat    lidocaine (XYLOCAINE) 1 % injection 4 mL    Inject Trigger Points, > 3         Orders Placed This Encounter   Procedures   • Inject Trigger Points, > 3     This order was created via procedure documentation     Order Specific Question:   Release to patient     Answer:   Routine Release   • Ambulatory Referral to Physical Therapy Evaluate and treat     Referral Priority:   Routine     Referral Type:   Physical Therapy     Referral Reason:   Specialty Services Required     Requested Specialty:   Physical Therapy     Number of Visits Requested:   1     Neck pain; as above, due to concern for myofascial pain, previous benefit from trigger point injection, decision made to perform procedure.  Additionally, due to ongoing fall risk, " weakness, deconditioning, chronic pain, patient would benefit from physical therapy, agreeable to treatment.  Lastly, on gabapentin 200 mg 3 times daily, no signs of abuse, misuse, Ranjan reviewed and appropriate, will refill.  Follow-up in 6 weeks or sooner if needed.        This document has been electronically signed by Richar Gonzalez MD on May 8, 2023 21:45 CDT    EMR Dragon/Transciption Disclaimer: Some of this note may be an electronic transcription/translation of spoken language to printed text.  The electronic translation of spoken language may permit erroneous, or at times, nonsensical words or phrases to be inadvertently transcribed. Although I have reviewed the note for such errors, some may still exist.

## 2023-06-05 ENCOUNTER — OFFICE VISIT (OUTPATIENT)
Dept: FAMILY MEDICINE CLINIC | Facility: CLINIC | Age: 63
End: 2023-06-05
Payer: COMMERCIAL

## 2023-06-05 VITALS
DIASTOLIC BLOOD PRESSURE: 68 MMHG | WEIGHT: 98.4 LBS | BODY MASS INDEX: 17.43 KG/M2 | OXYGEN SATURATION: 95 % | HEART RATE: 81 BPM | HEIGHT: 63 IN | TEMPERATURE: 97.6 F | SYSTOLIC BLOOD PRESSURE: 118 MMHG

## 2023-06-05 DIAGNOSIS — R63.4 UNINTENTIONAL WEIGHT LOSS: Primary | ICD-10-CM

## 2023-06-05 DIAGNOSIS — I10 ESSENTIAL HYPERTENSION: Chronic | ICD-10-CM

## 2023-06-05 DIAGNOSIS — M54.2 NECK PAIN: Chronic | ICD-10-CM

## 2023-06-05 DIAGNOSIS — Z12.4 CERVICAL CANCER SCREENING: ICD-10-CM

## 2023-06-05 DIAGNOSIS — G47.00 INSOMNIA, UNSPECIFIED TYPE: Chronic | ICD-10-CM

## 2023-06-05 PROCEDURE — 99214 OFFICE O/P EST MOD 30 MIN: CPT | Performed by: STUDENT IN AN ORGANIZED HEALTH CARE EDUCATION/TRAINING PROGRAM

## 2023-06-05 PROCEDURE — 1160F RVW MEDS BY RX/DR IN RCRD: CPT | Performed by: STUDENT IN AN ORGANIZED HEALTH CARE EDUCATION/TRAINING PROGRAM

## 2023-06-05 PROCEDURE — 3078F DIAST BP <80 MM HG: CPT | Performed by: STUDENT IN AN ORGANIZED HEALTH CARE EDUCATION/TRAINING PROGRAM

## 2023-06-05 PROCEDURE — 3074F SYST BP LT 130 MM HG: CPT | Performed by: STUDENT IN AN ORGANIZED HEALTH CARE EDUCATION/TRAINING PROGRAM

## 2023-06-05 PROCEDURE — 1159F MED LIST DOCD IN RCRD: CPT | Performed by: STUDENT IN AN ORGANIZED HEALTH CARE EDUCATION/TRAINING PROGRAM

## 2023-06-05 RX ORDER — TRAZODONE HYDROCHLORIDE 50 MG/1
100 TABLET ORAL
Qty: 90 TABLET | Refills: 1 | Status: SHIPPED | OUTPATIENT
Start: 2023-06-05

## 2023-06-05 RX ORDER — NIFEDIPINE 60 MG/1
60 TABLET, FILM COATED, EXTENDED RELEASE ORAL DAILY
Qty: 90 TABLET | Refills: 1 | Status: SHIPPED | OUTPATIENT
Start: 2023-06-05

## 2023-06-05 RX ORDER — BACLOFEN 10 MG/1
10 TABLET ORAL 3 TIMES DAILY
Qty: 90 TABLET | Refills: 0 | Status: SHIPPED | OUTPATIENT
Start: 2023-06-05

## 2023-06-05 NOTE — PROGRESS NOTES
Subjective:  Jyoti Guerra Person is a 62 y.o. female who presents for     Unintentional weight loss; states that has been an issue for the past year. States eats healthy diet, meats, vegetables, proteins, snacks. Denied any abdominal symptoms, nausea, vomiting or diarrhea. Does admit to some cocaine use, a couple of times per week.     Patient Active Problem List   Diagnosis    Vitamin D deficiency    Vertigo    Neuropathy    Restless leg syndrome    Pain in joint    Allergic rhinitis    Essential hypertension    Abnormal EEG    Cerebrovascular accident (CVA) due to thrombosis of left carotid artery    Illicit drug use    Tobacco abuse    Sleep apnea    Neck pain    Vitamin deficiency    Acute otitis externa of right ear    Rotator cuff strain    Acute pain of right shoulder    Closed fracture of distal end of right fibula    Right ankle pain    Allergic reaction    Chronic pain of left ankle    Closed fracture of talus    Hypotension    Left leg pain    Acute pain of left shoulder    Closed nondisplaced fracture of lateral malleolus of left fibula    Closed nondisplaced fracture of lateral malleolus of left fibula with routine healing    Renal insufficiency    ESR raised    Pain    Left foot pain    Closed nondisplaced fracture of fifth left metatarsal bone    Cause of injury, fall    Ankle weakness    Joint pain in fingers of left hand    Joint pain in fingers of right hand    Bilateral thumb pain    Bilateral hand pain    Numbness and tingling in both hands    Bilateral carpal tunnel syndrome    Bilateral arm pain    Bilateral leg pain    Decreased strength    Acute URI    Cough    Left hip pain    BMI 27.0-27.9,adult    Disorder of SI (sacroiliac) joint    Mobility impaired    Frequent falls    Injury of sacrum    Chronic pain of both lower extremities    Edema of spinal cord (HCC)    Radicular pain of left lower extremity    Chronic midline low back pain with left-sided sciatica    Spinal stenosis of lumbar  "region with neurogenic claudication    Degenerative disc disease, lumbar    Bilateral leg weakness    History of spinal cord injury    Incontinence    Hyperlipidemia    Hypothyroidism    Cocaine abuse    CVA (cerebral vascular accident)    Drowsiness    Hyperkalemia    Normocytic anemia due to blood loss    Preop testing    Steroid-induced hyperglycemia    Wheezing     Vitals:    Vitals:    06/05/23 1648   BP: 118/68   Pulse: 81   Temp: 97.6 °F (36.4 °C)   TempSrc: Oral   SpO2: 95%   Weight: 44.6 kg (98 lb 6.4 oz)   Height: 160 cm (63\")     Body mass index is 17.43 kg/m².      Current Outpatient Medications:     atorvastatin (LIPITOR) 40 MG tablet, TAKE ONE TABLET BY MOUTH AT BEDTIME, Disp: 90 tablet, Rfl: 1    baclofen (LIORESAL) 10 MG tablet, TAKE ONE TABLET BY MOUTH THREE TIMES DAILY, Disp: 90 tablet, Rfl: 0    gabapentin (NEURONTIN) 100 MG capsule, Take 2 capsules by mouth 3 (Three) Times a Day., Disp: 180 capsule, Rfl: 0    Incontinence Supply Disposable (Comfort Protect Adult Diaper/M) misc, 1 each 4 (Four) Times a Day As Needed (urination)., Disp: 120 each, Rfl: 2    mupirocin (BACTROBAN) 2 % ointment, apply TO right outer ear twice daily, Disp: 22 g, Rfl: 1    NIFEdipine CC (ADALAT CC) 60 MG 24 hr tablet, Take 1 tablet by mouth Daily., Disp: 90 tablet, Rfl: 1    traZODone (DESYREL) 50 MG tablet, Take 1 tablet by mouth every night at bedtime., Disp: 90 tablet, Rfl: 0    Patient Active Problem List   Diagnosis    Vitamin D deficiency    Vertigo    Neuropathy    Restless leg syndrome    Pain in joint    Allergic rhinitis    Essential hypertension    Abnormal EEG    Cerebrovascular accident (CVA) due to thrombosis of left carotid artery    Illicit drug use    Tobacco abuse    Sleep apnea    Neck pain    Vitamin deficiency    Acute otitis externa of right ear    Rotator cuff strain    Acute pain of right shoulder    Closed fracture of distal end of right fibula    Right ankle pain    Allergic reaction    Chronic " "pain of left ankle    Closed fracture of talus    Hypotension    Left leg pain    Acute pain of left shoulder    Closed nondisplaced fracture of lateral malleolus of left fibula    Closed nondisplaced fracture of lateral malleolus of left fibula with routine healing    Renal insufficiency    ESR raised    Pain    Left foot pain    Closed nondisplaced fracture of fifth left metatarsal bone    Cause of injury, fall    Ankle weakness    Joint pain in fingers of left hand    Joint pain in fingers of right hand    Bilateral thumb pain    Bilateral hand pain    Numbness and tingling in both hands    Bilateral carpal tunnel syndrome    Bilateral arm pain    Bilateral leg pain    Decreased strength    Acute URI    Cough    Left hip pain    BMI 27.0-27.9,adult    Disorder of SI (sacroiliac) joint    Mobility impaired    Frequent falls    Injury of sacrum    Chronic pain of both lower extremities    Edema of spinal cord (HCC)    Radicular pain of left lower extremity    Chronic midline low back pain with left-sided sciatica    Spinal stenosis of lumbar region with neurogenic claudication    Degenerative disc disease, lumbar    Bilateral leg weakness    History of spinal cord injury    Incontinence    Hyperlipidemia    Hypothyroidism    Cocaine abuse    CVA (cerebral vascular accident)    Drowsiness    Hyperkalemia    Normocytic anemia due to blood loss    Preop testing    Steroid-induced hyperglycemia    Wheezing     Past Surgical History:   Procedure Laterality Date    BREAST BIOPSY      NECK SURGERY      x2 \"put rods in her neck\"    OTHER SURGICAL HISTORY  07/07/2014    Destruction of Benign Lesion (1-14)    TUBAL ABDOMINAL LIGATION       Social History     Socioeconomic History    Marital status:    Tobacco Use    Smoking status: Every Day     Packs/day: 0.25     Years: 40.00     Pack years: 10.00     Types: Cigarettes    Smokeless tobacco: Never    Tobacco comments:     4-5 cigarettes daily   Vaping Use    Vaping " Use: Former   Substance and Sexual Activity    Alcohol use: Not Currently     Comment: rarely    Drug use: Yes     Frequency: 4.0 times per week     Types: Cocaine(coke)     Comment: 4 days a week    Sexual activity: Defer     Family History   Problem Relation Age of Onset    Alzheimer's disease Other     Bipolar disorder Other     Depression Other     Diabetes Other     Heart disease Other     Hypertension Other     Lung cancer Other     Migraines Other     Sickle cell anemia Other     Stroke Other     Lung disease Other     No Known Problems Mother     Alzheimer's disease Father     Heart attack Father     Throat cancer Father     No Known Problems Daughter      No visits with results within 6 Month(s) from this visit.   Latest known visit with results is:   Results Encounter on 10/17/2022   Component Date Value Ref Range Status    Cologuard 11/22/2022 Negative  Negative Final    Comment:   NEGATIVE TEST RESULT. A negative Cologuard result indicates a low likelihood that a colorectal cancer (CRC) or advanced adenoma (adenomatous polyps with more advanced pre-malignant features)  is present. The chance that a person with a negative Cologuard test has a colorectal cancer is less than 1 in 1500 (negative predictive value >99.9%) or has an  advanced adenoma is less than  5.3% (negative predictive value 94.7%). These data are based on a prospective cross-sectional study of 10,000 individuals at average risk for colorectal cancer who were screened with both Cologuard and colonoscopy. (Melchor BERGMAN. et al, N Engl J Med 2014;370(14):8599-9990) The normal value (reference range) for this assay is negative.    COLOGUARD RE-SCREENING RECOMMENDATION: Periodic colorectal cancer screening is an important part of preventive healthcare for asymptomatic individuals at average risk for colorectal cancer.  Following a negative Cologuard result, the American Cancer Society and U.S.                            Multi-Society Task Force  screening guidelines recommend a Cologuard re-screening interval of 3 years.   References: American Cancer Society Guideline for Colorectal Cancer Screening: https://www.cancer.org/cancer/colon-rectal-cancer/zdqeddsfs-rjgeiqnmm-toshsbf/acs-recommendations.html.; Frankie ERVIN, James STEPHENS, Choco SONI, Colorectal Cancer Screening: Recommendations for Physicians and Patients from the U.S. Multi-Society Task Force on Colorectal Cancer Screening , Am J Gastroenterology 2017; 112:7821-2551.    TEST DESCRIPTION: Composite algorithmic analysis of stool DNA-biomarkers with hemoglobin immunoassay.   Quantitative values of individual biomarkers are not reportable and are not associated with individual biomarker result reference ranges. Cologuard is intended for colorectal cancer screening of adults of either sex, 45 years or older, who are at average-risk for colorectal cancer (CRC). Cologuard has been approved for use by the U.S. FDA. The performance of Cologuard was                            established in a cross sectional study of average-risk adults aged 50-84. Cologuard performance in patients ages 45 to 49 years was estimated by sub-group analysis of near-age groups. Colonoscopies performed for a positive result may find as the most clinically significant lesion: colorectal cancer [4.0%], advanced adenoma (including sessile serrated polyps greater than or equal to 1cm diameter) [20%] or non- advanced adenoma [31%]; or no colorectal neoplasia [45%]. These estimates are derived from a prospective cross-sectional screening study of 10,000 individuals at average risk for colorectal cancer who were screened with both Cologuard and colonoscopy. (Melchor iVck al, N Engl J Med 2014;370(14):0318-4829.) Cologuard may produce a false negative or false positive result (no colorectal cancer or precancerous polyp present at colonoscopy follow up). A negative Cologuard test result does not guarantee the absence of CRC or advanced adenoma  (pre-cancer). The current Cologuard                            screening interval is every 3 years. (American Cancer Society and U.S. Multi-Society Task Force). Cologuard performance data in a 10,000 patient pivotal study using colonoscopy as the reference method can be accessed at the following location: www.[employer]/results. Additional description of the Cologuard test process, warnings and precautions can be found at www.[employer].        XR Ribs Left With PA Chest  Narrative: Procedure: Left    ribs    Indication: Left rib pain. Pleurodynia     Technique: Four    views    Prior Relevant Exam: Chest x-ray May, 2015.        FINDINGS: Minimally displaced fracture anterolateral aspect left  ninth rib. Left ribs are otherwise unremarkable.    The lung fields are clear. There is no consolidation or  pneumothorax.    Multilevel anterior and posterior cervical fusion stabilized by  surgical hardware.  Impression: Minimally displaced fracture anterolateral aspect  left ninth rib.    Electronically signed by:  Jake Whitaker MD  1/27/2021 9:16 AM [facility]  Workstation: [id number]      @[email or web address]@  Immunization History   Administered Date(s) Administered    COVID-19 (MODERNA) 1st,2nd,3rd Dose Monovalent 12/02/2021    Flu Vaccine Quad PF >36MO 10/08/2014, 11/23/2015, 10/26/2016, 09/13/2018, 09/18/2019    FluLaval/Fluzone >6mos 10/08/2014, 11/23/2015, 10/26/2016, 09/13/2018, 09/18/2019, 10/17/2022    Influenza, Unspecified 10/17/2022    PPD Test 07/01/2020    Pneumococcal Conjugate 13-Valent (PCV13) 10/26/2016    Td 04/23/2007    Tetanus 06/17/2014, 06/17/2014    Tetanus Toxoid 06/17/2014    Zostavax 03/14/2017     The following portions of the patient's history were reviewed and updated as appropriate: allergies, current medications, past family history, past medical history, past social history, past surgical history and problem list.    PHQ-9 Total Score: 11         Physical Exam  Constitutional:       Appearance: Normal  appearance.   HENT:      Head: Normocephalic and atraumatic.      Right Ear: External ear normal.      Left Ear: External ear normal.   Eyes:      General:         Right eye: No discharge.         Left eye: No discharge.      Conjunctiva/sclera: Conjunctivae normal.   Cardiovascular:      Rate and Rhythm: Normal rate and regular rhythm.      Pulses: Normal pulses.      Heart sounds: Normal heart sounds. No murmur heard.  Pulmonary:      Effort: Pulmonary effort is normal. No respiratory distress.      Breath sounds: Normal breath sounds.   Abdominal:      General: There is no distension.      Palpations: Abdomen is soft.      Tenderness: There is no abdominal tenderness.   Musculoskeletal:      Cervical back: Normal range of motion.      Right lower leg: No edema.      Left lower leg: No edema.   Lymphadenopathy:      Cervical: No cervical adenopathy.   Neurological:      Mental Status: She is alert. Mental status is at baseline.   Psychiatric:         Mood and Affect: Mood normal.         Behavior: Behavior normal.     Assessment & Plan    Diagnosis Plan   1. Unintentional weight loss  CBC & Differential    Comprehensive Metabolic Panel    Lipid Panel    TSH Rfx On Abnormal To Free T4    Vitamin B12    Folate    Chlamydia trachomatis, Neisseria gonorrhoeae, Trichomonas vaginalis, PCR - Urine, Urine, Clean Catch    RPR    HIV-1 / O / 2 Ag / Antibody 4th Generation    QuantiFERON TB Gold      2. Cervical cancer screening  Ambulatory Referral to Gynecology         Orders Placed This Encounter   Procedures    Chlamydia trachomatis, Neisseria gonorrhoeae, Trichomonas vaginalis, PCR - Urine, Urine, Clean Catch    Comprehensive Metabolic Panel     Order Specific Question:   Release to patient     Answer:   Immediate    Lipid Panel    TSH Rfx On Abnormal To Free T4    Vitamin B12     Order Specific Question:   Release to patient     Answer:   Routine Release    Folate     Order Specific Question:   Release to patient      Answer:   Routine Release    RPR    HIV-1 / O / 2 Ag / Antibody 4th Generation    QuantiFERON TB Gold     Order Specific Question:   Release to patient     Answer:   Routine Release    Ambulatory Referral to Gynecology     Referral Priority:   Routine     Referral Type:   Consultation     Referral Reason:   Specialty Services Required     Requested Specialty:   Gynecology     Number of Visits Requested:   1    CBC & Differential     Order Specific Question:   Manual Differential     Answer:   No     Unintentional weight loss; Obtain labs as above, obtain screenings as above, treat as indicated. Does have past and current history of cocaine use. Counseled risks of illicit drug use, discussed that it is contributing to her weight loss. Vitals stable, exam benign, given strict ER/return precautions.         This document has been electronically signed by Richar Gonzalez MD on June 5, 2023 17:03 CDT    EMR Dragon/Transciption Disclaimer: Some of this note may be an electronic transcription/translation of spoken language to printed text.  The electronic translation of spoken language may permit erroneous, or at times, nonsensical words or phrases to be inadvertently transcribed. Although I have reviewed the note for such errors, some may still exist.

## 2023-07-21 ENCOUNTER — TELEPHONE (OUTPATIENT)
Dept: FAMILY MEDICINE CLINIC | Facility: CLINIC | Age: 63
End: 2023-07-21
Payer: COMMERCIAL

## 2023-07-21 NOTE — TELEPHONE ENCOUNTER
Daljit with [facility] is wanting to know if a fax has been received for patient.    Please return call at [phone]

## 2023-07-25 NOTE — TELEPHONE ENCOUNTER
Spoke to Laura to let her know paperwork was received and will fax it as soon as it is completed.  She verbalized understanding.

## 2023-07-28 ENCOUNTER — OFFICE VISIT (OUTPATIENT)
Dept: OTOLARYNGOLOGY | Facility: CLINIC | Age: 63
End: 2023-07-28
Payer: COMMERCIAL

## 2023-07-28 VITALS — TEMPERATURE: 97.3 F | HEIGHT: 63 IN | WEIGHT: 102 LBS | BODY MASS INDEX: 18.07 KG/M2

## 2023-07-28 DIAGNOSIS — H60.391 ACUTE INFECTIVE OTITIS EXTERNA, RIGHT: Primary | ICD-10-CM

## 2023-07-28 PROCEDURE — 1160F RVW MEDS BY RX/DR IN RCRD: CPT | Performed by: OTOLARYNGOLOGY

## 2023-07-28 PROCEDURE — 99214 OFFICE O/P EST MOD 30 MIN: CPT | Performed by: OTOLARYNGOLOGY

## 2023-07-28 PROCEDURE — 1159F MED LIST DOCD IN RCRD: CPT | Performed by: OTOLARYNGOLOGY

## 2023-07-28 RX ORDER — OFLOXACIN 3 MG/ML
5 SOLUTION AURICULAR (OTIC) 2 TIMES DAILY
Qty: 10 ML | Refills: 0 | Status: SHIPPED | OUTPATIENT
Start: 2023-07-28

## 2023-07-28 NOTE — PROGRESS NOTES
Hattie Guerra Person is a 62 y.o. female.       History of Present Illness     Patient with a history of recurring otitis externa as well as a history of manipulating her ear asked for a work in today because she thinks her ear is infected.  She believes she has cotton in her ear and has been trying to get this out using tweezers leading to pain and drainage.    The following portions of the patient's history were reviewed and updated as appropriate: allergies, current medications, past family history, past medical history, past social history, past surgical history and problem list.     reports that she has been smoking cigarettes. She has a 10.00 pack-year smoking history. She has never used smokeless tobacco. She reports that she does not currently use alcohol. She reports current drug use. Frequency: 4.00 times per week. Drug: Cocaine(coke).   Patient is a tobacco user and has been counseled for use of tobacco products      Review of Systems        Objective   Physical Exam  Right ear shows some excoriation of the conchal bowl.  There is mucopurulent discharge in the canal that is cleaned under the microscope.  The skin of the external os and lateral canal show evidence of chronic trauma and fibroma formation with no evidence of granulation tissue or malignancy.  Tympanic membrane is intact.  Left ear no discharge.  Tympanic membrane intact         Assessment and Plan   Diagnoses and all orders for this visit:    1. Acute infective otitis externa, right (Primary)    Other orders  -     ofloxacin (FLOXIN) 0.3 % otic solution; Administer 5 drops to the right ear 2 (Two) Times a Day.  Dispense: 10 mL; Refill: 0  -     mupirocin (BACTROBAN) 2 % ointment; apply TO right outer ear twice daily  Dispense: 22 g; Refill: 1             Plan: Ear cleaned as described above.  Reassured the patient she does not have cotton in her ear.  We will prescribe ofloxacin drops and topical mupirocin and told her once  again that she absolutely must not manipulate her ear with any kind of objects.  Return in 2 weeks.  Call for problems.

## 2023-08-01 ENCOUNTER — TELEPHONE (OUTPATIENT)
Dept: FAMILY MEDICINE CLINIC | Facility: CLINIC | Age: 63
End: 2023-08-01
Payer: COMMERCIAL

## 2023-08-11 ENCOUNTER — OFFICE VISIT (OUTPATIENT)
Dept: OTOLARYNGOLOGY | Facility: CLINIC | Age: 63
End: 2023-08-11
Payer: COMMERCIAL

## 2023-08-11 VITALS — BODY MASS INDEX: 18.07 KG/M2 | WEIGHT: 102 LBS | HEIGHT: 63 IN

## 2023-08-11 DIAGNOSIS — H60.61 CHRONIC NON-INFECTIVE OTITIS EXTERNA OF RIGHT EAR, UNSPECIFIED TYPE: Primary | ICD-10-CM

## 2023-08-11 PROCEDURE — 1160F RVW MEDS BY RX/DR IN RCRD: CPT | Performed by: OTOLARYNGOLOGY

## 2023-08-11 PROCEDURE — 99213 OFFICE O/P EST LOW 20 MIN: CPT | Performed by: OTOLARYNGOLOGY

## 2023-08-11 PROCEDURE — 1159F MED LIST DOCD IN RCRD: CPT | Performed by: OTOLARYNGOLOGY

## 2023-08-11 NOTE — PROGRESS NOTES
Hattie Guerra Person is a 63 y.o. female.       History of Present Illness   Patient has a history of chronic otitis externa and also has a history of manipulating her right ear.  Was seen 2 weeks ago with an acute infective otitis externa and was treated with ofloxacin and mupirocin.  Says she is doing better and she has been leaving it alone and not manipulating it      The following portions of the patient's history were reviewed and updated as appropriate: allergies, current medications, past family history, past medical history, past social history, past surgical history and problem list.     reports that she has been smoking cigarettes. She has a 10.00 pack-year smoking history. She has never used smokeless tobacco. She reports that she does not currently use alcohol. She reports current drug use. Frequency: 4.00 times per week. Drug: Cocaine(coke).   Patient is not a tobacco user and has not been counseled for use of tobacco products      Review of Systems        Objective   Physical Exam    Right ear is markedly improved with no excoriation or crusting and no discharge in the canal.  Tympanic membrane intact and clear.  Left ear no discharge.  Tympanic membrane intact       Assessment and Plan   Diagnoses and all orders for this visit:    1. Chronic non-infective otitis externa of right ear, unspecified type (Primary)             Plan: Reassurance to the patient that she was improved.  She may discontinue the the eardrops and mupirocin for now.  Strongly urged her to refrain from manipulating her ear and advised her to return in 3 months so that if she does have some recurrent squamous debris I can clean it for her.

## 2023-09-17 DIAGNOSIS — E78.5 HYPERLIPIDEMIA, UNSPECIFIED HYPERLIPIDEMIA TYPE: Chronic | ICD-10-CM

## 2023-09-18 RX ORDER — ATORVASTATIN CALCIUM 40 MG/1
TABLET, FILM COATED ORAL
Qty: 90 TABLET | Refills: 1 | OUTPATIENT
Start: 2023-09-18

## 2024-04-04 NOTE — TELEPHONE ENCOUNTER
Called and spoke to Neema with [facility] who stated declined because of insurance.    No answer left message to call office